# Patient Record
Sex: MALE | Race: OTHER | Employment: FULL TIME | URBAN - METROPOLITAN AREA
[De-identification: names, ages, dates, MRNs, and addresses within clinical notes are randomized per-mention and may not be internally consistent; named-entity substitution may affect disease eponyms.]

---

## 2022-01-01 ENCOUNTER — HOSPITAL ENCOUNTER (INPATIENT)
Age: 62
LOS: 14 days | DRG: 870 | End: 2022-09-14
Attending: EMERGENCY MEDICINE | Admitting: FAMILY MEDICINE
Payer: COMMERCIAL

## 2022-01-01 ENCOUNTER — APPOINTMENT (OUTPATIENT)
Dept: CT IMAGING | Age: 62
DRG: 870 | End: 2022-01-01
Attending: EMERGENCY MEDICINE
Payer: COMMERCIAL

## 2022-01-01 ENCOUNTER — APPOINTMENT (OUTPATIENT)
Dept: GENERAL RADIOLOGY | Age: 62
DRG: 870 | End: 2022-01-01
Attending: INTERNAL MEDICINE
Payer: COMMERCIAL

## 2022-01-01 ENCOUNTER — APPOINTMENT (OUTPATIENT)
Dept: GENERAL RADIOLOGY | Age: 62
DRG: 870 | End: 2022-01-01
Attending: EMERGENCY MEDICINE
Payer: COMMERCIAL

## 2022-01-01 ENCOUNTER — APPOINTMENT (OUTPATIENT)
Dept: VASCULAR SURGERY | Age: 62
DRG: 870 | End: 2022-01-01
Attending: INTERNAL MEDICINE
Payer: COMMERCIAL

## 2022-01-01 ENCOUNTER — APPOINTMENT (OUTPATIENT)
Dept: MRI IMAGING | Age: 62
DRG: 870 | End: 2022-01-01
Attending: INTERNAL MEDICINE
Payer: COMMERCIAL

## 2022-01-01 ENCOUNTER — APPOINTMENT (OUTPATIENT)
Dept: NON INVASIVE DIAGNOSTICS | Age: 62
DRG: 870 | End: 2022-01-01
Attending: INTERNAL MEDICINE
Payer: COMMERCIAL

## 2022-01-01 ENCOUNTER — HOSPICE ADMISSION (OUTPATIENT)
Dept: HOSPICE | Facility: HOSPICE | Age: 62
End: 2022-01-01

## 2022-01-01 ENCOUNTER — APPOINTMENT (OUTPATIENT)
Dept: VASCULAR SURGERY | Age: 62
DRG: 870 | End: 2022-01-01
Attending: EMERGENCY MEDICINE
Payer: COMMERCIAL

## 2022-01-01 ENCOUNTER — APPOINTMENT (OUTPATIENT)
Dept: NON INVASIVE DIAGNOSTICS | Age: 62
DRG: 870 | End: 2022-01-01
Attending: EMERGENCY MEDICINE
Payer: COMMERCIAL

## 2022-01-01 ENCOUNTER — APPOINTMENT (OUTPATIENT)
Dept: CT IMAGING | Age: 62
DRG: 870 | End: 2022-01-01
Attending: INTERNAL MEDICINE
Payer: COMMERCIAL

## 2022-01-01 ENCOUNTER — APPOINTMENT (OUTPATIENT)
Dept: ULTRASOUND IMAGING | Age: 62
DRG: 870 | End: 2022-01-01
Attending: HOSPITALIST
Payer: COMMERCIAL

## 2022-01-01 VITALS
SYSTOLIC BLOOD PRESSURE: 126 MMHG | BODY MASS INDEX: 29.04 KG/M2 | WEIGHT: 202.82 LBS | HEIGHT: 70 IN | HEART RATE: 113 BPM | OXYGEN SATURATION: 94 % | DIASTOLIC BLOOD PRESSURE: 75 MMHG | TEMPERATURE: 100.3 F | RESPIRATION RATE: 21 BRPM

## 2022-01-01 DIAGNOSIS — I46.9 CARDIAC ARREST (HCC): Primary | ICD-10-CM

## 2022-01-01 DIAGNOSIS — N17.9 ACUTE RENAL FAILURE, UNSPECIFIED ACUTE RENAL FAILURE TYPE (HCC): ICD-10-CM

## 2022-01-01 DIAGNOSIS — R57.9 SHOCK CIRCULATORY (HCC): ICD-10-CM

## 2022-01-01 DIAGNOSIS — R33.9 URINARY RETENTION: ICD-10-CM

## 2022-01-01 LAB
ABO + RH BLD: NORMAL
ALBUMIN SERPL-MCNC: 1.7 G/DL (ref 3.4–5)
ALBUMIN SERPL-MCNC: 1.9 G/DL (ref 3.4–5)
ALBUMIN SERPL-MCNC: 2 G/DL (ref 3.4–5)
ALBUMIN SERPL-MCNC: 2.2 G/DL (ref 3.4–5)
ALBUMIN SERPL-MCNC: 2.3 G/DL (ref 3.4–5)
ALBUMIN SERPL-MCNC: 2.6 G/DL (ref 3.4–5)
ALBUMIN/GLOB SERPL: 0.4 {RATIO} (ref 0.8–1.7)
ALBUMIN/GLOB SERPL: 0.4 {RATIO} (ref 0.8–1.7)
ALBUMIN/GLOB SERPL: 0.5 {RATIO} (ref 0.8–1.7)
ALBUMIN/GLOB SERPL: 0.5 {RATIO} (ref 0.8–1.7)
ALBUMIN/GLOB SERPL: 0.6 {RATIO} (ref 0.8–1.7)
ALP SERPL-CCNC: 103 U/L (ref 45–117)
ALP SERPL-CCNC: 111 U/L (ref 45–117)
ALP SERPL-CCNC: 112 U/L (ref 45–117)
ALP SERPL-CCNC: 188 U/L (ref 45–117)
ALP SERPL-CCNC: 98 U/L (ref 45–117)
ALT SERPL-CCNC: 100 U/L (ref 16–61)
ALT SERPL-CCNC: 142 U/L (ref 16–61)
ALT SERPL-CCNC: 238 U/L (ref 16–61)
ALT SERPL-CCNC: 313 U/L (ref 16–61)
ALT SERPL-CCNC: 55 U/L (ref 16–61)
AMMONIA PLAS-SCNC: 18 UMOL/L (ref 11–32)
AMORPH CRY URNS QL MICRO: ABNORMAL
AMPHET UR QL SCN: NEGATIVE
ANION GAP BLD CALC-SCNC: 19 MMOL/L (ref 10–20)
ANION GAP SERPL CALC-SCNC: 10 MMOL/L (ref 3–18)
ANION GAP SERPL CALC-SCNC: 14 MMOL/L (ref 3–18)
ANION GAP SERPL CALC-SCNC: 15 MMOL/L (ref 3–18)
ANION GAP SERPL CALC-SCNC: 18 MMOL/L (ref 3–18)
ANION GAP SERPL CALC-SCNC: 4 MMOL/L (ref 3–18)
ANION GAP SERPL CALC-SCNC: 4 MMOL/L (ref 3–18)
ANION GAP SERPL CALC-SCNC: 5 MMOL/L (ref 3–18)
ANION GAP SERPL CALC-SCNC: 6 MMOL/L (ref 3–18)
ANION GAP SERPL CALC-SCNC: 6 MMOL/L (ref 3–18)
ANION GAP SERPL CALC-SCNC: 7 MMOL/L (ref 3–18)
ANION GAP SERPL CALC-SCNC: 8 MMOL/L (ref 3–18)
APPEARANCE UR: ABNORMAL
APPEARANCE UR: ABNORMAL
APTT PPP: 106.6 SEC (ref 23–36.4)
APTT PPP: 131 SEC (ref 23–36.4)
APTT PPP: 25.5 SEC (ref 23–36.4)
APTT PPP: 26 SEC (ref 23–36.4)
APTT PPP: 26.2 SEC (ref 23–36.4)
APTT PPP: 27.9 SEC (ref 23–36.4)
APTT PPP: 28.9 SEC (ref 23–36.4)
APTT PPP: 29.5 SEC (ref 23–36.4)
APTT PPP: 35.6 SEC (ref 23–36.4)
APTT PPP: >180 SEC (ref 23–36.4)
ARTERIAL PATENCY WRIST A: ABNORMAL
ARTERIAL PATENCY WRIST A: POSITIVE
AST SERPL-CCNC: 103 U/L (ref 10–38)
AST SERPL-CCNC: 111 U/L (ref 10–38)
AST SERPL-CCNC: 128 U/L (ref 10–38)
AST SERPL-CCNC: 275 U/L (ref 10–38)
AST SERPL-CCNC: 451 U/L (ref 10–38)
ATRIAL RATE: 112 BPM
BACTERIA SPEC CULT: NORMAL
BACTERIA SPEC CULT: NORMAL
BACTERIA URNS QL MICRO: ABNORMAL /HPF
BACTERIA URNS QL MICRO: ABNORMAL /HPF
BARBITURATES UR QL SCN: NEGATIVE
BASE DEFICIT BLD-SCNC: 1 MMOL/L
BASE DEFICIT BLD-SCNC: 4.7 MMOL/L
BASE DEFICIT BLD-SCNC: 5.1 MMOL/L
BASE DEFICIT BLD-SCNC: 8.7 MMOL/L
BASE DEFICIT BLD-SCNC: 8.8 MMOL/L
BASE EXCESS BLD CALC-SCNC: 0.8 MMOL/L
BASE EXCESS BLD CALC-SCNC: 1.7 MMOL/L
BASOPHILS # BLD: 0 K/UL (ref 0–0.1)
BASOPHILS # BLD: 0.1 K/UL (ref 0–0.1)
BASOPHILS NFR BLD: 0 % (ref 0–2)
BASOPHILS NFR BLD: 1 % (ref 0–2)
BDY SITE: ABNORMAL
BENZODIAZ UR QL: NEGATIVE
BILIRUB DIRECT SERPL-MCNC: 0.2 MG/DL (ref 0–0.2)
BILIRUB SERPL-MCNC: 0.4 MG/DL (ref 0.2–1)
BILIRUB SERPL-MCNC: 0.4 MG/DL (ref 0.2–1)
BILIRUB SERPL-MCNC: 0.5 MG/DL (ref 0.2–1)
BILIRUB SERPL-MCNC: 0.6 MG/DL (ref 0.2–1)
BILIRUB SERPL-MCNC: 0.6 MG/DL (ref 0.2–1)
BILIRUB UR QL: NEGATIVE
BILIRUB UR QL: NEGATIVE
BLOOD GROUP ANTIBODIES SERPL: NORMAL
BNP SERPL-MCNC: 142 PG/ML (ref 0–900)
BNP SERPL-MCNC: 262 PG/ML (ref 0–900)
BODY TEMPERATURE: 98
BODY TEMPERATURE: 99
BUN SERPL-MCNC: 30 MG/DL (ref 7–18)
BUN SERPL-MCNC: 31 MG/DL (ref 7–18)
BUN SERPL-MCNC: 31 MG/DL (ref 7–18)
BUN SERPL-MCNC: 32 MG/DL (ref 7–18)
BUN SERPL-MCNC: 32 MG/DL (ref 7–18)
BUN SERPL-MCNC: 34 MG/DL (ref 7–18)
BUN SERPL-MCNC: 39 MG/DL (ref 7–18)
BUN SERPL-MCNC: 40 MG/DL (ref 7–18)
BUN SERPL-MCNC: 42 MG/DL (ref 7–18)
BUN SERPL-MCNC: 43 MG/DL (ref 7–18)
BUN SERPL-MCNC: 48 MG/DL (ref 7–18)
BUN SERPL-MCNC: 48 MG/DL (ref 7–18)
BUN SERPL-MCNC: 50 MG/DL (ref 7–18)
BUN SERPL-MCNC: 51 MG/DL (ref 7–18)
BUN SERPL-MCNC: 52 MG/DL (ref 7–18)
BUN/CREAT SERPL: 12 (ref 12–20)
BUN/CREAT SERPL: 13 (ref 12–20)
BUN/CREAT SERPL: 13 (ref 12–20)
BUN/CREAT SERPL: 14 (ref 12–20)
BUN/CREAT SERPL: 15 (ref 12–20)
BUN/CREAT SERPL: 16 (ref 12–20)
BUN/CREAT SERPL: 17 (ref 12–20)
BUN/CREAT SERPL: 18 (ref 12–20)
BUN/CREAT SERPL: 18 (ref 12–20)
BUN/CREAT SERPL: 21 (ref 12–20)
CA-I BLD-MCNC: 1.03 MMOL/L (ref 1.12–1.32)
CALCIUM SERPL-MCNC: 7.7 MG/DL (ref 8.5–10.1)
CALCIUM SERPL-MCNC: 7.7 MG/DL (ref 8.5–10.1)
CALCIUM SERPL-MCNC: 7.8 MG/DL (ref 8.5–10.1)
CALCIUM SERPL-MCNC: 8 MG/DL (ref 8.5–10.1)
CALCIUM SERPL-MCNC: 8.1 MG/DL (ref 8.5–10.1)
CALCIUM SERPL-MCNC: 8.1 MG/DL (ref 8.5–10.1)
CALCIUM SERPL-MCNC: 8.3 MG/DL (ref 8.5–10.1)
CALCIUM SERPL-MCNC: 8.5 MG/DL (ref 8.5–10.1)
CALCIUM SERPL-MCNC: 8.5 MG/DL (ref 8.5–10.1)
CALCIUM SERPL-MCNC: 8.7 MG/DL (ref 8.5–10.1)
CALCIUM SERPL-MCNC: 8.9 MG/DL (ref 8.5–10.1)
CALCIUM SERPL-MCNC: 9 MG/DL (ref 8.5–10.1)
CALCIUM SERPL-MCNC: 9 MG/DL (ref 8.5–10.1)
CALCULATED P AXIS, ECG09: 73 DEGREES
CALCULATED R AXIS, ECG10: -69 DEGREES
CALCULATED T AXIS, ECG11: 51 DEGREES
CANNABINOIDS UR QL SCN: NEGATIVE
CHLORIDE BLD-SCNC: 110 MMOL/L (ref 98–107)
CHLORIDE SERPL-SCNC: 103 MMOL/L (ref 100–111)
CHLORIDE SERPL-SCNC: 104 MMOL/L (ref 100–111)
CHLORIDE SERPL-SCNC: 106 MMOL/L (ref 100–111)
CHLORIDE SERPL-SCNC: 107 MMOL/L (ref 100–111)
CHLORIDE SERPL-SCNC: 107 MMOL/L (ref 100–111)
CHLORIDE SERPL-SCNC: 108 MMOL/L (ref 100–111)
CHLORIDE SERPL-SCNC: 108 MMOL/L (ref 100–111)
CHLORIDE SERPL-SCNC: 109 MMOL/L (ref 100–111)
CHLORIDE SERPL-SCNC: 113 MMOL/L (ref 100–111)
CHLORIDE SERPL-SCNC: 114 MMOL/L (ref 100–111)
CHLORIDE SERPL-SCNC: 115 MMOL/L (ref 100–111)
CHLORIDE SERPL-SCNC: 118 MMOL/L (ref 100–111)
CHLORIDE SERPL-SCNC: 119 MMOL/L (ref 100–111)
CHLORIDE SERPL-SCNC: 119 MMOL/L (ref 100–111)
CHLORIDE SERPL-SCNC: 120 MMOL/L (ref 100–111)
CO2 BLD-SCNC: 18 MMOL/L (ref 19–24)
CO2 SERPL-SCNC: 17 MMOL/L (ref 21–32)
CO2 SERPL-SCNC: 19 MMOL/L (ref 21–32)
CO2 SERPL-SCNC: 20 MMOL/L (ref 21–32)
CO2 SERPL-SCNC: 21 MMOL/L (ref 21–32)
CO2 SERPL-SCNC: 25 MMOL/L (ref 21–32)
CO2 SERPL-SCNC: 27 MMOL/L (ref 21–32)
CO2 SERPL-SCNC: 28 MMOL/L (ref 21–32)
CO2 SERPL-SCNC: 29 MMOL/L (ref 21–32)
CO2 SERPL-SCNC: 30 MMOL/L (ref 21–32)
CO2 SERPL-SCNC: 30 MMOL/L (ref 21–32)
CO2 SERPL-SCNC: 31 MMOL/L (ref 21–32)
COCAINE UR QL SCN: NEGATIVE
COLOR UR: YELLOW
COLOR UR: YELLOW
COVID-19 RAPID TEST, COVR: NOT DETECTED
CREAT BLD-MCNC: 2.8 MG/DL (ref 0.6–1.3)
CREAT SERPL-MCNC: 2.11 MG/DL (ref 0.6–1.3)
CREAT SERPL-MCNC: 2.13 MG/DL (ref 0.6–1.3)
CREAT SERPL-MCNC: 2.25 MG/DL (ref 0.6–1.3)
CREAT SERPL-MCNC: 2.26 MG/DL (ref 0.6–1.3)
CREAT SERPL-MCNC: 2.27 MG/DL (ref 0.6–1.3)
CREAT SERPL-MCNC: 2.36 MG/DL (ref 0.6–1.3)
CREAT SERPL-MCNC: 2.38 MG/DL (ref 0.6–1.3)
CREAT SERPL-MCNC: 2.53 MG/DL (ref 0.6–1.3)
CREAT SERPL-MCNC: 2.6 MG/DL (ref 0.6–1.3)
CREAT SERPL-MCNC: 2.68 MG/DL (ref 0.6–1.3)
CREAT SERPL-MCNC: 2.91 MG/DL (ref 0.6–1.3)
CREAT SERPL-MCNC: 3.01 MG/DL (ref 0.6–1.3)
CREAT SERPL-MCNC: 3.03 MG/DL (ref 0.6–1.3)
CREAT SERPL-MCNC: 3.13 MG/DL (ref 0.6–1.3)
CREAT SERPL-MCNC: 3.16 MG/DL (ref 0.6–1.3)
DIAGNOSIS, 93000: NORMAL
DIFFERENTIAL METHOD BLD: ABNORMAL
ECHO AO ASC DIAM: 3.7 CM
ECHO AO ASCENDING AORTA INDEX: 1.62 CM/M2
ECHO AO ROOT DIAM: 3.8 CM
ECHO AO ROOT INDEX: 1.67 CM/M2
ECHO EST RA PRESSURE: 3 MMHG
ECHO LA DIAMETER INDEX: 1.1 CM/M2
ECHO LA DIAMETER: 2.5 CM
ECHO LA TO AORTIC ROOT RATIO: 0.66
ECHO LV FRACTIONAL SHORTENING: 42 % (ref 28–44)
ECHO LV INTERNAL DIMENSION DIASTOLE INDEX: 1.67 CM/M2
ECHO LV INTERNAL DIMENSION DIASTOLIC: 3.8 CM (ref 4.2–5.9)
ECHO LV INTERNAL DIMENSION SYSTOLIC INDEX: 0.96 CM/M2
ECHO LV INTERNAL DIMENSION SYSTOLIC: 2.2 CM
ECHO LV IVSD: 0.9 CM (ref 0.6–1)
ECHO LV MASS 2D: 109 G (ref 88–224)
ECHO LV MASS INDEX 2D: 47.8 G/M2 (ref 49–115)
ECHO LV POSTERIOR WALL DIASTOLIC: 1 CM (ref 0.6–1)
ECHO LV RELATIVE WALL THICKNESS RATIO: 0.53
ECHO LVOT AREA: 4.2 CM2
ECHO LVOT DIAM: 2.3 CM
ECHO MV A VELOCITY: 0.41 M/S
ECHO MV E DECELERATION TIME (DT): 131 MS
ECHO MV E VELOCITY: 0.33 M/S
ECHO MV E/A RATIO: 0.8
ECHO RIGHT VENTRICULAR SYSTOLIC PRESSURE (RVSP): 25 MMHG
ECHO RV FREE WALL PEAK S': 13 CM/S
ECHO RV INTERNAL DIMENSION: 4.7 CM
ECHO RV TAPSE: 1.3 CM (ref 1.7–?)
ECHO RVOT MEAN GRADIENT: 0 MMHG
ECHO RVOT PEAK GRADIENT: 1 MMHG
ECHO RVOT PEAK VELOCITY: 0.4 M/S
ECHO RVOT VTI: 5.9 CM
ECHO TV REGURGITANT MAX VELOCITY: 2.32 M/S
ECHO TV REGURGITANT PEAK GRADIENT: 22 MMHG
EOSINOPHIL # BLD: 0 K/UL (ref 0–0.4)
EOSINOPHIL # BLD: 0.1 K/UL (ref 0–0.4)
EOSINOPHIL # BLD: 0.1 K/UL (ref 0–0.4)
EOSINOPHIL # BLD: 0.2 K/UL (ref 0–0.4)
EOSINOPHIL # BLD: 0.3 K/UL (ref 0–0.4)
EOSINOPHIL # BLD: 0.4 K/UL (ref 0–0.4)
EOSINOPHIL # BLD: 0.4 K/UL (ref 0–0.4)
EOSINOPHIL NFR BLD: 0 % (ref 0–5)
EOSINOPHIL NFR BLD: 1 % (ref 0–5)
EOSINOPHIL NFR BLD: 1 % (ref 0–5)
EOSINOPHIL NFR BLD: 2 % (ref 0–5)
EOSINOPHIL NFR BLD: 3 % (ref 0–5)
EOSINOPHIL NFR BLD: 3 % (ref 0–5)
EOSINOPHIL NFR BLD: 4 % (ref 0–5)
EOSINOPHIL NFR BLD: 5 % (ref 0–5)
EPITH CASTS URNS QL MICRO: ABNORMAL /LPF (ref 0–5)
EPITH CASTS URNS QL MICRO: NEGATIVE /LPF (ref 0–5)
ERYTHROCYTE [DISTWIDTH] IN BLOOD BY AUTOMATED COUNT: 15.4 % (ref 11.6–14.5)
ERYTHROCYTE [DISTWIDTH] IN BLOOD BY AUTOMATED COUNT: 15.4 % (ref 11.6–14.5)
ERYTHROCYTE [DISTWIDTH] IN BLOOD BY AUTOMATED COUNT: 15.6 % (ref 11.6–14.5)
ERYTHROCYTE [DISTWIDTH] IN BLOOD BY AUTOMATED COUNT: 15.9 % (ref 11.6–14.5)
ERYTHROCYTE [DISTWIDTH] IN BLOOD BY AUTOMATED COUNT: 16 % (ref 11.6–14.5)
ERYTHROCYTE [DISTWIDTH] IN BLOOD BY AUTOMATED COUNT: 16.1 % (ref 11.6–14.5)
ERYTHROCYTE [DISTWIDTH] IN BLOOD BY AUTOMATED COUNT: 16.2 % (ref 11.6–14.5)
ERYTHROCYTE [DISTWIDTH] IN BLOOD BY AUTOMATED COUNT: 16.4 % (ref 11.6–14.5)
ERYTHROCYTE [DISTWIDTH] IN BLOOD BY AUTOMATED COUNT: 16.5 % (ref 11.6–14.5)
ERYTHROCYTE [DISTWIDTH] IN BLOOD BY AUTOMATED COUNT: 16.7 % (ref 11.6–14.5)
ERYTHROCYTE [DISTWIDTH] IN BLOOD BY AUTOMATED COUNT: 16.8 % (ref 11.6–14.5)
ERYTHROCYTE [DISTWIDTH] IN BLOOD BY AUTOMATED COUNT: 16.8 % (ref 11.6–14.5)
ERYTHROCYTE [DISTWIDTH] IN BLOOD BY AUTOMATED COUNT: 17 % (ref 11.6–14.5)
EST. AVERAGE GLUCOSE BLD GHB EST-MCNC: 206 MG/DL
ETHANOL SERPL-MCNC: <3 MG/DL (ref 0–3)
FIO2 ON VENT: 100 %
GAS FLOW.O2 O2 DELIVERY SYS: ABNORMAL L/MIN
GAS FLOW.O2 SETTING OXYMISER: 14 BPM
GAS FLOW.O2 SETTING OXYMISER: 16 BPM
GLOBULIN SER CALC-MCNC: 3.7 G/DL (ref 2–4)
GLOBULIN SER CALC-MCNC: 3.7 G/DL (ref 2–4)
GLOBULIN SER CALC-MCNC: 4.1 G/DL (ref 2–4)
GLOBULIN SER CALC-MCNC: 4.1 G/DL (ref 2–4)
GLOBULIN SER CALC-MCNC: 4.4 G/DL (ref 2–4)
GLUCOSE BLD STRIP.AUTO-MCNC: 102 MG/DL (ref 70–110)
GLUCOSE BLD STRIP.AUTO-MCNC: 117 MG/DL (ref 70–110)
GLUCOSE BLD STRIP.AUTO-MCNC: 120 MG/DL (ref 70–110)
GLUCOSE BLD STRIP.AUTO-MCNC: 134 MG/DL (ref 70–110)
GLUCOSE BLD STRIP.AUTO-MCNC: 136 MG/DL (ref 70–110)
GLUCOSE BLD STRIP.AUTO-MCNC: 139 MG/DL (ref 70–110)
GLUCOSE BLD STRIP.AUTO-MCNC: 176 MG/DL (ref 70–110)
GLUCOSE BLD STRIP.AUTO-MCNC: 176 MG/DL (ref 70–110)
GLUCOSE BLD STRIP.AUTO-MCNC: 183 MG/DL (ref 70–110)
GLUCOSE BLD STRIP.AUTO-MCNC: 188 MG/DL (ref 70–110)
GLUCOSE BLD STRIP.AUTO-MCNC: 190 MG/DL (ref 70–110)
GLUCOSE BLD STRIP.AUTO-MCNC: 191 MG/DL (ref 70–110)
GLUCOSE BLD STRIP.AUTO-MCNC: 191 MG/DL (ref 70–110)
GLUCOSE BLD STRIP.AUTO-MCNC: 192 MG/DL (ref 70–110)
GLUCOSE BLD STRIP.AUTO-MCNC: 193 MG/DL (ref 70–110)
GLUCOSE BLD STRIP.AUTO-MCNC: 197 MG/DL (ref 70–110)
GLUCOSE BLD STRIP.AUTO-MCNC: 198 MG/DL (ref 70–110)
GLUCOSE BLD STRIP.AUTO-MCNC: 202 MG/DL (ref 70–110)
GLUCOSE BLD STRIP.AUTO-MCNC: 204 MG/DL (ref 70–110)
GLUCOSE BLD STRIP.AUTO-MCNC: 204 MG/DL (ref 70–110)
GLUCOSE BLD STRIP.AUTO-MCNC: 205 MG/DL (ref 70–110)
GLUCOSE BLD STRIP.AUTO-MCNC: 208 MG/DL (ref 70–110)
GLUCOSE BLD STRIP.AUTO-MCNC: 209 MG/DL (ref 70–110)
GLUCOSE BLD STRIP.AUTO-MCNC: 213 MG/DL (ref 70–110)
GLUCOSE BLD STRIP.AUTO-MCNC: 219 MG/DL (ref 70–110)
GLUCOSE BLD STRIP.AUTO-MCNC: 220 MG/DL (ref 70–110)
GLUCOSE BLD STRIP.AUTO-MCNC: 221 MG/DL (ref 70–110)
GLUCOSE BLD STRIP.AUTO-MCNC: 222 MG/DL (ref 70–110)
GLUCOSE BLD STRIP.AUTO-MCNC: 224 MG/DL (ref 70–110)
GLUCOSE BLD STRIP.AUTO-MCNC: 225 MG/DL (ref 70–110)
GLUCOSE BLD STRIP.AUTO-MCNC: 226 MG/DL (ref 70–110)
GLUCOSE BLD STRIP.AUTO-MCNC: 230 MG/DL (ref 70–110)
GLUCOSE BLD STRIP.AUTO-MCNC: 236 MG/DL (ref 70–110)
GLUCOSE BLD STRIP.AUTO-MCNC: 240 MG/DL (ref 70–110)
GLUCOSE BLD STRIP.AUTO-MCNC: 241 MG/DL (ref 70–110)
GLUCOSE BLD STRIP.AUTO-MCNC: 243 MG/DL (ref 70–110)
GLUCOSE BLD STRIP.AUTO-MCNC: 243 MG/DL (ref 70–110)
GLUCOSE BLD STRIP.AUTO-MCNC: 247 MG/DL (ref 70–110)
GLUCOSE BLD STRIP.AUTO-MCNC: 247 MG/DL (ref 70–110)
GLUCOSE BLD STRIP.AUTO-MCNC: 256 MG/DL (ref 70–110)
GLUCOSE BLD STRIP.AUTO-MCNC: 260 MG/DL (ref 70–110)
GLUCOSE BLD STRIP.AUTO-MCNC: 275 MG/DL (ref 70–110)
GLUCOSE BLD STRIP.AUTO-MCNC: 306 MG/DL (ref 70–110)
GLUCOSE BLD-MCNC: 281 MG/DL (ref 65–100)
GLUCOSE SERPL-MCNC: 140 MG/DL (ref 74–99)
GLUCOSE SERPL-MCNC: 156 MG/DL (ref 74–99)
GLUCOSE SERPL-MCNC: 184 MG/DL (ref 74–99)
GLUCOSE SERPL-MCNC: 184 MG/DL (ref 74–99)
GLUCOSE SERPL-MCNC: 188 MG/DL (ref 74–99)
GLUCOSE SERPL-MCNC: 190 MG/DL (ref 74–99)
GLUCOSE SERPL-MCNC: 193 MG/DL (ref 74–99)
GLUCOSE SERPL-MCNC: 211 MG/DL (ref 74–99)
GLUCOSE SERPL-MCNC: 212 MG/DL (ref 74–99)
GLUCOSE SERPL-MCNC: 223 MG/DL (ref 74–99)
GLUCOSE SERPL-MCNC: 226 MG/DL (ref 74–99)
GLUCOSE SERPL-MCNC: 232 MG/DL (ref 74–99)
GLUCOSE SERPL-MCNC: 238 MG/DL (ref 74–99)
GLUCOSE SERPL-MCNC: 325 MG/DL (ref 74–99)
GLUCOSE SERPL-MCNC: 340 MG/DL (ref 74–99)
GLUCOSE UR STRIP.AUTO-MCNC: >1000 MG/DL
GLUCOSE UR STRIP.AUTO-MCNC: >1000 MG/DL
GRAN CASTS URNS QL MICRO: ABNORMAL /LPF
HBA1C MFR BLD: 8.8 % (ref 4.2–5.6)
HCO3 BLD-SCNC: 14.1 MMOL/L (ref 22–26)
HCO3 BLD-SCNC: 17.7 MMOL/L (ref 22–26)
HCO3 BLD-SCNC: 19.4 MMOL/L (ref 22–26)
HCO3 BLD-SCNC: 19.9 MMOL/L (ref 22–26)
HCO3 BLD-SCNC: 23.1 MMOL/L (ref 22–26)
HCO3 BLD-SCNC: 25 MMOL/L (ref 22–26)
HCO3 BLD-SCNC: 25.8 MMOL/L (ref 22–26)
HCT VFR BLD AUTO: 24.4 % (ref 36–48)
HCT VFR BLD AUTO: 24.5 % (ref 36–48)
HCT VFR BLD AUTO: 25.5 % (ref 36–48)
HCT VFR BLD AUTO: 25.6 % (ref 36–48)
HCT VFR BLD AUTO: 26.2 % (ref 36–48)
HCT VFR BLD AUTO: 26.4 % (ref 36–48)
HCT VFR BLD AUTO: 28 % (ref 36–48)
HCT VFR BLD AUTO: 28.4 % (ref 36–48)
HCT VFR BLD AUTO: 28.5 % (ref 36–48)
HCT VFR BLD AUTO: 29.5 % (ref 36–48)
HCT VFR BLD AUTO: 32.9 % (ref 36–48)
HCT VFR BLD AUTO: 33.8 % (ref 36–48)
HCT VFR BLD AUTO: 42.3 % (ref 36–48)
HDSCOM,HDSCOM: NORMAL
HGB BLD-MCNC: 10.5 G/DL (ref 13–16)
HGB BLD-MCNC: 10.7 G/DL (ref 13–16)
HGB BLD-MCNC: 13.1 G/DL (ref 13–16)
HGB BLD-MCNC: 7.7 G/DL (ref 13–16)
HGB BLD-MCNC: 7.9 G/DL (ref 13–16)
HGB BLD-MCNC: 8 G/DL (ref 13–16)
HGB BLD-MCNC: 8.1 G/DL (ref 13–16)
HGB BLD-MCNC: 8.2 G/DL (ref 13–16)
HGB BLD-MCNC: 8.5 G/DL (ref 13–16)
HGB BLD-MCNC: 9 G/DL (ref 13–16)
HGB BLD-MCNC: 9.1 G/DL (ref 13–16)
HGB BLD-MCNC: 9.1 G/DL (ref 13–16)
HGB BLD-MCNC: 9.4 G/DL (ref 13–16)
HGB UR QL STRIP: ABNORMAL
HGB UR QL STRIP: ABNORMAL
IMM GRANULOCYTES # BLD AUTO: 0 K/UL
IMM GRANULOCYTES # BLD AUTO: 0 K/UL
IMM GRANULOCYTES # BLD AUTO: 0.1 K/UL (ref 0–0.04)
IMM GRANULOCYTES # BLD AUTO: 0.2 K/UL (ref 0–0.04)
IMM GRANULOCYTES NFR BLD AUTO: 0 %
IMM GRANULOCYTES NFR BLD AUTO: 0 %
IMM GRANULOCYTES NFR BLD AUTO: 1 % (ref 0–0.5)
IMM GRANULOCYTES NFR BLD AUTO: 2 % (ref 0–0.5)
IMM GRANULOCYTES NFR BLD AUTO: 3 % (ref 0–0.5)
INR PPP: 1 (ref 0.8–1.2)
INR PPP: 1.1 (ref 0.8–1.2)
INR PPP: 1.5 (ref 0.8–1.2)
INSPIRATION.DURATION SETTING TIME VENT: 0.9 SEC
KETONES UR QL STRIP.AUTO: 15 MG/DL
KETONES UR QL STRIP.AUTO: NEGATIVE MG/DL
LACTATE BLD-SCNC: 10.02 MMOL/L (ref 0.4–2)
LACTATE SERPL-SCNC: 1.8 MMOL/L (ref 0.4–2)
LEUKOCYTE ESTERASE UR QL STRIP.AUTO: ABNORMAL
LEUKOCYTE ESTERASE UR QL STRIP.AUTO: ABNORMAL
LYMPHOCYTES # BLD: 1 K/UL (ref 0.9–3.6)
LYMPHOCYTES # BLD: 1 K/UL (ref 0.9–3.6)
LYMPHOCYTES # BLD: 1.1 K/UL (ref 0.9–3.6)
LYMPHOCYTES # BLD: 1.2 K/UL (ref 0.9–3.6)
LYMPHOCYTES # BLD: 1.2 K/UL (ref 0.9–3.6)
LYMPHOCYTES # BLD: 1.3 K/UL (ref 0.9–3.6)
LYMPHOCYTES # BLD: 1.5 K/UL (ref 0.9–3.6)
LYMPHOCYTES # BLD: 1.6 K/UL (ref 0.9–3.6)
LYMPHOCYTES # BLD: 2 K/UL (ref 0.9–3.6)
LYMPHOCYTES NFR BLD: 12 % (ref 21–52)
LYMPHOCYTES NFR BLD: 12 % (ref 21–52)
LYMPHOCYTES NFR BLD: 14 % (ref 21–52)
LYMPHOCYTES NFR BLD: 15 % (ref 21–52)
LYMPHOCYTES NFR BLD: 16 % (ref 21–52)
LYMPHOCYTES NFR BLD: 17 % (ref 21–52)
LYMPHOCYTES NFR BLD: 18 % (ref 21–52)
LYMPHOCYTES NFR BLD: 19 % (ref 21–52)
LYMPHOCYTES NFR BLD: 20 % (ref 21–52)
LYMPHOCYTES NFR BLD: 21 % (ref 21–52)
LYMPHOCYTES NFR BLD: 6 % (ref 21–52)
LYMPHOCYTES NFR BLD: 8 % (ref 21–52)
MAGNESIUM SERPL-MCNC: 2 MG/DL (ref 1.6–2.6)
MAGNESIUM SERPL-MCNC: 2.2 MG/DL (ref 1.6–2.6)
MAGNESIUM SERPL-MCNC: 2.2 MG/DL (ref 1.6–2.6)
MAGNESIUM SERPL-MCNC: 2.3 MG/DL (ref 1.6–2.6)
MAGNESIUM SERPL-MCNC: 2.4 MG/DL (ref 1.6–2.6)
MAGNESIUM SERPL-MCNC: 2.4 MG/DL (ref 1.6–2.6)
MAGNESIUM SERPL-MCNC: 2.5 MG/DL (ref 1.6–2.6)
MAGNESIUM SERPL-MCNC: 2.6 MG/DL (ref 1.6–2.6)
MAGNESIUM SERPL-MCNC: 2.7 MG/DL (ref 1.6–2.6)
MCH RBC QN AUTO: 27.9 PG (ref 24–34)
MCH RBC QN AUTO: 28.2 PG (ref 24–34)
MCH RBC QN AUTO: 28.3 PG (ref 24–34)
MCH RBC QN AUTO: 28.4 PG (ref 24–34)
MCH RBC QN AUTO: 28.4 PG (ref 24–34)
MCH RBC QN AUTO: 28.5 PG (ref 24–34)
MCH RBC QN AUTO: 28.5 PG (ref 24–34)
MCH RBC QN AUTO: 28.6 PG (ref 24–34)
MCH RBC QN AUTO: 28.7 PG (ref 24–34)
MCH RBC QN AUTO: 28.8 PG (ref 24–34)
MCH RBC QN AUTO: 29 PG (ref 24–34)
MCH RBC QN AUTO: 29.2 PG (ref 24–34)
MCH RBC QN AUTO: 29.2 PG (ref 24–34)
MCHC RBC AUTO-ENTMCNC: 31 G/DL (ref 31–37)
MCHC RBC AUTO-ENTMCNC: 31.1 G/DL (ref 31–37)
MCHC RBC AUTO-ENTMCNC: 31.3 G/DL (ref 31–37)
MCHC RBC AUTO-ENTMCNC: 31.3 G/DL (ref 31–37)
MCHC RBC AUTO-ENTMCNC: 31.6 G/DL (ref 31–37)
MCHC RBC AUTO-ENTMCNC: 31.8 G/DL (ref 31–37)
MCHC RBC AUTO-ENTMCNC: 31.9 G/DL (ref 31–37)
MCHC RBC AUTO-ENTMCNC: 31.9 G/DL (ref 31–37)
MCHC RBC AUTO-ENTMCNC: 32 G/DL (ref 31–37)
MCHC RBC AUTO-ENTMCNC: 32.1 G/DL (ref 31–37)
MCHC RBC AUTO-ENTMCNC: 32.2 G/DL (ref 31–37)
MCHC RBC AUTO-ENTMCNC: 32.2 G/DL (ref 31–37)
MCHC RBC AUTO-ENTMCNC: 32.5 G/DL (ref 31–37)
MCV RBC AUTO: 87 FL (ref 78–100)
MCV RBC AUTO: 88.3 FL (ref 78–100)
MCV RBC AUTO: 88.6 FL (ref 78–100)
MCV RBC AUTO: 88.8 FL (ref 78–100)
MCV RBC AUTO: 88.9 FL (ref 78–100)
MCV RBC AUTO: 90.2 FL (ref 78–100)
MCV RBC AUTO: 90.2 FL (ref 78–100)
MCV RBC AUTO: 90.4 FL (ref 78–100)
MCV RBC AUTO: 90.7 FL (ref 78–100)
MCV RBC AUTO: 91 FL (ref 78–100)
MCV RBC AUTO: 91.3 FL (ref 78–100)
MCV RBC AUTO: 92.1 FL (ref 78–100)
MCV RBC AUTO: 93.4 FL (ref 78–100)
METAMYELOCYTES NFR BLD MANUAL: 1 %
METHADONE UR QL: NEGATIVE
MONOCYTES # BLD: 0.6 K/UL (ref 0.05–1.2)
MONOCYTES # BLD: 0.7 K/UL (ref 0.05–1.2)
MONOCYTES # BLD: 0.8 K/UL (ref 0.05–1.2)
MONOCYTES # BLD: 1 K/UL (ref 0.05–1.2)
MONOCYTES # BLD: 1.2 K/UL (ref 0.05–1.2)
MONOCYTES # BLD: 1.4 K/UL (ref 0.05–1.2)
MONOCYTES NFR BLD: 10 % (ref 3–10)
MONOCYTES NFR BLD: 11 % (ref 3–10)
MONOCYTES NFR BLD: 6 % (ref 3–10)
MONOCYTES NFR BLD: 6 % (ref 3–10)
MONOCYTES NFR BLD: 7 % (ref 3–10)
MONOCYTES NFR BLD: 8 % (ref 3–10)
MONOCYTES NFR BLD: 9 % (ref 3–10)
MONOCYTES NFR BLD: 9 % (ref 3–10)
NEUTS BAND NFR BLD MANUAL: 2 % (ref 0–5)
NEUTS BAND NFR BLD MANUAL: 8 % (ref 0–5)
NEUTS SEG # BLD: 12.3 K/UL (ref 1.8–8)
NEUTS SEG # BLD: 14.6 K/UL (ref 1.8–8)
NEUTS SEG # BLD: 4.6 K/UL (ref 1.8–8)
NEUTS SEG # BLD: 4.7 K/UL (ref 1.8–8)
NEUTS SEG # BLD: 5.6 K/UL (ref 1.8–8)
NEUTS SEG # BLD: 6.1 K/UL (ref 1.8–8)
NEUTS SEG # BLD: 6.3 K/UL (ref 1.8–8)
NEUTS SEG # BLD: 6.5 K/UL (ref 1.8–8)
NEUTS SEG # BLD: 6.8 K/UL (ref 1.8–8)
NEUTS SEG # BLD: 6.9 K/UL (ref 1.8–8)
NEUTS SEG # BLD: 7.3 K/UL (ref 1.8–8)
NEUTS SEG # BLD: 7.3 K/UL (ref 1.8–8)
NEUTS SEG NFR BLD: 64 % (ref 40–73)
NEUTS SEG NFR BLD: 65 % (ref 40–73)
NEUTS SEG NFR BLD: 68 % (ref 40–73)
NEUTS SEG NFR BLD: 69 % (ref 40–73)
NEUTS SEG NFR BLD: 70 % (ref 40–73)
NEUTS SEG NFR BLD: 71 % (ref 40–73)
NEUTS SEG NFR BLD: 72 % (ref 40–73)
NEUTS SEG NFR BLD: 72 % (ref 40–73)
NEUTS SEG NFR BLD: 74 % (ref 40–73)
NEUTS SEG NFR BLD: 76 % (ref 40–73)
NEUTS SEG NFR BLD: 79 % (ref 40–73)
NEUTS SEG NFR BLD: 81 % (ref 40–73)
NITRITE UR QL STRIP.AUTO: NEGATIVE
NITRITE UR QL STRIP.AUTO: NEGATIVE
NRBC # BLD: 0 K/UL (ref 0–0.01)
NRBC # BLD: 0.02 K/UL (ref 0–0.01)
NRBC # BLD: 0.02 K/UL (ref 0–0.01)
NRBC # BLD: 0.05 K/UL (ref 0–0.01)
NRBC # BLD: 0.06 K/UL (ref 0–0.01)
NRBC # BLD: 0.08 K/UL (ref 0–0.01)
NRBC # BLD: 0.1 K/UL (ref 0–0.01)
NRBC # BLD: 0.11 K/UL (ref 0–0.01)
NRBC # BLD: 0.13 K/UL (ref 0–0.01)
NRBC # BLD: 0.15 K/UL (ref 0–0.01)
NRBC # BLD: 0.16 K/UL (ref 0–0.01)
NRBC BLD-RTO: 0 PER 100 WBC
NRBC BLD-RTO: 0.2 PER 100 WBC
NRBC BLD-RTO: 0.2 PER 100 WBC
NRBC BLD-RTO: 0.3 PER 100 WBC
NRBC BLD-RTO: 0.6 PER 100 WBC
NRBC BLD-RTO: 0.9 PER 100 WBC
NRBC BLD-RTO: 1.1 PER 100 WBC
NRBC BLD-RTO: 1.2 PER 100 WBC
NRBC BLD-RTO: 1.5 PER 100 WBC
NRBC BLD-RTO: 2.1 PER 100 WBC
NRBC BLD-RTO: 2.2 PER 100 WBC
O2/TOTAL GAS SETTING VFR VENT: 35 %
O2/TOTAL GAS SETTING VFR VENT: 40 %
O2/TOTAL GAS SETTING VFR VENT: 45 %
OPIATES UR QL: NEGATIVE
P-R INTERVAL, ECG05: 158 MS
PAW @ MEAN EXP FLOW ON VENT: 11 CMH2O
PAW @ MEAN EXP FLOW ON VENT: 9.6 CMH2O
PCO2 BLD: 21.7 MMHG (ref 35–45)
PCO2 BLD: 31.2 MMHG (ref 35–45)
PCO2 BLD: 35.5 MMHG (ref 35–45)
PCO2 BLD: 35.7 MMHG (ref 35–45)
PCO2 BLD: 36.5 MMHG (ref 35–45)
PCO2 BLD: 37.5 MMHG (ref 35–45)
PCO2 BLD: 39.1 MMHG (ref 35–45)
PCP UR QL: NEGATIVE
PEEP RESPIRATORY: 5 CMH2O
PEEP RESPIRATORY: 5 CM[H2O]
PEEP RESPIRATORY: 6 CMH2O
PEEP RESPIRATORY: 6 CMH2O
PH BLD: 7.26 [PH] (ref 7.35–7.45)
PH BLD: 7.35 [PH] (ref 7.35–7.45)
PH BLD: 7.4 [PH] (ref 7.35–7.45)
PH BLD: 7.42 [PH] (ref 7.35–7.45)
PH BLD: 7.42 [PH] (ref 7.35–7.45)
PH BLD: 7.44 [PH] (ref 7.35–7.45)
PH BLD: 7.45 [PH] (ref 7.35–7.45)
PH UR STRIP: 5.5 [PH] (ref 5–8)
PH UR STRIP: 5.5 [PH] (ref 5–8)
PHOSPHATE SERPL-MCNC: 2.4 MG/DL (ref 2.5–4.9)
PHOSPHATE SERPL-MCNC: 2.6 MG/DL (ref 2.5–4.9)
PHOSPHATE SERPL-MCNC: 2.8 MG/DL (ref 2.5–4.9)
PHOSPHATE SERPL-MCNC: 2.9 MG/DL (ref 2.5–4.9)
PHOSPHATE SERPL-MCNC: 2.9 MG/DL (ref 2.5–4.9)
PHOSPHATE SERPL-MCNC: 3 MG/DL (ref 2.5–4.9)
PHOSPHATE SERPL-MCNC: 3.1 MG/DL (ref 2.5–4.9)
PHOSPHATE SERPL-MCNC: 3.1 MG/DL (ref 2.5–4.9)
PHOSPHATE SERPL-MCNC: 3.4 MG/DL (ref 2.5–4.9)
PHOSPHATE SERPL-MCNC: 3.4 MG/DL (ref 2.5–4.9)
PHOSPHATE SERPL-MCNC: 3.5 MG/DL (ref 2.5–4.9)
PHOSPHATE SERPL-MCNC: 3.6 MG/DL (ref 2.5–4.9)
PHOSPHATE SERPL-MCNC: 3.7 MG/DL (ref 2.5–4.9)
PHOSPHATE SERPL-MCNC: 4.7 MG/DL (ref 2.5–4.9)
PIP ISTAT,IPIP: 13
PIP ISTAT,IPIP: 19
PIP ISTAT,IPIP: 20
PLATELET # BLD AUTO: 146 K/UL (ref 135–420)
PLATELET # BLD AUTO: 162 K/UL (ref 135–420)
PLATELET # BLD AUTO: 166 K/UL (ref 135–420)
PLATELET # BLD AUTO: 167 K/UL (ref 135–420)
PLATELET # BLD AUTO: 170 K/UL (ref 135–420)
PLATELET # BLD AUTO: 170 K/UL (ref 135–420)
PLATELET # BLD AUTO: 174 K/UL (ref 135–420)
PLATELET # BLD AUTO: 195 K/UL (ref 135–420)
PLATELET # BLD AUTO: 230 K/UL (ref 135–420)
PLATELET # BLD AUTO: 284 K/UL (ref 135–420)
PLATELET # BLD AUTO: 305 K/UL (ref 135–420)
PLATELET # BLD AUTO: 381 K/UL (ref 135–420)
PLATELET # BLD AUTO: 391 K/UL (ref 135–420)
PMV BLD AUTO: 10 FL (ref 9.2–11.8)
PMV BLD AUTO: 10.3 FL (ref 9.2–11.8)
PMV BLD AUTO: 10.6 FL (ref 9.2–11.8)
PMV BLD AUTO: 10.7 FL (ref 9.2–11.8)
PMV BLD AUTO: 10.7 FL (ref 9.2–11.8)
PMV BLD AUTO: 10.8 FL (ref 9.2–11.8)
PMV BLD AUTO: 10.9 FL (ref 9.2–11.8)
PMV BLD AUTO: 10.9 FL (ref 9.2–11.8)
PMV BLD AUTO: 11 FL (ref 9.2–11.8)
PMV BLD AUTO: 11.2 FL (ref 9.2–11.8)
PMV BLD AUTO: 11.3 FL (ref 9.2–11.8)
PMV BLD AUTO: 11.4 FL (ref 9.2–11.8)
PMV BLD AUTO: 11.9 FL (ref 9.2–11.8)
PO2 BLD: 116 MMHG (ref 80–100)
PO2 BLD: 118 MMHG (ref 80–100)
PO2 BLD: 135 MMHG (ref 80–100)
PO2 BLD: 137 MMHG (ref 80–100)
PO2 BLD: 444 MMHG (ref 80–100)
PO2 BLD: 84 MMHG (ref 80–100)
PO2 BLD: 95 MMHG (ref 80–100)
POTASSIUM BLD-SCNC: 3 MMOL/L (ref 3.5–5.1)
POTASSIUM SERPL-SCNC: 3.1 MMOL/L (ref 3.5–5.5)
POTASSIUM SERPL-SCNC: 3.3 MMOL/L (ref 3.5–5.5)
POTASSIUM SERPL-SCNC: 3.4 MMOL/L (ref 3.5–5.5)
POTASSIUM SERPL-SCNC: 3.5 MMOL/L (ref 3.5–5.5)
POTASSIUM SERPL-SCNC: 3.5 MMOL/L (ref 3.5–5.5)
POTASSIUM SERPL-SCNC: 3.6 MMOL/L (ref 3.5–5.5)
POTASSIUM SERPL-SCNC: 3.7 MMOL/L (ref 3.5–5.5)
POTASSIUM SERPL-SCNC: 3.8 MMOL/L (ref 3.5–5.5)
POTASSIUM SERPL-SCNC: 3.9 MMOL/L (ref 3.5–5.5)
POTASSIUM SERPL-SCNC: 4 MMOL/L (ref 3.5–5.5)
POTASSIUM SERPL-SCNC: 4.1 MMOL/L (ref 3.5–5.5)
POTASSIUM SERPL-SCNC: 4.4 MMOL/L (ref 3.5–5.5)
POTASSIUM SERPL-SCNC: 4.6 MMOL/L (ref 3.5–5.5)
POTASSIUM SERPL-SCNC: 4.7 MMOL/L (ref 3.5–5.5)
PROCALCITONIN SERPL-MCNC: 0.11 NG/ML
PROT SERPL-MCNC: 5.7 G/DL (ref 6.4–8.2)
PROT SERPL-MCNC: 5.8 G/DL (ref 6.4–8.2)
PROT SERPL-MCNC: 6 G/DL (ref 6.4–8.2)
PROT SERPL-MCNC: 6.3 G/DL (ref 6.4–8.2)
PROT SERPL-MCNC: 6.3 G/DL (ref 6.4–8.2)
PROT UR STRIP-MCNC: 100 MG/DL
PROT UR STRIP-MCNC: 100 MG/DL
PROTHROMBIN TIME: 13.3 SEC (ref 11.5–15.2)
PROTHROMBIN TIME: 13.3 SEC (ref 11.5–15.2)
PROTHROMBIN TIME: 13.6 SEC (ref 11.5–15.2)
PROTHROMBIN TIME: 13.7 SEC (ref 11.5–15.2)
PROTHROMBIN TIME: 13.8 SEC (ref 11.5–15.2)
PROTHROMBIN TIME: 14.1 SEC (ref 11.5–15.2)
PROTHROMBIN TIME: 14.2 SEC (ref 11.5–15.2)
PROTHROMBIN TIME: 14.3 SEC (ref 11.5–15.2)
PROTHROMBIN TIME: 18.7 SEC (ref 11.5–15.2)
Q-T INTERVAL, ECG07: 360 MS
QRS DURATION, ECG06: 100 MS
QTC CALCULATION (BEZET), ECG08: 491 MS
RBC # BLD AUTO: 2.7 M/UL (ref 4.35–5.65)
RBC # BLD AUTO: 2.76 M/UL (ref 4.35–5.65)
RBC # BLD AUTO: 2.78 M/UL (ref 4.35–5.65)
RBC # BLD AUTO: 2.87 M/UL (ref 4.35–5.65)
RBC # BLD AUTO: 2.89 M/UL (ref 4.35–5.65)
RBC # BLD AUTO: 2.98 M/UL (ref 4.35–5.65)
RBC # BLD AUTO: 3.12 M/UL (ref 4.35–5.65)
RBC # BLD AUTO: 3.12 M/UL (ref 4.35–5.65)
RBC # BLD AUTO: 3.17 M/UL (ref 4.35–5.65)
RBC # BLD AUTO: 3.27 M/UL (ref 4.35–5.65)
RBC # BLD AUTO: 3.62 M/UL (ref 4.35–5.65)
RBC # BLD AUTO: 3.78 M/UL (ref 4.35–5.65)
RBC # BLD AUTO: 4.69 M/UL (ref 4.35–5.65)
RBC #/AREA URNS HPF: ABNORMAL /HPF (ref 0–5)
RBC #/AREA URNS HPF: ABNORMAL /HPF (ref 0–5)
RBC MORPH BLD: ABNORMAL
RBC MORPH BLD: ABNORMAL
SAO2 % BLD: 100 %
SAO2 % BLD: 96.8 % (ref 92–97)
SAO2 % BLD: 97.5 % (ref 92–97)
SAO2 % BLD: 98.7 % (ref 92–97)
SAO2 % BLD: 98.8 % (ref 92–97)
SAO2 % BLD: 99 % (ref 92–97)
SAO2 % BLD: 99.2 % (ref 92–97)
SERVICE CMNT-IMP: ABNORMAL
SERVICE CMNT-IMP: NORMAL
SERVICE CMNT-IMP: NORMAL
SODIUM BLD-SCNC: 146 MMOL/L (ref 136–145)
SODIUM SERPL-SCNC: 137 MMOL/L (ref 136–145)
SODIUM SERPL-SCNC: 139 MMOL/L (ref 136–145)
SODIUM SERPL-SCNC: 140 MMOL/L (ref 136–145)
SODIUM SERPL-SCNC: 140 MMOL/L (ref 136–145)
SODIUM SERPL-SCNC: 142 MMOL/L (ref 136–145)
SODIUM SERPL-SCNC: 144 MMOL/L (ref 136–145)
SODIUM SERPL-SCNC: 145 MMOL/L (ref 136–145)
SODIUM SERPL-SCNC: 146 MMOL/L (ref 136–145)
SODIUM SERPL-SCNC: 150 MMOL/L (ref 136–145)
SODIUM SERPL-SCNC: 151 MMOL/L (ref 136–145)
SODIUM SERPL-SCNC: 152 MMOL/L (ref 136–145)
SOURCE, COVRS: NORMAL
SP GR UR REFRACTOMETRY: 1.02 (ref 1–1.03)
SP GR UR REFRACTOMETRY: 1.02 (ref 1–1.03)
SPECIMEN EXP DATE BLD: NORMAL
SPECIMEN SITE: ABNORMAL
SPECIMEN TYPE: ABNORMAL
TOTAL RESP. RATE, ITRR: 16
TRIGL SERPL-MCNC: 201 MG/DL (ref ?–150)
TROPONIN-HIGH SENSITIVITY: 2077 NG/L (ref 0–78)
TROPONIN-HIGH SENSITIVITY: 61 NG/L (ref 0–78)
TROPONIN-HIGH SENSITIVITY: 98 NG/L (ref 0–78)
UROBILINOGEN UR QL STRIP.AUTO: 0.2 EU/DL (ref 0.2–1)
UROBILINOGEN UR QL STRIP.AUTO: 0.2 EU/DL (ref 0.2–1)
VANCOMYCIN SERPL-MCNC: 16.2 UG/ML (ref 5–40)
VANCOMYCIN SERPL-MCNC: <0.8 UG/ML (ref 5–40)
VENTILATION MODE VENT: ABNORMAL
VENTRICULAR RATE, ECG03: 112 BPM
VT SETTING VENT: 450 ML
VT SETTING VENT: 475 ML
VT SETTING VENT: 480 ML
WBC # BLD AUTO: 10.1 K/UL (ref 4.6–13.2)
WBC # BLD AUTO: 12.4 K/UL (ref 4.6–13.2)
WBC # BLD AUTO: 15.1 K/UL (ref 4.6–13.2)
WBC # BLD AUTO: 16.8 K/UL (ref 4.6–13.2)
WBC # BLD AUTO: 7.1 K/UL (ref 4.6–13.2)
WBC # BLD AUTO: 7.3 K/UL (ref 4.6–13.2)
WBC # BLD AUTO: 8.2 K/UL (ref 4.6–13.2)
WBC # BLD AUTO: 8.6 K/UL (ref 4.6–13.2)
WBC # BLD AUTO: 8.8 K/UL (ref 4.6–13.2)
WBC # BLD AUTO: 9 K/UL (ref 4.6–13.2)
WBC # BLD AUTO: 9.6 K/UL (ref 4.6–13.2)
WBC # BLD AUTO: 9.6 K/UL (ref 4.6–13.2)
WBC # BLD AUTO: 9.7 K/UL (ref 4.6–13.2)
WBC URNS QL MICRO: ABNORMAL /HPF (ref 0–5)
WBC URNS QL MICRO: ABNORMAL /HPF (ref 0–5)

## 2022-01-01 PROCEDURE — 94003 VENT MGMT INPAT SUBQ DAY: CPT

## 2022-01-01 PROCEDURE — 65610000006 HC RM INTENSIVE CARE

## 2022-01-01 PROCEDURE — 85610 PROTHROMBIN TIME: CPT

## 2022-01-01 PROCEDURE — 74011250636 HC RX REV CODE- 250/636: Performed by: INTERNAL MEDICINE

## 2022-01-01 PROCEDURE — 80048 BASIC METABOLIC PNL TOTAL CA: CPT

## 2022-01-01 PROCEDURE — 77010033678 HC OXYGEN DAILY

## 2022-01-01 PROCEDURE — 74011636637 HC RX REV CODE- 636/637: Performed by: INTERNAL MEDICINE

## 2022-01-01 PROCEDURE — 74011250637 HC RX REV CODE- 250/637: Performed by: INTERNAL MEDICINE

## 2022-01-01 PROCEDURE — C9113 INJ PANTOPRAZOLE SODIUM, VIA: HCPCS | Performed by: FAMILY MEDICINE

## 2022-01-01 PROCEDURE — 81001 URINALYSIS AUTO W/SCOPE: CPT

## 2022-01-01 PROCEDURE — 74011000250 HC RX REV CODE- 250: Performed by: INTERNAL MEDICINE

## 2022-01-01 PROCEDURE — 74011000258 HC RX REV CODE- 258: Performed by: EMERGENCY MEDICINE

## 2022-01-01 PROCEDURE — 74011250636 HC RX REV CODE- 250/636: Performed by: FAMILY MEDICINE

## 2022-01-01 PROCEDURE — 85730 THROMBOPLASTIN TIME PARTIAL: CPT

## 2022-01-01 PROCEDURE — 71045 X-RAY EXAM CHEST 1 VIEW: CPT

## 2022-01-01 PROCEDURE — 82803 BLOOD GASES ANY COMBINATION: CPT

## 2022-01-01 PROCEDURE — 93970 EXTREMITY STUDY: CPT

## 2022-01-01 PROCEDURE — 83880 ASSAY OF NATRIURETIC PEPTIDE: CPT

## 2022-01-01 PROCEDURE — 74011250636 HC RX REV CODE- 250/636: Performed by: EMERGENCY MEDICINE

## 2022-01-01 PROCEDURE — 84100 ASSAY OF PHOSPHORUS: CPT

## 2022-01-01 PROCEDURE — 36600 WITHDRAWAL OF ARTERIAL BLOOD: CPT

## 2022-01-01 PROCEDURE — 82962 GLUCOSE BLOOD TEST: CPT

## 2022-01-01 PROCEDURE — 74011000258 HC RX REV CODE- 258: Performed by: FAMILY MEDICINE

## 2022-01-01 PROCEDURE — 87635 SARS-COV-2 COVID-19 AMP PRB: CPT

## 2022-01-01 PROCEDURE — 74011000250 HC RX REV CODE- 250: Performed by: NURSE PRACTITIONER

## 2022-01-01 PROCEDURE — 83735 ASSAY OF MAGNESIUM: CPT

## 2022-01-01 PROCEDURE — 84484 ASSAY OF TROPONIN QUANT: CPT

## 2022-01-01 PROCEDURE — 84132 ASSAY OF SERUM POTASSIUM: CPT

## 2022-01-01 PROCEDURE — 93005 ELECTROCARDIOGRAM TRACING: CPT

## 2022-01-01 PROCEDURE — 74011250637 HC RX REV CODE- 250/637: Performed by: NURSE PRACTITIONER

## 2022-01-01 PROCEDURE — 36415 COLL VENOUS BLD VENIPUNCTURE: CPT

## 2022-01-01 PROCEDURE — 85025 COMPLETE CBC W/AUTO DIFF WBC: CPT

## 2022-01-01 PROCEDURE — 94640 AIRWAY INHALATION TREATMENT: CPT

## 2022-01-01 PROCEDURE — 70551 MRI BRAIN STEM W/O DYE: CPT

## 2022-01-01 PROCEDURE — 70450 CT HEAD/BRAIN W/O DYE: CPT

## 2022-01-01 PROCEDURE — 76770 US EXAM ABDO BACK WALL COMP: CPT

## 2022-01-01 PROCEDURE — 84478 ASSAY OF TRIGLYCERIDES: CPT

## 2022-01-01 PROCEDURE — 74011000250 HC RX REV CODE- 250: Performed by: FAMILY MEDICINE

## 2022-01-01 PROCEDURE — 74011250637 HC RX REV CODE- 250/637: Performed by: FAMILY MEDICINE

## 2022-01-01 PROCEDURE — 71250 CT THORAX DX C-: CPT

## 2022-01-01 PROCEDURE — 74011000250 HC RX REV CODE- 250: Performed by: EMERGENCY MEDICINE

## 2022-01-01 PROCEDURE — 65270000029 HC RM PRIVATE

## 2022-01-01 PROCEDURE — 99233 SBSQ HOSP IP/OBS HIGH 50: CPT | Performed by: NURSE PRACTITIONER

## 2022-01-01 PROCEDURE — 82947 ASSAY GLUCOSE BLOOD QUANT: CPT

## 2022-01-01 PROCEDURE — 99231 SBSQ HOSP IP/OBS SF/LOW 25: CPT | Performed by: NURSE PRACTITIONER

## 2022-01-01 PROCEDURE — 80076 HEPATIC FUNCTION PANEL: CPT

## 2022-01-01 PROCEDURE — 02HV33Z INSERTION OF INFUSION DEVICE INTO SUPERIOR VENA CAVA, PERCUTANEOUS APPROACH: ICD-10-PCS | Performed by: FAMILY MEDICINE

## 2022-01-01 PROCEDURE — 80053 COMPREHEN METABOLIC PANEL: CPT

## 2022-01-01 PROCEDURE — 74011636637 HC RX REV CODE- 636/637: Performed by: FAMILY MEDICINE

## 2022-01-01 PROCEDURE — 84145 PROCALCITONIN (PCT): CPT

## 2022-01-01 PROCEDURE — 96365 THER/PROPH/DIAG IV INF INIT: CPT

## 2022-01-01 PROCEDURE — 82140 ASSAY OF AMMONIA: CPT

## 2022-01-01 PROCEDURE — 86900 BLOOD TYPING SEROLOGIC ABO: CPT

## 2022-01-01 PROCEDURE — 85027 COMPLETE CBC AUTOMATED: CPT

## 2022-01-01 PROCEDURE — 77030005513 HC CATH URETH FOL11 MDII -B

## 2022-01-01 PROCEDURE — 83036 HEMOGLOBIN GLYCOSYLATED A1C: CPT

## 2022-01-01 PROCEDURE — 80202 ASSAY OF VANCOMYCIN: CPT

## 2022-01-01 PROCEDURE — 74011250636 HC RX REV CODE- 250/636: Performed by: NURSE PRACTITIONER

## 2022-01-01 PROCEDURE — 5A1955Z RESPIRATORY VENTILATION, GREATER THAN 96 CONSECUTIVE HOURS: ICD-10-PCS | Performed by: FAMILY MEDICINE

## 2022-01-01 PROCEDURE — 74011250636 HC RX REV CODE- 250/636

## 2022-01-01 PROCEDURE — 94002 VENT MGMT INPAT INIT DAY: CPT

## 2022-01-01 PROCEDURE — 83605 ASSAY OF LACTIC ACID: CPT

## 2022-01-01 PROCEDURE — 99285 EMERGENCY DEPT VISIT HI MDM: CPT

## 2022-01-01 PROCEDURE — 99222 1ST HOSP IP/OBS MODERATE 55: CPT | Performed by: NURSE PRACTITIONER

## 2022-01-01 PROCEDURE — 80307 DRUG TEST PRSMV CHEM ANLYZR: CPT

## 2022-01-01 PROCEDURE — 96366 THER/PROPH/DIAG IV INF ADDON: CPT

## 2022-01-01 PROCEDURE — 95816 EEG AWAKE AND DROWSY: CPT | Performed by: INTERNAL MEDICINE

## 2022-01-01 PROCEDURE — 31500 INSERT EMERGENCY AIRWAY: CPT

## 2022-01-01 PROCEDURE — 77030040831 HC BAG URINE DRNG MDII -A

## 2022-01-01 PROCEDURE — 82077 ASSAY SPEC XCP UR&BREATH IA: CPT

## 2022-01-01 PROCEDURE — 80069 RENAL FUNCTION PANEL: CPT

## 2022-01-01 PROCEDURE — 87040 BLOOD CULTURE FOR BACTERIA: CPT

## 2022-01-01 PROCEDURE — 93306 TTE W/DOPPLER COMPLETE: CPT

## 2022-01-01 PROCEDURE — 2709999900 HC NON-CHARGEABLE SUPPLY

## 2022-01-01 PROCEDURE — A4217 STERILE WATER/SALINE, 500 ML: HCPCS

## 2022-01-01 RX ORDER — MORPHINE SULFATE 2 MG/ML
2 INJECTION, SOLUTION INTRAMUSCULAR; INTRAVENOUS
Status: DISCONTINUED | OUTPATIENT
Start: 2022-01-01 | End: 2022-01-01 | Stop reason: HOSPADM

## 2022-01-01 RX ORDER — POTASSIUM CHLORIDE 7.45 MG/ML
10 INJECTION INTRAVENOUS ONCE
Status: COMPLETED | OUTPATIENT
Start: 2022-01-01 | End: 2022-01-01

## 2022-01-01 RX ORDER — METOPROLOL TARTRATE 5 MG/5ML
1.25 INJECTION INTRAVENOUS EVERY 6 HOURS
Status: DISCONTINUED | OUTPATIENT
Start: 2022-01-01 | End: 2022-01-01

## 2022-01-01 RX ORDER — CHLORHEXIDINE GLUCONATE 1.2 MG/ML
10 RINSE ORAL ONCE
Status: DISPENSED | OUTPATIENT
Start: 2022-01-01 | End: 2022-01-01

## 2022-01-01 RX ORDER — GLYCOPYRROLATE 0.2 MG/ML
0.2 INJECTION INTRAMUSCULAR; INTRAVENOUS
Status: DISCONTINUED | OUTPATIENT
Start: 2022-01-01 | End: 2022-01-01 | Stop reason: HOSPADM

## 2022-01-01 RX ORDER — PROPOFOL 10 MG/ML
0-50 INJECTION, EMULSION INTRAVENOUS
Status: DISCONTINUED | OUTPATIENT
Start: 2022-01-01 | End: 2022-01-01 | Stop reason: RX

## 2022-01-01 RX ORDER — ONDANSETRON 2 MG/ML
4 INJECTION INTRAMUSCULAR; INTRAVENOUS
Status: DISCONTINUED | OUTPATIENT
Start: 2022-01-01 | End: 2022-01-01 | Stop reason: HOSPADM

## 2022-01-01 RX ORDER — SODIUM CHLORIDE 0.9 % (FLUSH) 0.9 %
5-40 SYRINGE (ML) INJECTION EVERY 8 HOURS
Status: DISCONTINUED | OUTPATIENT
Start: 2022-01-01 | End: 2022-01-01

## 2022-01-01 RX ORDER — POTASSIUM CHLORIDE 750 MG/1
10 TABLET, EXTENDED RELEASE ORAL
Status: COMPLETED | OUTPATIENT
Start: 2022-01-01 | End: 2022-01-01

## 2022-01-01 RX ORDER — MIDAZOLAM HYDROCHLORIDE 1 MG/ML
2 INJECTION, SOLUTION INTRAMUSCULAR; INTRAVENOUS ONCE
Status: COMPLETED | OUTPATIENT
Start: 2022-01-01 | End: 2022-01-01

## 2022-01-01 RX ORDER — FACIAL-BODY WIPES
10 EACH TOPICAL DAILY PRN
Status: DISCONTINUED | OUTPATIENT
Start: 2022-01-01 | End: 2022-01-01 | Stop reason: HOSPADM

## 2022-01-01 RX ORDER — NOREPINEPHRINE BITARTRATE/D5W 8 MG/250ML
2-30 PLASTIC BAG, INJECTION (ML) INTRAVENOUS
Status: DISCONTINUED | OUTPATIENT
Start: 2022-01-01 | End: 2022-01-01

## 2022-01-01 RX ORDER — ARFORMOTEROL TARTRATE 15 UG/2ML
15 SOLUTION RESPIRATORY (INHALATION)
Status: DISCONTINUED | OUTPATIENT
Start: 2022-01-01 | End: 2022-01-01

## 2022-01-01 RX ORDER — POLYETHYLENE GLYCOL 3350 17 G/17G
17 POWDER, FOR SOLUTION ORAL DAILY PRN
Status: DISCONTINUED | OUTPATIENT
Start: 2022-01-01 | End: 2022-01-01

## 2022-01-01 RX ORDER — FUROSEMIDE 10 MG/ML
20 INJECTION INTRAMUSCULAR; INTRAVENOUS DAILY
Status: DISCONTINUED | OUTPATIENT
Start: 2022-01-01 | End: 2022-01-01

## 2022-01-01 RX ORDER — ACETAMINOPHEN 650 MG/1
650 SUPPOSITORY RECTAL
Status: DISCONTINUED | OUTPATIENT
Start: 2022-01-01 | End: 2022-01-01

## 2022-01-01 RX ORDER — HEPARIN SODIUM 10000 [USP'U]/100ML
18-36 INJECTION, SOLUTION INTRAVENOUS
Status: DISCONTINUED | OUTPATIENT
Start: 2022-01-01 | End: 2022-01-01

## 2022-01-01 RX ORDER — SODIUM CHLORIDE 9 MG/ML
25 INJECTION, SOLUTION INTRAVENOUS CONTINUOUS
Status: DISCONTINUED | OUTPATIENT
Start: 2022-01-01 | End: 2022-01-01

## 2022-01-01 RX ORDER — METOPROLOL TARTRATE 5 MG/5ML
2.5 INJECTION INTRAVENOUS
Status: DISCONTINUED | OUTPATIENT
Start: 2022-01-01 | End: 2022-01-01

## 2022-01-01 RX ORDER — HEPARIN SODIUM 1000 [USP'U]/ML
80 INJECTION, SOLUTION INTRAVENOUS; SUBCUTANEOUS ONCE
Status: ACTIVE | OUTPATIENT
Start: 2022-01-01 | End: 2022-01-01

## 2022-01-01 RX ORDER — INSULIN GLARGINE 100 [IU]/ML
10 INJECTION, SOLUTION SUBCUTANEOUS EVERY 24 HOURS
Status: DISCONTINUED | OUTPATIENT
Start: 2022-01-01 | End: 2022-01-01

## 2022-01-01 RX ORDER — POTASSIUM CHLORIDE 7.45 MG/ML
10 INJECTION INTRAVENOUS
Status: COMPLETED | OUTPATIENT
Start: 2022-01-01 | End: 2022-01-01

## 2022-01-01 RX ORDER — INSULIN GLARGINE 100 [IU]/ML
12 INJECTION, SOLUTION SUBCUTANEOUS EVERY 24 HOURS
Status: DISCONTINUED | OUTPATIENT
Start: 2022-01-01 | End: 2022-01-01

## 2022-01-01 RX ORDER — LIDOCAINE HYDROCHLORIDE 20 MG/ML
JELLY TOPICAL
Status: COMPLETED | OUTPATIENT
Start: 2022-01-01 | End: 2022-01-01

## 2022-01-01 RX ORDER — PROPOFOL 10 MG/ML
0-50 VIAL (ML) INTRAVENOUS
Status: DISCONTINUED | OUTPATIENT
Start: 2022-01-01 | End: 2022-01-01

## 2022-01-01 RX ORDER — ONDANSETRON 4 MG/1
4 TABLET, ORALLY DISINTEGRATING ORAL
Status: DISCONTINUED | OUTPATIENT
Start: 2022-01-01 | End: 2022-01-01

## 2022-01-01 RX ORDER — ACETAMINOPHEN 325 MG/1
650 TABLET ORAL
Status: DISCONTINUED | OUTPATIENT
Start: 2022-01-01 | End: 2022-01-01 | Stop reason: HOSPADM

## 2022-01-01 RX ORDER — SCOLOPAMINE TRANSDERMAL SYSTEM 1 MG/1
1 PATCH, EXTENDED RELEASE TRANSDERMAL
Status: DISCONTINUED | OUTPATIENT
Start: 2022-01-01 | End: 2022-01-01 | Stop reason: HOSPADM

## 2022-01-01 RX ORDER — INSULIN GLARGINE 100 [IU]/ML
15 INJECTION, SOLUTION SUBCUTANEOUS EVERY 24 HOURS
Status: DISCONTINUED | OUTPATIENT
Start: 2022-01-01 | End: 2022-01-01

## 2022-01-01 RX ORDER — SODIUM,POTASSIUM PHOSPHATES 280-250MG
2 POWDER IN PACKET (EA) ORAL
Status: COMPLETED | OUTPATIENT
Start: 2022-01-01 | End: 2022-01-01

## 2022-01-01 RX ORDER — PROPOFOL 10 MG/ML
0-20 VIAL (ML) INTRAVENOUS
Status: DISCONTINUED | OUTPATIENT
Start: 2022-01-01 | End: 2022-01-01

## 2022-01-01 RX ORDER — DEXTROSE MONOHYDRATE 50 MG/ML
50 INJECTION, SOLUTION INTRAVENOUS CONTINUOUS
Status: DISCONTINUED | OUTPATIENT
Start: 2022-01-01 | End: 2022-01-01

## 2022-01-01 RX ORDER — SODIUM CHLORIDE 0.9 % (FLUSH) 0.9 %
5-40 SYRINGE (ML) INJECTION AS NEEDED
Status: DISCONTINUED | OUTPATIENT
Start: 2022-01-01 | End: 2022-01-01 | Stop reason: HOSPADM

## 2022-01-01 RX ORDER — AMLODIPINE BESYLATE 5 MG/1
5 TABLET ORAL DAILY
Status: DISCONTINUED | OUTPATIENT
Start: 2022-01-01 | End: 2022-01-01

## 2022-01-01 RX ORDER — HEPARIN SODIUM 5000 [USP'U]/ML
5000 INJECTION, SOLUTION INTRAVENOUS; SUBCUTANEOUS EVERY 8 HOURS
Status: DISCONTINUED | OUTPATIENT
Start: 2022-01-01 | End: 2022-01-01

## 2022-01-01 RX ORDER — HEPARIN SODIUM 1000 [USP'U]/ML
5000 INJECTION, SOLUTION INTRAVENOUS; SUBCUTANEOUS EVERY 8 HOURS
Status: DISCONTINUED | OUTPATIENT
Start: 2022-01-01 | End: 2022-01-01 | Stop reason: ALTCHOICE

## 2022-01-01 RX ORDER — DEXTROSE AND POTASSIUM CHLORIDE 5; .15 G/100ML; G/100ML
SOLUTION INTRAVENOUS CONTINUOUS
Status: DISCONTINUED | OUTPATIENT
Start: 2022-01-01 | End: 2022-01-01

## 2022-01-01 RX ORDER — INSULIN LISPRO 100 [IU]/ML
INJECTION, SOLUTION INTRAVENOUS; SUBCUTANEOUS EVERY 6 HOURS
Status: DISCONTINUED | OUTPATIENT
Start: 2022-01-01 | End: 2022-01-01

## 2022-01-01 RX ORDER — VANCOMYCIN HYDROCHLORIDE
1250 ONCE
Status: COMPLETED | OUTPATIENT
Start: 2022-01-01 | End: 2022-01-01

## 2022-01-01 RX ORDER — ACETAMINOPHEN 650 MG/1
650 SUPPOSITORY RECTAL
Status: DISCONTINUED | OUTPATIENT
Start: 2022-01-01 | End: 2022-01-01 | Stop reason: HOSPADM

## 2022-01-01 RX ORDER — ACETAMINOPHEN 325 MG/1
650 TABLET ORAL
Status: DISCONTINUED | OUTPATIENT
Start: 2022-01-01 | End: 2022-01-01

## 2022-01-01 RX ORDER — POTASSIUM CHLORIDE 20 MEQ/1
40 TABLET, EXTENDED RELEASE ORAL ONCE
Status: COMPLETED | OUTPATIENT
Start: 2022-01-01 | End: 2022-01-01

## 2022-01-01 RX ORDER — VANCOMYCIN 2 GRAM/500 ML IN 0.9 % SODIUM CHLORIDE INTRAVENOUS
2000 ONCE
Status: COMPLETED | OUTPATIENT
Start: 2022-01-01 | End: 2022-01-01

## 2022-01-01 RX ORDER — INSULIN GLARGINE 100 [IU]/ML
2 INJECTION, SOLUTION SUBCUTANEOUS ONCE
Status: COMPLETED | OUTPATIENT
Start: 2022-01-01 | End: 2022-01-01

## 2022-01-01 RX ORDER — AMLODIPINE BESYLATE 5 MG/1
2.5 TABLET ORAL DAILY
Status: DISCONTINUED | OUTPATIENT
Start: 2022-01-01 | End: 2022-01-01

## 2022-01-01 RX ORDER — VANCOMYCIN 2 GRAM/500 ML IN 0.9 % SODIUM CHLORIDE INTRAVENOUS
2000 ONCE
Status: DISCONTINUED | OUTPATIENT
Start: 2022-01-01 | End: 2022-01-01

## 2022-01-01 RX ORDER — LORAZEPAM 2 MG/ML
1 CONCENTRATE ORAL
Status: DISCONTINUED | OUTPATIENT
Start: 2022-01-01 | End: 2022-01-01 | Stop reason: HOSPADM

## 2022-01-01 RX ADMIN — ARFORMOTEROL TARTRATE INHALATION 15 MCG: 15 SOLUTION RESPIRATORY (INHALATION) at 20:03

## 2022-01-01 RX ADMIN — LACTULOSE 15 ML: 20 SOLUTION ORAL at 10:44

## 2022-01-01 RX ADMIN — METOPROLOL TARTRATE 1.25 MG: 5 INJECTION INTRAVENOUS at 20:08

## 2022-01-01 RX ADMIN — SODIUM CHLORIDE, PRESERVATIVE FREE 10 ML: 5 INJECTION INTRAVENOUS at 16:52

## 2022-01-01 RX ADMIN — AMLODIPINE BESYLATE 5 MG: 5 TABLET ORAL at 08:05

## 2022-01-01 RX ADMIN — LEVETIRACETAM 1000 MG: 100 INJECTION, SOLUTION INTRAVENOUS at 09:12

## 2022-01-01 RX ADMIN — ACYCLOVIR SODIUM 750 MG: 50 INJECTION, SOLUTION INTRAVENOUS at 01:16

## 2022-01-01 RX ADMIN — METOPROLOL TARTRATE 1.25 MG: 5 INJECTION INTRAVENOUS at 14:35

## 2022-01-01 RX ADMIN — FUROSEMIDE 20 MG: 10 INJECTION, SOLUTION INTRAMUSCULAR; INTRAVENOUS at 08:11

## 2022-01-01 RX ADMIN — ACETAMINOPHEN 650 MG: 325 TABLET ORAL at 17:10

## 2022-01-01 RX ADMIN — HEPARIN SODIUM 5000 UNITS: 5000 INJECTION INTRAVENOUS; SUBCUTANEOUS at 21:07

## 2022-01-01 RX ADMIN — ARFORMOTEROL TARTRATE INHALATION 15 MCG: 15 SOLUTION RESPIRATORY (INHALATION) at 08:00

## 2022-01-01 RX ADMIN — HEPARIN SODIUM 5000 UNITS: 5000 INJECTION INTRAVENOUS; SUBCUTANEOUS at 13:50

## 2022-01-01 RX ADMIN — SODIUM CHLORIDE, PRESERVATIVE FREE 10 ML: 5 INJECTION INTRAVENOUS at 21:14

## 2022-01-01 RX ADMIN — SODIUM CHLORIDE, PRESERVATIVE FREE 10 ML: 5 INJECTION INTRAVENOUS at 05:45

## 2022-01-01 RX ADMIN — LEVETIRACETAM 1000 MG: 100 INJECTION, SOLUTION INTRAVENOUS at 20:24

## 2022-01-01 RX ADMIN — ACYCLOVIR SODIUM 750 MG: 50 INJECTION, SOLUTION INTRAVENOUS at 06:10

## 2022-01-01 RX ADMIN — ACYCLOVIR SODIUM 750 MG: 50 INJECTION, SOLUTION INTRAVENOUS at 14:16

## 2022-01-01 RX ADMIN — METOPROLOL TARTRATE 1.25 MG: 5 INJECTION INTRAVENOUS at 13:23

## 2022-01-01 RX ADMIN — SODIUM CHLORIDE, PRESERVATIVE FREE 10 ML: 5 INJECTION INTRAVENOUS at 13:13

## 2022-01-01 RX ADMIN — METOPROLOL TARTRATE 1.25 MG: 5 INJECTION INTRAVENOUS at 01:16

## 2022-01-01 RX ADMIN — METOPROLOL TARTRATE 1.25 MG: 5 INJECTION INTRAVENOUS at 20:23

## 2022-01-01 RX ADMIN — INSULIN GLARGINE 15 UNITS: 100 INJECTION, SOLUTION SUBCUTANEOUS at 08:11

## 2022-01-01 RX ADMIN — SODIUM CHLORIDE, PRESERVATIVE FREE 40 MG: 5 INJECTION INTRAVENOUS at 21:27

## 2022-01-01 RX ADMIN — METOPROLOL TARTRATE 1.25 MG: 5 INJECTION INTRAVENOUS at 13:53

## 2022-01-01 RX ADMIN — HEPARIN SODIUM 5000 UNITS: 5000 INJECTION INTRAVENOUS; SUBCUTANEOUS at 05:07

## 2022-01-01 RX ADMIN — AMLODIPINE BESYLATE 5 MG: 5 TABLET ORAL at 08:11

## 2022-01-01 RX ADMIN — SODIUM CHLORIDE, PRESERVATIVE FREE 10 ML: 5 INJECTION INTRAVENOUS at 22:29

## 2022-01-01 RX ADMIN — PIPERACILLIN AND TAZOBACTAM 3.38 G: 3; .375 INJECTION, POWDER, FOR SOLUTION INTRAVENOUS at 12:22

## 2022-01-01 RX ADMIN — PIPERACILLIN AND TAZOBACTAM 3.38 G: 3; .375 INJECTION, POWDER, FOR SOLUTION INTRAVENOUS at 20:57

## 2022-01-01 RX ADMIN — SODIUM CHLORIDE, PRESERVATIVE FREE 40 MG: 5 INJECTION INTRAVENOUS at 09:09

## 2022-01-01 RX ADMIN — METOPROLOL TARTRATE 1.25 MG: 5 INJECTION INTRAVENOUS at 13:13

## 2022-01-01 RX ADMIN — METOPROLOL TARTRATE 1.25 MG: 5 INJECTION INTRAVENOUS at 21:12

## 2022-01-01 RX ADMIN — POTASSIUM CHLORIDE 10 MEQ: 7.46 INJECTION, SOLUTION INTRAVENOUS at 05:58

## 2022-01-01 RX ADMIN — POTASSIUM BICARBONATE 40 MEQ: 782 TABLET, EFFERVESCENT ORAL at 05:37

## 2022-01-01 RX ADMIN — Medication 3 UNITS: at 05:50

## 2022-01-01 RX ADMIN — METOPROLOL TARTRATE 1.25 MG: 5 INJECTION INTRAVENOUS at 14:43

## 2022-01-01 RX ADMIN — FUROSEMIDE 20 MG: 10 INJECTION, SOLUTION INTRAMUSCULAR; INTRAVENOUS at 09:41

## 2022-01-01 RX ADMIN — Medication 6 UNITS: at 17:52

## 2022-01-01 RX ADMIN — HEPARIN SODIUM 18 UNITS/KG/HR: 10000 INJECTION, SOLUTION INTRAVENOUS at 20:39

## 2022-01-01 RX ADMIN — Medication 6 UNITS: at 17:29

## 2022-01-01 RX ADMIN — FUROSEMIDE 20 MG: 10 INJECTION, SOLUTION INTRAMUSCULAR; INTRAVENOUS at 10:45

## 2022-01-01 RX ADMIN — FUROSEMIDE 20 MG: 10 INJECTION, SOLUTION INTRAMUSCULAR; INTRAVENOUS at 08:10

## 2022-01-01 RX ADMIN — Medication 6 UNITS: at 11:44

## 2022-01-01 RX ADMIN — HEPARIN SODIUM 5000 UNITS: 5000 INJECTION INTRAVENOUS; SUBCUTANEOUS at 14:35

## 2022-01-01 RX ADMIN — MORPHINE SULFATE 2 MG: 2 INJECTION, SOLUTION INTRAMUSCULAR; INTRAVENOUS at 21:20

## 2022-01-01 RX ADMIN — POTASSIUM BICARBONATE 20 MEQ: 782 TABLET, EFFERVESCENT ORAL at 23:26

## 2022-01-01 RX ADMIN — METOPROLOL TARTRATE 2.5 MG: 5 INJECTION INTRAVENOUS at 14:34

## 2022-01-01 RX ADMIN — Medication 6 UNITS: at 13:06

## 2022-01-01 RX ADMIN — SODIUM CHLORIDE, PRESERVATIVE FREE 10 ML: 5 INJECTION INTRAVENOUS at 05:50

## 2022-01-01 RX ADMIN — Medication 6 UNITS: at 17:47

## 2022-01-01 RX ADMIN — ARFORMOTEROL TARTRATE INHALATION 15 MCG: 15 SOLUTION RESPIRATORY (INHALATION) at 07:26

## 2022-01-01 RX ADMIN — SODIUM CHLORIDE, PRESERVATIVE FREE 40 MG: 5 INJECTION INTRAVENOUS at 08:09

## 2022-01-01 RX ADMIN — AMLODIPINE BESYLATE 5 MG: 5 TABLET ORAL at 13:50

## 2022-01-01 RX ADMIN — HEPARIN SODIUM 5000 UNITS: 5000 INJECTION INTRAVENOUS; SUBCUTANEOUS at 21:19

## 2022-01-01 RX ADMIN — SODIUM CHLORIDE, PRESERVATIVE FREE 40 MG: 5 INJECTION INTRAVENOUS at 08:05

## 2022-01-01 RX ADMIN — NOREPINEPHRINE BITARTRATE 13 MCG/MIN: 8 INJECTION, SOLUTION INTRAVENOUS at 03:53

## 2022-01-01 RX ADMIN — SODIUM CHLORIDE, PRESERVATIVE FREE 40 MG: 5 INJECTION INTRAVENOUS at 20:06

## 2022-01-01 RX ADMIN — Medication 6 UNITS: at 00:41

## 2022-01-01 RX ADMIN — LEVETIRACETAM 1000 MG: 100 INJECTION, SOLUTION INTRAVENOUS at 08:25

## 2022-01-01 RX ADMIN — PROPOFOL 15 MCG/KG/MIN: 10 INJECTION, EMULSION INTRAVENOUS at 19:33

## 2022-01-01 RX ADMIN — SODIUM CHLORIDE, PRESERVATIVE FREE 10 ML: 5 INJECTION INTRAVENOUS at 21:20

## 2022-01-01 RX ADMIN — GLYCOPYRROLATE 0.2 MG: 0.2 INJECTION, SOLUTION INTRAMUSCULAR; INTRAVENOUS at 20:19

## 2022-01-01 RX ADMIN — PIPERACILLIN AND TAZOBACTAM 3.38 G: 3; .375 INJECTION, POWDER, FOR SOLUTION INTRAVENOUS at 04:56

## 2022-01-01 RX ADMIN — Medication 6 UNITS: at 14:41

## 2022-01-01 RX ADMIN — ACYCLOVIR SODIUM 750 MG: 50 INJECTION, SOLUTION INTRAVENOUS at 15:00

## 2022-01-01 RX ADMIN — PIPERACILLIN AND TAZOBACTAM 3.38 G: 3; .375 INJECTION, POWDER, FOR SOLUTION INTRAVENOUS at 12:15

## 2022-01-01 RX ADMIN — SODIUM CHLORIDE, PRESERVATIVE FREE 10 ML: 5 INJECTION INTRAVENOUS at 13:01

## 2022-01-01 RX ADMIN — SODIUM CHLORIDE, PRESERVATIVE FREE 10 ML: 5 INJECTION INTRAVENOUS at 13:57

## 2022-01-01 RX ADMIN — ACYCLOVIR SODIUM 750 MG: 50 INJECTION, SOLUTION INTRAVENOUS at 02:33

## 2022-01-01 RX ADMIN — ACETAMINOPHEN 650 MG: 325 TABLET ORAL at 20:27

## 2022-01-01 RX ADMIN — METOPROLOL TARTRATE 1.25 MG: 5 INJECTION INTRAVENOUS at 01:57

## 2022-01-01 RX ADMIN — Medication 9 UNITS: at 23:35

## 2022-01-01 RX ADMIN — NOREPINEPHRINE BITARTRATE 4 MCG/MIN: 8 INJECTION, SOLUTION INTRAVENOUS at 11:05

## 2022-01-01 RX ADMIN — PROPOFOL 15 MCG/KG/MIN: 10 INJECTION, EMULSION INTRAVENOUS at 19:43

## 2022-01-01 RX ADMIN — ARFORMOTEROL TARTRATE INHALATION 15 MCG: 15 SOLUTION RESPIRATORY (INHALATION) at 07:38

## 2022-01-01 RX ADMIN — POTASSIUM & SODIUM PHOSPHATES POWDER PACK 280-160-250 MG 2 PACKET: 280-160-250 PACK at 13:51

## 2022-01-01 RX ADMIN — SODIUM CHLORIDE, PRESERVATIVE FREE 10 ML: 5 INJECTION INTRAVENOUS at 13:54

## 2022-01-01 RX ADMIN — METOPROLOL TARTRATE 1.25 MG: 5 INJECTION INTRAVENOUS at 09:25

## 2022-01-01 RX ADMIN — PIPERACILLIN AND TAZOBACTAM 4.5 G: 4; .5 INJECTION, POWDER, FOR SOLUTION INTRAVENOUS at 10:49

## 2022-01-01 RX ADMIN — PIPERACILLIN AND TAZOBACTAM 3.38 G: 3; .375 INJECTION, POWDER, FOR SOLUTION INTRAVENOUS at 21:07

## 2022-01-01 RX ADMIN — PIPERACILLIN AND TAZOBACTAM 3.38 G: 3; .375 INJECTION, POWDER, FOR SOLUTION INTRAVENOUS at 12:21

## 2022-01-01 RX ADMIN — FUROSEMIDE 20 MG: 10 INJECTION, SOLUTION INTRAMUSCULAR; INTRAVENOUS at 08:05

## 2022-01-01 RX ADMIN — ACYCLOVIR SODIUM 750 MG: 50 INJECTION, SOLUTION INTRAVENOUS at 22:43

## 2022-01-01 RX ADMIN — SODIUM CHLORIDE, PRESERVATIVE FREE 40 MG: 5 INJECTION INTRAVENOUS at 20:38

## 2022-01-01 RX ADMIN — POTASSIUM CHLORIDE 10 MEQ: 10 TABLET, EXTENDED RELEASE ORAL at 05:48

## 2022-01-01 RX ADMIN — ACYCLOVIR SODIUM 750 MG: 50 INJECTION, SOLUTION INTRAVENOUS at 23:12

## 2022-01-01 RX ADMIN — AMLODIPINE BESYLATE 2.5 MG: 5 TABLET ORAL at 12:54

## 2022-01-01 RX ADMIN — SODIUM CHLORIDE, PRESERVATIVE FREE 40 MG: 5 INJECTION INTRAVENOUS at 20:22

## 2022-01-01 RX ADMIN — SODIUM CHLORIDE, PRESERVATIVE FREE 40 MG: 5 INJECTION INTRAVENOUS at 20:28

## 2022-01-01 RX ADMIN — HEPARIN SODIUM 5000 UNITS: 5000 INJECTION INTRAVENOUS; SUBCUTANEOUS at 05:45

## 2022-01-01 RX ADMIN — PIPERACILLIN AND TAZOBACTAM 3.38 G: 3; .375 INJECTION, POWDER, FOR SOLUTION INTRAVENOUS at 14:35

## 2022-01-01 RX ADMIN — Medication 4 UNITS: at 05:42

## 2022-01-01 RX ADMIN — POTASSIUM BICARBONATE 20 MEQ: 782 TABLET, EFFERVESCENT ORAL at 15:58

## 2022-01-01 RX ADMIN — POTASSIUM CHLORIDE 10 MEQ: 7.46 INJECTION, SOLUTION INTRAVENOUS at 02:33

## 2022-01-01 RX ADMIN — Medication 3 UNITS: at 05:36

## 2022-01-01 RX ADMIN — PROPOFOL 10 MCG/KG/MIN: 10 INJECTION, EMULSION INTRAVENOUS at 11:36

## 2022-01-01 RX ADMIN — LEVETIRACETAM 1000 MG: 100 INJECTION, SOLUTION INTRAVENOUS at 20:14

## 2022-01-01 RX ADMIN — Medication 3 UNITS: at 05:07

## 2022-01-01 RX ADMIN — AMLODIPINE BESYLATE 5 MG: 5 TABLET ORAL at 10:45

## 2022-01-01 RX ADMIN — LACTULOSE 15 ML: 20 SOLUTION ORAL at 09:23

## 2022-01-01 RX ADMIN — PROPOFOL 20 MCG/KG/MIN: 10 INJECTION, EMULSION INTRAVENOUS at 06:11

## 2022-01-01 RX ADMIN — SODIUM CHLORIDE, PRESERVATIVE FREE 10 ML: 5 INJECTION INTRAVENOUS at 21:01

## 2022-01-01 RX ADMIN — SODIUM CHLORIDE 1000 ML: 9 INJECTION, SOLUTION INTRAVENOUS at 10:30

## 2022-01-01 RX ADMIN — MIDAZOLAM HYDROCHLORIDE 2 MG: 1 INJECTION, SOLUTION INTRAMUSCULAR; INTRAVENOUS at 20:38

## 2022-01-01 RX ADMIN — ARFORMOTEROL TARTRATE INHALATION 15 MCG: 15 SOLUTION RESPIRATORY (INHALATION) at 07:40

## 2022-01-01 RX ADMIN — INSULIN GLARGINE 12 UNITS: 100 INJECTION, SOLUTION SUBCUTANEOUS at 08:15

## 2022-01-01 RX ADMIN — Medication 10 UNITS: at 10:00

## 2022-01-01 RX ADMIN — Medication 3 UNITS: at 17:48

## 2022-01-01 RX ADMIN — SODIUM CHLORIDE, PRESERVATIVE FREE 40 MG: 5 INJECTION INTRAVENOUS at 20:53

## 2022-01-01 RX ADMIN — HEPARIN SODIUM 10 UNITS/KG/HR: 10000 INJECTION, SOLUTION INTRAVENOUS at 09:41

## 2022-01-01 RX ADMIN — SODIUM CHLORIDE, PRESERVATIVE FREE 10 ML: 5 INJECTION INTRAVENOUS at 21:10

## 2022-01-01 RX ADMIN — Medication 6 UNITS: at 00:13

## 2022-01-01 RX ADMIN — PROPOFOL 15 MCG/KG/MIN: 10 INJECTION, EMULSION INTRAVENOUS at 05:35

## 2022-01-01 RX ADMIN — SODIUM CHLORIDE, PRESERVATIVE FREE 10 ML: 5 INJECTION INTRAVENOUS at 05:14

## 2022-01-01 RX ADMIN — METOPROLOL TARTRATE 1.25 MG: 5 INJECTION INTRAVENOUS at 02:11

## 2022-01-01 RX ADMIN — METOPROLOL TARTRATE 1.25 MG: 5 INJECTION INTRAVENOUS at 13:34

## 2022-01-01 RX ADMIN — Medication 3 UNITS: at 18:00

## 2022-01-01 RX ADMIN — NOREPINEPHRINE BITARTRATE 16 MCG/MIN: 8 INJECTION, SOLUTION INTRAVENOUS at 19:45

## 2022-01-01 RX ADMIN — PIPERACILLIN AND TAZOBACTAM 3.38 G: 3; .375 INJECTION, POWDER, FOR SOLUTION INTRAVENOUS at 05:50

## 2022-01-01 RX ADMIN — FUROSEMIDE 20 MG: 10 INJECTION, SOLUTION INTRAMUSCULAR; INTRAVENOUS at 09:25

## 2022-01-01 RX ADMIN — SODIUM CHLORIDE, PRESERVATIVE FREE 40 MG: 5 INJECTION INTRAVENOUS at 20:27

## 2022-01-01 RX ADMIN — METOPROLOL TARTRATE 1.25 MG: 5 INJECTION INTRAVENOUS at 10:46

## 2022-01-01 RX ADMIN — POTASSIUM BICARBONATE 20 MEQ: 782 TABLET, EFFERVESCENT ORAL at 05:58

## 2022-01-01 RX ADMIN — METOPROLOL TARTRATE 1.25 MG: 5 INJECTION INTRAVENOUS at 21:13

## 2022-01-01 RX ADMIN — PIPERACILLIN AND TAZOBACTAM 3.38 G: 3; .375 INJECTION, POWDER, FOR SOLUTION INTRAVENOUS at 13:13

## 2022-01-01 RX ADMIN — MORPHINE SULFATE 2 MG: 2 INJECTION, SOLUTION INTRAMUSCULAR; INTRAVENOUS at 23:00

## 2022-01-01 RX ADMIN — Medication 6 UNITS: at 23:33

## 2022-01-01 RX ADMIN — LEVETIRACETAM 1000 MG: 100 INJECTION, SOLUTION INTRAVENOUS at 09:23

## 2022-01-01 RX ADMIN — METOPROLOL TARTRATE 1.25 MG: 5 INJECTION INTRAVENOUS at 09:42

## 2022-01-01 RX ADMIN — Medication 6 UNITS: at 13:01

## 2022-01-01 RX ADMIN — ACYCLOVIR SODIUM 750 MG: 50 INJECTION, SOLUTION INTRAVENOUS at 16:50

## 2022-01-01 RX ADMIN — POTASSIUM BICARBONATE 20 MEQ: 782 TABLET, EFFERVESCENT ORAL at 06:26

## 2022-01-01 RX ADMIN — Medication 6 UNITS: at 05:44

## 2022-01-01 RX ADMIN — METOPROLOL TARTRATE 1.25 MG: 5 INJECTION INTRAVENOUS at 09:06

## 2022-01-01 RX ADMIN — SODIUM CHLORIDE, PRESERVATIVE FREE 40 MG: 5 INJECTION INTRAVENOUS at 08:14

## 2022-01-01 RX ADMIN — SODIUM CHLORIDE, PRESERVATIVE FREE 40 MG: 5 INJECTION INTRAVENOUS at 20:57

## 2022-01-01 RX ADMIN — PIPERACILLIN AND TAZOBACTAM 3.38 G: 3; .375 INJECTION, POWDER, FOR SOLUTION INTRAVENOUS at 04:27

## 2022-01-01 RX ADMIN — PIPERACILLIN AND TAZOBACTAM 3.38 G: 3; .375 INJECTION, POWDER, FOR SOLUTION INTRAVENOUS at 20:23

## 2022-01-01 RX ADMIN — METOPROLOL TARTRATE 1.25 MG: 5 INJECTION INTRAVENOUS at 02:04

## 2022-01-01 RX ADMIN — Medication 3 UNITS: at 05:11

## 2022-01-01 RX ADMIN — HEPARIN SODIUM 5000 UNITS: 5000 INJECTION INTRAVENOUS; SUBCUTANEOUS at 23:53

## 2022-01-01 RX ADMIN — SODIUM CHLORIDE, PRESERVATIVE FREE 10 ML: 5 INJECTION INTRAVENOUS at 05:39

## 2022-01-01 RX ADMIN — POTASSIUM BICARBONATE 20 MEQ: 782 TABLET, EFFERVESCENT ORAL at 20:38

## 2022-01-01 RX ADMIN — METOPROLOL TARTRATE 1.25 MG: 5 INJECTION INTRAVENOUS at 08:05

## 2022-01-01 RX ADMIN — ACYCLOVIR SODIUM 750 MG: 50 INJECTION, SOLUTION INTRAVENOUS at 13:07

## 2022-01-01 RX ADMIN — METOPROLOL TARTRATE 1.25 MG: 5 INJECTION INTRAVENOUS at 20:54

## 2022-01-01 RX ADMIN — Medication 2 UNITS: at 23:26

## 2022-01-01 RX ADMIN — ACETAMINOPHEN 650 MG: 325 TABLET ORAL at 02:22

## 2022-01-01 RX ADMIN — POTASSIUM BICARBONATE 10 MEQ: 782 TABLET, EFFERVESCENT ORAL at 18:58

## 2022-01-01 RX ADMIN — FUROSEMIDE 20 MG: 10 INJECTION, SOLUTION INTRAMUSCULAR; INTRAVENOUS at 09:06

## 2022-01-01 RX ADMIN — METOPROLOL TARTRATE 1.25 MG: 5 INJECTION INTRAVENOUS at 02:25

## 2022-01-01 RX ADMIN — PIPERACILLIN AND TAZOBACTAM 4.5 G: 4; .5 INJECTION, POWDER, LYOPHILIZED, FOR SOLUTION INTRAVENOUS at 04:57

## 2022-01-01 RX ADMIN — PROPOFOL 20 MCG/KG/MIN: 10 INJECTION, EMULSION INTRAVENOUS at 23:15

## 2022-01-01 RX ADMIN — ACYCLOVIR SODIUM 750 MG: 50 INJECTION, SOLUTION INTRAVENOUS at 14:07

## 2022-01-01 RX ADMIN — HEPARIN SODIUM 5000 UNITS: 5000 INJECTION INTRAVENOUS; SUBCUTANEOUS at 13:53

## 2022-01-01 RX ADMIN — VANCOMYCIN HYDROCHLORIDE 2000 MG: 500 INJECTION, POWDER, LYOPHILIZED, FOR SOLUTION INTRAVENOUS at 13:08

## 2022-01-01 RX ADMIN — DEXTROSE MONOHYDRATE 50 ML/HR: 50 INJECTION, SOLUTION INTRAVENOUS at 13:29

## 2022-01-01 RX ADMIN — LEVETIRACETAM 1000 MG: 100 INJECTION, SOLUTION INTRAVENOUS at 20:53

## 2022-01-01 RX ADMIN — LEVETIRACETAM 1000 MG: 100 INJECTION, SOLUTION INTRAVENOUS at 09:17

## 2022-01-01 RX ADMIN — SODIUM CHLORIDE, PRESERVATIVE FREE 10 ML: 5 INJECTION INTRAVENOUS at 13:07

## 2022-01-01 RX ADMIN — Medication 6 UNITS: at 05:51

## 2022-01-01 RX ADMIN — Medication 3 UNITS: at 17:47

## 2022-01-01 RX ADMIN — Medication 2 UNITS: at 10:00

## 2022-01-01 RX ADMIN — METOPROLOL TARTRATE 1.25 MG: 5 INJECTION INTRAVENOUS at 20:57

## 2022-01-01 RX ADMIN — Medication 6 UNITS: at 11:41

## 2022-01-01 RX ADMIN — PROPOFOL 10 MCG/KG/MIN: 10 INJECTION, EMULSION INTRAVENOUS at 21:03

## 2022-01-01 RX ADMIN — HEPARIN SODIUM 5000 UNITS: 5000 INJECTION INTRAVENOUS; SUBCUTANEOUS at 05:51

## 2022-01-01 RX ADMIN — ARFORMOTEROL TARTRATE INHALATION 15 MCG: 15 SOLUTION RESPIRATORY (INHALATION) at 20:00

## 2022-01-01 RX ADMIN — INSULIN GLARGINE 12 UNITS: 100 INJECTION, SOLUTION SUBCUTANEOUS at 08:00

## 2022-01-01 RX ADMIN — METOPROLOL TARTRATE 1.25 MG: 5 INJECTION INTRAVENOUS at 20:38

## 2022-01-01 RX ADMIN — LORAZEPAM 1 MG: 2 SOLUTION, CONCENTRATE ORAL at 11:15

## 2022-01-01 RX ADMIN — PIPERACILLIN AND TAZOBACTAM 3.38 G: 3; .375 INJECTION, POWDER, FOR SOLUTION INTRAVENOUS at 04:21

## 2022-01-01 RX ADMIN — PIPERACILLIN AND TAZOBACTAM 3.38 G: 3; .375 INJECTION, POWDER, FOR SOLUTION INTRAVENOUS at 14:43

## 2022-01-01 RX ADMIN — SODIUM CHLORIDE, PRESERVATIVE FREE 40 MG: 5 INJECTION INTRAVENOUS at 08:11

## 2022-01-01 RX ADMIN — LORAZEPAM 1 MG: 2 SOLUTION, CONCENTRATE ORAL at 18:12

## 2022-01-01 RX ADMIN — HEPARIN SODIUM 5000 UNITS: 5000 INJECTION INTRAVENOUS; SUBCUTANEOUS at 06:30

## 2022-01-01 RX ADMIN — SODIUM CHLORIDE, PRESERVATIVE FREE 10 ML: 5 INJECTION INTRAVENOUS at 21:07

## 2022-01-01 RX ADMIN — METOPROLOL TARTRATE 1.25 MG: 5 INJECTION INTRAVENOUS at 02:33

## 2022-01-01 RX ADMIN — ACYCLOVIR SODIUM 750 MG: 50 INJECTION, SOLUTION INTRAVENOUS at 01:25

## 2022-01-01 RX ADMIN — MORPHINE SULFATE 2 MG: 2 INJECTION, SOLUTION INTRAMUSCULAR; INTRAVENOUS at 16:10

## 2022-01-01 RX ADMIN — PROPOFOL 20 MCG/KG/MIN: 10 INJECTION, EMULSION INTRAVENOUS at 03:00

## 2022-01-01 RX ADMIN — ARFORMOTEROL TARTRATE INHALATION 15 MCG: 15 SOLUTION RESPIRATORY (INHALATION) at 20:14

## 2022-01-01 RX ADMIN — SODIUM CHLORIDE, PRESERVATIVE FREE 40 MG: 5 INJECTION INTRAVENOUS at 09:06

## 2022-01-01 RX ADMIN — INSULIN GLARGINE 15 UNITS: 100 INJECTION, SOLUTION SUBCUTANEOUS at 09:25

## 2022-01-01 RX ADMIN — SODIUM CHLORIDE, PRESERVATIVE FREE 40 MG: 5 INJECTION INTRAVENOUS at 21:13

## 2022-01-01 RX ADMIN — MORPHINE SULFATE 2 MG: 2 INJECTION, SOLUTION INTRAMUSCULAR; INTRAVENOUS at 16:35

## 2022-01-01 RX ADMIN — PROPOFOL 10 MCG/KG/MIN: 10 INJECTION, EMULSION INTRAVENOUS at 08:13

## 2022-01-01 RX ADMIN — METOPROLOL TARTRATE 1.25 MG: 5 INJECTION INTRAVENOUS at 08:09

## 2022-01-01 RX ADMIN — EPINEPHRINE 10 MCG/MIN: 1 INJECTION INTRAMUSCULAR; INTRAVENOUS; SUBCUTANEOUS at 10:06

## 2022-01-01 RX ADMIN — METOPROLOL TARTRATE 2.5 MG: 5 INJECTION INTRAVENOUS at 17:47

## 2022-01-01 RX ADMIN — MORPHINE SULFATE 2 MG: 2 INJECTION, SOLUTION INTRAMUSCULAR; INTRAVENOUS at 12:42

## 2022-01-01 RX ADMIN — Medication 3 UNITS: at 23:26

## 2022-01-01 RX ADMIN — PROPOFOL 20 MCG/KG/MIN: 10 INJECTION, EMULSION INTRAVENOUS at 16:28

## 2022-01-01 RX ADMIN — Medication 3 UNITS: at 23:53

## 2022-01-01 RX ADMIN — SODIUM CHLORIDE, PRESERVATIVE FREE 10 ML: 5 INJECTION INTRAVENOUS at 14:06

## 2022-01-01 RX ADMIN — MORPHINE SULFATE 2 MG: 2 INJECTION, SOLUTION INTRAMUSCULAR; INTRAVENOUS at 19:51

## 2022-01-01 RX ADMIN — PIPERACILLIN AND TAZOBACTAM 3.38 G: 3; .375 INJECTION, POWDER, FOR SOLUTION INTRAVENOUS at 20:29

## 2022-01-01 RX ADMIN — PIPERACILLIN AND TAZOBACTAM 4.5 G: 4; .5 INJECTION, POWDER, LYOPHILIZED, FOR SOLUTION INTRAVENOUS at 23:26

## 2022-01-01 RX ADMIN — PIPERACILLIN AND TAZOBACTAM 3.38 G: 3; .375 INJECTION, POWDER, FOR SOLUTION INTRAVENOUS at 05:13

## 2022-01-01 RX ADMIN — PROPOFOL 15 MCG/KG/MIN: 10 INJECTION, EMULSION INTRAVENOUS at 07:59

## 2022-01-01 RX ADMIN — SODIUM CHLORIDE, PRESERVATIVE FREE 40 MG: 5 INJECTION INTRAVENOUS at 10:48

## 2022-01-01 RX ADMIN — PIPERACILLIN AND TAZOBACTAM 3.38 G: 3; .375 INJECTION, POWDER, FOR SOLUTION INTRAVENOUS at 20:54

## 2022-01-01 RX ADMIN — INSULIN GLARGINE 15 UNITS: 100 INJECTION, SOLUTION SUBCUTANEOUS at 09:05

## 2022-01-01 RX ADMIN — Medication 3 UNITS: at 23:58

## 2022-01-01 RX ADMIN — AMLODIPINE BESYLATE 2.5 MG: 5 TABLET ORAL at 09:07

## 2022-01-01 RX ADMIN — SODIUM CHLORIDE, PRESERVATIVE FREE 10 ML: 5 INJECTION INTRAVENOUS at 21:29

## 2022-01-01 RX ADMIN — MORPHINE SULFATE 2 MG: 2 INJECTION, SOLUTION INTRAMUSCULAR; INTRAVENOUS at 10:27

## 2022-01-01 RX ADMIN — METOPROLOL TARTRATE 1.25 MG: 5 INJECTION INTRAVENOUS at 01:53

## 2022-01-01 RX ADMIN — LEVETIRACETAM 1000 MG: 100 INJECTION, SOLUTION INTRAVENOUS at 20:27

## 2022-01-01 RX ADMIN — LEVETIRACETAM 1000 MG: 100 INJECTION, SOLUTION INTRAVENOUS at 11:11

## 2022-01-01 RX ADMIN — PROPOFOL 10 MCG/KG/MIN: 10 INJECTION, EMULSION INTRAVENOUS at 20:49

## 2022-01-01 RX ADMIN — POTASSIUM BICARBONATE 40 MEQ: 782 TABLET, EFFERVESCENT ORAL at 13:08

## 2022-01-01 RX ADMIN — Medication 6 UNITS: at 11:36

## 2022-01-01 RX ADMIN — MORPHINE SULFATE 2 MG: 2 INJECTION, SOLUTION INTRAMUSCULAR; INTRAVENOUS at 10:44

## 2022-01-01 RX ADMIN — PIPERACILLIN AND TAZOBACTAM 3.38 G: 3; .375 INJECTION, POWDER, FOR SOLUTION INTRAVENOUS at 21:27

## 2022-01-01 RX ADMIN — Medication 9 UNITS: at 17:48

## 2022-01-01 RX ADMIN — LIDOCAINE HYDROCHLORIDE: 20 JELLY TOPICAL at 14:09

## 2022-01-01 RX ADMIN — Medication 10 UNITS: at 09:10

## 2022-01-01 RX ADMIN — HEPARIN SODIUM 5000 UNITS: 5000 INJECTION INTRAVENOUS; SUBCUTANEOUS at 22:29

## 2022-01-01 RX ADMIN — POTASSIUM BICARBONATE 20 MEQ: 782 TABLET, EFFERVESCENT ORAL at 05:48

## 2022-01-01 RX ADMIN — PIPERACILLIN AND TAZOBACTAM 3.38 G: 3; .375 INJECTION, POWDER, FOR SOLUTION INTRAVENOUS at 20:38

## 2022-01-01 RX ADMIN — PIPERACILLIN AND TAZOBACTAM 3.38 G: 3; .375 INJECTION, POWDER, FOR SOLUTION INTRAVENOUS at 05:42

## 2022-01-01 RX ADMIN — Medication 6 UNITS: at 05:48

## 2022-01-01 RX ADMIN — METOPROLOL TARTRATE 1.25 MG: 5 INJECTION INTRAVENOUS at 13:50

## 2022-01-01 RX ADMIN — SODIUM CHLORIDE, PRESERVATIVE FREE 40 MG: 5 INJECTION INTRAVENOUS at 08:00

## 2022-01-01 RX ADMIN — Medication 6 UNITS: at 18:15

## 2022-01-01 RX ADMIN — LEVETIRACETAM 1000 MG: 100 INJECTION, SOLUTION INTRAVENOUS at 21:15

## 2022-01-01 RX ADMIN — MORPHINE SULFATE 2 MG: 2 INJECTION, SOLUTION INTRAMUSCULAR; INTRAVENOUS at 14:24

## 2022-01-01 RX ADMIN — SODIUM CHLORIDE, PRESERVATIVE FREE 10 ML: 5 INJECTION INTRAVENOUS at 05:51

## 2022-01-01 RX ADMIN — LEVETIRACETAM 1000 MG: 100 INJECTION, SOLUTION INTRAVENOUS at 20:08

## 2022-01-01 RX ADMIN — HEPARIN SODIUM 5000 UNITS: 5000 INJECTION INTRAVENOUS; SUBCUTANEOUS at 21:01

## 2022-01-01 RX ADMIN — ACYCLOVIR SODIUM 750 MG: 50 INJECTION, SOLUTION INTRAVENOUS at 01:35

## 2022-01-01 RX ADMIN — MORPHINE SULFATE 2 MG: 2 INJECTION, SOLUTION INTRAMUSCULAR; INTRAVENOUS at 20:19

## 2022-01-01 RX ADMIN — SODIUM CHLORIDE, PRESERVATIVE FREE 40 MG: 5 INJECTION INTRAVENOUS at 09:05

## 2022-01-01 RX ADMIN — POTASSIUM BICARBONATE 20 MEQ: 782 TABLET, EFFERVESCENT ORAL at 12:22

## 2022-01-01 RX ADMIN — AMLODIPINE BESYLATE 5 MG: 5 TABLET ORAL at 09:05

## 2022-01-01 RX ADMIN — LEVETIRACETAM 1000 MG: 100 INJECTION, SOLUTION INTRAVENOUS at 09:06

## 2022-01-01 RX ADMIN — SODIUM CHLORIDE, PRESERVATIVE FREE 40 MG: 5 INJECTION INTRAVENOUS at 08:37

## 2022-01-01 RX ADMIN — LACTULOSE 15 ML: 20 SOLUTION ORAL at 13:51

## 2022-01-01 RX ADMIN — SODIUM CHLORIDE, PRESERVATIVE FREE 10 ML: 5 INJECTION INTRAVENOUS at 05:42

## 2022-01-01 RX ADMIN — LEVETIRACETAM 1000 MG: 100 INJECTION, SOLUTION INTRAVENOUS at 21:27

## 2022-01-01 RX ADMIN — Medication 4 UNITS: at 13:14

## 2022-01-01 RX ADMIN — SODIUM CHLORIDE, PRESERVATIVE FREE 40 MG: 5 INJECTION INTRAVENOUS at 21:11

## 2022-01-01 RX ADMIN — LEVETIRACETAM 1000 MG: 100 INJECTION, SOLUTION INTRAVENOUS at 21:22

## 2022-01-01 RX ADMIN — METOPROLOL TARTRATE 1.25 MG: 5 INJECTION INTRAVENOUS at 13:00

## 2022-01-01 RX ADMIN — SODIUM CHLORIDE, PRESERVATIVE FREE 10 ML: 5 INJECTION INTRAVENOUS at 21:59

## 2022-01-01 RX ADMIN — ACYCLOVIR SODIUM 750 MG: 50 INJECTION, SOLUTION INTRAVENOUS at 11:38

## 2022-01-01 RX ADMIN — SODIUM CHLORIDE 50 ML/HR: 900 INJECTION, SOLUTION INTRAVENOUS at 18:00

## 2022-01-01 RX ADMIN — HEPARIN SODIUM 5000 UNITS: 5000 INJECTION INTRAVENOUS; SUBCUTANEOUS at 13:01

## 2022-01-01 RX ADMIN — PIPERACILLIN AND TAZOBACTAM 3.38 G: 3; .375 INJECTION, POWDER, FOR SOLUTION INTRAVENOUS at 04:41

## 2022-01-01 RX ADMIN — SODIUM CHLORIDE 25 ML/HR: 900 INJECTION, SOLUTION INTRAVENOUS at 21:10

## 2022-01-01 RX ADMIN — PIPERACILLIN AND TAZOBACTAM 3.38 G: 3; .375 INJECTION, POWDER, FOR SOLUTION INTRAVENOUS at 12:41

## 2022-01-01 RX ADMIN — LEVETIRACETAM 1000 MG: 100 INJECTION, SOLUTION INTRAVENOUS at 09:41

## 2022-01-01 RX ADMIN — AMLODIPINE BESYLATE 5 MG: 5 TABLET ORAL at 09:24

## 2022-01-01 RX ADMIN — SODIUM CHLORIDE, PRESERVATIVE FREE 40 MG: 5 INJECTION INTRAVENOUS at 09:22

## 2022-01-01 RX ADMIN — POTASSIUM CHLORIDE 10 MEQ: 7.45 INJECTION INTRAVENOUS at 13:08

## 2022-01-01 RX ADMIN — SODIUM CHLORIDE, PRESERVATIVE FREE 40 MG: 5 INJECTION INTRAVENOUS at 09:41

## 2022-01-01 RX ADMIN — PIPERACILLIN AND TAZOBACTAM 3.38 G: 3; .375 INJECTION, POWDER, FOR SOLUTION INTRAVENOUS at 20:34

## 2022-01-01 RX ADMIN — LACTULOSE 15 ML: 20 SOLUTION ORAL at 08:09

## 2022-01-01 RX ADMIN — SODIUM CHLORIDE, PRESERVATIVE FREE 10 ML: 5 INJECTION INTRAVENOUS at 13:51

## 2022-01-01 RX ADMIN — Medication 9 UNITS: at 17:37

## 2022-01-01 RX ADMIN — INSULIN GLARGINE 15 UNITS: 100 INJECTION, SOLUTION SUBCUTANEOUS at 10:02

## 2022-01-01 RX ADMIN — LEVETIRACETAM 1000 MG: 100 INJECTION, SOLUTION INTRAVENOUS at 21:01

## 2022-01-01 RX ADMIN — Medication 3 UNITS: at 23:38

## 2022-01-01 RX ADMIN — INSULIN GLARGINE 12 UNITS: 100 INJECTION, SOLUTION SUBCUTANEOUS at 08:09

## 2022-01-01 RX ADMIN — Medication 3 UNITS: at 05:33

## 2022-01-01 RX ADMIN — HEPARIN SODIUM 5000 UNITS: 5000 INJECTION INTRAVENOUS; SUBCUTANEOUS at 05:02

## 2022-01-01 RX ADMIN — MORPHINE SULFATE 2 MG: 2 INJECTION, SOLUTION INTRAMUSCULAR; INTRAVENOUS at 00:46

## 2022-01-01 RX ADMIN — MORPHINE SULFATE 2 MG: 2 INJECTION, SOLUTION INTRAMUSCULAR; INTRAVENOUS at 16:55

## 2022-01-01 RX ADMIN — SODIUM CHLORIDE, PRESERVATIVE FREE 10 ML: 5 INJECTION INTRAVENOUS at 20:43

## 2022-01-01 RX ADMIN — Medication 6 UNITS: at 23:23

## 2022-01-01 RX ADMIN — Medication 6 UNITS: at 12:54

## 2022-01-01 RX ADMIN — INSULIN GLARGINE 15 UNITS: 100 INJECTION, SOLUTION SUBCUTANEOUS at 10:45

## 2022-01-01 RX ADMIN — Medication 4 UNITS: at 05:11

## 2022-01-01 RX ADMIN — LEVETIRACETAM 1000 MG: 100 INJECTION, SOLUTION INTRAVENOUS at 20:38

## 2022-01-01 RX ADMIN — MORPHINE SULFATE 2 MG: 2 INJECTION, SOLUTION INTRAMUSCULAR; INTRAVENOUS at 17:09

## 2022-01-01 RX ADMIN — SODIUM CHLORIDE, PRESERVATIVE FREE 10 ML: 5 INJECTION INTRAVENOUS at 05:02

## 2022-01-01 RX ADMIN — INSULIN GLARGINE 12 UNITS: 100 INJECTION, SOLUTION SUBCUTANEOUS at 09:07

## 2022-01-01 RX ADMIN — SODIUM CHLORIDE, PRESERVATIVE FREE 10 ML: 5 INJECTION INTRAVENOUS at 21:12

## 2022-01-01 RX ADMIN — FUROSEMIDE 20 MG: 10 INJECTION, SOLUTION INTRAMUSCULAR; INTRAVENOUS at 09:05

## 2022-01-01 RX ADMIN — Medication 8 UNITS: at 00:29

## 2022-01-01 RX ADMIN — FUROSEMIDE 20 MG: 10 INJECTION, SOLUTION INTRAMUSCULAR; INTRAVENOUS at 11:12

## 2022-01-01 RX ADMIN — SODIUM CHLORIDE, PRESERVATIVE FREE 10 ML: 5 INJECTION INTRAVENOUS at 23:54

## 2022-01-01 RX ADMIN — LEVETIRACETAM 1000 MG: 100 INJECTION, SOLUTION INTRAVENOUS at 08:20

## 2022-01-01 RX ADMIN — ARFORMOTEROL TARTRATE INHALATION 15 MCG: 15 SOLUTION RESPIRATORY (INHALATION) at 20:27

## 2022-01-01 RX ADMIN — LACTULOSE 15 ML: 20 SOLUTION ORAL at 09:41

## 2022-01-01 RX ADMIN — LACTULOSE 15 ML: 20 SOLUTION ORAL at 08:06

## 2022-01-01 RX ADMIN — PIPERACILLIN AND TAZOBACTAM 3.38 G: 3; .375 INJECTION, POWDER, FOR SOLUTION INTRAVENOUS at 20:27

## 2022-01-01 RX ADMIN — PIPERACILLIN AND TAZOBACTAM 4.5 G: 4; .5 INJECTION, POWDER, LYOPHILIZED, FOR SOLUTION INTRAVENOUS at 19:35

## 2022-01-01 RX ADMIN — HEPARIN SODIUM 5000 UNITS: 5000 INJECTION INTRAVENOUS; SUBCUTANEOUS at 13:54

## 2022-01-01 RX ADMIN — DEXTROSE MONOHYDRATE AND POTASSIUM CHLORIDE INJECTION, SOLUTION: 5; .149 INJECTION, SOLUTION INTRAVENOUS at 10:44

## 2022-01-01 RX ADMIN — METOPROLOL TARTRATE 1.25 MG: 5 INJECTION INTRAVENOUS at 01:25

## 2022-01-01 RX ADMIN — MORPHINE SULFATE 2 MG: 2 INJECTION, SOLUTION INTRAMUSCULAR; INTRAVENOUS at 12:51

## 2022-01-01 RX ADMIN — POTASSIUM CHLORIDE 40 MEQ: 1500 TABLET, EXTENDED RELEASE ORAL at 15:09

## 2022-01-01 RX ADMIN — Medication 6 UNITS: at 11:53

## 2022-01-01 RX ADMIN — SODIUM CHLORIDE, PRESERVATIVE FREE 10 ML: 5 INJECTION INTRAVENOUS at 05:13

## 2022-01-01 RX ADMIN — LEVETIRACETAM 1000 MG: 100 INJECTION, SOLUTION INTRAVENOUS at 10:43

## 2022-01-01 RX ADMIN — AMLODIPINE BESYLATE 5 MG: 5 TABLET ORAL at 09:41

## 2022-01-01 RX ADMIN — SODIUM CHLORIDE, PRESERVATIVE FREE 10 ML: 5 INJECTION INTRAVENOUS at 13:23

## 2022-01-01 RX ADMIN — Medication 3 UNITS: at 12:29

## 2022-01-01 RX ADMIN — METOPROLOL TARTRATE 1.25 MG: 5 INJECTION INTRAVENOUS at 08:00

## 2022-01-01 RX ADMIN — LEVETIRACETAM 1000 MG: 100 INJECTION, SOLUTION INTRAVENOUS at 09:49

## 2022-01-01 RX ADMIN — LEVETIRACETAM 1000 MG: 100 INJECTION, SOLUTION INTRAVENOUS at 20:58

## 2022-01-01 RX ADMIN — VANCOMYCIN HYDROCHLORIDE 1250 MG: 5 INJECTION, POWDER, LYOPHILIZED, FOR SOLUTION INTRAVENOUS at 15:30

## 2022-01-01 RX ADMIN — SODIUM CHLORIDE, PRESERVATIVE FREE 40 MG: 5 INJECTION INTRAVENOUS at 20:33

## 2022-01-01 RX ADMIN — LEVETIRACETAM 1000 MG: 100 INJECTION, SOLUTION INTRAVENOUS at 08:23

## 2022-01-01 RX ADMIN — SODIUM CHLORIDE, PRESERVATIVE FREE 10 ML: 5 INJECTION INTRAVENOUS at 05:08

## 2022-01-01 RX ADMIN — DEXTROSE MONOHYDRATE AND POTASSIUM CHLORIDE INJECTION, SOLUTION: 5; .149 INJECTION, SOLUTION INTRAVENOUS at 05:37

## 2022-01-01 RX ADMIN — PIPERACILLIN AND TAZOBACTAM 3.38 G: 3; .375 INJECTION, POWDER, FOR SOLUTION INTRAVENOUS at 04:36

## 2022-01-01 RX ADMIN — MORPHINE SULFATE 2 MG: 2 INJECTION, SOLUTION INTRAMUSCULAR; INTRAVENOUS at 11:51

## 2022-01-01 RX ADMIN — MORPHINE SULFATE 2 MG: 2 INJECTION, SOLUTION INTRAMUSCULAR; INTRAVENOUS at 10:59

## 2022-01-01 RX ADMIN — HEPARIN SODIUM 5000 UNITS: 5000 INJECTION INTRAVENOUS; SUBCUTANEOUS at 21:11

## 2022-01-01 RX ADMIN — POTASSIUM & SODIUM PHOSPHATES POWDER PACK 280-160-250 MG 2 PACKET: 280-160-250 PACK at 12:22

## 2022-01-01 RX ADMIN — SODIUM CHLORIDE, PRESERVATIVE FREE 10 ML: 5 INJECTION INTRAVENOUS at 05:19

## 2022-01-01 RX ADMIN — INSULIN GLARGINE 15 UNITS: 100 INJECTION, SOLUTION SUBCUTANEOUS at 08:05

## 2022-01-01 RX ADMIN — AMLODIPINE BESYLATE 5 MG: 5 TABLET ORAL at 08:10

## 2022-01-01 RX ADMIN — DEXTROSE MONOHYDRATE 50 ML/HR: 50 INJECTION, SOLUTION INTRAVENOUS at 15:15

## 2022-01-01 RX ADMIN — PIPERACILLIN AND TAZOBACTAM 3.38 G: 3; .375 INJECTION, POWDER, FOR SOLUTION INTRAVENOUS at 12:54

## 2022-01-01 RX ADMIN — LACTULOSE 15 ML: 20 SOLUTION ORAL at 09:05

## 2022-01-01 RX ADMIN — METOPROLOL TARTRATE 1.25 MG: 5 INJECTION INTRAVENOUS at 20:28

## 2022-01-01 RX ADMIN — METOPROLOL TARTRATE 1.25 MG: 5 INJECTION INTRAVENOUS at 02:28

## 2022-01-01 RX ADMIN — ACYCLOVIR SODIUM 750 MG: 50 INJECTION, SOLUTION INTRAVENOUS at 12:40

## 2022-01-01 RX ADMIN — PIPERACILLIN AND TAZOBACTAM 3.38 G: 3; .375 INJECTION, POWDER, FOR SOLUTION INTRAVENOUS at 05:36

## 2022-01-01 RX ADMIN — LEVETIRACETAM 1000 MG: 100 INJECTION, SOLUTION INTRAVENOUS at 08:21

## 2022-01-01 RX ADMIN — LEVETIRACETAM 1000 MG: 100 INJECTION, SOLUTION INTRAVENOUS at 20:48

## 2022-01-01 RX ADMIN — HEPARIN SODIUM 5000 UNITS: 5000 INJECTION INTRAVENOUS; SUBCUTANEOUS at 05:13

## 2022-01-01 RX ADMIN — HEPARIN SODIUM 5000 UNITS: 5000 INJECTION INTRAVENOUS; SUBCUTANEOUS at 05:36

## 2022-01-01 RX ADMIN — PIPERACILLIN AND TAZOBACTAM 3.38 G: 3; .375 INJECTION, POWDER, FOR SOLUTION INTRAVENOUS at 13:15

## 2022-01-01 RX ADMIN — METOPROLOL TARTRATE 1.25 MG: 5 INJECTION INTRAVENOUS at 20:31

## 2022-01-01 RX ADMIN — POTASSIUM CHLORIDE 10 MEQ: 7.46 INJECTION, SOLUTION INTRAVENOUS at 13:51

## 2022-01-01 RX ADMIN — HEPARIN SODIUM 5000 UNITS: 5000 INJECTION INTRAVENOUS; SUBCUTANEOUS at 13:23

## 2022-01-01 RX ADMIN — GLYCOPYRROLATE 0.2 MG: 0.2 INJECTION, SOLUTION INTRAMUSCULAR; INTRAVENOUS at 11:02

## 2022-01-01 RX ADMIN — MORPHINE SULFATE 2 MG: 2 INJECTION, SOLUTION INTRAMUSCULAR; INTRAVENOUS at 11:19

## 2022-01-01 RX ADMIN — HEPARIN SODIUM 5000 UNITS: 5000 INJECTION INTRAVENOUS; SUBCUTANEOUS at 21:13

## 2022-01-01 RX ADMIN — SODIUM CHLORIDE, PRESERVATIVE FREE 10 ML: 5 INJECTION INTRAVENOUS at 05:36

## 2022-01-01 RX ADMIN — MORPHINE SULFATE 2 MG: 2 INJECTION, SOLUTION INTRAMUSCULAR; INTRAVENOUS at 18:12

## 2022-01-01 RX ADMIN — SODIUM CHLORIDE, PRESERVATIVE FREE 10 ML: 5 INJECTION INTRAVENOUS at 05:12

## 2022-01-01 RX ADMIN — ARFORMOTEROL TARTRATE INHALATION 15 MCG: 15 SOLUTION RESPIRATORY (INHALATION) at 19:32

## 2022-01-01 RX ADMIN — POTASSIUM CHLORIDE 10 MEQ: 7.45 INJECTION INTRAVENOUS at 06:25

## 2022-01-01 RX ADMIN — LORAZEPAM 1 MG: 2 SOLUTION, CONCENTRATE ORAL at 10:35

## 2022-08-31 PROBLEM — J96.90 RESPIRATORY FAILURE (HCC): Status: ACTIVE | Noted: 2022-01-01

## 2022-08-31 PROBLEM — N17.9 AKI (ACUTE KIDNEY INJURY) (HCC): Status: ACTIVE | Noted: 2022-01-01

## 2022-08-31 PROBLEM — I46.9 CARDIAC ARREST (HCC): Status: ACTIVE | Noted: 2022-01-01

## 2022-08-31 PROBLEM — R57.9 SHOCK (HCC): Status: ACTIVE | Noted: 2022-01-01

## 2022-08-31 PROBLEM — N13.30 HYDRONEPHROSIS: Status: ACTIVE | Noted: 2022-01-01

## 2022-08-31 PROBLEM — A41.9 SEPSIS (HCC): Status: ACTIVE | Noted: 2022-01-01

## 2022-08-31 PROBLEM — B02.7 DISSEMINATED HERPES ZOSTER: Status: ACTIVE | Noted: 2022-01-01

## 2022-08-31 NOTE — CONSULTS
[facility]    Subjective:     Date of Consultation:  August 31, 2022    Referring Physician: Adryan Wolfe MD    Reason for Consultation:  Urinary retention, b/l hydroureteronephrosis. History of Present Illness:   Patient is a 65 y.o.  male who is being seen for reason above. He was admitted to the hospital for Cardiac arrest ([facility] [address] [I46.9]. Pt was noted on CT scan to have  Urinary retention, b/l hydroureteronephrosis. See CT report. Pt is unresponsive and intubated in ICU on pressors. Failed duran catheter placement in ER and ICU. Urology consulted for difficult duran catheter placement in setting of cardiac arrest and Urinary retention, b/l hydroureteronephrosis. CT scan 8/31/22  Severe left and moderate right hydroureteronephrosis with marked urinary bladder  distention. No obstructing calculus.     BMP:   Lab Results   Component Value Date/Time     08/31/2022 10:00 AM    K 3.1 (L) 08/31/2022 10:00 AM     08/31/2022 10:00 AM    CO2 19 (L) 08/31/2022 10:00 AM    AGAP 18 08/31/2022 10:00 AM     (H) 08/31/2022 10:00 AM    BUN 50 (H) 08/31/2022 10:00 AM    CREA 3.01 (H) 08/31/2022 10:00 AM    GFRAA 26 (L) 08/31/2022 10:00 AM    GFRNA 21 (L) 08/31/2022 10:00 AM     CMP:   Lab Results   Component Value Date/Time     08/31/2022 10:00 AM    K 3.1 (L) 08/31/2022 10:00 AM     08/31/2022 10:00 AM    CO2 19 (L) 08/31/2022 10:00 AM    AGAP 18 08/31/2022 10:00 AM     (H) 08/31/2022 10:00 AM    BUN 50 (H) 08/31/2022 10:00 AM    CREA 3.01 (H) 08/31/2022 10:00 AM    GFRAA 26 (L) 08/31/2022 10:00 AM    GFRNA 21 (L) 08/31/2022 10:00 AM    CA 7.7 (L) 08/31/2022 10:00 AM    MG 2.7 (H) 08/31/2022 10:00 AM    ALB 2.3 (L) 08/31/2022 10:00 AM    TP 6.0 (L) 08/31/2022 10:00 AM    GLOB 3.7 08/31/2022 10:00 AM    AGRAT 0.6 (L) 08/31/2022 10:00 AM     (H) 08/31/2022 10:00 AM     CBC:   Lab Results   Component Value Date/Time    WBC 16.8 (H) 08/31/2022 02:15 PM    HGB 13.1 08/31/2022 02:15 PM    HCT 42.3 08/31/2022 02:15 PM     08/31/2022 02:15 PM          Past Medical History:   Diagnosis Date    Gout     Shingles       No past surgical history on file. No family history on file.    Social History     Tobacco Use    Smoking status: Not on file    Smokeless tobacco: Not on file   Substance Use Topics    Alcohol use: Not on file     No Known Allergies   Prior to Admission medications    Not on File         REVIEW OF SYSTEMS  Unable to obtain      Objective:     Patient Vitals for the past 8 hrs:   BP Temp Pulse Resp SpO2 Height Weight   08/31/22 1524 96/65 -- -- -- -- 5' 10\" (1.778 m) 111.6 kg (246 lb)   08/31/22 1507 -- -- 82 25 100 % -- --   08/31/22 1402 96/65 -- 80 20 100 % -- --   08/31/22 1341 (!) 80/61 -- 80 20 -- -- --   08/31/22 1339 (!) 84/62 -- 83 20 -- -- --   08/31/22 1331 (!) 87/61 -- 82 20 100 % -- --   08/31/22 1330 (!) 89/56 -- 81 21 100 % -- --   08/31/22 1321 (!) 91/58 -- 81 20 100 % -- --   08/31/22 1316 (!) 88/52 -- 82 22 -- -- --   08/31/22 1311 (!) 89/52 -- 81 20 100 % -- --   08/31/22 1258 (!) 94/59 -- 84 20 100 % -- --   08/31/22 1253 (!) 89/46 -- 84 20 100 % -- --   08/31/22 1248 (!) 85/55 -- 84 19 -- -- --   08/31/22 1213 (!) 98/57 -- 84 21 100 % -- --   08/31/22 1208 93/61 -- 85 28 100 % -- --   08/31/22 1203 (!) 95/57 -- 84 19 100 % -- --   08/31/22 1158 (!) 100/56 -- 87 20 -- -- --   08/31/22 1153 96/64 -- 88 20 -- -- --   08/31/22 1148 97/61 -- 86 20 -- -- --   08/31/22 1143 99/64 -- 88 18 100 % -- --   08/31/22 1056 (!) 97/59 -- 93 22 100 % -- --   08/31/22 1046 97/65 -- 98 24 100 % -- --   08/31/22 1041 102/65 -- 99 26 100 % -- --   08/31/22 1031 112/65 -- 96 22 100 % -- --   08/31/22 1026 107/62 -- 96 23 -- -- --   08/31/22 1016 (!) 88/68 -- 100 29 100 % -- --   08/31/22 1015 (!) 83/64 -- 95 21 -- -- --   08/31/22 1005 -- -- (!) 105 -- 100 % -- --   08/31/22 1004 -- -- -- -- 100 % -- --   08/31/22 1003 -- -- -- -- 100 % -- --   08/31/22 1002 (!) 68/50 -- -- -- -- -- --   22 1001 (!) 82/55 -- -- -- 100 % -- --   22 1000 (!) 82/55 96.8 °F (36 °C) (!) 112 18 100 % 5' 10\" (1.778 m) 111.6 kg (246 lb 1.6 oz)   22 0959 -- -- -- -- (!) 89 % -- --   22 0958 -- -- -- -- (!) 82 % -- 111.9 kg (246 lb 12.8 oz)   22 0957 (!) 86/59 -- -- -- (!) 88 % -- --   22 0954 (!) 140/121 -- -- -- -- -- --     Temp (24hrs), Av.8 °F (36 °C), Min:96.8 °F (36 °C), Max:96.8 °F (36 °C)        Intake and Output:   No intake/output data recorded. Physical Exam:  Constitutional Abnormal Unresponsive, intubated and sedated   Neck Exam     Respiratory  Intubated        Genitourinary Normal +Suprapubic fullness.          Current Facility-Administered Medications:     EPINEPHrine (ADRENALIN) 8 mg in 0.9% sodium chloride 250 mL infusion, 2-30 mcg/min, IntraVENous, TITRATE, Anu Pacheco DO, Stopped at 22 1313    chlorhexidine (PERIDEX) 0.12 % mouthwash 10 mL, 10 mL, Oral, ONCE, Fausto Chow DO    NOREPINephrine (LEVOPHED) 8 mg in 5% dextrose 250mL (32 mcg/mL) infusion, 2-30 mcg/min, IntraVENous, TITRATE, Anu Pacheco DO, Last Rate: 30 mL/hr at 22 1316, 16 mcg/min at 22 1316    [COMPLETED] piperacillin-tazobactam (ZOSYN) 4.5 g in 0.9% sodium chloride (MBP/ADV) 100 mL MBP, 4.5 g, IntraVENous, ONCE, IV Completed at 22 1258 **FOLLOWED BY** piperacillin-tazobactam (ZOSYN) 4.5 g in 0.9% sodium chloride (MBP/ADV) 100 mL MBP, 4.5 g, IntraVENous, Q6H, Fausto Calvillo DO    vancomycin (VANCOCIN) 2000 mg in  ml infusion, 2,000 mg, IntraVENous, ONCE, Fausto Calvillo DO    Vancomycin Pharmacy to Dose, 1 Each, Other, Rx Dosing/Monitoring, Fausto Calvillo DO    propofol (DIPRIVAN) 10 mg/mL infusion, 0-20 mcg/kg/min, IntraVENous, TITRATE, Prudencio Johnson MD, Last Rate: 10 mL/hr at 22 1145, 15 mcg/kg/min at 22 1145    heparin (porcine) 1,000 unit/mL injection 8,930 Units, 80 Units/kg, IntraVENous, ONCE, Anu Pacheco, DO    heparin (porcine) 25,000 units in 0.45% saline 250 ml infusion, 18-36 Units/kg/hr, IntraVENous, TITRATE, Fausto Resendiz,     0.9% sodium chloride infusion, 50 mL/hr, IntraVENous, CONTINUOUS, Juan Jordan MD    sodium chloride (NS) flush 5-40 mL, 5-40 mL, IntraVENous, Q8H, Brigette Chakraborty MD    sodium chloride (NS) flush 5-40 mL, 5-40 mL, IntraVENous, PRN, Brigette Chakraborty MD    acetaminophen (TYLENOL) tablet 650 mg, 650 mg, Oral, Q6H PRN **OR** acetaminophen (TYLENOL) suppository 650 mg, 650 mg, Rectal, Q6H PRN, Brigette Chakraborty MD    polyethylene glycol (MIRALAX) packet 17 g, 17 g, Oral, DAILY PRN, Brigette Chakraborty MD    ondansetron (ZOFRAN ODT) tablet 4 mg, 4 mg, Oral, Q8H PRN **OR** ondansetron (ZOFRAN) injection 4 mg, 4 mg, IntraVENous, Q6H PRN, Brigette Chakraborty MD    pantoprazole (PROTONIX) 40 mg in 0.9% sodium chloride 10 mL injection, 40 mg, IntraVENous, Q12H, Sukhjinder Chakraborty MD    [START ON 9/1/2022] vancomycin (VANCOCIN) 1,000 mg in 0.9% sodium chloride 250 mL (VIAL-MATE), 1,000 mg, IntraVENous, Q24H, Fausto Chow DO    arformoteroL (BROVANA) neb solution 15 mcg, 15 mcg, Nebulization, BID RT, Juan Jordan MD       Assessment:     Active Problems:    Cardiac arrest ([address] (8/31/2022)      Respiratory failure ([address] (8/31/2022)      GENO (acute kidney injury) ([address] (8/31/2022)      Disseminated herpes zoster (8/31/2022)      Hydronephrosis (8/31/2022)      Shock ([address] (8/31/2022)      Sepsis ([facility] [address] (8/31/2022)    Urinary retention        Plan:   Initial attempt to place 18fr coude duran catheter did not go in. Duran placed at bedside over wire with 16fr [place] tip duran catheter. Clear urine drained out immediately. Cont duran catheter long term as long as patient is in the ICU. Only remove duran catheter when patient has recovered from cardiac rest, alert and is able to at least ambulate.  If duran catheter stays in for 4 weeks, it will need to be changed. In that case, it will need to be changed by a urologist over a wire. Hydronephrosis is likely from urination retention. Monitor for post obstructive diuresis. Will sign off. Call with questions.      Signed By: Kristen Ayala MD                         August 31, 2022

## 2022-08-31 NOTE — ED NOTES
Patient og unsuccessful after several attempts so had to pace ng. md aware. Ng to [facility] with some bloody secreations.

## 2022-08-31 NOTE — PROGRESS NOTES
Active shingles alreay on antiviral at home   Bilateral hydronephrosis ? Urosepsis cath difficult to place urology consulted   Rash present still considered septic contraindication for cooling protocol  Spoke to family full code   ANNE MARIE Narvaez Md

## 2022-08-31 NOTE — PROGRESS NOTES
I was called to evalaute after cardiac arrest  I am evaluating for possible cooling protocol. Echo pending  If no contraindications by cardiology we will start  ANNE MARIE Malave Md

## 2022-08-31 NOTE — PROGRESS NOTES
.[id number] [facility] Pharmacokinetic Monitoring Service - Vancomycin     Jessika Vela is a 65 y.o. male starting on vancomycin therapy for Sepsis of unknown etiology. Pharmacy consulted by Dr Cande Pathak for monitoring and adjustment. Target Concentration: Goal AUC/MAGALYS 400-600 mg*hr/L    Additional Antimicrobials: Zosyn    Pertinent Laboratory Values: Wt Readings from Last 1 Encounters:   08/31/22 111.6 kg (246 lb 1.6 oz)     Temp Readings from Last 1 Encounters:   08/31/22 96.8 °F (36 °C)     No components found for: PROCAL  Estimated Creatinine Clearance: 32.2 mL/min (A) (based on SCr of 3.01 mg/dL (H)). Recent Labs     08/31/22  1000   WBC 9.6       Pertinent Cultures:  Culture Date Source Results           .     Plan:  Dosing recommendations based on Bayesian software  Start vancomycin 2000 mg bolus the Vancomycin 1000 mg IV Q 24 hours  Anticipated AUCss of 516 mg/L.hr and trough concentration of 17.7 mcgml at steady state  Renal labs as indicated   Vancomycin concentration ordered after subsequent doses  Pharmacy will continue to monitor patient and adjust therapy as indicated    Thank you for the consult,  Erlinda Yarbrough [facility]  8/31/2022 2:52 PM

## 2022-08-31 NOTE — H&P
History & Physical    Patient: Zenobia Cervantes MRN: [id number]  CSN: [id number]    YOB: 1960  Age: 65 y.o. Sex: male      DOA: 8/31/2022  Primary Care Provider:  Myrna Weems MD      Assessment/Plan   Zenobia Cervantes is a 65 y.o. hypertension, dyslipidemia, type 2 diabetes mellitus, vitamin D deficiency, stage 3a CKD and gout male with history of who presents with cardiac arrest.     Cardiac arrest  Acute respiratory failure  Shock   Sepsis, likely due urinary source  Disseminated herpes zoster  Severe left and moderate right hydroureteronephrosis with marked urinary bladder  distention      CRITICAL CARE PLAN    Resp - Acute respiratory failure, See vent orders, VAP bundle. HOB>30 degrees. Bronchodilators. ABG, Follow sputum cx. Daily CXR. Intensivist consult -Dr. Momo Garcia PVL studies    ID - Shock, Sepsis, likely due urinary source, but also has disseminated zoster, Follow up blood and urine cx, broad spectrum antibiotics, trend temps, wbc, LA improving. CVS - Cardiac arrest, Shock, Monitor HD. Wean pressors, levo for MAP>65. Cooling protocol to be determined by intensivist     Heme/onc - Follow H&H, plts. INR. Renal - Severe left and moderate right hydroureteronephrosis with marked urinary bladder, multiple attempts to place duran catheter unsucessful, urology consulted, will need urology to place duran, nephrology consulted, full evaluation per nephrology and urology, Scr 3.01,Trend BUN, Cr, follow I/O, duran in place. Check and replace Mg, K, phos. Endocrine -  Follow FSG    Neuro -acute head CT negative     Pain/Sedation - Fentanyl, ativan/versed prn. Sedation bundle. GI - NPO for now. NG tube, tube feeding.     Prophylaxis - DVT: heparin, GI: protonix     Patient Active Problem List   Diagnosis Code    Cardiac arrest ([facility] [address] I46.9    Respiratory failure ([facility] [address] J96.90    GENO (acute kidney injury) ([facility] [address] N17.9    Disseminated herpes zoster B02.7    Hydronephrosis N13.30 Shock ([facility] R57.9    Sepsis ([facility] A41.9     Estimated length of stay : 2-3 days    CC: cardiac arrest        HPI:     Elder Matamoros is a 65 y.o. male who presents with cardiac arrest. Reportedly patient was at home, has been feeling unwell since yesterday, when this morning he was feeling worse. The call for EMS was for \"illness\". On EMS arrival patient was encephalopathic but they were able to measure vital signs and check a blood sugar. In their presence patient had a witnessed cardiac arrest.  Reportedly in pulseless electrical activity. He received about 6 doses of epinephrine, no shocks delivered. He was started on a Levophed drip and then changed to an epinephrine drip. They had between 4 and 5 episodes of cardiac arrest, patient arrives with return of spontaneous circulation. He is unresponsive and not able to contribute to the history and the history is limited as a result. Reportedly being treated for both gout and shingles at present. Past Medical History:   Diagnosis Date    Gout     Shingles        No past surgical history on file. No family history on file. Social History     Socioeconomic History    Marital status:        Prior to Admission medications    Not on File       No Known Allergies    Review of Systems:  [facility]     Physical Exam:     Physical Exam:  Visit Vitals  /74   Pulse 93   Temp 99.6 °F (37.6 °C)   Resp 16   Ht 5' 10\" (1.778 m)   Wt 111.6 kg (246 lb)   SpO2 100%   BMI 35.30 kg/m²      O2 Device: Endotracheal tube, Ventilator    Temp (24hrs), Av.2 °F (36.2 °C), Min:95.3 °F (35.2 °C), Max:99.6 °F (37.6 °C)    1901 -  07  In: -   Out: 1950 [[id number]]   701 - 1900  In: 1390.2 [I.V.:1390.2]  Out: -     General:  Vented and sedated    Head:  Normocephalic, without obvious abnormality, atraumatic. Eyes:  Conjunctivae/corneas clear, sclera anicteric, PERRL, EOMs intact. Nose: Nares normal. No drainage or sinus tenderness.    Throat: Lips, mucosa, and tongue normal.    Neck: Supple, symmetrical, trachea midline, no adenopathy. Lungs:   Clear to auscultation bilaterally. Heart:  Regular rate and rhythm, S1, S2 normal, no murmur, click, rub or gallop. Abdomen: Soft, non-tender. Bowel sounds normal. No masses,  No organomegaly. Extremities: Extremities normal, atraumatic, no cyanosis or edema. Capillary refill normal.   Pulses: 2+ and symmetric all extremities. Skin: Skin color as per ethnicity, turgor normal. No rashes or lesions   Neurologic: Vented and sedated       Labs Reviewed:    Lab results reviewed. For significant abnormal values and values requiring intervention, see assessment and plan.   Recent Results (from the past 24 hour(s))   EKG, 12 LEAD, INITIAL    Collection Time: 08/31/22  9:51 AM   Result Value Ref Range    Ventricular Rate 112 BPM    Atrial Rate 112 BPM    P-R Interval 158 ms    QRS Duration 100 ms    Q-T Interval 360 ms    QTC Calculation (Bezet) 491 ms    Calculated P Axis 73 degrees    Calculated R Axis -69 degrees    Calculated T Axis 51 degrees    Diagnosis       Sinus tachycardia  Left axis deviation  Low voltage QRS  Inferior infarct , age undetermined  Abnormal ECG  No previous ECGs available     MAGNESIUM    Collection Time: 08/31/22 10:00 AM   Result Value Ref Range    Magnesium 2.7 (H) 1.6 - 2.6 mg/dL   TROPONIN-HIGH SENSITIVITY    Collection Time: 08/31/22 10:00 AM   Result Value Ref Range    Troponin-High Sensitivity 98 (H) 0 - 78 ng/L   METABOLIC PANEL, COMPREHENSIVE    Collection Time: 08/31/22 10:00 AM   Result Value Ref Range    Sodium 144 136 - 145 mmol/L    Potassium 3.1 (L) 3.5 - 5.5 mmol/L    Chloride 107 100 - 111 mmol/L    CO2 19 (L) 21 - 32 mmol/L    Anion gap 18 3.0 - 18 mmol/L    Glucose 325 (H) 74 - 99 mg/dL    BUN 50 (H) 7.0 - 18 MG/DL    Creatinine 3.01 (H) 0.6 - 1.3 MG/DL    BUN/Creatinine ratio 17 12 - 20      GFR est AA 26 (L) >60 ml/min/1.73m2    GFR est non-AA 21 (L) >60 ml/min/1.73m2    Calcium 7.7 (L) 8.5 - 10.1 MG/DL    Bilirubin, total 0.4 0.2 - 1.0 MG/DL    ALT (SGPT) 313 (H) 16 - 61 U/L    AST (SGOT) 451 (H) 10 - 38 U/L    Alk. phosphatase 103 45 - 117 U/L    Protein, total 6.0 (L) 6.4 - 8.2 g/dL    Albumin 2.3 (L) 3.4 - 5.0 g/dL    Globulin 3.7 2.0 - 4.0 g/dL    A-G Ratio 0.6 (L) 0.8 - 1.7     CBC WITH AUTOMATED DIFF    Collection Time: 08/31/22 10:00 AM   Result Value Ref Range    WBC 9.6 4.6 - 13.2 K/uL    RBC 3.62 (L) 4.35 - 5.65 M/uL    HGB 10.5 (L) 13.0 - 16.0 g/dL    HCT 33.8 (L) 36.0 - 48.0 %    MCV 93.4 78.0 - 100.0 FL    MCH 29.0 24.0 - 34.0 PG    MCHC 31.1 31.0 - 37.0 g/dL    RDW 15.4 (H) 11.6 - 14.5 %    PLATELET 851 937 - 679 K/uL    MPV 10.7 9.2 - 11.8 FL    NRBC 0.0 0  WBC    ABSOLUTE NRBC 0.00 0.00 - 0.01 K/uL    NEUTROPHILS 69 40 - 73 %    BAND NEUTROPHILS 2 0 - 5 %    LYMPHOCYTES 21 21 - 52 %    MONOCYTES 6 3 - 10 %    EOSINOPHILS 1 0 - 5 %    BASOPHILS 0 0 - 2 %    METAMYELOCYTES 1 (H) 0 %    IMMATURE GRANULOCYTES 0 %    ABS. NEUTROPHILS 6.8 1.8 - 8.0 K/UL    ABS. LYMPHOCYTES 2.0 0.9 - 3.6 K/UL    ABS. MONOCYTES 0.6 0.05 - 1.2 K/UL    ABS. EOSINOPHILS 0.1 0.0 - 0.4 K/UL    ABS. BASOPHILS 0.0 0.0 - 0.1 K/UL    ABS. IMM.  GRANS. 0.0 K/UL    DF MANUAL      RBC COMMENTS NORMOCYTIC, NORMOCHROMIC     PROTHROMBIN TIME + INR    Collection Time: 08/31/22 10:00 AM   Result Value Ref Range    Prothrombin time 18.7 (H) 11.5 - 15.2 sec    INR 1.5 (H) 0.8 - 1.2     ETHYL ALCOHOL    Collection Time: 08/31/22 10:00 AM   Result Value Ref Range    ALCOHOL(ETHYL),SERUM <3 0 - 3 MG/DL   NT-PRO BNP    Collection Time: 08/31/22 10:00 AM   Result Value Ref Range    NT pro- 0 - 900 PG/ML   BLOOD GAS,CHEM8,LACTIC ACID POC    Collection Time: 08/31/22 10:08 AM   Result Value Ref Range    pH (POC) 7.26 (L) 7.35 - 7.45      pCO2 (POC) 39.1 35.0 - 45.0 MMHG    pO2 (POC) 444 (H) 80 - 100 MMHG    Calcium, ionized (POC) 1.03 (L) 1.12 - 1.32 mmol/L    Base deficit (POC) 8.7 mmol/L    HCO3 (POC) 17.7 (L) 22 - 26 MMOL/L    CO2, POC 18 (L) 19 - 24 MMOL/L    O2  %    Sample source ARTERIAL      SITE RIGHT RADIAL      WILLY'S TEST Positive      Device: ADULT VENT      FIO2 100 %    Tidal volume 480      PEEP/CPAP 5      Performed by Romy MCGUIRE     Sodium (POC) 146 (H) 136 - 145 mmol/L    Potassium (POC) 3.0 (L) 3.5 - 5.1 mmol/L    Glucose (POC) 281 (H) 65 - 100 mg/dL    Creatinine (POC) 2.80 (H) 0.6 - 1.3 mg/dL    Lactic Acid (POC) 10.02 (HH) 0.40 - 2.00 mmol/L    Chloride (POC) 110 (H) 98 - 107 mmol/L    Anion gap, POC 19 10 - 20      GFRAA, POC 28 (L) >60 ml/min/1.73m2    GFRNA, POC 23 (L) >60 ml/min/1.73m2   TYPE & SCREEN    Collection Time: 08/31/22 10:15 AM   Result Value Ref Range    Crossmatch Expiration 09/03/2022,2359     ABO/Rh(D) AB POSITIVE     Antibody screen NEG    COVID-19 RAPID TEST    Collection Time: 08/31/22 10:50 AM   Result Value Ref Range    Specimen source Nasopharyngeal      COVID-19 rapid test Not detected NOTD     TROPONIN-HIGH SENSITIVITY    Collection Time: 08/31/22  2:15 PM   Result Value Ref Range    Troponin-High Sensitivity 2,077 (HH) 0 - 78 ng/L   NT-PRO BNP    Collection Time: 08/31/22  2:15 PM   Result Value Ref Range    NT pro- 0 - 900 PG/ML   RENAL FUNCTION PANEL    Collection Time: 08/31/22  2:15 PM   Result Value Ref Range    Sodium 142 136 - 145 mmol/L    Potassium 4.1 3.5 - 5.5 mmol/L    Chloride 108 100 - 111 mmol/L    CO2 20 (L) 21 - 32 mmol/L    Anion gap 14 3.0 - 18 mmol/L    Glucose 340 (H) 74 - 99 mg/dL    BUN 52 (H) 7.0 - 18 MG/DL    Creatinine 2.91 (H) 0.6 - 1.3 MG/DL    BUN/Creatinine ratio 18 12 - 20      GFR est AA 27 (L) >60 ml/min/1.73m2    GFR est non-AA 22 (L) >60 ml/min/1.73m2    Calcium 8.3 (L) 8.5 - 10.1 MG/DL    Phosphorus 4.7 2.5 - 4.9 MG/DL    Albumin 2.6 (L) 3.4 - 5.0 g/dL   MAGNESIUM    Collection Time: 08/31/22  2:15 PM   Result Value Ref Range    Magnesium 2.6 1.6 - 2.6 mg/dL   CBC WITH AUTOMATED DIFF Collection Time: 08/31/22  2:15 PM   Result Value Ref Range    WBC 16.8 (H) 4.6 - 13.2 K/uL    RBC 4.69 4.35 - 5.65 M/uL    HGB 13.1 13.0 - 16.0 g/dL    HCT 42.3 36.0 - 48.0 %    MCV 90.2 78.0 - 100.0 FL    MCH 27.9 24.0 - 34.0 PG    MCHC 31.0 31.0 - 37.0 g/dL    RDW 15.6 (H) 11.6 - 14.5 %    PLATELET 022 519 - 262 K/uL    MPV 10.6 9.2 - 11.8 FL    NRBC 0.0 0  WBC    ABSOLUTE NRBC 0.00 0.00 - 0.01 K/uL    NEUTROPHILS 79 (H) 40 - 73 %    BAND NEUTROPHILS 8 (H) 0 - 5 %    LYMPHOCYTES 6 (L) 21 - 52 %    MONOCYTES 7 3 - 10 %    EOSINOPHILS 0 0 - 5 %    BASOPHILS 0 0 - 2 %    IMMATURE GRANULOCYTES 0 %    ABS. NEUTROPHILS 14.6 (H) 1.8 - 8.0 K/UL    ABS. LYMPHOCYTES 1.0 0.9 - 3.6 K/UL    ABS. MONOCYTES 1.2 0.05 - 1.2 K/UL    ABS. EOSINOPHILS 0.0 0.0 - 0.4 K/UL    ABS. BASOPHILS 0.0 0.0 - 0.1 K/UL    ABS. IMM.  GRANS. 0.0 K/UL    DF MANUAL      RBC COMMENTS OVALOCYTES  SLIGHT       PTT    Collection Time: 08/31/22  2:15 PM   Result Value Ref Range    aPTT 35.6 23.0 - 36.4 SEC   ECHO ADULT COMPLETE    Collection Time: 08/31/22  3:58 PM   Result Value Ref Range    Fractional Shortening 2D 42 28 - 44 %    LVIDd Index 1.67 cm/m2    LVIDs Index 0.96 cm/m2    LV RWT Ratio 0.53     LV Mass 2D 109.0 88 - 224 g    LV Mass 2D Index 47.8 (A) 49 - 115 g/m2    MV E/A 0.80     LVOT Area 4.2 cm2    LA Size Index 1.10 cm/m2    LA/AO Root Ratio 0.66     Ao Root Index 1.67 cm/m2    Ascending Aorta Index 1.62 cm/m2    Est. RA Pressure 3 mmHg    RVSP 25 mmHg    IVSd 0.9 0.6 - 1.0 cm    LVIDd 3.8 (A) 4.2 - 5.9 cm    LVIDs 2.2 cm    LVOT Diameter 2.3 cm    LVPWd 1.0 0.6 - 1.0 cm    RVIDd 4.7 cm    RV Free Wall Peak S' 13 cm/s    RVOT Peak Gradient 1 mmHg    RVOT Mean Gradient 0 mmHg    RVOT Peak Velocity 0.4 m/s    RVOT VTI 5.9 cm    LA Diameter 2.5 cm    MV A Velocity 0.41 m/s    MV E Wave Deceleration Time 131.0 ms    MV E Velocity 0.33 m/s    TAPSE 1.3 (A) 1.7 cm    TR Peak Gradient 22 mmHg    TR Max Velocity 2.32 m/s    Ascending Aorta 3.7 cm    Aortic Root 3.8 cm   DRUG SCREEN, URINE    Collection Time: 08/31/22  5:15 PM   Result Value Ref Range    BENZODIAZEPINES Negative NEG      BARBITURATES Negative NEG      THC (TH-CANNABINOL) Negative NEG      OPIATES Negative NEG      PCP(PHENCYCLIDINE) Negative NEG      COCAINE Negative NEG      AMPHETAMINES Negative NEG      METHADONE Negative NEG      HDSCOM (NOTE)    URINALYSIS W/MICROSCOPIC    Collection Time: 08/31/22  5:16 PM   Result Value Ref Range    Color YELLOW      Appearance TURBID      Specific gravity 1.016 1.005 - 1.030      pH (UA) 5.5 5.0 - 8.0      Protein 100 (A) NEG mg/dL    Glucose >1,000 (A) NEG mg/dL    Ketone Negative NEG mg/dL    Bilirubin Negative NEG      Blood LARGE (A) NEG      Urobilinogen 0.2 0.2 - 1.0 EU/dL    Nitrites Negative NEG      Leukocyte Esterase LARGE (A) NEG      WBC TOO NUMEROUS TO COUNT 0 - 5 /hpf    RBC 0 to 1 0 - 5 /hpf    Epithelial cells Negative 0 - 5 /lpf    Bacteria 2+ (A) NEG /hpf   BLOOD GAS, ARTERIAL POC    Collection Time: 08/31/22  5:33 PM   Result Value Ref Range    Device: ADULT VENT      FIO2 (POC) 45 %    pH (POC) 7.35 7.35 - 7.45      pCO2 (POC) 35.7 35.0 - 45.0 MMHG    pO2 (POC) 135 (H) 80 - 100 MMHG    HCO3 (POC) 19.9 (L) 22 - 26 MMOL/L    sO2 (POC) 99.0 (H) 92 - 97 %    Base deficit (POC) 5.1 mmol/L    Mode PRESSURE CONTROL      Set Rate 16 bpm    PEEP/CPAP (POC) 6 cmH2O    Mean Airway Pressure 11 cmH2O    PIP (POC) 20      Allens test (POC) Positive      Site RIGHT RADIAL      Specimen type (POC) ARTERIAL      Performed by Alessio Rivas        Procedures/imaging: see electronic medical records for all procedures/Xrays and details which were not copied into this note but were reviewed prior to creation of Plan    [id number], 60 minutes of critical care time spent in the direct evaluation and treatment of this high risk patient.  The reason for providing this level of medical care for this critically ill patient was due a critical illness that impaired one or more vital organ systems such that there was a high probability of imminent or life threatening deterioration in the patients condition. This care involved high complexity decision making to assess, manipulate, and support vital system functions, to treat this degreee vital organ system failure and to prevent further life threatening deterioration of the patients condition.       CC: Julian Orozco MD

## 2022-08-31 NOTE — PROGRESS NOTES
Pt seen and assessed. Ett is secured 7.0 24 @ gums. Vent settings are as documented RR increased to 20 fi02 decreased to 50% per ABG. ETC02 in line. BBS are diminished and coarse. RN and DR at bedside.

## 2022-08-31 NOTE — PROGRESS NOTES
Patient was in a procedure. Spoke with the family in the waiting room - wife, son (22 yr old), pt's brother, 2 of wife's sisters, their  and a couple of others. Brother was asking most of the questions and insisting on talking to the doctor. Explained that he was working with the patient and once he was able he would come speak to them. Family was allowed at the bedside after a bit. Doctor spoke with the wife. Other family wanting to talk to the doctor also. Explained that he can't have conversations with each family member. Brother was most insistent about getting information. Wife was very quiet. Family is aware that the patient will be moved to the ICU once tests are completed. [address] [facility], M.Div.  [facility], [facility]-  Board Certified   Board Certified Ethicist  Certified [address] [facility]  [phone] - Office

## 2022-08-31 NOTE — ED TRIAGE NOTES
Patient arrived via ems. The call was for illness. When the arrived patient was found sitting in wheelschair urinating on self. They got a pulse put in ems to bring to ed lost pulse 4 times and returned  to rosc they gave a total of epi 6x and 1 sodium bicarb and levo started for a brief time but did not tolerate. Patient arrived with epi gtt not dripping.

## 2022-08-31 NOTE — ED NOTES
TRANSFER - OUT REPORT:    Verbal report given to Oz Garrett 45 (name) on Negrito Danielle  being transferred to ICU (unit) for routine progression of care       Report consisted of patients Situation, Background, Assessment and   Recommendations(SBAR). Information from the following report(s) SBAR, ED Summary, Intake/Output, MAR and Recent Results was reviewed with the receiving nurse. Lines:   Triple Lumen Central line left IJ 08/31/22 Left Internal jugular (Active)   Central Line Being Utilized Yes 08/31/22 1135   Site Assessment Clean, dry, & intact 08/31/22 1135   Dressing Status Clean, dry, & intact 08/31/22 1135       Peripheral IV 08/31/22 Left Antecubital (Active)   Site Assessment Clean, dry, & intact 08/31/22 1003   Phlebitis Assessment 0 08/31/22 1003   Infiltration Assessment 0 08/31/22 1003   Dressing Status Clean, dry, & intact 08/31/22 1003       Peripheral IV 08/31/22 Right Antecubital (Active)        Opportunity for questions and clarification was provided.       Patient transported with:   Monitor   Ventilator

## 2022-08-31 NOTE — PROGRESS NOTES
TRANSFER - IN REPORT:    1415:Verbal report received from [facility] on Delio Medinaph  being received from Emergency Room for routine progression of care      Report consisted of patients Situation, Background, Assessment and   Recommendations(SBAR). Information from the following report(s) SBAR, ED Summary, and Cardiac Rhythm NSR  was reviewed with the receiving nurse. Opportunity for questions and clarification was provided. Assessment completed upon patients arrival to unit and care assumed. 1500: Pt arrived via stretcher with RN and respiratory. Pt on airborne contact for shingles. Heparin held per MD due to excessive penile bleeding post duran insertion attempts in ED and oral bleeding. Urology to come see patient. Dr. Ce Lucero aware. Pt hypothermic and requiring david villareal MD aware. Dr. Ce Lucero in to see patient. Duran cath placed, see MD note. Blood continues to ooze around catheter, but is not in urine. Urine sent for testing. OG tube inserted w/out difficulty. Xray to verify placement. EEG done in room, ECHO completed, PVL completed. Cardiology in to assess patient. Pt placed on contact/airborne precautions. Multiple areas of shingles, with significant amount bleeding and open to air. Okay for family to visit if they have chicken pox vaccine, have had chicken pox and shingles vaccine per Infectious Disease. Pharmacy to retime vanc, not given in ED at 1200, will be rescheduled for this evening. Will administer at new time. 1820: Bedside shift change report given to Tonia Nieves RN. Report included the following information SBAR, Kardex, Intake/Output, MAR, Recent Results, Med Rec Status and Cardiac Rhythm NSR and plan of care.

## 2022-08-31 NOTE — ED NOTES
Lyle catheter unable to advance bleeding noted from penis male premafit placed for urinary collection

## 2022-08-31 NOTE — Clinical Note
Status[de-identified] INPATIENT [101]   Type of Bed: Intensive Care [6]   Inpatient Hospitalization Certified Necessary for the Following Reasons: 4. Patient requires ICU level of care interventions (further clarification in H&P documentation)   Admitting Diagnosis: Cardiac arrest ([facility] [address] [427. 5. ICD-9-CM]   Admitting Physician: Anderson Albrecht [[id number]]   Attending Physician: Anderson Albrecht [[id number]]   Estimated Length of Stay: 5-7 Midnights   Discharge Plan[de-identified] Extended Care Facility (e.g. Adult Home, Nursing Home, etc.)

## 2022-08-31 NOTE — CONSULTS
[facility]  Pulmonary, Critical Care, and Sleep Medicine    Name: Zelda Dukes MRN: [id number]   : 1960 Hospital: [facility] [place]    Date: 2022  Room: [address] [place] [address] Note                                              Consult requesting physician: Dr. 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 Currently has urethral bleeding can do low dose heparin if better we will start full dose   Ventilator bundle & Sedation protocol followed. Daily sedation holiday, assessment for readiness for SBT and then re-titrate if required. Chlorhexidine mouth washes. Keep SPO2 >=92%. HOB 30 degree elevation all the time. Aggressive pulmonary toileting. Aspiration precautions. Incentive spirometry. CVS: cardiac arrest   I suspect related to sepsis recently disseminated zoster was on antiviral has DM/HTN  ? Pea arrest not clear  Echo pending  [id number] minutes codes 3 times CPR epi  Due to renal failure cannot exclude PE  PVL pending , bleeding now cannot give full dose heparin but will reevaluate daily   Now on levophed 16  ID: suspect sepsis   Hydronephrosis urology consulted urinalysis pending  10 days of zoster active disseminate back and left leg consulted ID    Sepsis bundle and protocol followed.  Follow serial lactic acid, frequent BMP check, fluid resuscitation. Follow cultures. Deescalate antibiotic when appropriate. 1. Mildly motion limited study without evidence of acute intracranial  abnormality. 2. Paranasal mucosal disease as described above with air-fluid level in the  right maxillary sinus as can be seen with sinusitis. IMPRESSION     Severe left and moderate right hydroureteronephrosis with marked urinary bladder  distention. No obstructing calculus. No acute intrathoracic abnormality. Hematology/Oncology: follow hb  Chronic anemia   Renal: CRI around 1.8 now Glen suspect due to sepsis and hydronephrosis   GI/: PPI  Hydronephrosis   Endocrine: Monitor BS. SSI. has DM  Neurology: intubated on propofol post cardiac arrest r/o anoxia   Ct of the head negative  EEG  Neurology consulted myoclonic jerks  Toxicology: pending   Pain/Sedation: propofol  Skin/Wound: has extensive back and left lower extremity rash typical for zoster   Active lesions   Electrolytes: Replace electrolytes per ICU electrolyte replacement protocol. IVF: NACL  Nutrition: npo  Prophylaxis: DVT Prophylaxis: SCD/sq heaprin. GI Prophylaxis: Protonix/ppi. Restraints:   Wrist soft restraints for patient interfering with medical therapy/management and patient safety. Lines/Tubes: PIV  ETT: in place . OGT/NGT: in place . Central line: left internal jugular placed on 8/31 in ED  (site examined, no erythema, induration, discharge or sign of infection. Dressing intact. Medically necessary, will remove it when not needed. Central line bundle followed). Other:  PT/OT eval and treat. OOB.    Advance Directive/Palliative Care: consulted    Will defer respective systems problem management to primary and other respective consultant and follow patient in ICU with primary and other medical team.  Further recommendations will be based on the patient's response to recommended treatment and results of the investigation ordered. Quality Care: PPI, DVT prophylaxis, HOB elevated, Infection control all reviewed and addressed. Care of plan d/w hospitalist team, RN, RT, MDR.  D/w , family-wife and brother  (answered all questions to satisfaction). High  complexity decision making was performed during the evaluation of this patient at high risk for decompensation with multiple organ involvement. Total critical care time spent rendering care exclusive of procedures/family discussion/coordination of care: 90 minutes. I evaluated for cooling protocol spoke to cardiology and hospitalist due to sepsis zoster and hydronephrosis high risk for cooling           Subjective/History of Present Illness:     Patient is a 65 y.o. male with PMHx significant for HTN/CRI/DM recently diagnosed with severe zoster. Ling was on antiviral tx very weak , sob,cough. EMS called in ambulance 3 times cardiac arrest. Witnessed CPR/Epi  Repored PEA. 6 doses of epi and no shocks. He came on epi drip but in Ed chnaged to levophed. Ling had seizure vs myocloninc jerks was started on propofol. 8/31/2022:  Evaluated in Ed and ICU bed 4  Cardiac arrest  Unresponsive  ? Urosepsis vs zoster? Suspect sepsis  No history of CAD  Worsening renal failure  Pvl pending  Now on levophed1 6  Full code spoke to family        I/O last 24 hrs: No intake or output data in the 24 hours ending 08/31/22 1504      The patient is critically ill and can not provide additional history due to Ventilated    History taken from  family and EMR    Review of Systems:    ROS not obtained due to patient factor. No Known Allergies   Past Medical History:   Diagnosis Date    Gout     Shingles       No past surgical history on file. Social History     Tobacco Use    Smoking status: Not on file    Smokeless tobacco: Not on file   Substance Use Topics    Alcohol use: Not on file      No family history on file.    Prior to Admission medications    Not on File     Current Facility-Administered Medications   Medication Dose Route Frequency    EPINEPHrine (ADRENALIN) 8 mg in 0.9% sodium chloride 250 mL infusion  2-30 mcg/min IntraVENous TITRATE    chlorhexidine (PERIDEX) 0.12 % mouthwash 10 mL  10 mL Oral ONCE    NOREPINephrine (LEVOPHED) 8 mg in 5% dextrose 250mL (32 mcg/mL) infusion  2-30 mcg/min IntraVENous TITRATE    piperacillin-tazobactam (ZOSYN) 4.5 g in 0.9% sodium chloride (MBP/ADV) 100 mL MBP  4.5 g IntraVENous Q6H    vancomycin (VANCOCIN) 2000 mg in  ml infusion  2,000 mg IntraVENous ONCE    Vancomycin Pharmacy to Dose  1 Each Other Rx Dosing/Monitoring    propofol (DIPRIVAN) 10 mg/mL infusion  0-20 mcg/kg/min IntraVENous TITRATE    heparin (porcine) 1,000 unit/mL injection 8,930 Units  80 Units/kg IntraVENous ONCE    heparin (porcine) 25,000 units in 0.45% saline 250 ml infusion  18-36 Units/kg/hr IntraVENous TITRATE    0.9% sodium chloride infusion  50 mL/hr IntraVENous CONTINUOUS    sodium chloride (NS) flush 5-40 mL  5-40 mL IntraVENous Q8H    pantoprazole (PROTONIX) 40 mg in 0.9% sodium chloride 10 mL injection  40 mg IntraVENous Q12H    [START ON 2022] vancomycin (VANCOCIN) 1,000 mg in 0.9% sodium chloride 250 mL (VIAL-MATE)  1,000 mg IntraVENous Q24H    arformoteroL (BROVANA) neb solution 15 mcg  15 mcg Nebulization BID RT         Objective:   Vital Signs:    Visit Vitals  BP 96/65   Pulse 80   Temp 96.8 °F (36 °C)   Resp 20   Ht 5' 10\" (1.778 m)   Wt 111.6 kg (246 lb 1.6 oz)   SpO2 100%   BMI 35.31 kg/m²       O2 Device: Ventilator       Temp (24hrs), Av.8 °F (36 °C), Min:96.8 °F (36 °C), Max:96.8 °F (36 °C)       Intake/Output:   Last shift:      No intake/output data recorded. Last 3 shifts: No intake/output data recorded.     No intake or output data in the 24 hours ending 22 1504    Last 3 Recorded Weights in this Encounter    22 0958 22 1000   Weight: 111.9 kg (246 lb 12.8 oz) 111.6 kg (246 lb 1.6 oz)         Ventilator Settings:  Mode Rate Tidal Volume Pressure FiO2 PEEP   Assist control, Volume control   480 ml    50 % (decreased per ABG) 5 cm H20     Peak airway pressure:      Plateau pressure:     Tidal volume:    Minute ventilation:       Recent Labs     08/31/22  1008   PHI 7.26*   PCO2I 39.1   PO2I 444*   HCO3I 17.7*   FIO2 100       Physical Exam:     Impresson general : intubated sedated unresponsive  Head:   Normocephalic,atraumatic. ENT:   EOM , no scleral icterus, no pallor, no cyanosis. Nose:   No sinus tenderness  Throat:  Oropharynx ,mucosa, and tongue normal. No oral thrush. Neck:   Supple, symmetric. Lymph nodes. Trachea ismidline   Lung: Moderate air entry bilateral equal. No rales. No rhonchi. No wheezing. No stridors. No prolongded expiration. No accessory muscle use. Heart:   Regular  rate & rhythm. S1 S2 present. No murmur. No JVD. Abdomen:  Soft. NT. ND. +BS. No masses. liver  and spleen  Extremities:  No pedal edema. No cyanosis. No clubbing. Pulses: 2+ and symmetric in DP. Capillary refill: normal  Lymphatic:  neck and supraclavicular    Musculoskeletal: No joint swelling. No tenderness.    Skin:   Back and left lower extremity vesicular rash typical for zoster      Data:       Recent Results (from the past 24 hour(s))   EKG, 12 LEAD, INITIAL    Collection Time: 08/31/22  9:51 AM   Result Value Ref Range    Ventricular Rate 112 BPM    Atrial Rate 112 BPM    P-R Interval 158 ms    QRS Duration 100 ms    Q-T Interval 360 ms    QTC Calculation (Bezet) 491 ms    Calculated P Axis 73 degrees    Calculated R Axis -69 degrees    Calculated T Axis 51 degrees    Diagnosis       Sinus tachycardia  Left axis deviation  Low voltage QRS  Inferior infarct , age undetermined  Abnormal ECG  No previous ECGs available     MAGNESIUM    Collection Time: 08/31/22 10:00 AM   Result Value Ref Range    Magnesium 2.7 (H) 1.6 - 2.6 mg/dL   TROPONIN-HIGH SENSITIVITY    Collection Time: 08/31/22 10:00 AM   Result Value Ref Range    Troponin-High Sensitivity 98 (H) 0 - 78 ng/L   METABOLIC PANEL, COMPREHENSIVE    Collection Time: 08/31/22 10:00 AM   Result Value Ref Range    Sodium 144 136 - 145 mmol/L    Potassium 3.1 (L) 3.5 - 5.5 mmol/L    Chloride 107 100 - 111 mmol/L    CO2 19 (L) 21 - 32 mmol/L    Anion gap 18 3.0 - 18 mmol/L    Glucose 325 (H) 74 - 99 mg/dL    BUN 50 (H) 7.0 - 18 MG/DL    Creatinine 3.01 (H) 0.6 - 1.3 MG/DL    BUN/Creatinine ratio 17 12 - 20      GFR est AA 26 (L) >60 ml/min/1.73m2    GFR est non-AA 21 (L) >60 ml/min/1.73m2    Calcium 7.7 (L) 8.5 - 10.1 MG/DL    Bilirubin, total 0.4 0.2 - 1.0 MG/DL    ALT (SGPT) 313 (H) 16 - 61 U/L    AST (SGOT) 451 (H) 10 - 38 U/L    Alk. phosphatase 103 45 - 117 U/L    Protein, total 6.0 (L) 6.4 - 8.2 g/dL    Albumin 2.3 (L) 3.4 - 5.0 g/dL    Globulin 3.7 2.0 - 4.0 g/dL    A-G Ratio 0.6 (L) 0.8 - 1.7     CBC WITH AUTOMATED DIFF    Collection Time: 08/31/22 10:00 AM   Result Value Ref Range    WBC 9.6 4.6 - 13.2 K/uL    RBC 3.62 (L) 4.35 - 5.65 M/uL    HGB 10.5 (L) 13.0 - 16.0 g/dL    HCT 33.8 (L) 36.0 - 48.0 %    MCV 93.4 78.0 - 100.0 FL    MCH 29.0 24.0 - 34.0 PG    MCHC 31.1 31.0 - 37.0 g/dL    RDW 15.4 (H) 11.6 - 14.5 %    PLATELET 943 889 - 897 K/uL    MPV 10.7 9.2 - 11.8 FL    NRBC 0.0 0  WBC    ABSOLUTE NRBC 0.00 0.00 - 0.01 K/uL    NEUTROPHILS 69 40 - 73 %    BAND NEUTROPHILS 2 0 - 5 %    LYMPHOCYTES 21 21 - 52 %    MONOCYTES 6 3 - 10 %    EOSINOPHILS 1 0 - 5 %    BASOPHILS 0 0 - 2 %    METAMYELOCYTES 1 (H) 0 %    IMMATURE GRANULOCYTES 0 %    ABS. NEUTROPHILS 6.8 1.8 - 8.0 K/UL    ABS. LYMPHOCYTES 2.0 0.9 - 3.6 K/UL    ABS. MONOCYTES 0.6 0.05 - 1.2 K/UL    ABS. EOSINOPHILS 0.1 0.0 - 0.4 K/UL    ABS. BASOPHILS 0.0 0.0 - 0.1 K/UL    ABS. IMM.  GRANS. 0.0 K/UL    DF MANUAL      RBC COMMENTS NORMOCYTIC, NORMOCHROMIC     PROTHROMBIN TIME + INR    Collection Time: 08/31/22 10:00 AM   Result Value Ref Range    Prothrombin time 18.7 (H) 11.5 - 15.2 sec    INR 1.5 (H) 0.8 - 1. 2     ETHYL ALCOHOL    Collection Time: 08/31/22 10:00 AM   Result Value Ref Range    ALCOHOL(ETHYL),SERUM <3 0 - 3 MG/DL   NT-PRO BNP    Collection Time: 08/31/22 10:00 AM   Result Value Ref Range    NT pro- 0 - 900 PG/ML   BLOOD GAS,CHEM8,LACTIC ACID POC    Collection Time: 08/31/22 10:08 AM   Result Value Ref Range    pH (POC) 7.26 (L) 7.35 - 7.45      pCO2 (POC) 39.1 35.0 - 45.0 MMHG    pO2 (POC) 444 (H) 80 - 100 MMHG    Calcium, ionized (POC) 1.03 (L) 1.12 - 1.32 mmol/L    Base deficit (POC) 8.7 mmol/L    HCO3 (POC) 17.7 (L) 22 - 26 MMOL/L    CO2, POC 18 (L) 19 - 24 MMOL/L    O2  %    Sample source ARTERIAL      SITE RIGHT RADIAL      WILLY'S TEST Positive      Device: ADULT VENT      FIO2 100 %    Tidal volume 480      PEEP/CPAP 5      Performed by [place] Daily RESP     Sodium (POC) 146 (H) 136 - 145 mmol/L    Potassium (POC) 3.0 (L) 3.5 - 5.1 mmol/L    Glucose (POC) 281 (H) 65 - 100 mg/dL    Creatinine (POC) 2.80 (H) 0.6 - 1.3 mg/dL    Lactic Acid (POC) 10.02 (HH) 0.40 - 2.00 mmol/L    Chloride (POC) 110 (H) 98 - 107 mmol/L    Anion gap, POC 19 10 - 20      GFRAA, POC 28 (L) >60 ml/min/1.73m2    GFRNA, POC 23 (L) >60 ml/min/1.73m2   TYPE & SCREEN    Collection Time: 08/31/22 10:15 AM   Result Value Ref Range    Crossmatch Expiration 09/03/2022,2359     ABO/Rh(D) AB POSITIVE     Antibody screen NEG    COVID-19 RAPID TEST    Collection Time: 08/31/22 10:50 AM   Result Value Ref Range    Specimen source Nasopharyngeal      COVID-19 rapid test Not detected NOTD           Chemistry Recent Labs     08/31/22  1000   *      K 3.1*      CO2 19*   BUN 50*   CREA 3.01*   CA 7.7*   MG 2.7*   AGAP 18   BUCR 17      TP 6.0*   ALB 2.3*   GLOB 3.7   AGRAT 0.6*        Lactic Acid No results found for: LAC  No results for input(s): LAC in the last 72 hours.      Liver Enzymes Protein, total   Date Value Ref Range Status   08/31/2022 6.0 (L) 6.4 - 8.2 g/dL Final     Albumin   Date Value Ref Range Status   08/31/2022 2.3 (L) 3.4 - 5.0 g/dL Final     Globulin   Date Value Ref Range Status   08/31/2022 3.7 2.0 - 4.0 g/dL Final     A-G Ratio   Date Value Ref Range Status   08/31/2022 0.6 (L) 0.8 - 1.7   Final     Alk. phosphatase   Date Value Ref Range Status   08/31/2022 103 45 - 117 U/L Final     Recent Labs     08/31/22  1000   TP 6.0*   ALB 2.3*   GLOB 3.7   AGRAT 0.6*           CBC w/Diff Recent Labs     08/31/22  1000   WBC 9.6   RBC 3.62*   HGB 10.5*   HCT 33.8*      GRANS 69   LYMPH 21   EOS 1        Cardiac Enzymes No results found for: CPK, CK, CKMMB, CKMB, RCK3, CKMBT, CKNDX, CKND1, ROB, TROPT, TROIQ, BAY, TROPT, TNIPOC, BNP, BNPP     BNP No results found for: BNP, BNPP, XBNPT     Coagulation Recent Labs     08/31/22  1000   PTP 18.7*   INR 1.5*         Thyroid  No results found for: T4, T3U, TSH, TSHEXT    No results found for: T4     Urinalysis No results found for: COLOR, APPRN, SPGRU, REFSG, EMIGDIO, PROTU, GLUCU, KETU, BILU, UROU, CLAUDIO, LEUKU, GLUKE, EPSU, BACTU, WBCU, RBCU, CASTS, UCRY     Micro  No results for input(s): SDES, CULT in the last 72 hours. No results for input(s): CULT in the last 72 hours. Culture data during this hospitalization. All Micro Results       Procedure Component Value Units Date/Time    COVID-19 RAPID TEST [[id number]] Collected: 08/31/22 1050    Order Status: Completed Specimen: Nasopharyngeal Updated: 08/31/22 1131     Specimen source Nasopharyngeal        COVID-19 rapid test Not detected        Comment: Rapid Abbott ID Now       Rapid NAAT:  The specimen is NEGATIVE for SARS-CoV-2, the novel coronavirus associated with COVID-19. Negative results should be treated as presumptive and, if inconsistent with clinical signs and symptoms or necessary for patient management, should be tested with an alternative molecular assay.   Negative results do not preclude SARS-CoV-2 infection and should not be used as the sole basis for patient management decisions. This test has been authorized by the FDA under an Emergency Use Authorization (EUA) for use by authorized laboratories. Fact sheet for Healthcare Providers: ConventionUpdate.co.nz  Fact sheet for Patients: ConventionUpdate.co.nz       Methodology: Isothermal Nucleic Acid Amplification         CULTURE, BLOOD [[id number]] Collected: 08/31/22 1015    Order Status: Sent Specimen: Blood Updated: 08/31/22 1021    CULTURE, BLOOD [[id number]] Collected: 08/31/22 1000    Order Status: Sent Specimen: Blood Updated: 08/31/22 1021                 PFT       Ultrasound       LE Doppler       ECHO       Images report reviewed by me:  CT (Most Recent) (CT chest reviewed by me)        CXR reviewed by me:  XR (Most Recent). CXR  reviewed by me and compared with previous CXR Results from Hospital Encounter encounter on 08/31/22    XR CHEST PORT    Narrative  XR CHEST PORT    CLINICAL INDICATION/HISTORY: Tube placement  -Additional: None    COMPARISON: None    TECHNIQUE: Portable frontal view of the chest    _______________    FINDINGS:    SUPPORT DEVICES: Enteric tube tip projecting over the thoracic inlet. Endotracheal tube tip in the midthoracic trachea. Left IJV central venous  catheter tip at the caval atrial junction. HEART AND MEDIASTINUM: Cardiomediastinal silhouette within normal limits. LUNGS AND PLEURAL SPACES: No dense consolidation, large effusion or  pneumothorax.    _______________    Impression  Enteric tube tip projecting over the thoracic inlet. Endotracheal tube tip in  the midthoracic trachea. No acute cardiopulmonary abnormality.            Johana Chandler MD  8/31/2022

## 2022-08-31 NOTE — ED NOTES
Patient has been attempted duran with 14 duran and 16 coude without success  re attempted after inserting uro jet. With duran coude without success only some blood return noted.  Checked wit bladder scanner had 856 ml n bladder Dr. Leisa Bundy aware Patient: Victor M Mao    Procedure(s):  Esophagogastroduodenoscopy with Biopsy by Biopsy    Diagnosis:Gastroesophageal reflux disease with esophagitis [K21.0]  Esophageal dysphagia [R13.10]  Diagnosis Additional Information: No value filed.    Anesthesia Type:  MAC    Note:  Anesthesia Post Evaluation    Patient location during evaluation: Phase 2  Patient participation: Able to fully participate in evaluation  Level of consciousness: awake  Pain management: adequate  Airway patency: patent  Cardiovascular status: acceptable and hemodynamically stable  Respiratory status: acceptable, room air and nonlabored ventilation  Hydration status: stable  PONV: none     Anesthetic complications: None    Comments: Patient was happy with the anesthesia care received and no anesthesia related complications were noted.  I will follow up with the patient again if it is needed.        Last vitals:  There were no vitals filed for this visit.      Electronically Signed By: DAHLIA Starks CRNA  October 4, 2019  3:05 PM

## 2022-08-31 NOTE — CONSULTS
[facility] Consult Note      Patient: Maura Case MRN: [id number]  SSN: [id number]    YOB: 1960  Age: 65 y.o. Sex: male    Date of Consultation: 8/31/2022  Referring Physician: Dr. Eric Escalona  Reason for Consultation: PEA cardiac arrest    HPI:  I was asked by Dr. Eric Escalona to see this patient for PEA cardiac arrest. Maura Case is a 81-year-old gentleman admitted in the hospital with PE a cardiac arrest.  Patient is intubated most of the information is gathered from medical record as well as from patient's wife. Patient is recently diagnosed with acute gout and shingle and was not feeling well EMS was called today and patient developed PA arrest underwent CPR with epinephrine have reversal of spontaneous circulation with multiple round of CPR. Patient is intubated. History of DVT in 2017 after surgery. No known history of CAD, CHF, sleep apnea. Past Medical History:   Diagnosis Date    Gout     Shingles      No past surgical history on file.   Current Facility-Administered Medications   Medication Dose Route Frequency    EPINEPHrine (ADRENALIN) 8 mg in 0.9% sodium chloride 250 mL infusion  2-30 mcg/min IntraVENous TITRATE    chlorhexidine (PERIDEX) 0.12 % mouthwash 10 mL  10 mL Oral ONCE    NOREPINephrine (LEVOPHED) 8 mg in 5% dextrose 250mL (32 mcg/mL) infusion  2-30 mcg/min IntraVENous TITRATE    piperacillin-tazobactam (ZOSYN) 4.5 g in 0.9% sodium chloride (MBP/ADV) 100 mL MBP  4.5 g IntraVENous Q6H    vancomycin (VANCOCIN) 2000 mg in  ml infusion  2,000 mg IntraVENous ONCE    Vancomycin Pharmacy to Dose  1 Each Other Rx Dosing/Monitoring    propofol (DIPRIVAN) 10 mg/mL infusion  0-20 mcg/kg/min IntraVENous TITRATE    heparin (porcine) 1,000 unit/mL injection 8,930 Units  80 Units/kg IntraVENous ONCE    heparin (porcine) 25,000 units in 0.45% saline 250 ml infusion  18-36 Units/kg/hr IntraVENous TITRATE    0.9% sodium chloride infusion  50 mL/hr IntraVENous CONTINUOUS    sodium chloride (NS) flush 5-40 mL  5-40 mL IntraVENous Q8H    sodium chloride (NS) flush 5-40 mL  5-40 mL IntraVENous PRN    acetaminophen (TYLENOL) tablet 650 mg  650 mg Oral Q6H PRN    Or    acetaminophen (TYLENOL) suppository 650 mg  650 mg Rectal Q6H PRN    polyethylene glycol (MIRALAX) packet 17 g  17 g Oral DAILY PRN    ondansetron (ZOFRAN ODT) tablet 4 mg  4 mg Oral Q8H PRN    Or    ondansetron (ZOFRAN) injection 4 mg  4 mg IntraVENous Q6H PRN    pantoprazole (PROTONIX) 40 mg in 0.9% sodium chloride 10 mL injection  40 mg IntraVENous Q12H    [START ON 9/1/2022] vancomycin (VANCOCIN) 1,000 mg in 0.9% sodium chloride 250 mL (VIAL-MATE)  1,000 mg IntraVENous Q24H    arformoteroL (BROVANA) neb solution 15 mcg  15 mcg Nebulization BID RT       Allergies and Intolerances:   No Known Allergies    Family History:   No family history on file. Social History:   He  has no history on file for tobacco use. He  has no history on file for alcohol use. Review of Systems:     Review of Systems      Limited due to intubation.       Physical:   Patient Vitals for the past 6 hrs:   Temp Pulse Resp BP SpO2   08/31/22 1730 -- 93 23 116/73 100 %   08/31/22 1700 -- 91 24 105/74 100 %   08/31/22 1630 -- 90 24 -- 100 %   08/31/22 1600 (!) 95.3 °F (35.2 °C) 86 24 103/68 100 %   08/31/22 1530 -- 85 24 -- 100 %   08/31/22 1524 -- -- -- 96/65 --   08/31/22 1507 -- 82 25 -- 100 %   08/31/22 1500 -- 84 24 -- 100 %   08/31/22 1402 -- 80 20 96/65 100 %   08/31/22 1400 -- 80 20 -- 100 %   08/31/22 1341 -- 80 20 (!) 80/61 --   08/31/22 1339 -- 83 20 (!) 84/62 --   08/31/22 1331 -- 82 20 (!) 87/61 100 %   08/31/22 1330 -- 81 21 (!) 89/56 100 %   08/31/22 1321 -- 81 20 (!) 91/58 100 %   08/31/22 1316 -- 82 22 (!) 88/52 --   08/31/22 1311 -- 81 20 (!) 89/52 100 %   08/31/22 1300 -- 84 20 (!) 92/58 100 %   08/31/22 1258 -- 84 20 (!) 94/59 100 %   08/31/22 1253 -- 84 20 (!) 89/46 100 %   08/31/22 1248 -- 84 19 (!) 85/55 --         Exam:   General Appearance:   Limited due to intubation, comfortable  Shingle rash  HEENT: TILA. HEAD: Atraumatic  NECK: No JVD, no thyroidomeglay. LUNGS:  bilateral air entry positive   HEART: S1+S2     ABD: Non-tender, BS Audible    EXT:  bilateral trace edema,   VASCULAR EXAM: Pulses are intact. PSYCHIATRIC EXAM:  limited due to intubation  MUSCULOSKELETAL: Grossly no joint deformity. NEUROLOGICAL:  limited due to intubation    Review of Data:   LABS:   Lab Results   Component Value Date/Time    WBC 16.8 (H) 08/31/2022 02:15 PM    HGB 13.1 08/31/2022 02:15 PM    HCT 42.3 08/31/2022 02:15 PM    PLATELET 467 36/16/7905 02:15 PM     Lab Results   Component Value Date/Time    Sodium 142 08/31/2022 02:15 PM    Potassium 4.1 08/31/2022 02:15 PM    Chloride 108 08/31/2022 02:15 PM    CO2 20 (L) 08/31/2022 02:15 PM    Glucose 340 (H) 08/31/2022 02:15 PM    BUN 52 (H) 08/31/2022 02:15 PM    Creatinine 2.91 (H) 08/31/2022 02:15 PM     No results found for: CHOL, CHOLX, CHLST, CHOLV, HDL, HDLP, LDL, LDLC, DLDLP, TGLX, TRIGL, TRIGP  No components found for: GPT  No results found for: HBA1C, LVG7LHEW, FDY7GCYH    RADIOLOGY    CXR Results  (Last 48 hours)                 08/31/22 1610  XR CHEST PORT Final result    Impression:          1. Support devices in stable/appropriate position as visualized. 2. No superimposed acute radiographic cardiopulmonary abnormality. Narrative:  EXAM: XR CHEST PORT       CLINICAL INDICATION/HISTORY: central line   -Additional: None       COMPARISON: 4 hours prior       TECHNIQUE: Portable frontal view of the chest       _______________       FINDINGS:       SUPPORT DEVICES: Endotracheal tube, gastric tube, and left IJ approach central   venous catheter noted. Tip of the central venous catheter is at the level of the   superior cavoatrial junction. HEART AND MEDIASTINUM: Normal cardiac size and mediastinal contours. LUNGS AND PLEURAL SPACES: No focal pneumonic opacity.  No evidence of   pneumothorax or pleural effusion. BONY THORAX AND SOFT TISSUES: Unremarkable.       _______________           08/31/22 1133  XR CHEST PORT Final result    Impression:      Enteric tube tip projecting over the thoracic inlet. Endotracheal tube tip in   the midthoracic trachea. No acute cardiopulmonary abnormality. Narrative:  XR CHEST PORT       CLINICAL INDICATION/HISTORY: Tube placement   -Additional: None       COMPARISON: None       TECHNIQUE: Portable frontal view of the chest       _______________       FINDINGS:       SUPPORT DEVICES: Enteric tube tip projecting over the thoracic inlet. Endotracheal tube tip in the midthoracic trachea. Left IJV central venous   catheter tip at the caval atrial junction. HEART AND MEDIASTINUM: Cardiomediastinal silhouette within normal limits. LUNGS AND PLEURAL SPACES: No dense consolidation, large effusion or   pneumothorax.       _______________                     Cardiology Procedures:   Results for orders placed or performed during the hospital encounter of 08/31/22   EKG, 12 LEAD, INITIAL   Result Value Ref Range    Ventricular Rate 112 BPM    Atrial Rate 112 BPM    P-R Interval 158 ms    QRS Duration 100 ms    Q-T Interval 360 ms    QTC Calculation (Bezet) 491 ms    Calculated P Axis 73 degrees    Calculated R Axis -69 degrees    Calculated T Axis 51 degrees    Diagnosis       Sinus tachycardia  Left axis deviation  Low voltage QRS  Inferior infarct , age undetermined  Abnormal ECG  No previous ECGs available        Echo Results  (Last 48 hours)      None         Cardiolite (Tc-99m Sestamibi) stress test        Impression / Plan:    Patient Active Problem List   Diagnosis Code    Cardiac arrest ([facility] [address] I46.9    Respiratory failure ([facility]) J96.90    GENO (acute kidney injury) ([address] N17.9    Disseminated herpes zoster B02.7    Hydronephrosis N13.30    Shock ([facility] [address] R57.9    Sepsis ([facility]) A41.9     Assessment and plan.         Cardiac arrest, PEA arrest/non shockable cardiac arrest  Respiratory failure requiring intubation  Acute kidney injury  Bilateral hydro ureteral nephrosis  Sepsis   Shock  Disseminated herpes zoster    Plan. Echocardiogram done and reviewed      Left Ventricle: Normal left ventricular systolic function with a visually estimated EF of 60 - 65%. Not well visualized. Left ventricle size is normal. Normal wall thickness. Normal wall motion. Right Ventricle: Not well visualized. Right ventricle is moderately dilated. Moderately reduced systolic function. Visually moderately dilated RV and moderately reduced RV systolic function. Aortic Valve: Tricuspid valve. Tricuspid Valve: The estimated RVSP is 25 mmHg. Right Atrium: Not well visualized. Right atrium is moderately dilated. No old echocardiogram available to compare. Continue with supportive treatment. Continue with ventilatory support   Patient is having some urethral bleeding and being evaluated by urologist Dr. Leonor Wiseman for catheter placement. Consider starting heparin if there is no active bleeding after urinary catheterization  DVT and a pulmonary embolism workup pending  Management plan was discussed with patient's wife  Discussed with Dr. Elliott Daley                45 minutes of critical care time spent in the direct evaluation and treatment of this high risk patient. The reason for providing this level of medical care for this critically ill patient was due a critical illness that impaired one or more vital organ systems such that there was a high probability of imminent or life threatening deterioration in the patients condition. This care involved high complexity decision making to assess, manipulate, and support vital system functions, to treat this degreee vital organ system failure and to prevent further life threatening deterioration of the patients condition.       Signed By: Christy Chew MD     August 31, 2022

## 2022-08-31 NOTE — ED PROVIDER NOTES
EMERGENCY DEPARTMENT HISTORY AND PHYSICAL EXAM      Date: 8/31/2022  Patient Name: Maura Case      History of Presenting Illness     Chief Complaint   Patient presents with    Cardiac arrest       History Provided By: Patient's Wife, Patient's Son, and EMS    Location/Duration/Severity/Modifying factors   Patient is a 80-year-old male who presents with cardiac arrest.  Reportedly patient was at home, has been feeling unwell since yesterday, when this morning he was feeling worse. The call for EMS was for \"illness\". On EMS arrival patient was encephalopathic but they were able to measure vital signs and check a blood sugar. In their presence patient had a witnessed cardiac arrest.  Reportedly in pulseless electrical activity. He received about 6 doses of epinephrine, no shocks delivered. He was started on a Levophed drip and then changed to an epinephrine drip. They had between 4 and 5 episodes of cardiac arrest, patient arrives with return of spontaneous circulation. He is unresponsive and not able to contribute to the history and the history is limited as a result. Reportedly being treated for both gout and shingles at present. Patient's wife later provided history that has been ill for about 3 days. He has been being treated for gout as well as shingles. And they think it is the left leg that was bothering him. He was put on gabapentin but no opioids that they are aware of. He was not complaining of any chest pain, shortness of breath or any other new symptoms for him otherwise. Today he was at home complaining of feeling unwell patient got up to go to the bathroom and wife checked on him and found him slumped over the sink and she was able to maneuver him to a family member's wheelchair he was less responsive but talking to the family. On EMS arrival patient initially had a pulse and then lost a pulse in their presence. The history is provided by the EMS personnel.      There are no other complaints, changes, or physical findings at this time.     PCP: Everett Guadarrama MD    Current Facility-Administered Medications   Medication Dose Route Frequency Provider Last Rate Last Admin    insulin lispro (HUMALOG) injection   SubCUTAneous Q6H Jane Francisco MD   4 Units at 09/01/22 3606    ELECTROLYTE REPLACEMENT PROTOCOL - Potassium Renal Dosing  1 Each Other PRN Jane Francisco MD        insulin glargine (LANTUS) injection 10 Units  10 Units SubCUTAneous Q24H Dean Nolan MD   10 Units at 09/01/22 1000    acyclovir (ZOVIRAX) 750 mg in 0.9% sodium chloride 250 mL IVPB  10 mg/kg (Ideal) IntraVENous Q8H Bruna Zhang  mL/hr at 09/01/22 1240 750 mg at 09/01/22 1240    piperacillin-tazobactam (ZOSYN) 3.375 g in 0.9% sodium chloride (MBP/ADV) 100 mL MBP  3.375 g IntraVENous Q8H Eyad CANADA DO 25 mL/hr at 09/01/22 1315 3.375 g at 09/01/22 1315    vancomycin (VANCOCIN) 2000 mg in  ml infusion  2,000 mg IntraVENous Dimitri Browne  mL/hr at 09/01/22 1308 2,000 mg at 09/01/22 1308    [START ON 9/2/2022] Vancomycin Random Concentration 9/2/2022 at 1300  1 Each Other ONCE Eyad CANADA DO        EPINEPHrine (ADRENALIN) 8 mg in 0.9% sodium chloride 250 mL infusion  2-30 mcg/min IntraVENous TITRATE Tawny Dillard DO   Stopped at 08/31/22 1313    NOREPINephrine (LEVOPHED) 8 mg in 5% dextrose 250mL (32 mcg/mL) infusion  2-30 mcg/min IntraVENous TITRATE Tawny Dillard DO   Held at 09/01/22 0900    Vancomycin Pharmacy to Dose  1 Each Other Rx Dosing/Monitoring Eyad CANADA DO        heparin (porcine) 25,000 units in 0.45% saline 250 ml infusion  18-36 Units/kg/hr IntraVENous TITRATE Tawny Dillard DO   Held at 09/01/22 8400    0.9% sodium chloride infusion  50 mL/hr IntraVENous CONTINUOUS Dean Nolan MD 50 mL/hr at 08/31/22 1800 50 mL/hr at 08/31/22 1800    sodium chloride (NS) flush 5-40 mL  5-40 mL IntraVENous Q8H Brigette Chakraborty MD   10 mL at 09/01/22 1323    sodium chloride (NS) flush 5-40 mL  5-40 mL IntraVENous PRN Nicanor Chakraborty MD        acetaminophen (TYLENOL) tablet 650 mg  650 mg Oral Q6H PRN Nicanor Chakraborty MD        Or    acetaminophen (TYLENOL) suppository 650 mg  650 mg Rectal Q6H PRN Brigette Chakraborty MD        polyethylene glycol (MIRALAX) packet 17 g  17 g Oral DAILY PRN Brigette Chakraborty MD        ondansetron (ZOFRAN ODT) tablet 4 mg  4 mg Oral Q8H PRN Brigette Chakraborty MD        Or    ondansetron (ZOFRAN) injection 4 mg  4 mg IntraVENous Q6H PRN Brigette Chakraborty MD        pantoprazole (PROTONIX) 40 mg in 0.9% sodium chloride 10 mL injection  40 mg IntraVENous Q12H Brigette Chakraborty MD   40 mg at 09/01/22 0837    arformoteroL (BROVANA) neb solution 15 mcg  15 mcg Nebulization BID RT Kendell Sierra MD   15 mcg at 09/01/22 0800    levETIRAcetam (KEPPRA) 1,000 mg in 0.9% sodium chloride 100 mL IVPB  1,000 mg IntraVENous Q12H Reina Ponce  mL/hr at 09/01/22 1111 1,000 mg at 09/01/22 1111    propofol (DIPRIVAN) 10 mg/mL infusion  0-50 mcg/kg/min IntraVENous TITRATE Reina Ponce MD   Stopped at 09/01/22 0850       Past History     Past Medical History:  Past Medical History:   Diagnosis Date    Gout     Shingles        Past Surgical History:  No past surgical history on file. Family History:  No family history on file. Social History: Allergies:  No Known Allergies      Review of Systems     Review of Systems   Unable to perform ROS: Intubated     Physical Exam     CONSTITUTIONAL: Well-developed, well-nourished individual ill appearing dusky coloration, non responsive. Patient is is intubated. HEAD: Normocephalic, atraumatic. EYES: Pupils are fixed, 4mm. Eyelids, conjunctiva, iris, and sclera are normal.  EARS: External ears are normal.  NOSE: The nose is normal in appearance.   MOUTH/DENTAL: The lips are dusky in coloration, gums, and teeth appear normal. There are no exudates or erosions on the buccal mucosa. The uvula is mid-line. The tonsils are not inflamed or erythematous. The posterior pharynx is free from erythema and exudates. NECK: The trachea is mid-line. The neck is supple and non-tender to palpation. There is no cervical lymphadenopathy. There is no JVD. CHEST: No evidence of trauma or deformity. Non-tender to palpation. No crepitus or paradoxical movements. Chest excursion is non-existent. CARDIOVASCULAR: The heart is in complete stand-still. LUNGS: Coarse breath sounds with BVM ventilation. No spontaneous respirations. GI/ABDOMEN: The abdomen is mildly distended in appearance. PELVIS: No evidence of trauma or deformity. Negative pelvic rock. no evidence of instability. MUSCULOSKELETAL: There are no deformities noted in all four extremities. Patient with intact central and peripheral pulses. INTEGUMENTARY: The skin is dusky and cool. There is a rash consistent with shingles to the left femoral region and the left leg. There is trace to 1+ edema both lower extremities  NEUROLOGICAL: Unresponsive. : normal genitalia, NO rashes/lesions  RECTAL: deferred  PSYCHOLOGICAL: Unresponsive.          Lab and Diagnostic Study Results     Labs -  Recent Results (from the past 24 hour(s))   ECHO ADULT COMPLETE    Collection Time: 08/31/22  3:58 PM   Result Value Ref Range    Fractional Shortening 2D 42 28 - 44 %    LVIDd Index 1.67 cm/m2    LVIDs Index 0.96 cm/m2    LV RWT Ratio 0.53     LV Mass 2D 109.0 88 - 224 g    LV Mass 2D Index 47.8 (A) 49 - 115 g/m2    MV E/A 0.80     LVOT Area 4.2 cm2    LA Size Index 1.10 cm/m2    LA/AO Root Ratio 0.66     Ao Root Index 1.67 cm/m2    Ascending Aorta Index 1.62 cm/m2    Est. RA Pressure 3 mmHg    RVSP 25 mmHg    IVSd 0.9 0.6 - 1.0 cm    LVIDd 3.8 (A) 4.2 - 5.9 cm    LVIDs 2.2 cm    LVOT Diameter 2.3 cm    LVPWd 1.0 0.6 - 1.0 cm    RVIDd 4.7 cm    RV Free Wall Peak S' 13 cm/s    RVOT Peak Gradient 1 mmHg RVOT Mean Gradient 0 mmHg    RVOT Peak Velocity 0.4 m/s    RVOT VTI 5.9 cm    LA Diameter 2.5 cm    MV A Velocity 0.41 m/s    MV E Wave Deceleration Time 131.0 ms    MV E Velocity 0.33 m/s    TAPSE 1.3 (A) 1.7 cm    TR Peak Gradient 22 mmHg    TR Max Velocity 2.32 m/s    Ascending Aorta 3.7 cm    Aortic Root 3.8 cm   DRUG SCREEN, URINE    Collection Time: 08/31/22  5:15 PM   Result Value Ref Range    BENZODIAZEPINES Negative NEG      BARBITURATES Negative NEG      THC (TH-CANNABINOL) Negative NEG      OPIATES Negative NEG      PCP(PHENCYCLIDINE) Negative NEG      COCAINE Negative NEG      AMPHETAMINES Negative NEG      METHADONE Negative NEG      HDSCOM (NOTE)    URINALYSIS W/MICROSCOPIC    Collection Time: 08/31/22  5:16 PM   Result Value Ref Range    Color YELLOW      Appearance TURBID      Specific gravity 1.016 1.005 - 1.030      pH (UA) 5.5 5.0 - 8.0      Protein 100 (A) NEG mg/dL    Glucose >1,000 (A) NEG mg/dL    Ketone Negative NEG mg/dL    Bilirubin Negative NEG      Blood LARGE (A) NEG      Urobilinogen 0.2 0.2 - 1.0 EU/dL    Nitrites Negative NEG      Leukocyte Esterase LARGE (A) NEG      WBC TOO NUMEROUS TO COUNT 0 - 5 /hpf    RBC 0 to 1 0 - 5 /hpf    Epithelial cells Negative 0 - 5 /lpf    Bacteria 2+ (A) NEG /hpf   BLOOD GAS, ARTERIAL POC    Collection Time: 08/31/22  5:33 PM   Result Value Ref Range    Device: ADULT VENT      FIO2 (POC) 45 %    pH (POC) 7.35 7.35 - 7.45      pCO2 (POC) 35.7 35.0 - 45.0 MMHG    pO2 (POC) 135 (H) 80 - 100 MMHG    HCO3 (POC) 19.9 (L) 22 - 26 MMOL/L    sO2 (POC) 99.0 (H) 92 - 97 %    Base deficit (POC) 5.1 mmol/L    Mode PRESSURE CONTROL      Set Rate 16 bpm    PEEP/CPAP (POC) 6 cmH2O    Mean Airway Pressure 11 cmH2O    PIP (POC) 20      Allens test (POC) Positive      Site RIGHT RADIAL      Specimen type (POC) ARTERIAL      Performed by Evie Dinh    GLUCOSE, POC    Collection Time: 08/31/22 11:54 PM   Result Value Ref Range    Glucose (POC) 306 (H) 70 - 110 mg/dL METABOLIC PANEL, COMPREHENSIVE    Collection Time: 09/01/22  3:00 AM   Result Value Ref Range    Sodium 145 136 - 145 mmol/L    Potassium 3.4 (L) 3.5 - 5.5 mmol/L    Chloride 113 (H) 100 - 111 mmol/L    CO2 25 21 - 32 mmol/L    Anion gap 7 3.0 - 18 mmol/L    Glucose 232 (H) 74 - 99 mg/dL    BUN 51 (H) 7.0 - 18 MG/DL    Creatinine 3.03 (H) 0.6 - 1.3 MG/DL    BUN/Creatinine ratio 17 12 - 20      GFR est AA 26 (L) >60 ml/min/1.73m2    GFR est non-AA 21 (L) >60 ml/min/1.73m2    Calcium 7.7 (L) 8.5 - 10.1 MG/DL    Bilirubin, total 0.4 0.2 - 1.0 MG/DL    ALT (SGPT) 238 (H) 16 - 61 U/L    AST (SGOT) 275 (H) 10 - 38 U/L    Alk.  phosphatase 98 45 - 117 U/L    Protein, total 6.3 (L) 6.4 - 8.2 g/dL    Albumin 2.2 (L) 3.4 - 5.0 g/dL    Globulin 4.1 (H) 2.0 - 4.0 g/dL    A-G Ratio 0.5 (L) 0.8 - 1.7     MAGNESIUM    Collection Time: 09/01/22  3:00 AM   Result Value Ref Range    Magnesium 2.4 1.6 - 2.6 mg/dL   PHOSPHORUS    Collection Time: 09/01/22  3:00 AM   Result Value Ref Range    Phosphorus 2.6 2.5 - 4.9 MG/DL   PTT    Collection Time: 09/01/22  3:00 AM   Result Value Ref Range    aPTT >180.0 (HH) 23.0 - 36.4 SEC   CBC W/O DIFF    Collection Time: 09/01/22  3:00 AM   Result Value Ref Range    WBC 12.4 4.6 - 13.2 K/uL    RBC 3.78 (L) 4.35 - 5.65 M/uL    HGB 10.7 (L) 13.0 - 16.0 g/dL    HCT 32.9 (L) 36.0 - 48.0 %    MCV 87.0 78.0 - 100.0 FL    MCH 28.3 24.0 - 34.0 PG    MCHC 32.5 31.0 - 37.0 g/dL    RDW 15.4 (H) 11.6 - 14.5 %    PLATELET 291 216 - 844 K/uL    MPV 11.2 9.2 - 11.8 FL    NRBC 0.0 0  WBC    ABSOLUTE NRBC 0.00 0.00 - 0.01 K/uL   GLUCOSE, POC    Collection Time: 09/01/22  5:06 AM   Result Value Ref Range    Glucose (POC) 247 (H) 70 - 110 mg/dL   BLOOD GAS, ARTERIAL POC    Collection Time: 09/01/22  5:59 AM   Result Value Ref Range    Device: ADULT VENT      FIO2 (POC) 40 %    pH (POC) 7.42 7.35 - 7.45      pCO2 (POC) 35.5 35.0 - 45.0 MMHG    pO2 (POC) 137 (H) 80 - 100 MMHG    HCO3 (POC) 23.1 22 - 26 MMOL/L sO2 (POC) 99.2 (H) 92 - 97 %    Base deficit (POC) 1.0 mmol/L    Mode PRESSURE CONTROL      Tidal volume 475 ml    Set Rate 16 bpm    PEEP/CPAP (POC) 6 cmH2O    Mean Airway Pressure 9.6 cmH2O    PIP (POC) 19      Allens test (POC) NOT APPLICABLE      Inspiratory Time 0.9 sec    Total resp. rate 16      Site RIGHT RADIAL      Specimen type (POC) ARTERIAL      Performed by Scotty Lynch    PTT    Collection Time: 09/01/22 10:21 AM   Result Value Ref Range    aPTT >180.0 (HH) 23.0 - 36.4 SEC   POTASSIUM    Collection Time: 09/01/22 10:21 AM   Result Value Ref Range    Potassium 3.4 (L) 3.5 - 5.5 mmol/L   HEMOGLOBIN A1C WITH EAG    Collection Time: 09/01/22 10:21 AM   Result Value Ref Range    Hemoglobin A1c 8.8 (H) 4.2 - 5.6 %    Est. average glucose 206 mg/dL   VANCOMYCIN, RANDOM    Collection Time: 09/01/22 10:21 AM   Result Value Ref Range    Vancomycin, random <0.8 (L) 5.0 - 40.0 UG/ML   GLUCOSE, POC    Collection Time: 09/01/22 12:39 PM   Result Value Ref Range    Glucose (POC) 134 (H) 70 - 110 mg/dL         Radiologic Studies -   DUPLEX LOWER EXT VENOUS BILAT   Final Result      XR CHEST PORT   Final Result         1. Support devices in stable/appropriate position as visualized. 2. No superimposed acute radiographic cardiopulmonary abnormality. CT HEAD WO CONT   Final Result         1. Mildly motion limited study without evidence of acute intracranial   abnormality. 2. Paranasal mucosal disease as described above with air-fluid level in the   right maxillary sinus as can be seen with sinusitis. CT CHEST ABD PELV WO CONT   Final Result      Severe left and moderate right hydroureteronephrosis with marked urinary bladder   distention. No obstructing calculus. No acute intrathoracic abnormality. XR CHEST PORT   Final Result      Enteric tube tip projecting over the thoracic inlet. Endotracheal tube tip in   the midthoracic trachea. No acute cardiopulmonary abnormality.       CT HEAD WO CONT    (Results Pending)   US RETROPERITONEUM COMP    (Results Pending)         Medical Decision Making and ED Course   - I am the first and primary provider for this patient AND AM THE PRIMARY PROVIDER OF RECORD. - I reviewed the vital signs, available nursing notes, past medical history, past surgical history, family history and social history. - Initial assessment performed. The patients presenting problems have been discussed, and the staff are in agreement with the care plan formulated and outlined with them. I have encouraged them to ask questions as they arise throughout their visit. Vital Signs-Reviewed the patient's vital signs. Patient Vitals for the past 12 hrs:   Temp Pulse Resp BP SpO2   09/01/22 1230 98.2 °F (36.8 °C) -- -- -- --   09/01/22 1130 -- 89 17 -- 94 %   09/01/22 1113 -- 89 16 -- 95 %   09/01/22 1100 -- 88 16 120/75 93 %   09/01/22 1030 -- 88 16 115/71 92 %   09/01/22 1000 -- 97 20 134/83 98 %   09/01/22 0930 -- 93 16 109/71 100 %   09/01/22 0900 -- (!) 104 17 122/77 99 %   09/01/22 0830 -- 83 17 139/77 99 %   09/01/22 0822 98.4 °F (36.9 °C) 80 18 -- 100 %   09/01/22 0800 98.4 °F (36.9 °C) 82 15 131/81 100 %   09/01/22 0730 -- 82 15 132/81 100 %   09/01/22 0700 -- 81 18 120/73 100 %   09/01/22 0558 -- 79 16 -- 100 %   09/01/22 0530 -- 81 16 114/72 100 %   09/01/22 0515 -- 80 13 119/73 100 %   09/01/22 0500 -- 82 16 124/74 100 %   09/01/22 0445 -- 79 15 122/72 100 %   09/01/22 0430 -- 79 18 116/73 100 %   09/01/22 0415 -- 79 16 105/70 100 %   09/01/22 0400 -- 80 16 108/67 100 %   09/01/22 0345 -- 82 16 94/64 100 %   09/01/22 0330 -- 80 17 113/70 100 %   09/01/22 0315 -- 82 16 120/72 100 %   09/01/22 0300 98.6 °F (37 °C) 82 16 116/72 100 %       EKG interpretation: EKG interpretation: Sinus tachycardia rate of 112 bpm, with a left axis deviation. There is quite a bit of artifact. No ST elevation or ST depression. QTC prolonged at 491. No T wave inversions.   Overall sinus tachycardia with nonspecific changes. Records Reviewed: Nursing Notes and Old Medical Records        Provider Notes (Medical Decision Making):     MDM  Number of Diagnoses or Management Options  Acute renal failure, unspecified acute renal failure type [facility])  Cardiac arrest ([facility] [address]  Shock circulatory ([facility]  Urinary retention  Diagnosis management comments: Patient presented after  cardiac arrest, PEA arrest, s/p 5-6 epinephine doses in the field. Total downtime between 25-35 minutes. Pt was intubated by EMS in the field with 7.0 ETT. On examination he has diminished by symmetric breath sounds with satisfactory placement of the tube, after it was withdrawn 2 cm to about 23cm at the teeth. He arrived on Epinephrine gtt, this was continued, but gradually transitioned to a Norepinephrine gtt, started on propofol for involuntary movements (?myoclonic jerking). He has otherwise not been making any spontaneous movements, but appears to have a gag/cough reflex. Family was updated with the patient's condition and grave status. Received 2L IVF between ED and EMS. Did not continue with more fluid given his distended bladder and inability to place a duran catheter in ED. His hemodynamics gradually improved during ED stay. Ddx: for cardiac arrest -- Hypoxic arrest, PE, ACS, Hyperkalemia, Septic shock/MODs, Acidosis, hypoglycemia, opioid overdose. Patient discussed with several consultants (see Consults section). Patient will be admitted to Hospitalist service to the Intensive care unit. Patient's family upated with the plan. ED Course:       ED Course as of 09/01/22 1445   Wed Aug 31, 2022   1226 Lactic Acid (POC)(!!): 10.02 [JOHNNA]      ED Course User Index  [JOHNNA] Ciara Puente, DO     ------------------------------------------------------------------------------------------------------------        Consultations:       Consultations: -  Hospitalist Consultant: Dr. Mimi Romero:  We have asked for emergent assistance with regard to this patient. We have discussed the patients HPI, ROS, PE and results this far. They will come and evaluate the patient for admission. , - Cardiology Consult: Dr. Yrn Fox. We have asked for emergent assistance with regard to this patient. We have discussed the patients HPI, ROS, PE and results this far. They will come and evaluate the patient for admission with or without emergent diagnostics and intervention. , and -  Dr Venecia Post -- Urology, Dr Sol Barfield -- [address] [place] [address]    Urology: D/w Dr Venecia Post; Requests trying 18 Fr catheter with urojet (unsuccessful), he will see pt in ICU  Cardiology: D/w Dr Yrn Fox (Cardiology) -- Recommends heparin gtt until PE can be excluded, will order stat echo; defer to intensivist to discuss cooling if no contraindication    Procedures and Critical Care       Performed by: Saad Diaz DO    Critical Care    Date/Time: 9/1/2022 2:43 PM  Performed by: Saima Carlos DO  Authorized by: Saima Carlos DO     Critical care provider statement:     Critical care time (minutes):  100    Critical care was necessary to treat or prevent imminent or life-threatening deterioration of the following conditions:  Circulatory failure, cardiac failure, renal failure, respiratory failure and shock    Critical care was time spent personally by me on the following activities:  Ordering and performing treatments and interventions, development of treatment plan with patient or surrogate, ordering and review of laboratory studies, discussions with consultants, ordering and review of radiographic studies, pulse oximetry, evaluation of patient's response to treatment, re-evaluation of patient's condition, review of old charts and examination of patient    I assumed direction of critical care for this patient from another provider in my specialty: no      Care discussed with: admitting provider    Central Line    Date/Time: 9/1/2022 2:43 PM  Performed by: Saima Carlos DO  Authorized by: Jude Nichols DO     Consent:     Consent obtained:  Emergent situation  Universal protocol:     Relevant documents present and verified: yes      Test results available: yes      Site/side marked: yes      Immediately prior to procedure, a time out was called: yes      Patient identity confirmed:  Arm band  Pre-procedure details:     Indication(s): central venous access      Hand hygiene: Hand hygiene performed prior to insertion      Sterile barrier technique: All elements of maximal sterile technique followed      Skin preparation:  Chlorhexidine    Skin preparation agent: Skin preparation agent completely dried prior to procedure    Sedation:     Sedation type:  None  Procedure details:     Location:  L internal jugular    Site selection rationale:  Rash to Femoral region, large accessible vessel on US imaging    Patient position:  Trendelenburg    Procedural supplies:  Triple lumen    Catheter size:  7 Fr    Landmarks identified: yes      Ultrasound guidance: yes      Ultrasound guidance timing: real time      Sterile ultrasound techniques: Sterile gel and sterile probe covers were used      Number of attempts:  1    Successful placement: yes    Post-procedure details:     Post-procedure:  Dressing applied and line sutured    Assessment:  Blood return through all ports and no pneumothorax on x-ray    Procedure completion:  Tolerated             CRITICAL CARE NOTE :  10:16 AM  CRITICAL CARE TIME: I have spent 100 minutes of critical care time involved in lab review, consultations with specialist, family decision-making, and documentation. During this entire length of time I was immediately available to the patient. Critical Care: The reason for providing this level of medical care for this critically ill patient was due a critical illness that impaired one or more vital organ systems such that there was a high probability of imminent or life threatening deterioration in the patients condition.  This care involved high complexity decision making to assess, manipulate, and support vital system functions, to treat this vital organ system failure and to prevent further life threatening deterioration of the patients condition. Time is exclusive of procedural and teaching time. DO Lynne Dunn DO        Disposition         Diagnosis:   1. Cardiac arrest ([address]    2. Acute renal failure, unspecified acute renal failure type ([address]    3. Urinary retention    4. Shock circulatory ([address]          Disposition: ADMITTED    Follow-up Information       Follow up With Specialties Details Why Pilar Calles MD Family Medicine Follow up  [address] [place] [address] [phone]              There are no discharge medications for this patient. Diagnosis     Clinical Impression:   1. Cardiac arrest ([address]    2. Acute renal failure, unspecified acute renal failure type ([address]    3. Urinary retention    4. Shock circulatory [facility])        Attestations:    Lynne Sanchez DO    Please note that this dictation was completed with [employer], the computer voice recognition software. Quite often unanticipated grammatical, syntax, homophones, and other interpretive errors are inadvertently transcribed by the computer software. Please disregard these errors. Please excuse any errors that have escaped final proofreading. Thank you.

## 2022-09-01 PROBLEM — N39.0 UTI (URINARY TRACT INFECTION): Status: ACTIVE | Noted: 2022-01-01

## 2022-09-01 NOTE — PROGRESS NOTES
Upon AM assessment pt noted to be unresponsive; no movement to painful stimuli, assessment documented per relevant flow sheet    Advised by intensivist Darren Green to hold propofol pending her assessment of myoclonus jerks in pt, once propofol was held pt began jerking uncontrollably, levophed placed on hold see note in MAR, brown and bloody tinged drainage orally and via OGT, OGT placed to low continuous suction, 100 output immediately after placing OGT to suction, heparin placed on hold at this time, intensivist made aware, Head CT ordered pt too unstable to go at this time    Reassessment completed pt remains off of heparin, blood tinged secretions still present, additional 100 output orally and via OGT, family at the bedside, provided updates on pt condition, all questions answered to their liking, allowed time for clarification, no additional requests at this time    Received call from lab with critical PTT result >180 despite heparin remaining on hold, intensivist made aware, will continue to hold heparin gtt    Pt experiencing myoclonal jerks, elevated HR, and desaturation into 80s, propofol restarted at this time    Reassessment completed, cardiologist at bedside, made aware of the heparin gtt being on hold d/t signs of bleeding from OGT and orally, noted signs of bleeding at Right IJ site    VSS on propofol gtt and mechanical ventilator, fever noted; med given per mar, ice packs applied to pt, layers minimized, restraints have been successfully d/c throughout entirety of shift, intermittent myoclonus jerks, all movement has been non purposeful, family remains at bedside, no additional needs to address    Bedside and Verbal shift change report given to Michaela Zuleta RN (oncoming nurse) by YING Suarez (offgoing nurse).  Report included the following information SBAR, Kardex, Intake/Output, MAR, Recent Results, and Cardiac Rhythm SR-ST .

## 2022-09-01 NOTE — PROGRESS NOTES
.[id number] Pharmacokinetic Monitoring Service - Vancomycin     Kirstie Westfall is a 65 y.o. male starting on vancomycin therapy for Sepsis of unknown etiology. Pharmacy consulted by Dr Johnnie Nayak for monitoring and adjustment. Target Concentration: Goal AUC/MAGALYS 400-600 mg*hr/L    Additional Antimicrobials: Zosyn    Pertinent Laboratory Values: Wt Readings from Last 1 Encounters:   08/31/22 111.6 kg (246 lb)     Temp Readings from Last 1 Encounters:   09/01/22 98.4 °F (36.9 °C)     No components found for: PROCAL  Estimated Creatinine Clearance: 32 mL/min (A) (based on SCr of 3.03 mg/dL (H)). Recent Labs     09/01/22  0300 08/31/22  1415   WBC 12.4 16.8*       Pertinent Cultures:  Culture Date Source Results           .     Plan:  Dosing recommendations based on Bayesian software  Patient did not receive bolus dose on 8/31/22, will start therapy 9/1/22 as indicated   Start vancomycin 2000 mg bolus   Anticipated AUCss of 516 mg/L.hr and trough concentration of 17.7 mcgml at steady state  Renal labs as indicated   Vancomycin concentration ordered 9/2/2022 at 1300  Pharmacy will continue to monitor patient and adjust therapy as indicated    Thank you for the consult,  Romana Zavala, [facility]  9/1/2022

## 2022-09-01 NOTE — PROGRESS NOTES
Heparin Monitoring  DVT / PE / AFIB    Patient Name: Florinda Mota   YOB: 1960   Medical Record Number: [id number]   Date of Admission: 8/31/2022    Progress Note: 9/1/2022 4:41 AM     Assessment This Admission:   Principal Problem:    Cardiac arrest ([address] (8/31/2022)    Active Problems:    Respiratory failure ([address] (8/31/2022)      GENO (acute kidney injury) ([address] (8/31/2022)      Disseminated herpes zoster (8/31/2022)      Hydronephrosis (8/31/2022)      Shock ([address] (8/31/2022)      Sepsis ([facility] [address] [facility] (8/31/2022)        Labs:   Latest aPTT:  Lab Results   Component Value Date/Time    aPTT >180.0 () 09/01/2022 03:00 AM      72-hr Hx Hematology:  Recent Labs     08/31/22  1415 08/31/22  1000   HGB 13.1 10.5*   HCT 42.3 33.8*    146       Pharmacist hereby verifies that the following action has been completed by nursing  according to the DVT/PE/AFib Heparin Protocol:     PTT greater than 141 seconds  ==>  RN *HELD* infusion for 1 hour.   Resumed Heparin Drip and decreased rate by 3 units/kg/hr.    --------------------------------------  Notes:   5254 infusion stopped   est new rate 15 un/kg/hr      Selma Hernandez, [facility] Pharmacist  [phone]

## 2022-09-01 NOTE — CONSULTS
P[facility] Specialists  Pulmonary, Critical Care, and Sleep Medicine    Name: Latonia Michelle MRN: [id number]   : 1960 Hospital: [facility]    Date: 2022  Room: [address] [place] [address] Note                                              Consult requesting physician: Dr. 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 Currently has urethral bleeding can do low dose heparin if better we will start full dose   Ventilator bundle & Sedation protocol followed. Daily sedation holiday, assessment for readiness for SBT and then re-titrate if required. Chlorhexidine mouth washes. Keep SPO2 >=92%. HOB 30 degree elevation all the time. Aggressive pulmonary toileting. Aspiration precautions. Incentive spirometry. CVS: cardiac arrest   On levophed BP improving   Echo stable   Has DVT heparin drip as tolerated had some OG bleeding now on hold   I suspect related to sepsis recently disseminated zoster was on antiviral has DM/HTN  ? Pea arrest not clear  Echo pending  [id number] minutes codes 3 times CPR epi  Due to renal failure cannot exclude PE  PVL pending , bleeding now cannot give full dose heparin but will reevaluate daily   Now on levophed 16  ID: suspect sepsis   UTI culure pending but positive U/a and zoster   Hydronephrosis urology consulted urinalysis pending  10 days of zoster active disseminate back and left leg consulted ID    Sepsis bundle and protocol followed. Follow serial lactic acid, frequent BMP check, fluid resuscitation. Follow cultures. Deescalate antibiotic when appropriate. 1. Mildly motion limited study without evidence of acute intracranial  abnormality. 2. Paranasal mucosal disease as described above with air-fluid level in the  right maxillary sinus as can be seen with sinusitis. IMPRESSION     Severe left and moderate right hydroureteronephrosis with marked urinary bladder  distention. No obstructing calculus. No acute intrathoracic abnormality. Hematology/Oncology: follow hb  Chronic anemia   Renal: CRI around 1.8 now Glen suspect due to sepsis and hydronephrosis   GI/: PPI  Hydronephrosis   Endocrine: Monitor BS. SSI. has DM  Neurology: intubated on propofol post cardiac arrest r/o anoxia   Ct of the head negative  EEG  Neurology consulted myoclonic jerks  Off propofol today did not follow commands  RR above the Vent   Reapt Ct pending not stable for MRI has to be off airborne isolation    Toxicology: pending   Pain/Sedation: propofol  Skin/Wound: has extensive back and left lower extremity rash typical for zoster   Active lesions   Electrolytes: Replace electrolytes per ICU electrolyte replacement protocol. IVF: NACL  Nutrition: npo  Prophylaxis: DVT Prophylaxis: SCD/sq heaprin. GI Prophylaxis: Protonix/ppi. Restraints:   Wrist soft restraints for patient interfering with medical therapy/management and patient safety. Lines/Tubes: PIV  ETT: in place . OGT/NGT: in place . Central line: left internal jugular placed on 8/31 in ED  (site examined, no erythema, induration, discharge or sign of infection. Dressing intact. Medically necessary, will remove it when not needed. Central line bundle followed). Other:  PT/OT eval and treat. OOB.   Advance Directive/Palliative Care: consulted    Will defer respective systems problem management to primary and other respective consultant and follow patient in ICU with primary and other medical team.  Further recommendations will be based on the patient's response to recommended treatment and results of the investigation ordered. Quality Care: PPI, DVT prophylaxis, HOB elevated, Infection control all reviewed and addressed. Care of plan d/w hospitalist team, RN, RT, MDR.  D/w , family-wife and brother  (answered all questions to satisfaction). High  complexity decision making was performed during the evaluation of this patient at high risk for decompensation with multiple organ involvement. Total critical care time spent rendering care exclusive of procedures/family discussion/coordination of care: 55minutes. I evaluated for cooling protocol spoke to cardiology and hospitalist due to sepsis zoster and hydronephrosis high risk for cooling  Familu updated daily wife and sons          Subjective/History of Present Illness:     Patient is a 65 y.o. male with PMHx significant for HTN/CRI/DM recently diagnosed with severe zoster. Ling was on antiviral tx very weak , sob,cough. EMS called in ambulance 3 times cardiac arrest. Witnessed CPR/Epi  Repored PEA. 6 doses of epi and no shocks. He came on epi drip but in Ed chnaged to levophed. Ling had seizure vs myocloninc jerks was started on propofol. 9/1/2022: Intubated off sedation RR above the vent and gaga but does not follow commands   Myoclonic jerks  Concern about anoxia  Contineu care     Full code spoke to family        I/O last 24 hrs:    Intake/Output Summary (Last 24 hours) at 9/1/2022 1600  Last data filed at 9/1/2022 1230  Gross per 24 hour   Intake 1851.01 ml   Output 4200 ml   Net -2348.99 ml         The patient is critically ill and can not provide additional history due to Ventilated    History taken from  family and EMR    Review of Systems:    ROS not obtained due to patient factor. No Known Allergies   Past Medical History:   Diagnosis Date    Gout     Shingles       No past surgical history on file. Social History     Tobacco Use    Smoking status: Not on file    Smokeless tobacco: Not on file   Substance Use Topics    Alcohol use: Not on file      No family history on file.    Prior to Admission medications    Not on File     Current Facility-Administered Medications   Medication Dose Route Frequency    insulin lispro (HUMALOG) injection   SubCUTAneous Q6H    insulin glargine (LANTUS) injection 10 Units  10 Units SubCUTAneous Q24H    acyclovir (ZOVIRAX) 750 mg in 0.9% sodium chloride 250 mL IVPB  10 mg/kg (Ideal) IntraVENous Q8H    piperacillin-tazobactam (ZOSYN) 3.375 g in 0.9% sodium chloride (MBP/ADV) 100 mL MBP  3.375 g IntraVENous Q8H    [START ON 9/2/2022] Vancomycin Random Concentration 9/2/2022 at 1300  1 Each Other ONCE    NOREPINephrine (LEVOPHED) 8 mg in 5% dextrose 250mL (32 mcg/mL) infusion  2-30 mcg/min IntraVENous TITRATE    Vancomycin Pharmacy to Dose  1 Each Other Rx Dosing/Monitoring    heparin (porcine) 25,000 units in 0.45% saline 250 ml infusion  18-36 Units/kg/hr IntraVENous TITRATE    0.9% sodium chloride infusion  50 mL/hr IntraVENous CONTINUOUS    sodium chloride (NS) flush 5-40 mL  5-40 mL IntraVENous Q8H    pantoprazole (PROTONIX) 40 mg in 0.9% sodium chloride 10 mL injection  40 mg IntraVENous Q12H    arformoteroL (BROVANA) neb solution 15 mcg  15 mcg Nebulization BID RT    levETIRAcetam (KEPPRA) 1,000 mg in 0.9% sodium chloride 100 mL IVPB  1,000 mg IntraVENous Q12H    propofol (DIPRIVAN) 10 mg/mL infusion  0-50 mcg/kg/min IntraVENous TITRATE         Objective:   Vital Signs:    Visit Vitals  /75   Pulse (!) 110   Temp 98.2 °F (36.8 °C)   Resp 19   Ht 5' 10\" (1.778 m)   Wt 111.6 kg (246 lb)   SpO2 93%   BMI 35.30 kg/m²       O2 Device: Endotracheal tube, Ventilator       Temp (24hrs), Av.7 °F (37.1 °C), Min:98.2 °F (36.8 °C), Max:99.6 °F (37.6 °C)       Intake/Output:   Last shift:      701 - 1900  In: 9096 [I.V.:1851]  Out: 806 [Urine:875]    Last 3 shifts: 1901 -  07  In: 1390.2 [I.V.:1390.2]  Out: 7094 [JGHOU:0256]      Intake/Output Summary (Last 24 hours) at 2022 1600  Last data filed at 2022 1230  Gross per 24 hour   Intake 1851.01 ml   Output 4200 ml   Net -2348.99 ml       Last 3 Recorded Weights in this Encounter    22 0958 22 1000 22 1524   Weight: 111.9 kg (246 lb 12.8 oz) 111.6 kg (246 lb 1.6 oz) 111.6 kg (246 lb)         Ventilator Settings:  Mode Rate Tidal Volume Pressure FiO2 PEEP   Pressure control   450 ml    35 % 5 cm H20     Peak airway pressure: 18 cm H2O    Plateau pressure:     Tidal volume:    Minute ventilation: 11.2 l/min     Recent Labs     22  0559 22  1733 22  1008   PHI 7.42 7.35 7.26*   PCO2I 35.5 35.7 39.1   PO2I 137* 135* 444*   HCO3I 23.1 19.9* 17.7*   FIO2  --   --  100   FIO2I 40 45  --          Physical Exam:     Impresson general : intubated off sedation   Pupils reactive positive corneal and gaga RR above the vent but   Does not follow commands   Head:   Normocephalic,atraumatic. ENT:   EOM , no scleral icterus, no pallor, no cyanosis. Nose:   No sinus tenderness  Throat:  Oropharynx ,mucosa, and tongue normal. No oral thrush. Neck:   Supple, symmetric. Lymph nodes. Trachea ismidline   Lung: Moderate air entry bilateral equal. No rales. No rhonchi. No wheezing. No stridors. No prolongded expiration. No accessory muscle use. Heart:   Regular  rate & rhythm. S1 S2 present. No murmur. No JVD. Abdomen:  Soft. NT. ND. +BS. No masses. liver  and spleen  Extremities:  No pedal edema. No cyanosis. No clubbing. Pulses: 2+ and symmetric in DP. Capillary refill: normal  Lymphatic:  neck and supraclavicular    Musculoskeletal: No joint swelling. No tenderness.    Skin:   Back and left lower extremity vesicular rash typical for zoster      Data:       Recent Results (from the past 24 hour(s))   DRUG SCREEN, URINE    Collection Time: 08/31/22  5:15 PM   Result Value Ref Range    BENZODIAZEPINES Negative NEG      BARBITURATES Negative NEG      THC (TH-CANNABINOL) Negative NEG      OPIATES Negative NEG      PCP(PHENCYCLIDINE) Negative NEG      COCAINE Negative NEG      AMPHETAMINES Negative NEG      METHADONE Negative NEG      HDSCOM (NOTE)    URINALYSIS W/MICROSCOPIC    Collection Time: 08/31/22  5:16 PM   Result Value Ref Range    Color YELLOW      Appearance TURBID      Specific gravity 1.016 1.005 - 1.030      pH (UA) 5.5 5.0 - 8.0      Protein 100 (A) NEG mg/dL    Glucose >1,000 (A) NEG mg/dL    Ketone Negative NEG mg/dL    Bilirubin Negative NEG      Blood LARGE (A) NEG      Urobilinogen 0.2 0.2 - 1.0 EU/dL    Nitrites Negative NEG      Leukocyte Esterase LARGE (A) NEG      WBC TOO NUMEROUS TO COUNT 0 - 5 /hpf    RBC 0 to 1 0 - 5 /hpf    Epithelial cells Negative 0 - 5 /lpf    Bacteria 2+ (A) NEG /hpf   BLOOD GAS, ARTERIAL POC    Collection Time: 08/31/22  5:33 PM   Result Value Ref Range    Device: ADULT VENT      FIO2 (POC) 45 %    pH (POC) 7.35 7.35 - 7.45      pCO2 (POC) 35.7 35.0 - 45.0 MMHG    pO2 (POC) 135 (H) 80 - 100 MMHG    HCO3 (POC) 19.9 (L) 22 - 26 MMOL/L    sO2 (POC) 99.0 (H) 92 - 97 %    Base deficit (POC) 5.1 mmol/L    Mode PRESSURE CONTROL      Set Rate 16 bpm    PEEP/CPAP (POC) 6 cmH2O    Mean Airway Pressure 11 cmH2O    PIP (POC) 20      Allens test (POC) Positive      Site RIGHT RADIAL      Specimen type (POC) ARTERIAL      Performed by Kailee Ness    GLUCOSE, POC    Collection Time: 08/31/22 11:54 PM   Result Value Ref Range    Glucose (POC) 306 (H) 70 - 148 mg/dL   METABOLIC PANEL, COMPREHENSIVE    Collection Time: 09/01/22  3:00 AM   Result Value Ref Range    Sodium 145 136 - 145 mmol/L    Potassium 3.4 (L) 3.5 - 5.5 mmol/L    Chloride 113 (H) 100 - 111 mmol/L CO2 25 21 - 32 mmol/L    Anion gap 7 3.0 - 18 mmol/L    Glucose 232 (H) 74 - 99 mg/dL    BUN 51 (H) 7.0 - 18 MG/DL    Creatinine 3.03 (H) 0.6 - 1.3 MG/DL    BUN/Creatinine ratio 17 12 - 20      GFR est AA 26 (L) >60 ml/min/1.73m2    GFR est non-AA 21 (L) >60 ml/min/1.73m2    Calcium 7.7 (L) 8.5 - 10.1 MG/DL    Bilirubin, total 0.4 0.2 - 1.0 MG/DL    ALT (SGPT) 238 (H) 16 - 61 U/L    AST (SGOT) 275 (H) 10 - 38 U/L    Alk.  phosphatase 98 45 - 117 U/L    Protein, total 6.3 (L) 6.4 - 8.2 g/dL    Albumin 2.2 (L) 3.4 - 5.0 g/dL    Globulin 4.1 (H) 2.0 - 4.0 g/dL    A-G Ratio 0.5 (L) 0.8 - 1.7     MAGNESIUM    Collection Time: 09/01/22  3:00 AM   Result Value Ref Range    Magnesium 2.4 1.6 - 2.6 mg/dL   PHOSPHORUS    Collection Time: 09/01/22  3:00 AM   Result Value Ref Range    Phosphorus 2.6 2.5 - 4.9 MG/DL   PTT    Collection Time: 09/01/22  3:00 AM   Result Value Ref Range    aPTT >180.0 (HH) 23.0 - 36.4 SEC   CBC W/O DIFF    Collection Time: 09/01/22  3:00 AM   Result Value Ref Range    WBC 12.4 4.6 - 13.2 K/uL    RBC 3.78 (L) 4.35 - 5.65 M/uL    HGB 10.7 (L) 13.0 - 16.0 g/dL    HCT 32.9 (L) 36.0 - 48.0 %    MCV 87.0 78.0 - 100.0 FL    MCH 28.3 24.0 - 34.0 PG    MCHC 32.5 31.0 - 37.0 g/dL    RDW 15.4 (H) 11.6 - 14.5 %    PLATELET 938 911 - 714 K/uL    MPV 11.2 9.2 - 11.8 FL    NRBC 0.0 0  WBC    ABSOLUTE NRBC 0.00 0.00 - 0.01 K/uL   GLUCOSE, POC    Collection Time: 09/01/22  5:06 AM   Result Value Ref Range    Glucose (POC) 247 (H) 70 - 110 mg/dL   BLOOD GAS, ARTERIAL POC    Collection Time: 09/01/22  5:59 AM   Result Value Ref Range    Device: ADULT VENT      FIO2 (POC) 40 %    pH (POC) 7.42 7.35 - 7.45      pCO2 (POC) 35.5 35.0 - 45.0 MMHG    pO2 (POC) 137 (H) 80 - 100 MMHG    HCO3 (POC) 23.1 22 - 26 MMOL/L    sO2 (POC) 99.2 (H) 92 - 97 %    Base deficit (POC) 1.0 mmol/L    Mode PRESSURE CONTROL      Tidal volume 475 ml    Set Rate 16 bpm    PEEP/CPAP (POC) 6 cmH2O    Mean Airway Pressure 9.6 cmH2O    PIP (POC) 19      Allens test (POC) NOT APPLICABLE      Inspiratory Time 0.9 sec    Total resp.  rate 16      Site RIGHT RADIAL      Specimen type (POC) ARTERIAL      Performed by Sanjana Robles    PTT    Collection Time: 09/01/22 10:21 AM   Result Value Ref Range    aPTT >180.0 (HH) 23.0 - 36.4 SEC   POTASSIUM    Collection Time: 09/01/22 10:21 AM   Result Value Ref Range    Potassium 3.4 (L) 3.5 - 5.5 mmol/L   HEMOGLOBIN A1C WITH EAG    Collection Time: 09/01/22 10:21 AM   Result Value Ref Range    Hemoglobin A1c 8.8 (H) 4.2 - 5.6 %    Est. average glucose 206 mg/dL   VANCOMYCIN, RANDOM    Collection Time: 09/01/22 10:21 AM   Result Value Ref Range    Vancomycin, random <0.8 (L) 5.0 - 40.0 UG/ML   GLUCOSE, POC    Collection Time: 09/01/22 12:39 PM   Result Value Ref Range    Glucose (POC) 134 (H) 70 - 110 mg/dL   LACTIC ACID    Collection Time: 09/01/22  1:30 PM   Result Value Ref Range    Lactic acid 1.8 0.4 - 2.0 MMOL/L         Chemistry Recent Labs     09/01/22  1021 09/01/22  0300 08/31/22  1415 08/31/22  1000   GLU  --  232* 340* 325*   NA  --  145 142 144   K 3.4* 3.4* 4.1 3.1*   CL  --  113* 108 107   CO2  --  25 20* 19*   BUN  --  51* 52* 50*   CREA  --  3.03* 2.91* 3.01*   CA  --  7.7* 8.3* 7.7*   MG  --  2.4 2.6 2.7*   PHOS  --  2.6 4.7  --    AGAP  --  7 14 18   BUCR  --  17 18 17   AP  --  98  --  103   TP  --  6.3*  --  6.0*   ALB  --  2.2* 2.6* 2.3*   GLOB  --  4.1*  --  3.7   AGRAT  --  0.5*  --  0.6*          Lactic Acid Lactic acid   Date Value Ref Range Status   09/01/2022 1.8 0.4 - 2.0 MMOL/L Final     Recent Labs     09/01/22  1330   LAC 1.8        Liver Enzymes Protein, total   Date Value Ref Range Status   09/01/2022 6.3 (L) 6.4 - 8.2 g/dL Final     Albumin   Date Value Ref Range Status   09/01/2022 2.2 (L) 3.4 - 5.0 g/dL Final     Globulin   Date Value Ref Range Status   09/01/2022 4.1 (H) 2.0 - 4.0 g/dL Final     A-G Ratio   Date Value Ref Range Status   09/01/2022 0.5 (L) 0.8 - 1.7   Final Alk. phosphatase   Date Value Ref Range Status   09/01/2022 98 45 - 117 U/L Final     Recent Labs     09/01/22  0300 08/31/22  1415 08/31/22  1000   TP 6.3*  --  6.0*   ALB 2.2* 2.6* 2.3*   GLOB 4.1*  --  3.7   AGRAT 0.5*  --  0.6*   AP 98  --  103          CBC w/Diff Recent Labs     09/01/22  0300 08/31/22  1415 08/31/22  1000   WBC 12.4 16.8* 9.6   RBC 3.78* 4.69 3.62*   HGB 10.7* 13.1 10.5*   HCT 32.9* 42.3 33.8*    162 146   GRANS  --  79* 69   LYMPH  --  6* 21   EOS  --  0 1          Cardiac Enzymes No results found for: CPK, CK, CKMMB, CKMB, RCK3, CKMBT, CKNDX, CKND1, ROB, TROPT, TROIQ, BAY, TROPT, TNIPOC, BNP, BNPP     BNP No results found for: BNP, BNPP, XBNPT     Coagulation Recent Labs     09/01/22  1021 09/01/22  0300 08/31/22  1415 08/31/22  1000   PTP  --   --   --  18.7*   INR  --   --   --  1.5*   APTT >180.0* >180.0* 35.6  --            Thyroid  No results found for: T4, T3U, TSH, TSHEXT, TSHEXT    No results found for: T4     Urinalysis Lab Results   Component Value Date/Time    Color YELLOW 08/31/2022 05:16 PM    Appearance TURBID 08/31/2022 05:16 PM    Specific gravity 1.016 08/31/2022 05:16 PM    pH (UA) 5.5 08/31/2022 05:16 PM    Protein 100 (A) 08/31/2022 05:16 PM    Glucose >1,000 (A) 08/31/2022 05:16 PM    Ketone Negative 08/31/2022 05:16 PM    Bilirubin Negative 08/31/2022 05:16 PM    Urobilinogen 0.2 08/31/2022 05:16 PM    Nitrites Negative 08/31/2022 05:16 PM    Leukocyte Esterase LARGE (A) 08/31/2022 05:16 PM    Epithelial cells Negative 08/31/2022 05:16 PM    Bacteria 2+ (A) 08/31/2022 05:16 PM    WBC TOO NUMEROUS TO COUNT 08/31/2022 05:16 PM    RBC 0 to 1 08/31/2022 05:16 PM        Micro  Recent Labs     08/31/22  1015 08/31/22  1000   CULT NO GROWTH AFTER 22 HOURS NO GROWTH AFTER 22 HOURS     Recent Labs     08/31/22  1015 08/31/22  1000   CULT NO GROWTH AFTER 22 HOURS NO GROWTH AFTER 22 HOURS          Culture data during this hospitalization.    All Micro Results Procedure Component Value Units Date/Time    CULTURE, BLOOD [[id number]] Collected: 08/31/22 1015    Order Status: Completed Specimen: Blood Updated: 09/01/22 0829     Special Requests: NO SPECIAL REQUESTS        Culture result: NO GROWTH AFTER 22 HOURS       CULTURE, BLOOD [[id number]] Collected: 08/31/22 1000    Order Status: Completed Specimen: Blood Updated: 09/01/22 0829     Special Requests: NO SPECIAL REQUESTS        Culture result: NO GROWTH AFTER 22 HOURS       COVID-19 RAPID TEST [[id number]] Collected: 08/31/22 1050    Order Status: Completed Specimen: Nasopharyngeal Updated: 08/31/22 1131     Specimen source Nasopharyngeal        COVID-19 rapid test Not detected        Comment: Rapid Abbott ID Now       Rapid NAAT:  The specimen is NEGATIVE for SARS-CoV-2, the novel coronavirus associated with COVID-19. Negative results should be treated as presumptive and, if inconsistent with clinical signs and symptoms or necessary for patient management, should be tested with an alternative molecular assay. Negative results do not preclude SARS-CoV-2 infection and should not be used as the sole basis for patient management decisions. This test has been authorized by the FDA under an Emergency Use Authorization (EUA) for use by authorized laboratories. Fact sheet for Healthcare Providers: ConventionUpdate.co.nz  Fact sheet for Patients: ConventionUpdate.co.nz       Methodology: Isothermal Nucleic Acid Amplification                      PFT       Ultrasound       LE Doppler       ECHO       Images report reviewed by me:  CT (Most Recent) (CT chest reviewed by me)        CXR reviewed by me:  XR (Most Recent).  CXR  reviewed by me and compared with previous CXR Results from Hospital Encounter encounter on 08/31/22    XR CHEST PORT    Narrative  EXAM: XR CHEST PORT    CLINICAL INDICATION/HISTORY: central line  -Additional: None    COMPARISON: 4 hours prior    TECHNIQUE: Portable frontal view of the chest    _______________    FINDINGS:    SUPPORT DEVICES: Endotracheal tube, gastric tube, and left IJ approach central  venous catheter noted. Tip of the central venous catheter is at the level of the  superior cavoatrial junction. HEART AND MEDIASTINUM: Normal cardiac size and mediastinal contours. LUNGS AND PLEURAL SPACES: No focal pneumonic opacity. No evidence of  pneumothorax or pleural effusion. BONY THORAX AND SOFT TISSUES: Unremarkable.    _______________    Impression  1. Support devices in stable/appropriate position as visualized. 2. No superimposed acute radiographic cardiopulmonary abnormality.            Patel Bales MD  9/1/2022

## 2022-09-01 NOTE — ACP (ADVANCE CARE PLANNING)
Advance Care Planning     General Advance Care Planning (ACP) Conversation      Date of Conversation: 9/1/2022  Conducted with: Healthcare Decision Maker: Next of Kin by law (only applies in absence of a Healthcare Power of  or Legal Guardian)    Healthcare Decision Maker:     Primary Decision Maker: Emili Austin - [phone]    Today we documented Decision Maker(s) consistent with Legal Next of Kin hierarchy. Content/Action Overview:   Has NO ACP documents/care preferences. Unable to complete AMD at this time  Reviewed DNR/DNI and patient elects Full Code (Attempt Resuscitation)    Length of Voluntary ACP Conversation in minutes:  <16 minutes (Non-Billable)     9/1/2022 1055 Seen today in room ICU 4 along with Ayse Gray NP. Lying in bed with eyes closed most of the time. Robust appearing. Occasionally opened them. No sedation on. Intubated/ventilated. SBT ongoing. Cinda Mayers, in the room. Came to the ED on 8/31/2022 from home per EMS for cardiac arrest. He had been feeling poorly at home for 2 days. EMS was called and he arrested when EMS arrived. He had multiple ROSC episodes. He is being treated for shingles. Required pressor support in the ED. Intubated per EMS    Admitted to ICU for cardiac arrest and shock. Intubated/ventilated. Will be managed by the intensivist with ID and neurology consultations. While in ED unable to place duran and imaging revealed bilateral hydroureteronephrosis. Urology was able to place an indwelling catheter. EEG done for myoclonic movements revealing: \"This EEG is abnormal due to severely depressed EEG background, which may be due to severe anoxic brain injury. The quality of EEG is limited due to myogenic artifact. Clinical correlation is recommended. \"    PMH (from record review) significant for HTN, dyslipidemia, T2DM, CKD stage 3, gout    The palliative care team has been consulted for goals of care discussion    Met in the ICU waiting room with wife, two sisters-in-law, and daughter. Introduced the role of palliative medicine for the hospitalized patient. Brief medical update given. They shared that they understand further neurological testing is ongoing and are awaiting the results but know that he may have suffered brain injury during the down time of his cardiac arrest. They did share that his shingles was extremely painful. Lives in a single family home with his wife. He is employed by the [employer] as a . Prior to admission, he was independent in ADLs and very active including being a  in his [employer]. Mrs Flo Veliz understands that she is legal next of kin and feels very supported by her children and sisters. Understands the current FULL CODE status. Encouraged to initiate discussions about resuscitation if he should experience another cardiac arrest. They agreed to do so. Disposition plan: to be determined based on response to treatment and family decisions    Palliative care will continue to follow Brooklynn Wilson  and his family during his hospitalization and support them as they make healthcare decisions and define goals of care.       Kayli Jimenez RN, MSN  Palliative Medicine  P: [phone]

## 2022-09-01 NOTE — DIABETES MGMT
Diabetes/ Glycemic Control Plan of Care  Recommendations:    Lantus 15 units every 24 hours   Continue Corrective Humalog coverage    Assessment: 65 y.o. male with known h/o T2DM - home medication regimen not able to be obtained r/t patient status. Patient admitted through ER by EMT for cardiac arrest.  Patient currently intubated, unresponsive. Blood glucose 232-340 mg/dL since admission with a total of 12 units corrective insulin coverage. Recommend initiating Lantus at a starting dose of 15 units, and continue monitoring + corrective Q6 hours. DX: No diagnosis found. Fasting/ Morning blood glucose:   Lab Results   Component Value Date/Time    Glucose 232 (H) 09/01/2022 03:00 AM    Glucose (POC) 247 (H) 09/01/2022 05:06 AM     IV Fluids containing dextrose:  NOREPINephrine (LEVOPHED) 8 mg in 5% dextrose 250mL (32 mcg/mL) infusion     Steroids:   none    Blood glucose values:        Within target range (70-180mg/dL):  NO  Current insulin orders:    Corrective Humalog Q6 hours - normal sensitivity scale  Total Daily Dose previous 24 hours = 12 units    Nutrition/Diet:   Active Orders   Diet    DIET NPO      Plan/Goals:   Blood glucose will be within target of 70 - 180 mg/dl within 72 hours    Education:  [] Refer to Diabetes Education Record                       [x] Education not indicated at this time       [address], [address]  Glycemic Control Team   Phone:  [phone]  Tues - Thurs 8:30 - 4:30

## 2022-09-01 NOTE — CONSULTS
[facility]  (A Division of [address])    Tiana Angel MD  Office #: - Option # [phone]  Fax #: [phone]     Date of Admission: 8/31/2022Date of Note: 9/1/2022     Reason for Referral: Evaluation and antibiotic management of dissiminated shingles/ shock post nan     Thank you for involving me in the care of this patient. Please do not hesitate to contact me on the above number if question or concern. Current Antimicrobials:    Prior Antimicrobials:  Zosyn/ Vanco 8/31 to date    Antibiotic allergy: None     Assessment:   Critically ill patient /guraded prognosis with :    Probable Septic Shock - on Levophed. Likely urine source--skin( has shingles)? --Severe L and mod R hydronephrosis, without obstructing stone. UA with TNTC wbc  S/P Cardiac arrest with elevated troponins-PE not ruled out  -- age indetermiante DVT on LLE  Suspected anoxic encephalopathy- with myoclonus jerks-- on Propfol- Neuro following  L lumbar/sacral dermatome Shingles-- was on viral medication at home per reports  Acute on chronick GENO (Cr 1.6 5/2022)    Plan:     Agree with Zosyn/ Vancomycin  FU blood culture until final-- NO UCX available  Resume viral treatment with Acyclovir IV-- dosed for crCl-- monitor electrolytes closely  Air borne isolation for Shingles, can DC once lesions dry up  Supportive care per ICU    Lines / Catheters: Left IJ central line, L PIV, Duran      HPI:     Jessika Vela is 65 y.o. M patient with PMH of shingles, on medication. History is limited and obtained from chart review and med staff. Presented to ED yesterday and was in cardiac arrest with CPR that took 30 minutes. He was intubated/ placed on pressor and admitted to ICU. He had shingles at home and was on treatment. He had urine retention  and had about 2L of urine with duran placement per RN. Hydronephrosis on CT scan, no CPD on CXR, LLE DVT of unknown age on FORBES.  He was placed on emperic abx    He is currently on pressor- Levophed, unresponsive, with myoclonus jerk that requires propfol    Past Medical History:   Diagnosis Date    Gout     Shingles      No past surgical history on file. No family history on file.   Medications reviewed as below:   Current Facility-Administered Medications   Medication Dose Route Frequency Provider Last Rate Last Admin    insulin lispro (HUMALOG) injection   SubCUTAneous Q6H Hollie Moy MD   4 Units at 09/01/22 0511    ELECTROLYTE REPLACEMENT PROTOCOL - Potassium Renal Dosing  1 Each Other PRN Hollie Moy MD        insulin glargine (LANTUS) injection 10 Units  10 Units SubCUTAneous Q24H Gil Mireles MD   10 Units at 09/01/22 1000    acyclovir (ZOVIRAX) 750 mg in 0.9% sodium chloride 250 mL IVPB  10 mg/kg (Ideal) IntraVENous Q8H Bruna Zhang  mL/hr at 09/01/22 1240 750 mg at 09/01/22 1240    piperacillin-tazobactam (ZOSYN) 3.375 g in 0.9% sodium chloride (MBP/ADV) 100 mL MBP  3.375 g IntraVENous Q8H Nelly CANADA DO 25 mL/hr at 09/01/22 1315 3.375 g at 09/01/22 1315    [START ON 9/2/2022] Vancomycin Random Concentration 9/2/2022 at 1300  1 Each Other ONCE Nelly CANADA DO        NOREPINephrine (LEVOPHED) 8 mg in 5% dextrose 250mL (32 mcg/mL) infusion  2-30 mcg/min IntraVENous TITRATE Edmar Ann DO   Held at 09/01/22 0900    Vancomycin Pharmacy to Dose  1 Each Other Rx Dosing/Monitoring Nelly CANADA DO        heparin (porcine) 25,000 units in 0.45% saline 250 ml infusion  18-36 Units/kg/hr IntraVENous TITRATE Edmar Ann DO   Held at 09/01/22 6980    0.9% sodium chloride infusion  50 mL/hr IntraVENous CONTINUOUS Gil Mireles MD 50 mL/hr at 08/31/22 1800 50 mL/hr at 08/31/22 1800    sodium chloride (NS) flush 5-40 mL  5-40 mL IntraVENous Q8H Brigette Chakraborty MD   10 mL at 09/01/22 1323    sodium chloride (NS) flush 5-40 mL  5-40 mL IntraVENous PRN Elizabeth Chakraborty MD        acetaminophen (TYLENOL) tablet 650 mg  650 mg Oral Q6H PRN Stephanie Chakraborty MD        Or    acetaminophen (TYLENOL) suppository 650 mg  650 mg Rectal Q6H PRN Brigette Chakraborty MD        polyethylene glycol (MIRALAX) packet 17 g  17 g Oral DAILY PRN Brigette Chakraborty MD        ondansetron (ZOFRAN ODT) tablet 4 mg  4 mg Oral Q8H PRN Brigette Chakraborty MD        Or    ondansetron (ZOFRAN) injection 4 mg  4 mg IntraVENous Q6H PRN Brigette Chakraborty MD        pantoprazole (PROTONIX) 40 mg in 0.9% sodium chloride 10 mL injection  40 mg IntraVENous Q12H Brigette Chakraborty MD   40 mg at 09/01/22 0837    arformoteroL (BROVANA) neb solution 15 mcg  15 mcg Nebulization BID RT Rosas Zaldivar MD   15 mcg at 09/01/22 0800    levETIRAcetam (KEPPRA) 1,000 mg in 0.9% sodium chloride 100 mL IVPB  1,000 mg IntraVENous Q12H Ino LONGORIA  mL/hr at 09/01/22 1111 1,000 mg at 09/01/22 1111    propofol (DIPRIVAN) 10 mg/mL infusion  0-50 mcg/kg/min IntraVENous TITRATE Cristobal Purcell MD 13.4 mL/hr at 09/01/22 1628 20 mcg/kg/min at 09/01/22 1628     No Known Allergies  Social History     Socioeconomic History    Marital status:      Spouse name: Not on file    Number of children: Not on file    Years of education: Not on file    Highest education level: Not on file   Occupational History    Not on file   Tobacco Use    Smoking status: Not on file    Smokeless tobacco: Not on file   Substance and Sexual Activity    Alcohol use: Not on file    Drug use: Not on file    Sexual activity: Not on file   Other Topics Concern    Not on file   Social History Narrative    Not on file     Social Determinants of Health     Financial Resource Strain: Not on file   Food Insecurity: Not on file   Transportation Needs: Not on file   Physical Activity: Not on file   Stress: Not on file   Social Connections: Not on file   Intimate Partner Violence: Not on file   Housing Stability: Not on file        Review of Systems: Unable to provide      Objective:      Visit Vitals  /74   Pulse (!) 108   Temp 98.2 °F (36.8 °C)   Resp 16   Ht 5' 10\" (1.778 m)   Wt 111.6 kg (246 lb)   SpO2 95%   BMI 35.30 kg/m²     Temp (24hrs), Av.6 °F (37 °C), Min:98.2 °F (36.8 °C), Max:99.6 °F (37.6 °C)         GEN: WD unresponsive, intubated  L IJ TLC/ ET/OT in place  . HEENT: Unicteric. Edematous sclera. --no neck swelling  CHEST: Non laboured breathing. CTA  CVS:RRR, no mur/gallop  ABD: Obese/soft. Non tender. Non distended. Hypoactive BS  VERNELL: Lyle in place  EXT: No apparent swelling or redness on UE/LE joints. Skin: Dry and intact. No obvious cellulitis-- has L thigh/buttock Shingles rash-- freshly ulcerated today.  No active vesicles noted on today exam.  CNS: unresponsive, jerky movements      Labs: Results:   Chemistry Recent Labs     22  1021 22  0300 22  1415 22  1000   GLU  --  232* 340* 325*   NA  --  145 142 144   K 3.4* 3.4* 4.1 3.1*   CL  --  113* 108 107   CO2  --  25 20* 19*   BUN  --  51* 52* 50*   CREA  --  3.03* 2.91* 3.01*   CA  --  7.7* 8.3* 7.7*   AGAP  --  7 14 18   BUCR  --  17 18 17   AP  --  98  --  103   TP  --  6.3*  --  6.0*   ALB  --  2.2* 2.6* 2.3*   GLOB  --  4.1*  --  3.7   AGRAT  --  0.5*  --  0.6*      CBC w/Diff Recent Labs     22  0300 22  1415 22  1000   WBC 12.4 16.8* 9.6   RBC 3.78* 4.69 3.62*   HGB 10.7* 13.1 10.5*   HCT 32.9* 42.3 33.8*    162 146   GRANS  --  79* 69   LYMPH  --  6* 21   EOS  --  0 1            No results found for: SDES Lab Results   Component Value Date/Time    Culture result: NO GROWTH AFTER 22 HOURS 2022 10:15 AM    Culture result: NO GROWTH AFTER 22 HOURS 2022 10:00 AM        Results       Procedure Component Value Units Date/Time    COVID-19 RAPID TEST [[id number]] Collected: 22 1050    Order Status: Completed Specimen: Nasopharyngeal Updated: 22 1131     Specimen source Nasopharyngeal COVID-19 rapid test Not detected        Comment: Rapid Abbott ID Now       Rapid NAAT:  The specimen is NEGATIVE for SARS-CoV-2, the novel coronavirus associated with COVID-19. Negative results should be treated as presumptive and, if inconsistent with clinical signs and symptoms or necessary for patient management, should be tested with an alternative molecular assay. Negative results do not preclude SARS-CoV-2 infection and should not be used as the sole basis for patient management decisions. This test has been authorized by the FDA under an Emergency Use Authorization (EUA) for use by authorized laboratories. Fact sheet for Healthcare Providers: ConventionASI System Integrationdate.co.nz  Fact sheet for Patients: ConventionUpdate.co.nz       Methodology: Isothermal Nucleic Acid Amplification         CULTURE, BLOOD [[id number]] Collected: 08/31/22 1015    Order Status: Completed Specimen: Blood Updated: 09/01/22 0829     Special Requests: NO SPECIAL REQUESTS        Culture result: NO GROWTH AFTER 22 HOURS       CULTURE, BLOOD [[id number]] Collected: 08/31/22 1000    Order Status: Completed Specimen: Blood Updated: 09/01/22 0829     Special Requests: NO SPECIAL REQUESTS        Culture result: NO GROWTH AFTER 22 HOURS                 Imaging:      All imaging reviewed from Admission to present as per radiology interpretation in [facility]

## 2022-09-01 NOTE — PROGRESS NOTES
1945- Report and care received, assessment completed per flow sheet. Intubated, unresponsive, eyes open spontaneously, does not focus or make eye contact. Does have generalized jerking/twitching movements increased with stimulation, arms elevate off of bed. No gag, no corneal reflex. 2029- Discussed seizure like activity and heparin drip with . Orders received to start heparin drip. 2330- Reassessment without change. 0300- Reassessment without change.

## 2022-09-01 NOTE — PROCEDURES
ELECTROENCEPHALOGRAPHY     Patient: Topher Covarrubias MRN: [id number]  CSN: [id number]    YOB: 1960  Age: 65 y.o. Sex: male    DOA: 8/31/2022 LOS:  LOS: 1 day        Date of Service: 08/31/2022    DICTATING: Henri Cueto MD     REFERRING PHYSICIAN: Dr. Stella Jacobson: 22-56    HISTORY OF PRESENT ILLNESS: This is a 67-year-old gentleman who presented with cardiac arrest. He had myoclonic movements. Currently, he is comatose. ELECTROENCEPHALOGRAM INTERPRETATION: This is a referential and bipolar EEG recorded with a 10-20 system. EEG background Shows diffuse myogenic artifact in the frontal leads, obscuring the EEG reading. However, there is no remarkable culture dominant rhythm of wakefulness or sleep pattern. The EEG background is diffusely depressed. Photic stimulation does not produce an abnormal activity. There are no epileptiform discharges or electrographic seizures in the study. IMPRESSION: This EEG is abnormal due to severely depressed EEG background, which may be due to severe anoxic brain injury. The quality of EEG is limited due to myogenic artifact. Clinical correlation is recommended.       Signed:  Henri Cueto MD  9/1/2022  9:23 AM

## 2022-09-01 NOTE — PROGRESS NOTES
Hospitalist Progress Note    Patient: Zelda Dukes MRN: [id number]  CSN: [id number]    YOB: 1960  Age: 65 y.o. Sex: male    DOA: 8/31/2022 LOS:  LOS: 1 day          Chief Complaint:    Cardiac arrest      Assessment/Plan     Zelda Dukes is a 65 y.o. hypertension, dyslipidemia, type 2 diabetes mellitus, vitamin D deficiency, stage 3a CKD and gout male with history of who presents with cardiac arrest.      Cardiac arrest, PEA  Acute respiratory failure  Shock   RLE popliteal DVT  Sepsis, likely due to urinary source  UTI  Disseminated herpes zoster  Severe left and moderate right hydroureteronephrosis with marked urinary bladder  Distention  GEON on CKD 3  hyperglycemiaa    Renal US pending  Heparin gtt  IV antibiotics  IV PPI    Lantus, SSI    Echo with nl EF    Pressors X 2    Cardiology on case    Vent mgmt/sedation per intensivist    Lyle placed by urology for retention, bleeding, hydro      Neuro -acute head CT negative      Pain/Sedation - Fentanyl, ativan/versed prn. Sedation bundle. GI - NPO     SUP     Prophylaxis - DVT: heparin, GI: protonix     Disposition :  Patient Active Problem List   Diagnosis Code    Cardiac arrest ([facility] [address] I46.9    Respiratory failure ([facility] [address] J96.90    GENO (acute kidney injury) ([facility] [address] N17.9    Disseminated herpes zoster B02.7    Hydronephrosis N13.30    Shock ([facility] [address] R57.9    Sepsis ([facility] [address] A41.9       Subjective:  Getting suctioned and tended to by nursing this am  No new events reported  DVT in leg US  On pressor support      Review of systems:    UTO due to severe illness      Vital signs/Intake and Output:  Visit Vitals  /72   Pulse 80   Temp 98.6 °F (37 °C)   Resp 18   Ht 5' 10\" (1.778 m)   Wt 111.6 kg (246 lb)   SpO2 100%   BMI 35.30 kg/m²     Current Shift:  No intake/output data recorded.   Last three shifts:  08/30 1901 - 09/01 0700  In: 1390.2 [I.V.:1390.2]  Out: 2660 [Urine:3325]    Exam:    General: OW AAM NAD sedate intubated  Head/Neck: NCAT, supple, No masses, No lymphadenopathy  CVS:Regular rate and rhythm, no M/R/G, S1/S2 heard, no thrill  Lungs:Clear to auscultation bilaterally, no wheezes, rhonchi, or rales  Abdomen: Soft, Nontender, No distention  Extremities: No C/C/E, pulses palpable 2+  Neuro:grossly normal     Labs: Results:       Chemistry Recent Labs     09/01/22 0300 08/31/22 1415 08/31/22  1000   * 340* 325*    142 144   K 3.4* 4.1 3.1*   * 108 107   CO2 25 20* 19*   BUN 51* 52* 50*   CREA 3.03* 2.91* 3.01*   CA 7.7* 8.3* 7.7*   AGAP 7 14 18   BUCR 17 18 17   AP 98  --  103   TP 6.3*  --  6.0*   ALB 2.2* 2.6* 2.3*   GLOB 4.1*  --  3.7   AGRAT 0.5*  --  0.6*      CBC w/Diff Recent Labs     09/01/22 0300 08/31/22 1415 08/31/22  1000   WBC 12.4 16.8* 9.6   RBC 3.78* 4.69 3.62*   HGB 10.7* 13.1 10.5*   HCT 32.9* 42.3 33.8*    162 146   GRANS  --  79* 69   LYMPH  --  6* 21   EOS  --  0 1      Cardiac Enzymes No results for input(s): CPK, CKND1, ROB in the last 72 hours. No lab exists for component: CKRMB, TROIP   Coagulation Recent Labs     09/01/22 0300 08/31/22 1415 08/31/22  1000   PTP  --   --  18.7*   INR  --   --  1.5*   APTT >180.0* 35.6  --        Lipid Panel No results found for: CHOL, CHOLPOCT, CHOLX, CHLST, CHOLV, 301263, HDL, HDLP, LDL, LDLC, DLDLP, 264186, VLDLC, VLDL, TGLX, TRIGL, TRIGP, TGLPOCT, CHHD, CHHDX   BNP No results for input(s): BNPP in the last 72 hours.    Liver Enzymes Recent Labs     09/01/22  0300   TP 6.3*   ALB 2.2*   AP 98      Thyroid Studies No results found for: T4, T3U, TSH, TSHEXT     Procedures/imaging: see electronic medical records for all procedures/Xrays and details which were not copied into this note but were reviewed prior to creation of Pebbles Ayers MD

## 2022-09-01 NOTE — PROGRESS NOTES
Reason for Admission:  Cardiac Arrest                     RUR Score:    Low; 10%                 Plan for utilizing home health:     SNF vs HH/Hospice     PCP: First and Last name:  Jaspal Peña MD     Name of Practice:    Are you a current patient: Yes/No:    Approximate date of last visit:    Can you participate in a virtual visit with your PCP:                     Current Advanced Directive/Advance Care Plan: Full Code      Healthcare Decision Maker:   Click here to complete [address] including selection of the Healthcare Decision Maker Relationship (ie \"Primary\")                             Transition of Care Plan:     SNF vs HH/Hospice                 Chart reviewed. Per H&P Margie Ohara is a 65 y.o. male who presents with cardiac arrest. Reportedly patient was at home, has been feeling unwell since yesterday, when this morning he was feeling worse. The call for EMS was for \"illness\". On EMS arrival patient was encephalopathic but they were able to measure vital signs and check a blood sugar. In their presence patient had a witnessed cardiac arrest.  Reportedly in pulseless electrical activity. He received about 6 doses of epinephrine, no shocks delivered. He was started on a Levophed drip and then changed to an epinephrine drip. They had between 4 and 5 episodes of cardiac arrest, patient arrives with return of spontaneous circulation. He is unresponsive and not able to contribute to the history and the history is limited as a result. Reportedly being treated for both gout and shingles at present. \"    Pt is currently intubated and has a Palliative Care consult pending. CM to await outcome of Palliative Care consult and identification of goals of care to further assist with care transition.   CM to continue to follow and assist.    Care Management Interventions  Transition of Care Consult (CM Consult): Discharge Planning  Health Maintenance Reviewed: Yes  Support Systems: Spouse/Significant Other, Child(lesley), Other Family Member(s)  The Plan for Transition of Care is Related to the Following Treatment Goals : SNF vs HH/Hospice  Discharge Location  Patient Expects to be Discharged to[de-identified] Skilled nursing facility (vs HH/Hospice)

## 2022-09-01 NOTE — PROGRESS NOTES
Cardiology Progress Note        Patient: Tony Desai        Sex: male          DOA: 8/31/2022  YOB: 1960      Age:  65 y.o.        LOS:  LOS: 1 day   Assessment/Plan     Principal Problem:    Cardiac arrest ([address] (8/31/2022)    Active Problems:    Respiratory failure ([address] (8/31/2022)      GENO (acute kidney injury) ([address] (8/31/2022)      Disseminated herpes zoster (8/31/2022)      Hydronephrosis (8/31/2022)      Shock ([address] (8/31/2022)      Sepsis ([address] (8/31/2022)      UTI (urinary tract infection) (9/1/2022)        Plan:    Cardiac arrest PEA arrest  Mild troponin elevation after CPR and PEA cardiac arrest, normal EF and wall motion, no evidence of ACS  DVT  Continue anticoagulation if no active bleeding  Continue with supportive treatment  I have long and lengthy discussion with family about management plan. All the questions were answered. 35 minutes of critical care time spent in the direct evaluation and treatment of this high risk patient. The reason for providing this level of medical care for this critically ill patient was due a critical illness that impaired one or more vital organ systems such that there was a high probability of imminent or life threatening deterioration in the patients condition. This care involved high complexity decision making to assess, manipulate, and support vital system functions, to treat this degreee vital organ system failure and to prevent further life threatening deterioration of the patients condition. Subjective:    cc:  Cardiac arrest        REVIEW OF SYSTEMS:     Limited due to intubation      Objective:      Visit Vitals  /74   Pulse (!) 108   Temp 98.2 °F (36.8 °C)   Resp 16   Ht 5' 10\" (1.778 m)   Wt 111.6 kg (246 lb)   SpO2 95%   BMI 35.30 kg/m²     Body mass index is 35.3 kg/m². Physical Exam:  General Appearance: Intubated  NECK: No JVD, no thyroidomeglay.    LUNGS: Clear bilaterally. HEART: S1+S2 audible,    ABD: Non-tender, BS Audible    EXT: No edema, and no cysnosis. VASCULAR EXAM: Pulses are intact.     PSYCHIATRIC EXAM: Intubated    Medication:  Current Facility-Administered Medications   Medication Dose Route Frequency    insulin lispro (HUMALOG) injection   SubCUTAneous Q6H    ELECTROLYTE REPLACEMENT PROTOCOL - Potassium Renal Dosing  1 Each Other PRN    insulin glargine (LANTUS) injection 10 Units  10 Units SubCUTAneous Q24H    acyclovir (ZOVIRAX) 750 mg in 0.9% sodium chloride 250 mL IVPB  10 mg/kg (Ideal) IntraVENous Q8H    piperacillin-tazobactam (ZOSYN) 3.375 g in 0.9% sodium chloride (MBP/ADV) 100 mL MBP  3.375 g IntraVENous Q8H    [START ON 9/2/2022] Vancomycin Random Concentration 9/2/2022 at 1300  1 Each Other ONCE    NOREPINephrine (LEVOPHED) 8 mg in 5% dextrose 250mL (32 mcg/mL) infusion  2-30 mcg/min IntraVENous TITRATE    Vancomycin Pharmacy to Dose  1 Each Other Rx Dosing/Monitoring    heparin (porcine) 25,000 units in 0.45% saline 250 ml infusion  18-36 Units/kg/hr IntraVENous TITRATE    0.9% sodium chloride infusion  50 mL/hr IntraVENous CONTINUOUS    sodium chloride (NS) flush 5-40 mL  5-40 mL IntraVENous Q8H    sodium chloride (NS) flush 5-40 mL  5-40 mL IntraVENous PRN    acetaminophen (TYLENOL) tablet 650 mg  650 mg Oral Q6H PRN    Or    acetaminophen (TYLENOL) suppository 650 mg  650 mg Rectal Q6H PRN    polyethylene glycol (MIRALAX) packet 17 g  17 g Oral DAILY PRN    ondansetron (ZOFRAN ODT) tablet 4 mg  4 mg Oral Q8H PRN    Or    ondansetron (ZOFRAN) injection 4 mg  4 mg IntraVENous Q6H PRN    pantoprazole (PROTONIX) 40 mg in 0.9% sodium chloride 10 mL injection  40 mg IntraVENous Q12H    arformoteroL (BROVANA) neb solution 15 mcg  15 mcg Nebulization BID RT    levETIRAcetam (KEPPRA) 1,000 mg in 0.9% sodium chloride 100 mL IVPB  1,000 mg IntraVENous Q12H    propofol (DIPRIVAN) 10 mg/mL infusion  0-50 mcg/kg/min IntraVENous TITRATE Lab/Data Reviewed:  Procedures/imaging: see electronic medical records for all procedures/Xrays   and details which were not copied into this note but were reviewed prior to creation of Plan       All lab results for the last 24 hours reviewed. Recent Labs     09/01/22  0300 08/31/22  1415 08/31/22  1000   WBC 12.4 16.8* 9.6   HGB 10.7* 13.1 10.5*   HCT 32.9* 42.3 33.8*    162 146     Recent Labs     09/01/22  1021 09/01/22  0300 08/31/22  1415 08/31/22  1000   NA  --  145 142 144   K 3.4* 3.4* 4.1 3.1*   CL  --  113* 108 107   CO2  --  25 20* 19*   GLU  --  232* 340* 325*   BUN  --  51* 52* 50*   CREA  --  3.03* 2.91* 3.01*   CA  --  7.7* 8.3* 7.7*       RADIOLOGY:      CXR Results  (Last 48 hours)                 08/31/22 1610  XR CHEST PORT Final result    Impression:          1. Support devices in stable/appropriate position as visualized. 2. No superimposed acute radiographic cardiopulmonary abnormality. Narrative:  EXAM: XR CHEST PORT       CLINICAL INDICATION/HISTORY: central line   -Additional: None       COMPARISON: 4 hours prior       TECHNIQUE: Portable frontal view of the chest       _______________       FINDINGS:       SUPPORT DEVICES: Endotracheal tube, gastric tube, and left IJ approach central   venous catheter noted. Tip of the central venous catheter is at the level of the   superior cavoatrial junction. HEART AND MEDIASTINUM: Normal cardiac size and mediastinal contours. LUNGS AND PLEURAL SPACES: No focal pneumonic opacity. No evidence of   pneumothorax or pleural effusion. BONY THORAX AND SOFT TISSUES: Unremarkable.       _______________           08/31/22 1133  XR CHEST PORT Final result    Impression:      Enteric tube tip projecting over the thoracic inlet. Endotracheal tube tip in   the midthoracic trachea. No acute cardiopulmonary abnormality.        Narrative:  XR CHEST PORT       CLINICAL INDICATION/HISTORY: Tube placement -Additional: None       COMPARISON: None       TECHNIQUE: Portable frontal view of the chest       _______________       FINDINGS:       SUPPORT DEVICES: Enteric tube tip projecting over the thoracic inlet. Endotracheal tube tip in the midthoracic trachea. Left IJV central venous   catheter tip at the caval atrial junction. HEART AND MEDIASTINUM: Cardiomediastinal silhouette within normal limits.        LUNGS AND PLEURAL SPACES: No dense consolidation, large effusion or   pneumothorax.       _______________                     Cardiology Procedures:   Results for orders placed or performed during the hospital encounter of 08/31/22   EKG, 12 LEAD, INITIAL   Result Value Ref Range    Ventricular Rate 112 BPM    Atrial Rate 112 BPM    P-R Interval 158 ms    QRS Duration 100 ms    Q-T Interval 360 ms    QTC Calculation (Bezet) 491 ms    Calculated P Axis 73 degrees    Calculated R Axis -69 degrees    Calculated T Axis 51 degrees    Diagnosis       Sinus tachycardia  Left axis deviation  Low voltage QRS  Inferior infarct , age undetermined  Abnormal ECG  No previous ECGs available  Confirmed by Poppy Godinez MD. ([id number]) on 8/31/2022 11:30:53 PM        Echo Results  (Last 48 hours)      None         Cardiolite (Tc-99m Sestamibi) stress test    Signed By: Tammy Odom MD     September 1, 2022

## 2022-09-01 NOTE — PROGRESS NOTES
Pharmacy Dosing Services: Zosyn    Indication: Sepsis of Unknown Etiology  Previous Regimen Zosyn 4.5 g IV Q 6 hours    Serum Creatinine Lab Results   Component Value Date/Time    Creatinine 3.03 (H) 09/01/2022 03:00 AM    Creatinine (POC) 2.80 (H) 08/31/2022 10:08 AM      Creatinine Clearance Estimated Creatinine Clearance: 32 mL/min (A) (based on SCr of 3.03 mg/dL (H)).    BUN Lab Results   Component Value Date/Time    BUN 51 (H) 09/01/2022 03:00 AM       Dose adjusted based on indication and extended infusion to Zosyn 3.375 g IV Q 8 hours   Plan:  Continue to monitor

## 2022-09-01 NOTE — CONSULTS
NEUROLOGY CONSULTATION NOTE    Patient: Luis Alberto Farias MRN: [id number]  [facility]: [id number]    YOB: 1960  Age: 65 y.o. Sex: male    DOA: 8/31/2022 LOS:  LOS: 1 day        Requesting Physician: Dr. Amy Hawk  Reason for Consultation: Anoxic brain injury               HISTORY OF PRESENT ILLNESS:   Luis Alberto Farias is a 65 y.o. male with history of hypertension, diabetes, hyperlipidemia and gout, who presented with cardiac arrest.  Apparently, the patient was feeling sick for a couple of days prior to becoming encephalopathic. When he was seen by EMS, he was encephalopathic and then he went into cardiac arrest.  He received CPR for 30 minutes. Currently, he is intubated. He also has widespread Norma zoster lesions. He was seen to have urinary retention with bilateral hydronephrosis. He has skin lesions at several dermatomes including lumbosacral region. PAST MEDICAL HISTORY:  Past Medical History:   Diagnosis Date    Gout     Shingles      PAST SURGICAL HISTORY:  No past surgical history on file. FAMILY HISTORY:  No family history on file.   SOCIAL HISTORY:  Social History     Socioeconomic History    Marital status:      MEDICATIONS:  Current Facility-Administered Medications   Medication Dose Route Frequency    insulin lispro (HUMALOG) injection   SubCUTAneous Q6H    ELECTROLYTE REPLACEMENT PROTOCOL - Potassium Renal Dosing  1 Each Other PRN    insulin glargine (LANTUS) injection 10 Units  10 Units SubCUTAneous Q24H    EPINEPHrine (ADRENALIN) 8 mg in 0.9% sodium chloride 250 mL infusion  2-30 mcg/min IntraVENous TITRATE    NOREPINephrine (LEVOPHED) 8 mg in 5% dextrose 250mL (32 mcg/mL) infusion  2-30 mcg/min IntraVENous TITRATE    piperacillin-tazobactam (ZOSYN) 4.5 g in 0.9% sodium chloride (MBP/ADV) 100 mL MBP  4.5 g IntraVENous Q6H    Vancomycin Pharmacy to Dose  1 Each Other Rx Dosing/Monitoring    heparin (porcine) 25,000 units in 0.45% saline 250 ml infusion  18-36 Units/kg/hr IntraVENous TITRATE    0.9% sodium chloride infusion  50 mL/hr IntraVENous CONTINUOUS    sodium chloride (NS) flush 5-40 mL  5-40 mL IntraVENous Q8H    sodium chloride (NS) flush 5-40 mL  5-40 mL IntraVENous PRN    acetaminophen (TYLENOL) tablet 650 mg  650 mg Oral Q6H PRN    Or    acetaminophen (TYLENOL) suppository 650 mg  650 mg Rectal Q6H PRN    polyethylene glycol (MIRALAX) packet 17 g  17 g Oral DAILY PRN    ondansetron (ZOFRAN ODT) tablet 4 mg  4 mg Oral Q8H PRN    Or    ondansetron (ZOFRAN) injection 4 mg  4 mg IntraVENous Q6H PRN    pantoprazole (PROTONIX) 40 mg in 0.9% sodium chloride 10 mL injection  40 mg IntraVENous Q12H    vancomycin (VANCOCIN) 1,000 mg in 0.9% sodium chloride 250 mL (VIAL-MATE)  1,000 mg IntraVENous Q24H    arformoteroL (BROVANA) neb solution 15 mcg  15 mcg Nebulization BID RT    levETIRAcetam (KEPPRA) 1,000 mg in 0.9% sodium chloride 100 mL IVPB  1,000 mg IntraVENous Q12H    propofol (DIPRIVAN) 10 mg/mL infusion  0-50 mcg/kg/min IntraVENous TITRATE     Prior to Admission medications    Not on File       ALLERGIES:  No Known Allergies    Review of Systems  I am unable to review the systems. The patient is comatose. PHYSICAL EXAMINATION:   Visit Vitals  /72   Pulse 80   Temp 98.6 °F (37 °C)   Resp 18   Ht 5' 10\" (1.778 m)   Wt 111.6 kg (246 lb)   SpO2 100%   BMI 35.30 kg/m²      O2 Device: Endotracheal tube, Ventilator  GENERAL: Comatose, intubated. HEENT: Moist mucous membranes, sclerae anicteric, scalp is atraumatic. ET tube is in. CVS: Regular rate and rhythm, no murmurs or gallops. No carotid bruits. PULMONARY: Clear to auscultation bilaterally. No rales or rhonchi. No wheezing. EXTREMITIES: Normal range of motion at all sites. No deformities. ABDOMEN: Soft, nontender. SKIN: There are no zoster lesions at several dermatomes. NEUROLOGIC: The patient is comatose. He is not responding to his name. He blinks his eyes to light.   Pupils are equal and reactive to light.  Corneal reflexes are intact. Gag reflex and cough reflex are intact. He does not withdraw from noxious stimuli. Deep tendon reflexes are depressed. Labs: Results:       Chemistry Recent Labs     09/01/22 0300 08/31/22 1415 08/31/22  1000   * 340* 325*    142 144   K 3.4* 4.1 3.1*   * 108 107   CO2 25 20* 19*   BUN 51* 52* 50*   CREA 3.03* 2.91* 3.01*   CA 7.7* 8.3* 7.7*   AGAP 7 14 18   BUCR 17 18 17   AP 98  --  103   TP 6.3*  --  6.0*   ALB 2.2* 2.6* 2.3*   GLOB 4.1*  --  3.7   AGRAT 0.5*  --  0.6*      CBC w/Diff Recent Labs     09/01/22 0300 08/31/22 1415 08/31/22  1000   WBC 12.4 16.8* 9.6   RBC 3.78* 4.69 3.62*   HGB 10.7* 13.1 10.5*   HCT 32.9* 42.3 33.8*    162 146   GRANS  --  79* 69   LYMPH  --  6* 21   EOS  --  0 1      Cardiac Enzymes No results for input(s): CPK, CKND1, ROB in the last 72 hours. No lab exists for component: CKRMB, TROIP   Coagulation Recent Labs     09/01/22 0300 08/31/22 1415 08/31/22  1000   PTP  --   --  18.7*   INR  --   --  1.5*   APTT >180.0* 35.6  --        Lipid Panel No results found for: CHOL, CHOLPOCT, CHOLX, CHLST, CHOLV, 922060, HDL, HDLP, LDL, LDLC, DLDLP, 550788, VLDLC, VLDL, TGLX, TRIGL, TRIGP, TGLPOCT, CHHD, CHHDX   BNP No results for input(s): BNPP in the last 72 hours. Liver Enzymes Recent Labs     09/01/22  0300   TP 6.3*   ALB 2.2*   AP 98      Thyroid Studies No results found for: T4, T3U, TSH, TSHEXT       Radiology:  CT HEAD WO CONT    Result Date: 8/31/2022  EXAM: CT head INDICATION: Cardiac arrest COMPARISON: None. TECHNIQUE: Axial CT imaging of the head was performed without intravenous contrast. Standard multiplanar coronal and sagittal reformatted images were obtained and are included in interpretation. One or more dose reduction techniques were used on this CT: automated exposure control, adjustment of the mAs and/or kVp according to patient size, and iterative reconstruction techniques.   The specific techniques used on this CT exam have been documented in the patient's electronic medical record. Digital Imaging and Communications in Medicine (DICOM) format image data are available to nonaffiliated external healthcare facilities or entities on a secure, media free, reciprocally searchable basis with patient authorization for at least a 12-month period after this study. _______________ FINDINGS: BRAIN AND POSTERIOR FOSSA: Exam quality is mildly degraded secondary to motion artifact. Ventricular size and configuration appears within normal limits. Basilar cisterns are patent. Within the limitations of motion, no acute intra-axial hemorrhage. No evidence of mass effect or midline shift. Gray-white matter differentiation appears within normal limits as visualized. EXTRA-AXIAL SPACES AND MENINGES: There are no abnormal extra-axial fluid collections. CALVARIUM: Intact. SINUSES: Minor nonspecific mucosal thickening ethmoid air cells, sphenoid sinus with air-fluid level present in the right maxillary sinus. OTHER: None. _______________     1. Mildly motion limited study without evidence of acute intracranial abnormality. 2. Paranasal mucosal disease as described above with air-fluid level in the right maxillary sinus as can be seen with sinusitis. CT CHEST ABD PELV WO CONT    Result Date: 8/31/2022  EXAM: CT chest, abdomen, and pelvis INDICATION: Pain COMPARISON: No prior study. TECHNIQUE: Axial CT imaging of the chest, abdomen, and pelvis was performed without IV contrast administration. Multiplanar reformats were generated. One or more dose reduction techniques were used on this CT: automated exposure control, adjustment of the mAs and/or kVp according to patient size, and iterative reconstruction techniques. The specific techniques used on this CT exam have been documented in the patient's electronic medical record.   Digital Imaging and Communications in Medicine (DICOM) format image data are available to nonaffiliated external healthcare facilities or entities on a secure, media free, reciprocally searchable basis with patient authorization for at least a 12-month period after this study. _______________ FINDINGS: CHEST: MEDIASTINUM: Left IJV central venous catheter tip at the cavoatrial junction. Normal caliber aorta with vascular calcifications. No pericardial effusion. LYMPH NODES: No pathologically enlarged mediastinal or hilar lymph nodes. PLEURA: No pleural effusion or pneumothorax. LUNGS/AIRWAY: Endotracheal tube tip in the midthoracic trachea. No consolidation or suspicious pulmonary nodule is seen. OTHER: None. ABDOMEN/PELVIS: LIVER, BILIARY: Liver is unremarkable. No abnormal biliary dilation. Gallbladder is unremarkable. PANCREAS: Unremarkable. SPLEEN: Unremarkable. ADRENALS: Unremarkable. KIDNEYS: Severe left and moderate right hydroureteronephrosis with marked urinary bladder distention. No obstructing calculus. Left lower pole 5.8 cm simple cysts, no follow-up necessary. LYMPH NODES: No pathologically enlarged lymph nodes. GASTROINTESTINAL TRACT: No abnormal bowel dilation or wall thickening. PELVIC ORGANS: Unremarkable. VASCULATURE: Unremarkable. OSSEOUS: No area of erosion or aggressive-appearing bone destruction. OTHER: None. _______________     Severe left and moderate right hydroureteronephrosis with marked urinary bladder distention. No obstructing calculus. No acute intrathoracic abnormality. XR CHEST PORT    Result Date: 8/31/2022  EXAM: XR CHEST PORT CLINICAL INDICATION/HISTORY: central line -Additional: None COMPARISON: 4 hours prior TECHNIQUE: Portable frontal view of the chest _______________ FINDINGS: SUPPORT DEVICES: Endotracheal tube, gastric tube, and left IJ approach central venous catheter noted. Tip of the central venous catheter is at the level of the superior cavoatrial junction. HEART AND MEDIASTINUM: Normal cardiac size and mediastinal contours.  LUNGS AND PLEURAL SPACES: No focal pneumonic opacity. No evidence of pneumothorax or pleural effusion. BONY THORAX AND SOFT TISSUES: Unremarkable. _______________     1. Support devices in stable/appropriate position as visualized. 2. No superimposed acute radiographic cardiopulmonary abnormality. XR CHEST PORT    Result Date: 8/31/2022  XR CHEST PORT CLINICAL INDICATION/HISTORY: Tube placement -Additional: None COMPARISON: None TECHNIQUE: Portable frontal view of the chest _______________ FINDINGS: SUPPORT DEVICES: Enteric tube tip projecting over the thoracic inlet. Endotracheal tube tip in the midthoracic trachea. Left IJV central venous catheter tip at the caval atrial junction. HEART AND MEDIASTINUM: Cardiomediastinal silhouette within normal limits. LUNGS AND PLEURAL SPACES: No dense consolidation, large effusion or pneumothorax. _______________     Enteric tube tip projecting over the thoracic inlet. Endotracheal tube tip in the midthoracic trachea. No acute cardiopulmonary abnormality. ECHO ADULT COMPLETE    Result Date: 8/31/2022  Formatting of this result is different from the original.   Left Ventricle: Normal left ventricular systolic function with a visually estimated EF of 60 - 65%. Not well visualized. Left ventricle size is normal. Normal wall thickness. Normal wall motion. Right Ventricle: Not well visualized. Right ventricle is moderately dilated. Moderately reduced systolic function. Visually moderately dilated RV and moderately reduced RV systolic function. Aortic Valve: Tricuspid valve. Tricuspid Valve: The estimated RVSP is 25 mmHg. Right Atrium: Not well visualized. Right atrium is moderately dilated. No old echocardiogram available to compare. DUPLEX LOWER EXT VENOUS BILAT    Result Date: 9/1/2022  · Age indeterminate thrombus present in the right popliteal vein. · No evidence of deep vein thrombosis in the left lower extremity.       ASSESSMENT/IMPRESSION:  Altered mental status, possibly hypoxic brain injury. EEG was remarkably depressed, suggestive of severe anoxic brain injury. The patient needs to undergo brain MRI but he is not stable hemodynamically. It may be reasonable to repeat the head CT until he is stable. He has widespread Norma zoster infection. I am assuming that he may have had neurogenic bladder and secondary hydronephrosis, resulting from the involvement of sacral roots. RECOMMENDATIONS:  1. Repeat head CT. 2.  Brain MRI without contrast when he is hemodynamically stable. 3.  Prognosis is guarded. I explained this to his wife and sister. 4.  Treatment of underlying toxic/metabolic etiologies and infection. 5.  Serial neuro examinations. We will follow the patient.  Please do not hesitate to return with any questions.    ------------------------------------  Ramos Gracia MD  9/1/2022  9:44 AM

## 2022-09-01 NOTE — CONSULTS
Palliative Medicine Consult    Patient Name: Zelda Dukes  YOB: 1960    Date of Initial Consult: 9/1/2022  Reason for Consult: goals of care discussions  Requesting Provider: Dr. Mykel Oliveira  Primary Care Physician: Sandy Gabriel MD      SUMMARY:   Zelda Dukes is a 65 y.o. with a past history of hypertension, dyslipidemia, type 2 diabetes mellitus, vitamin D deficiency, stage 3a CKD and gout , who was admitted on 8/31/2022 from home with a diagnosis of cardiac arrest, acute respiratory failure, sepsis, shock, disseminated herpes zoster, and Severe left and moderate right hydroureteronephrosis with marked urinary bladder distention. Current medical issues leading to Palliative Medicine involvement include: goals of care discussions. PALLIATIVE DIAGNOSES:   Goals of care discussions  Cardiac arrest  Acute respiratory failure  Sepsis, shock  Disseminated herpes zoster  Severe left and moderate right hydroureteronephrosis with marked urinary bladder distention       PLAN:   Goals of care discussions: Palliative medicine team including Alisha Veliz RN and I met with patient at patient's bedside. Patient is orally intubated, no response to noxious stimuli. Yarely Hodges at bedside. Family meeting in ICU waiting room today with patient's wife Cathleen Mason, patient's daughter, and 2 sister-in laws. Family has a good understanding of current health situation and they are waiting for results from ongoing neurological testing to determine the existence of and the severity of anoxic brain injury. Prior to admission, patient was independent in ADLs, worked as a  at [employer], and is a  in [employer]. Patient has no known AMD, and his wife Melissa Trevino is legal NOK. Family is hopeful for recovery but recognize consideration of quality of life will be discussed further based on patient's response to medical treatment.  Discussed the benefits and burdens of CPR in the event of cardiopulmonary arrest.  Encouraged ongoing conversations as patient is very high risk for unsuccessful attempts and/or post-arrest complications. At this time continue full code with full interventions. We will continue to follow with you. Cardiac arrest: Multiple, witnessed by EMS, total downtime ~30 minutes. Cardiology following. EEG done for myoclonic movements revealing: \"This EEG is abnormal due to severely depressed EEG background, which may be due to severe anoxic brain injury. \"   Acute respiratory failure: secondary to number 2. Orally intubated on mechanical vent, managed by PCCM. SBT ongoing. Sepsis, shock: likely due to urinary source. On IVAB per primary team. Lyle placed by urology for retention, bleeding, and hydronephrosis. Disseminated herpes zoster: Family reports patient was experiencing significant pain. Severe left and moderate right hydroureteronephrosis with marked urinary bladder distention: Lyle placed by urology for retention, bleeding, and hydronephrosis.   Initial consult note routed to primary continuity provider  Communicated plan of care with: Palliative IDT       GOALS OF CARE / TREATMENT PREFERENCES:   [====Goals of Care====]  GOALS OF CARE: Full code with full interventions  Patient/Health Care Proxy Stated Goals: Prolong life      TREATMENT PREFERENCES:   Code Status: Full Code    Advance Care Planning:  Advance Care Planning 9/1/2022   Confirm Advance Directive None       Medical Interventions: Full interventions            The palliative care team has discussed with patient / health care proxy about goals of care / treatment preferences for patient.  [====Goals of Care====]         HISTORY:     History obtained from: patient's chart, family    CHIEF COMPLAINT: cardiac arrest    HPI/SUBJECTIVE:    The patient is:   [] Verbal and participatory  [x] Non-participatory due to:   Orally intubated on mechanical ventilator, no response to noxious stimuli Clinical Pain Assessment (nonverbal scale for severity on nonverbal patients):   Clinical Pain Assessment  Severity: 0    Adult Nonverbal Pain Scale  Face: No particular expression or smile  Activity (Movement): Lying quietly, normal position  Guarding: Lying quietly, no positioning of hands over areas of body  Physiology (Vital Signs): Stable vital signs  Respiratory: Baseline RR/SpO2 compliant with ventilator  Total Score: 0       FUNCTIONAL ASSESSMENT:     Palliative Performance Scale (PPS):  PPS: 30       PSYCHOSOCIAL/SPIRITUAL SCREENING:     Advance Care Planning:  Advance Care Planning 9/1/2022   Confirm Advance Directive None        Any spiritual / Baptist concerns: unable to assess  [] Yes /  [] No    Caregiver Burnout:  [] Yes /  [x] No /  [] No Caregiver Present      Anticipatory grief assessment: unable to assess  [] Normal  / [] Maladaptive             REVIEW OF SYSTEMS:     Positive and pertinent negative findings in ROS are noted above in HPI. The following systems were [] reviewed / [x] unable to be reviewed as noted in HPI  Other findings are noted below. Systems: constitutional, ears/nose/mouth/throat, respiratory, gastrointestinal, genitourinary, musculoskeletal, integumentary, neurologic, psychiatric, endocrine. Positive findings noted below. Modified ESAS Completed by: provider           Pain: 0           Dyspnea: 0                    PHYSICAL EXAM:     From RN flowsheet:  Wt Readings from Last 3 Encounters:   08/31/22 111.6 kg (246 lb)     Blood pressure 120/75, pulse 89, temperature 98.2 °F (36.8 °C), resp. rate 17, height 5' 10\" (1.778 m), weight 111.6 kg (246 lb), SpO2 94 %.     Pain Scale 1: FLACC                      Constitutional: orally intubated, critically ill appearing  Eyes: closed  ENMT: orally intubated   Cardiovascular: regular rhythm, distal pulses intact  Respiratory: orally intubated on mechanical ventilation  Gastrointestinal: soft non-tender   Musculoskeletal: no deformity, no tenderness to palpation  Skin: warm, dry  Neurologic: off sedation, no response to noxious stimuli        HISTORY:     Principal Problem:    Cardiac arrest ([facility] (8/31/2022)    Active Problems:    Respiratory failure ([facility] (8/31/2022)      GENO (acute kidney injury) ([facility] (8/31/2022)      Disseminated herpes zoster (8/31/2022)      Hydronephrosis (8/31/2022)      Shock ([facility] (8/31/2022)      Sepsis ([facility] (8/31/2022)    Past Medical History:   Diagnosis Date    Gout     Shingles       No past surgical history on file. No family history on file. History reviewed, no pertinent family history.   Social History     Tobacco Use    Smoking status: Not on file    Smokeless tobacco: Not on file   Substance Use Topics    Alcohol use: Not on file     No Known Allergies   Current Facility-Administered Medications   Medication Dose Route Frequency    insulin lispro (HUMALOG) injection   SubCUTAneous Q6H    ELECTROLYTE REPLACEMENT PROTOCOL - Potassium Renal Dosing  1 Each Other PRN    insulin glargine (LANTUS) injection 10 Units  10 Units SubCUTAneous Q24H    acyclovir (ZOVIRAX) 750 mg in 0.9% sodium chloride 250 mL IVPB  10 mg/kg (Ideal) IntraVENous Q8H    piperacillin-tazobactam (ZOSYN) 3.375 g in 0.9% sodium chloride (MBP/ADV) 100 mL MBP  3.375 g IntraVENous Q8H    potassium chloride 10 mEq in 100 ml IVPB  10 mEq IntraVENous ONCE    vancomycin (VANCOCIN) 2000 mg in  ml infusion  2,000 mg IntraVENous ONCE    [START ON 9/2/2022] Vancomycin Random Concentration 9/2/2022 at 1300  1 Each Other ONCE    EPINEPHrine (ADRENALIN) 8 mg in 0.9% sodium chloride 250 mL infusion  2-30 mcg/min IntraVENous TITRATE    NOREPINephrine (LEVOPHED) 8 mg in 5% dextrose 250mL (32 mcg/mL) infusion  2-30 mcg/min IntraVENous TITRATE    Vancomycin Pharmacy to Dose  1 Each Other Rx Dosing/Monitoring    heparin (porcine) 25,000 units in 0.45% saline 250 ml infusion  18-36 Units/kg/hr IntraVENous TITRATE    0.9% sodium chloride infusion 50 mL/hr IntraVENous CONTINUOUS    sodium chloride (NS) flush 5-40 mL  5-40 mL IntraVENous Q8H    sodium chloride (NS) flush 5-40 mL  5-40 mL IntraVENous PRN    acetaminophen (TYLENOL) tablet 650 mg  650 mg Oral Q6H PRN    Or    acetaminophen (TYLENOL) suppository 650 mg  650 mg Rectal Q6H PRN    polyethylene glycol (MIRALAX) packet 17 g  17 g Oral DAILY PRN    ondansetron (ZOFRAN ODT) tablet 4 mg  4 mg Oral Q8H PRN    Or    ondansetron (ZOFRAN) injection 4 mg  4 mg IntraVENous Q6H PRN    pantoprazole (PROTONIX) 40 mg in 0.9% sodium chloride 10 mL injection  40 mg IntraVENous Q12H    arformoteroL (BROVANA) neb solution 15 mcg  15 mcg Nebulization BID RT    levETIRAcetam (KEPPRA) 1,000 mg in 0.9% sodium chloride 100 mL IVPB  1,000 mg IntraVENous Q12H    propofol (DIPRIVAN) 10 mg/mL infusion  0-50 mcg/kg/min IntraVENous TITRATE          LAB AND IMAGING FINDINGS:     Lab Results   Component Value Date/Time    WBC 12.4 09/01/2022 03:00 AM    HGB 10.7 (L) 09/01/2022 03:00 AM    PLATELET 000 75/51/5664 03:00 AM     Lab Results   Component Value Date/Time    Sodium 145 09/01/2022 03:00 AM    Potassium 3.4 (L) 09/01/2022 10:21 AM    Chloride 113 (H) 09/01/2022 03:00 AM    CO2 25 09/01/2022 03:00 AM    BUN 51 (H) 09/01/2022 03:00 AM    Creatinine 3.03 (H) 09/01/2022 03:00 AM    Calcium 7.7 (L) 09/01/2022 03:00 AM    Magnesium 2.4 09/01/2022 03:00 AM    Phosphorus 2.6 09/01/2022 03:00 AM      Lab Results   Component Value Date/Time    Alk.  phosphatase 98 09/01/2022 03:00 AM    Protein, total 6.3 (L) 09/01/2022 03:00 AM    Albumin 2.2 (L) 09/01/2022 03:00 AM    Globulin 4.1 (H) 09/01/2022 03:00 AM     Lab Results   Component Value Date/Time    INR 1.5 (H) 08/31/2022 10:00 AM    Prothrombin time 18.7 (H) 08/31/2022 10:00 AM    aPTT >180.0 (HH) 09/01/2022 10:21 AM      No results found for: IRON, FE, TIBC, IBCT, PSAT, FERR   No results found for: PH, PCO2, PO2  No components found for: GLPOC   No results found for: CPK, CKMB Total time: 50 minutes  Counseling / coordination time, spent as noted above:   > 50% counseling / coordination?: yes, patient, family, and medical team    Prolonged service was provided for  []30 min   []75 min in face to face time in the presence of the patient, spent as noted above.   Time Start:   Time End:

## 2022-09-01 NOTE — PROGRESS NOTES
Met with various family members throughout the day as they kept brito in the family room and took turns at the bedside. Spoke at length to patient's step-daughter and her sister who had just arrived from out of the area. Set up a Zoom for 3 pm today to - [email or web address]    1500 - Took Zoom into the room. Wife and  at the bedside. About a dozen on the screen. Lots of love, tears and prayers shared. Patient's son is 23 and has not been in the room since the patient went on isolation. Son is a student at [employer] and is understandably overwhelmed. Patient has a strong support system who remain hopeful. [address], M.Div.  [facility]  Board Certified   Board Certified Ethicist  Certified [facility]  [phone] - Office

## 2022-09-02 NOTE — PROGRESS NOTES
1930- Report and care received, assessment completed per flow sheet. Eyes open spontaneously, does not make eye contact or track. +Gag, + Corneal reflex. No movement of extremities noted with the exception of jerking movements when stimulated. 0000- Reassessment without change. 0330- Reassessment without change except no jerking/seizure like activity noted with care/stimulation. Incontinence care completed, linen changed.

## 2022-09-02 NOTE — DIABETES MGMT
Diabetes/ Glycemic Control Plan of Care  Recommendations:   Recommend increasing Lantus to 12-15 units q 24 hrs. Assessment: BG ranging 120-243 mg/dl over the last 24 hours on current regimen. TDD insulin previous 24 hours: 16 units. HgbA1c resulted at 8.8%. Patient discussed with interdisciplinary team to increase basal insulin. Provider would like conservative increase in insulin. Patient remains NPO at this time. Recent Glucose Results:   Lab Results   Component Value Date/Time     (H) 09/02/2022 05:40 AM    GLUCPOC 243 (H) 09/02/2022 05:24 AM    GLUCPOC 191 (H) 09/01/2022 11:21 PM    GLUCPOC 120 (H) 09/01/2022 05:18 PM         BG within target range (non-ICU: <180; -180):  [] Yes    [x] No   Current insulin orders: 10 units Lantus, corrective Humalog  Total Daily Dose previous 24 hours = 16 units      Plan/Goals:   Blood glucose will be within target of 70 - 180 mg/dl within 72 hours  Reinforce dietary and medication compliance at home.        Education:  [] Refer to Diabetes Education Record                       [x] Education not indicated at this time     Loyda Chambers RD  Glycemic Control Team  [phone]    Monday-Friday   9 am - 3 pm

## 2022-09-02 NOTE — PROGRESS NOTES
/  /                        Cardiology Progress Note        Patient: Lauren Denis        Sex: male          DOA: 8/31/2022  YOB: 1960      Age:  65 y.o.        LOS:  LOS: 2 days   Assessment/Plan     Principal Problem:    Cardiac arrest ([address] (8/31/2022)    Active Problems:    Respiratory failure ([address] (8/31/2022)      GENO (acute kidney injury) ([address] (8/31/2022)      Disseminated herpes zoster (8/31/2022)      Hydronephrosis (8/31/2022)      Shock ([address] (8/31/2022)      Sepsis ([address] (8/31/2022)      UTI (urinary tract infection) (9/1/2022)      Plan:    9/2/2022  Patient remained intubated  Off sedation  Of Levophed  Cardiac telemetry stable with occasional PVCs  Continue with supportive treatment  Discussed with wife and family members  Discussed with Dr. Manoj Jordan    Cardiac arrest PEA arrest  Mild troponin elevation after CPR and PEA cardiac arrest, normal EF and wall motion, no evidence of ACS  DVT  Continue anticoagulation if no active bleeding  Continue with supportive treatment  I have long and lengthy discussion with family about management plan. All the questions were answered. 35 minutes of critical care time spent in the direct evaluation and treatment of this high risk patient. The reason for providing this level of medical care for this critically ill patient was due a critical illness that impaired one or more vital organ systems such that there was a high probability of imminent or life threatening deterioration in the patients condition. This care involved high complexity decision making to assess, manipulate, and support vital system functions, to treat this degreee vital organ system failure and to prevent further life threatening deterioration of the patients condition.     Subjective:    cc:  Cardiac arrest        REVIEW OF SYSTEMS:     Limited due to intubation      Objective:      Visit Vitals  /62   Pulse (!) 127   Temp 100.2 °F (37.9 °C)   Resp 27   Ht 5' 10\" (1.778 m)   Wt 111.6 kg (246 lb)   SpO2 97%   BMI 35.30 kg/m²     Body mass index is 35.3 kg/m². Physical Exam:  General Appearance: Intubated  NECK: No JVD, no thyroidomeglay. LUNGS: Clear bilaterally. HEART: S1+S2 audible,    ABD: Non-tender, BS Audible    EXT: No edema, and no cysnosis. VASCULAR EXAM: Pulses are intact.     PSYCHIATRIC EXAM: Intubated    Medication:  Current Facility-Administered Medications   Medication Dose Route Frequency    [START ON 9/3/2022] insulin glargine (LANTUS) injection 12 Units  12 Units SubCUTAneous Q24H    vancomycin (VANCOCIN) 1250 mg in  ml infusion  1,250 mg IntraVENous ONCE    acyclovir (ZOVIRAX) 750 mg in 0.9% sodium chloride 250 mL IVPB  10 mg/kg (Ideal) IntraVENous Q12H    insulin lispro (HUMALOG) injection   SubCUTAneous Q6H    ELECTROLYTE REPLACEMENT PROTOCOL - Potassium Renal Dosing  1 Each Other PRN    piperacillin-tazobactam (ZOSYN) 3.375 g in 0.9% sodium chloride (MBP/ADV) 100 mL MBP  3.375 g IntraVENous Q8H    NOREPINephrine (LEVOPHED) 8 mg in 5% dextrose 250mL (32 mcg/mL) infusion  2-30 mcg/min IntraVENous TITRATE    Vancomycin Pharmacy to Dose  1 Each Other Rx Dosing/Monitoring    heparin (porcine) 25,000 units in 0.45% saline 250 ml infusion  18-36 Units/kg/hr IntraVENous TITRATE    0.9% sodium chloride infusion  50 mL/hr IntraVENous CONTINUOUS    sodium chloride (NS) flush 5-40 mL  5-40 mL IntraVENous Q8H    sodium chloride (NS) flush 5-40 mL  5-40 mL IntraVENous PRN    acetaminophen (TYLENOL) tablet 650 mg  650 mg Oral Q6H PRN    Or    acetaminophen (TYLENOL) suppository 650 mg  650 mg Rectal Q6H PRN    polyethylene glycol (MIRALAX) packet 17 g  17 g Oral DAILY PRN    ondansetron (ZOFRAN ODT) tablet 4 mg  4 mg Oral Q8H PRN    Or    ondansetron (ZOFRAN) injection 4 mg  4 mg IntraVENous Q6H PRN    pantoprazole (PROTONIX) 40 mg in 0.9% sodium chloride 10 mL injection  40 mg IntraVENous Q12H    arformoteroL (BROVANA) neb solution 15 mcg  15 mcg Nebulization BID RT    levETIRAcetam (KEPPRA) 1,000 mg in 0.9% sodium chloride 100 mL IVPB  1,000 mg IntraVENous Q12H    propofol (DIPRIVAN) 10 mg/mL infusion  0-50 mcg/kg/min IntraVENous TITRATE               Lab/Data Reviewed:  Procedures/imaging: see electronic medical records for all procedures/Xrays   and details which were not copied into this note but were reviewed prior to creation of Plan       All lab results for the last 24 hours reviewed. Recent Labs     09/01/22  0300 08/31/22  1415 08/31/22  1000   WBC 12.4 16.8* 9.6   HGB 10.7* 13.1 10.5*   HCT 32.9* 42.3 33.8*    162 146       Recent Labs     09/02/22  0540 09/01/22  1730 09/01/22  1021 09/01/22  0300 08/31/22  1415   *  --   --  145 142   K 3.9 4.0 3.4* 3.4* 4.1   *  --   --  113* 108   CO2 17*  --   --  25 20*   *  --   --  232* 340*   BUN 42*  --   --  51* 52*   CREA 3.16*  --   --  3.03* 2.91*   CA 8.3*  --   --  7.7* 8.3*         RADIOLOGY:      CXR Results  (Last 48 hours)                 08/31/22 1610  XR CHEST PORT Final result    Impression:          1. Support devices in stable/appropriate position as visualized. 2. No superimposed acute radiographic cardiopulmonary abnormality. Narrative:  EXAM: XR CHEST PORT       CLINICAL INDICATION/HISTORY: central line   -Additional: None       COMPARISON: 4 hours prior       TECHNIQUE: Portable frontal view of the chest       _______________       FINDINGS:       SUPPORT DEVICES: Endotracheal tube, gastric tube, and left IJ approach central   venous catheter noted. Tip of the central venous catheter is at the level of the   superior cavoatrial junction. HEART AND MEDIASTINUM: Normal cardiac size and mediastinal contours. LUNGS AND PLEURAL SPACES: No focal pneumonic opacity. No evidence of   pneumothorax or pleural effusion.        BONY THORAX AND SOFT TISSUES: Unremarkable.       _______________                     Cardiology Procedures:   Results for orders placed or performed during the hospital encounter of 08/31/22   EKG, 12 LEAD, INITIAL   Result Value Ref Range    Ventricular Rate 112 BPM    Atrial Rate 112 BPM    P-R Interval 158 ms    QRS Duration 100 ms    Q-T Interval 360 ms    QTC Calculation (Bezet) 491 ms    Calculated P Axis 73 degrees    Calculated R Axis -69 degrees    Calculated T Axis 51 degrees    Diagnosis       Sinus tachycardia  Left axis deviation  Low voltage QRS  Inferior infarct , age undetermined  Abnormal ECG  No previous ECGs available  Confirmed by Reuben Crowley MD. ([id number]) on 8/31/2022 11:30:53 PM        Echo Results  (Last 48 hours)      None         Cardiolite (Tc-99m Sestamibi) stress test    Signed By: Christy Chew MD     September 2, 2022 English

## 2022-09-02 NOTE — PROGRESS NOTES
0800-Received pt resting in bed with eyes closed, vss on ventilator and sedation, will continue to monitor  1200-Pt remains stable off of pressor, no sign of distress, family at bedside and updated on pt status  1600-Pt tolerated CT well, vss with sedation on ventilator, Heparin drip restarted per cardiology, will monitor for bleeding  1922-Bedside and Verbal shift change report given to [address] [place] [address] I- (oncoming nurse) by Margarita Voung RN (offgoing nurse).  Report included the following information SBAR, Kardex, Intake/Output, MAR, Recent Results, and Cardiac Rhythm ST .

## 2022-09-02 NOTE — PROGRESS NOTES
NEUROLOGY PROGRESS NOTE        Patient: Dottie Cloud        Sex: male          DOA: 8/31/2022  YOB: 1960      Age:  65 y.o.         LOS: 2 days     Identification:  [id number] male presents with coma after cardiac arrest.            SUBJECTIVE:   Myoclonic jerking has resolved. Patient remains to be in coma. EEG showed  abnormal due to severely depressed EEG background, which may be due to severe anoxic brain injury  REVIEW OF SYSTEMS: Unable to obtain. OBJECTIVE:    Visit Vitals  /60   Pulse (!) 117   Temp 99.4 °F (37.4 °C)   Resp 19   Ht 5' 10\" (1.778 m)   Wt 111.6 kg (246 lb)   SpO2 98%   BMI 35.30 kg/m²     Physical Exam:  GEN: Alert, NAD  EYES: conjunctiva normal, lids with out lesions  HEENT: MMM. HEART: RRR +S1 +S2  LUNGS: CTA B/L no rales or rhonchi. ABDOMEN: Soft, non-tender. EXTREMITIES: No edema cyanosis  SKIN: no rashes or skin breakdown, no nodules  NEURO: Coma, not respond to verbal or physical stimuli. Cranials: Neck is supple. Face is symmetric. PERRLA, positive corneal reflex, breathing over the vent. Motor: No spontaneous movement all 4 extremities. Sensory: No response to stimuli. Coordination: Unable to obtain.   Current Facility-Administered Medications   Medication Dose Route Frequency    [START ON 9/3/2022] insulin glargine (LANTUS) injection 12 Units  12 Units SubCUTAneous Q24H    insulin glargine (LANTUS) injection 2 Units  2 Units SubCUTAneous ONCE    insulin lispro (HUMALOG) injection   SubCUTAneous Q6H    ELECTROLYTE REPLACEMENT PROTOCOL - Potassium Renal Dosing  1 Each Other PRN    acyclovir (ZOVIRAX) 750 mg in 0.9% sodium chloride 250 mL IVPB  10 mg/kg (Ideal) IntraVENous Q8H    piperacillin-tazobactam (ZOSYN) 3.375 g in 0.9% sodium chloride (MBP/ADV) 100 mL MBP  3.375 g IntraVENous Q8H    Vancomycin Random Concentration 9/2/2022 at 1300  1 Each Other ONCE    NOREPINephrine (LEVOPHED) 8 mg in 5% dextrose 250mL (32 mcg/mL) infusion  2-30 mcg/min IntraVENous TITRATE    Vancomycin Pharmacy to Dose  1 Each Other Rx Dosing/Monitoring    heparin (porcine) 25,000 units in 0.45% saline 250 ml infusion  18-36 Units/kg/hr IntraVENous TITRATE    0.9% sodium chloride infusion  50 mL/hr IntraVENous CONTINUOUS    sodium chloride (NS) flush 5-40 mL  5-40 mL IntraVENous Q8H    sodium chloride (NS) flush 5-40 mL  5-40 mL IntraVENous PRN    acetaminophen (TYLENOL) tablet 650 mg  650 mg Oral Q6H PRN    Or    acetaminophen (TYLENOL) suppository 650 mg  650 mg Rectal Q6H PRN    polyethylene glycol (MIRALAX) packet 17 g  17 g Oral DAILY PRN    ondansetron (ZOFRAN ODT) tablet 4 mg  4 mg Oral Q8H PRN    Or    ondansetron (ZOFRAN) injection 4 mg  4 mg IntraVENous Q6H PRN    pantoprazole (PROTONIX) 40 mg in 0.9% sodium chloride 10 mL injection  40 mg IntraVENous Q12H    arformoteroL (BROVANA) neb solution 15 mcg  15 mcg Nebulization BID RT    levETIRAcetam (KEPPRA) 1,000 mg in 0.9% sodium chloride 100 mL IVPB  1,000 mg IntraVENous Q12H    propofol (DIPRIVAN) 10 mg/mL infusion  0-50 mcg/kg/min IntraVENous TITRATE       Laboratory  Recent Results (from the past 24 hour(s))   PTT    Collection Time: 09/01/22 10:21 AM   Result Value Ref Range    aPTT >180.0 (HH) 23.0 - 36.4 SEC   POTASSIUM    Collection Time: 09/01/22 10:21 AM   Result Value Ref Range    Potassium 3.4 (L) 3.5 - 5.5 mmol/L   HEMOGLOBIN A1C WITH EAG    Collection Time: 09/01/22 10:21 AM   Result Value Ref Range    Hemoglobin A1c 8.8 (H) 4.2 - 5.6 %    Est. average glucose 206 mg/dL   VANCOMYCIN, RANDOM    Collection Time: 09/01/22 10:21 AM   Result Value Ref Range    Vancomycin, random <0.8 (L) 5.0 - 40.0 UG/ML   GLUCOSE, POC    Collection Time: 09/01/22 12:39 PM   Result Value Ref Range    Glucose (POC) 134 (H) 70 - 110 mg/dL   LACTIC ACID    Collection Time: 09/01/22  1:30 PM   Result Value Ref Range    Lactic acid 1.8 0.4 - 2.0 MMOL/L   GLUCOSE, POC    Collection Time: 09/01/22  5:18 PM   Result Value Ref Range Glucose (POC) 120 (H) 70 - 110 mg/dL   POTASSIUM    Collection Time: 09/01/22  5:30 PM   Result Value Ref Range    Potassium 4.0 3.5 - 5.5 mmol/L   GLUCOSE, POC    Collection Time: 09/01/22 11:21 PM   Result Value Ref Range    Glucose (POC) 191 (H) 70 - 110 mg/dL   GLUCOSE, POC    Collection Time: 09/02/22  5:24 AM   Result Value Ref Range    Glucose (POC) 243 (H) 70 - 925 mg/dL   METABOLIC PANEL, COMPREHENSIVE    Collection Time: 09/02/22  5:40 AM   Result Value Ref Range    Sodium 146 (H) 136 - 145 mmol/L    Potassium 3.9 3.5 - 5.5 mmol/L    Chloride 114 (H) 100 - 111 mmol/L    CO2 17 (L) 21 - 32 mmol/L    Anion gap 15 3.0 - 18 mmol/L    Glucose 223 (H) 74 - 99 mg/dL    BUN 42 (H) 7.0 - 18 MG/DL    Creatinine 3.16 (H) 0.6 - 1.3 MG/DL    BUN/Creatinine ratio 13 12 - 20      GFR est AA 24 (L) >60 ml/min/1.73m2    GFR est non-AA 20 (L) >60 ml/min/1.73m2    Calcium 8.3 (L) 8.5 - 10.1 MG/DL    Bilirubin, total 0.6 0.2 - 1.0 MG/DL    ALT (SGPT) 142 (H) 16 - 61 U/L    AST (SGOT) 128 (H) 10 - 38 U/L    Alk.  phosphatase 111 45 - 117 U/L    Protein, total 5.7 (L) 6.4 - 8.2 g/dL    Albumin 2.0 (L) 3.4 - 5.0 g/dL    Globulin 3.7 2.0 - 4.0 g/dL    A-G Ratio 0.5 (L) 0.8 - 1.7     MAGNESIUM    Collection Time: 09/02/22  5:40 AM   Result Value Ref Range    Magnesium 2.5 1.6 - 2.6 mg/dL   PHOSPHORUS    Collection Time: 09/02/22  5:40 AM   Result Value Ref Range    Phosphorus 3.4 2.5 - 4.9 MG/DL   PTT    Collection Time: 09/02/22  5:40 AM   Result Value Ref Range    aPTT 28.9 23.0 - 36.4 SEC   BLOOD GAS, ARTERIAL POC    Collection Time: 09/02/22  5:45 AM   Result Value Ref Range    Device: ADULT VENT      FIO2 (POC) 35 %    pH (POC) 7.42 7.35 - 7.45      pCO2 (POC) 21.7 (L) 35.0 - 45.0 MMHG    pO2 (POC) 84 80 - 100 MMHG    HCO3 (POC) 14.1 (L) 22 - 26 MMOL/L    sO2 (POC) 96.8 92 - 97 %    Base deficit (POC) 8.8 mmol/L    Mode PRESSURE CONTROL      Set Rate 16 bpm    PEEP/CPAP (POC) 5 cmH2O    PIP (POC) 13      Allens test (POC) Positive      Site RIGHT RADIAL      Specimen type (POC) ARTERIAL      Performed by Espinoza Alcantara        Radiology:  CT HEAD WO CONT    Result Date: 8/31/2022  EXAM: CT head INDICATION: Cardiac arrest COMPARISON: None. TECHNIQUE: Axial CT imaging of the head was performed without intravenous contrast. Standard multiplanar coronal and sagittal reformatted images were obtained and are included in interpretation. One or more dose reduction techniques were used on this CT: automated exposure control, adjustment of the mAs and/or kVp according to patient size, and iterative reconstruction techniques. The specific techniques used on this CT exam have been documented in the patient's electronic medical record. Digital Imaging and Communications in Medicine (DICOM) format image data are available to nonaffiliated external healthcare facilities or entities on a secure, media free, reciprocally searchable basis with patient authorization for at least a 12-month period after this study. _______________ FINDINGS: BRAIN AND POSTERIOR FOSSA: Exam quality is mildly degraded secondary to motion artifact. Ventricular size and configuration appears within normal limits. Basilar cisterns are patent. Within the limitations of motion, no acute intra-axial hemorrhage. No evidence of mass effect or midline shift. Gray-white matter differentiation appears within normal limits as visualized. EXTRA-AXIAL SPACES AND MENINGES: There are no abnormal extra-axial fluid collections. CALVARIUM: Intact. SINUSES: Minor nonspecific mucosal thickening ethmoid air cells, sphenoid sinus with air-fluid level present in the right maxillary sinus. OTHER: None. _______________     1. Mildly motion limited study without evidence of acute intracranial abnormality. 2. Paranasal mucosal disease as described above with air-fluid level in the right maxillary sinus as can be seen with sinusitis.     CT CHEST ABD PELV WO CONT    Result Date: 8/31/2022  EXAM: CT chest, abdomen, and pelvis INDICATION: Pain COMPARISON: No prior study. TECHNIQUE: Axial CT imaging of the chest, abdomen, and pelvis was performed without IV contrast administration. Multiplanar reformats were generated. One or more dose reduction techniques were used on this CT: automated exposure control, adjustment of the mAs and/or kVp according to patient size, and iterative reconstruction techniques. The specific techniques used on this CT exam have been documented in the patient's electronic medical record. Digital Imaging and Communications in Medicine (DICOM) format image data are available to nonaffiliated external healthcare facilities or entities on a secure, media free, reciprocally searchable basis with patient authorization for at least a 12-month period after this study. _______________ FINDINGS: CHEST: MEDIASTINUM: Left IJV central venous catheter tip at the cavoatrial junction. Normal caliber aorta with vascular calcifications. No pericardial effusion. LYMPH NODES: No pathologically enlarged mediastinal or hilar lymph nodes. PLEURA: No pleural effusion or pneumothorax. LUNGS/AIRWAY: Endotracheal tube tip in the midthoracic trachea. No consolidation or suspicious pulmonary nodule is seen. OTHER: None. ABDOMEN/PELVIS: LIVER, BILIARY: Liver is unremarkable. No abnormal biliary dilation. Gallbladder is unremarkable. PANCREAS: Unremarkable. SPLEEN: Unremarkable. ADRENALS: Unremarkable. KIDNEYS: Severe left and moderate right hydroureteronephrosis with marked urinary bladder distention. No obstructing calculus. Left lower pole 5.8 cm simple cysts, no follow-up necessary. LYMPH NODES: No pathologically enlarged lymph nodes. GASTROINTESTINAL TRACT: No abnormal bowel dilation or wall thickening. PELVIC ORGANS: Unremarkable. VASCULATURE: Unremarkable. OSSEOUS: No area of erosion or aggressive-appearing bone destruction.  OTHER: None. _______________     Severe left and moderate right hydroureteronephrosis with marked urinary bladder distention. No obstructing calculus. No acute intrathoracic abnormality. US RETROPERITONEUM COMP    Result Date: 9/2/2022  US RETROPERITONEUM COMP INDICATION: Acute kidney injury. TECHNIQUE: Renal ultrasound. COMPARISON: 8/31/2022 CT FINDINGS: Right kidney measures 10.7 cm. Left kidney measures 6.7 cm. Bilateral increased cortical echogenicity. Severe left hydronephrosis with cortical atrophy. No shadowing calculus or perinephric abnormality. No solid renal mass identified. Indwelling Lyle catheter within decompressed urinary bladder. Bilateral increased cortical echogenicity. Severe left hydronephrosis with cortical atrophy. XR CHEST PORT    Result Date: 8/31/2022  EXAM: XR CHEST PORT CLINICAL INDICATION/HISTORY: central line -Additional: None COMPARISON: 4 hours prior TECHNIQUE: Portable frontal view of the chest _______________ FINDINGS: SUPPORT DEVICES: Endotracheal tube, gastric tube, and left IJ approach central venous catheter noted. Tip of the central venous catheter is at the level of the superior cavoatrial junction. HEART AND MEDIASTINUM: Normal cardiac size and mediastinal contours. LUNGS AND PLEURAL SPACES: No focal pneumonic opacity. No evidence of pneumothorax or pleural effusion. BONY THORAX AND SOFT TISSUES: Unremarkable. _______________     1. Support devices in stable/appropriate position as visualized. 2. No superimposed acute radiographic cardiopulmonary abnormality. XR CHEST PORT    Result Date: 8/31/2022  XR CHEST PORT CLINICAL INDICATION/HISTORY: Tube placement -Additional: None COMPARISON: None TECHNIQUE: Portable frontal view of the chest _______________ FINDINGS: SUPPORT DEVICES: Enteric tube tip projecting over the thoracic inlet. Endotracheal tube tip in the midthoracic trachea. Left IJV central venous catheter tip at the caval atrial junction. HEART AND MEDIASTINUM: Cardiomediastinal silhouette within normal limits.  LUNGS AND PLEURAL SPACES: No dense consolidation, large effusion or pneumothorax. _______________     Enteric tube tip projecting over the thoracic inlet. Endotracheal tube tip in the midthoracic trachea. No acute cardiopulmonary abnormality. ECHO ADULT COMPLETE    Result Date: 8/31/2022  Formatting of this result is different from the original.   Left Ventricle: Normal left ventricular systolic function with a visually estimated EF of 60 - 65%. Not well visualized. Left ventricle size is normal. Normal wall thickness. Normal wall motion. Right Ventricle: Not well visualized. Right ventricle is moderately dilated. Moderately reduced systolic function. Visually moderately dilated RV and moderately reduced RV systolic function. Aortic Valve: Tricuspid valve. Tricuspid Valve: The estimated RVSP is 25 mmHg. Right Atrium: Not well visualized. Right atrium is moderately dilated. No old echocardiogram available to compare. DUPLEX LOWER EXT VENOUS BILAT    Result Date: 9/1/2022  · Age indeterminate thrombus present in the right popliteal vein. · No evidence of deep vein thrombosis in the left lower extremity. ASSESSMENT/IMPRESSION:   69-year-old male with past medical history of shingles at lumbar/sacral dermatome, probable septic shock presents with, after cardiac arrest.  Coma: Anoxic brain injury after cardiac arrest.  Prognosis is guarded. PLAN/RECOMMENDATIONS:  Brain MRI or head CT when patient is stable. Continue Keppra 1000 mg twice daily for myoclonic jerk. Prognosis is discussed with family. I will follow the patient. Please do not hesitate to return with any questions. Signed:   Mary Rice MD  9/2/2022  9:37 AM

## 2022-09-02 NOTE — PROGRESS NOTES
[facility]  (A Division of [address] [place] [address] Road)    Rosa Jaramillo MD  Office #: - Option # [phone]  Fax #: [phone]     Date of Admission: 8/31/2022Date of Note: 9/2/2022     Reason for Referral: Evaluation and antibiotic management of dissiminated shingles/ shock post carrest     I am on call over the weekend-- [phone]. Please call via  or Perfect Serve for on call MD if questions or concerns. Thanks. Current Antimicrobials:    Prior Antimicrobials:  Zosyn/ Vanco 8/31 to date    Antibiotic allergy: None     Assessment:   Critically ill patient /guraded prognosis with :    Probable Septic Shock - on Levophed. Likely urine source--skin( has shingles)? --Severe L and mod R hydronephrosis, without obstructing stone. UA with TNTC wbc  --BCX  8/31: NGSF, no UCX available for review prior to antibiotics  S/P Cardiac arrest with elevated troponins-PE not ruled out  -- age indetermiante DVT on LLE  Suspected anoxic encephalopathy- with myoclonus jerks-- on Propfol- Neuro following  L lumbar/sacral dermatome Shingles-- was on viral medication at home per reports  Acute on chronick GENO (Cr 1.6 5/2022)  History of gout treatment with colchicine recently per wife--new diagnosis    Plan:     Cont Zosyn/ Vancomycin-- if blood culture remain negative - can DC vancomycin tomorrow  FU blood culture: NGSF  Resume viral treatment with Acyclovir IV-- dosed for crCl-- monitor electrolytes closely- until 9/4/22( was on oral PTA)  Air borne isolation/ contact isolation can be discontinued-- lesions now dry and crusted--no active lesion  Supportive care per ICU    Subjective:      Pt seen and DW ICU RN. His wife interviewed. He remains intubated, but is off off pressors and propofol. Head CT imaging done today.   He remains unresponsive  Cardiac/ Neuro team following as well-- concern for anoxic brain injury  Notes/Labs reviwed    HPI:     Topher Covarrubias is 65 y.o. Librado Ion patient with PMH of shingles, on medication. History is limited and obtained from chart review and med staff. Presented to ED yesterday and was in cardiac arrest with CPR that took 30 minutes. He was intubated/ placed on pressor and admitted to ICU. He had shingles at home and was on treatment. He had urine retention  and had about 2L of urine with duran placement per RN. Hydronephrosis on CT scan, no CPD on CXR, LLE DVT of unknown age on FORBES. He was placed on emperic abx    He is currently on pressor- Levophed, unresponsive, with myoclonus jerk that requires propfol    Past Medical History:   Diagnosis Date    Gout     Shingles      No past surgical history on file. No family history on file.   Medications reviewed as below:   Current Facility-Administered Medications   Medication Dose Route Frequency Provider Last Rate Last Admin    [START ON 9/3/2022] insulin glargine (LANTUS) injection 12 Units  12 Units SubCUTAneous Q24H Dean Nolan MD        vancomycin (VANCOCIN) 1250 mg in  ml infusion  1,250 mg IntraVENous ONCE Fausto Chow DO        acyclovir (ZOVIRAX) 750 mg in 0.9% sodium chloride 250 mL IVPB  10 mg/kg (Ideal) IntraVENous Q12H Bruna Zhang MD        insulin lispro (HUMALOG) injection   SubCUTAneous Q6H Sho LONGORIA MD   4 Units at 09/02/22 1314    ELECTROLYTE REPLACEMENT PROTOCOL - Potassium Renal Dosing  1 Each Other PRN Jane Francisco MD        piperacillin-tazobactam (ZOSYN) 3.375 g in 0.9% sodium chloride (MBP/ADV) 100 mL MBP  3.375 g IntraVENous Q8H Eyad CANADA DO 25 mL/hr at 09/02/22 1221 3.375 g at 09/02/22 1221    NOREPINephrine (LEVOPHED) 8 mg in 5% dextrose 250mL (32 mcg/mL) infusion  2-30 mcg/min IntraVENous TITRATE Tawny Dillard DO   Held at 09/01/22 0900    Vancomycin Pharmacy to Dose  1 Each Other Rx Dosing/Monitoring Eyad CANADA DO        heparin (porcine) 25,000 units in 0.45% saline 250 ml infusion  18-36 Units/kg/hr IntraVENous TITRATE Kim Rojo DO   Held at 09/01/22 0338    0.9% sodium chloride infusion  50 mL/hr IntraVENous CONTINUOUS John Walsh MD 50 mL/hr at 08/31/22 1800 50 mL/hr at 08/31/22 1800    sodium chloride (NS) flush 5-40 mL  5-40 mL IntraVENous Q8H Brigette Chakraborty MD   10 mL at 09/02/22 1406    sodium chloride (NS) flush 5-40 mL  5-40 mL IntraVENous PRN Camila Chakraborty MD        acetaminophen (TYLENOL) tablet 650 mg  650 mg Oral Q6H PRN Camila Chakraborty MD   650 mg at 09/01/22 1710    Or    acetaminophen (TYLENOL) suppository 650 mg  650 mg Rectal Q6H PRN Brigette Chakraborty MD        polyethylene glycol (MIRALAX) packet 17 g  17 g Oral DAILY PRN Brigette Chakraborty MD        ondansetron (ZOFRAN ODT) tablet 4 mg  4 mg Oral Q8H PRN Brigette Chakraborty MD        Or    ondansetron (ZOFRAN) injection 4 mg  4 mg IntraVENous Q6H PRN Brigette Chakraborty MD        pantoprazole (PROTONIX) 40 mg in 0.9% sodium chloride 10 mL injection  40 mg IntraVENous Q12H Brigette Chakraborty MD   40 mg at 09/02/22 0909    arformoteroL (BROVANA) neb solution 15 mcg  15 mcg Nebulization BID RT John Walsh MD   15 mcg at 09/02/22 0800    levETIRAcetam (KEPPRA) 1,000 mg in 0.9% sodium chloride 100 mL IVPB  1,000 mg IntraVENous Q12H Reji Govea  mL/hr at 09/02/22 0949 1,000 mg at 09/02/22 0949    propofol (DIPRIVAN) 10 mg/mL infusion  0-50 mcg/kg/min IntraVENous TITRATE Reji Govea MD   Stopped at 09/02/22 0920     No Known Allergies  Social History     Socioeconomic History    Marital status:      Spouse name: Not on file    Number of children: Not on file    Years of education: Not on file    Highest education level: Not on file   Occupational History    Not on file   Tobacco Use    Smoking status: Not on file    Smokeless tobacco: Not on file   Substance and Sexual Activity    Alcohol use: Not on file    Drug use: Not on file    Sexual activity: Not on file Other Topics Concern    Not on file   Social History Narrative    Not on file     Social Determinants of Health     Financial Resource Strain: Not on file   Food Insecurity: Not on file   Transportation Needs: Not on file   Physical Activity: Not on file   Stress: Not on file   Social Connections: Not on file   Intimate Partner Violence: Not on file   Housing Stability: Not on file        Review of Systems: Unable to provide      Objective:      Visit Vitals  /62   Pulse (!) 127   Temp 100.2 °F (37.9 °C)   Resp 27   Ht 5' 10\" (1.778 m)   Wt 111.6 kg (246 lb)   SpO2 97%   BMI 35.30 kg/m²     Temp (24hrs), Av.4 °F (37.4 °C), Min:98.6 °F (37 °C), Max:100.3 °F (37.9 °C)         GEN: WD unresponsive, intubated    Lines / Catheters: Left IJ central line, L PIV, Lyle  . HEENT: Unicteric. Edematous sclera. --no neck swelling  CHEST: Non laboured breathing. CTA  CVS:RRR, no mur/gallop  ABD: Obese/soft. Non tender. Non distended. Hypoactive BS  VERNELL: Lyle in place  EXT: No apparent swelling or redness on UE/LE joints. Skin: Dry and intact.  No obvious cellulitis-- has L thigh/buttock Shingles rash-- multiple dermatome, no active vesicular lesion on exam.  CNS: unresponsive, no jerky movement      Labs: Results:   Chemistry Recent Labs     22  0540 22  1730 22  1021 22  0300 22  1415 22  1000   *  --   --  232* 340* 325*   *  --   --  145 142 144   K 3.9 4.0 3.4* 3.4* 4.1 3.1*   *  --   --  113* 108 107   CO2 17*  --   --  25 20* 19*   BUN 42*  --   --  51* 52* 50*   CREA 3.16*  --   --  3.03* 2.91* 3.01*   CA 8.3*  --   --  7.7* 8.3* 7.7*   AGAP 15  --   --  7 14 18   BUCR 13  --   --  17 18 17     --   --  98  --  103   TP 5.7*  --   --  6.3*  --  6.0*   ALB 2.0*  --   --  2.2* 2.6* 2.3*   GLOB 3.7  --   --  4.1*  --  3.7   AGRAT 0.5*  --   --  0.5*  --  0.6*      CBC w/Diff Recent Labs     22  0300 22  1415 22  1000   WBC 12.4 16.8* 9.6   RBC 3.78* 4.69 3.62*   HGB 10.7* 13.1 10.5*   HCT 32.9* 42.3 33.8*    162 146   GRANS  --  79* 69   LYMPH  --  6* 21   EOS  --  0 1            No results found for: SDES Lab Results   Component Value Date/Time    Culture result: NO GROWTH 2 DAYS 08/31/2022 10:15 AM    Culture result: NO GROWTH 2 DAYS 08/31/2022 10:00 AM        Results       Procedure Component Value Units Date/Time    COVID-19 RAPID TEST [[id number]] Collected: 08/31/22 1050    Order Status: Completed Specimen: Nasopharyngeal Updated: 08/31/22 1131     Specimen source Nasopharyngeal        COVID-19 rapid test Not detected        Comment: Rapid Abbott ID Now       Rapid NAAT:  The specimen is NEGATIVE for SARS-CoV-2, the novel coronavirus associated with COVID-19. Negative results should be treated as presumptive and, if inconsistent with clinical signs and symptoms or necessary for patient management, should be tested with an alternative molecular assay. Negative results do not preclude SARS-CoV-2 infection and should not be used as the sole basis for patient management decisions. This test has been authorized by the FDA under an Emergency Use Authorization (EUA) for use by authorized laboratories. Fact sheet for Healthcare Providers: ConventionUpdate.co.nz  Fact sheet for Patients: ConventionUpdate.co.nz       Methodology: Isothermal Nucleic Acid Amplification         CULTURE, BLOOD [[id number]] Collected: 08/31/22 1015    Order Status: Completed Specimen: Blood Updated: 09/02/22 0755     Special Requests: NO SPECIAL REQUESTS        Culture result: NO GROWTH 2 DAYS       CULTURE, BLOOD [[id number]] Collected: 08/31/22 1000    Order Status: Completed Specimen: Blood Updated: 09/02/22 0755     Special Requests: NO SPECIAL REQUESTS        Culture result: NO GROWTH 2 DAYS                 Imaging:      All imaging reviewed from Admission to present as per radiology interpretation in [facility]

## 2022-09-02 NOTE — PROGRESS NOTES
Hospitalist Progress Note    Patient: Cassi Johnson MRN: [id number]  CSN: [id number]    YOB: 1960  Age: 65 y.o. Sex: male    DOA: 8/31/2022 LOS:  LOS: 2 days          Chief Complaint:    arrest      Assessment/Plan        Cassi Johnson is a 65 y.o. hypertension, dyslipidemia, type 2 diabetes mellitus, vitamin D deficiency, stage 3a CKD and gout male with history of who presents with cardiac arrest.      Cardiac arrest, PEA  Acute respiratory failure  Anoxia  Myoclonic jerks  Shock   RLE popliteal DVT  Sepsis, likely due to urinary source  UTI  Disseminated herpes zoster  Severe left and moderate right hydroureteronephrosis   GENO on CKD 3  hyperglycemia     Renal US shows hydro  Continue duran  Heparin gtt  IV antibiotics  IV acyclovir  IV PPI     Lantus, SSI     Echo with nl EF    Off pressors    Seen by neurology, guarded neurologic prognosis     Cardiology on case     Vent mgmt/sedation per intensivist     head CT negative       GI - NPO      SUP     Prophylaxis - DVT: heparin, GI: protonix     Long talk with wife and RICHIE, Total time: 26 min  In room, this am, d/w family, primarily wife  Guarded prognosis  Counseling / coordination time:   > 50% counseling / coordination    Full code       Disposition :  Patient Active Problem List   Diagnosis Code    Cardiac arrest ([facility] [address] I46.9    Respiratory failure ([address] J96.90    GENO (acute kidney injury) ([facility] [address] N17.9    Disseminated herpes zoster B02.7    Hydronephrosis N13.30    Shock ([address] R57.9    Sepsis ([address] A41.9    UTI (urinary tract infection) N39.0       Subjective:    Sedate on vent  NAD  No new issues reported  Pressors off    Review of systems:    UTO due to illness      Vital signs/Intake and Output:  Visit Vitals  /62   Pulse (!) 115   Temp 99.4 °F (37.4 °C)   Resp 21   Ht 5' 10\" (1.778 m)   Wt 111.6 kg (246 lb)   SpO2 98%   BMI 35.30 kg/m²     Current Shift:  No intake/output data recorded.   Last three shifts:  08/31 1901 - 09/02 0700  In: 4891.3 [I.V.:4891.3]  Out: 5000 [Urine:5000]    Exam:    General: OW AAM sedate on vent, NAD  CVS:Regular rate and rhythm, no M/R/G, S1/S2 heard, no thrill  Lungs:Clear to auscultation bilaterally, no wheezes, rhonchi, or rales  Abdomen: Soft, Nontender, No distention, Normal Bowel sounds  Extremities: No C/C/E, pulses palpable 2+  Skin:excoriated scabbed lesions on trunk, limbs  Neuro:grossly normal , no jerking seen during time in room                Labs: Results:       Chemistry Recent Labs     09/02/22  0540 09/01/22  1730 09/01/22  1021 09/01/22  0300 08/31/22  1415 08/31/22  1000   *  --   --  232* 340* 325*   *  --   --  145 142 144   K 3.9 4.0 3.4* 3.4* 4.1 3.1*   *  --   --  113* 108 107   CO2 17*  --   --  25 20* 19*   BUN 42*  --   --  51* 52* 50*   CREA 3.16*  --   --  3.03* 2.91* 3.01*   CA 8.3*  --   --  7.7* 8.3* 7.7*   AGAP 15  --   --  7 14 18   BUCR 13  --   --  17 18 17     --   --  98  --  103   TP 5.7*  --   --  6.3*  --  6.0*   ALB 2.0*  --   --  2.2* 2.6* 2.3*   GLOB 3.7  --   --  4.1*  --  3.7   AGRAT 0.5*  --   --  0.5*  --  0.6*      CBC w/Diff Recent Labs     09/01/22  0300 08/31/22  1415 08/31/22  1000   WBC 12.4 16.8* 9.6   RBC 3.78* 4.69 3.62*   HGB 10.7* 13.1 10.5*   HCT 32.9* 42.3 33.8*    162 146   GRANS  --  79* 69   LYMPH  --  6* 21   EOS  --  0 1      Cardiac Enzymes No results for input(s): CPK, CKND1, ROB in the last 72 hours. No lab exists for component: CKRMB, TROIP   Coagulation Recent Labs     09/02/22  1156 09/02/22  0540 08/31/22  1415 08/31/22  1000   PTP  --   --   --  18.7*   INR  --   --   --  1.5*   APTT 29.5 28.9   < >  --     < > = values in this interval not displayed. Lipid Panel No results found for: CHOL, CHOLPOCT, CHOLX, CHLST, CHOLV, 802343, HDL, HDLP, LDL, LDLC, DLDLP, 742353, VLDLC, VLDL, TGLX, TRIGL, TRIGP, TGLPOCT, CHHD, CHHDX   BNP No results for input(s): BNPP in the last 72 hours.    Liver Enzymes Recent Labs     09/02/22  0540   TP 5.7*   ALB 2.0*         Thyroid Studies No results found for: T4, T3U, TSH, TSHEXT     Procedures/imaging: see electronic medical records for all procedures/Xrays and details which were not copied into this note but were reviewed prior to creation of Clementine Guerra MD

## 2022-09-02 NOTE — PROGRESS NOTES
Vancomycin - Pharmacy to Dose ( Sepsis of Unknown Etiology )    Patient received Vancomycin 2000 mg IV at 13:08 9/1/2022  Vancomycin Random Level 16.2 mg/l at 12:00 9/3/2022  Will Re-dose with Vancomycin 1250 mg IV today. Goal Trough Level ~ 15 mg/l. Pharmacy will continue to follow and adjust.    Sofia Casarez [facility]

## 2022-09-02 NOTE — PROGRESS NOTES
Pharmacy Dosing Services: Acyclovir    Acyclovir 750 mg IV q8h was automatically dose-adjusted to Acyclovir 750 mg IV q12h per [facility] P&T Committee Protocol, with respect to renal function. Indication: Herpes Zoster     Pt Weight:   Wt Readings from Last 1 Encounters:   08/31/22 111.6 kg (246 lb)     Serum Creatinine Lab Results   Component Value Date/Time    Creatinine 3.16 (H) 09/02/2022 05:40 AM    Creatinine (POC) 2.80 (H) 08/31/2022 10:08 AM       Creatinine Clearance Estimated Creatinine Clearance: 30.7 mL/min (A) (based on SCr of 3.16 mg/dL (H)). BUN Lab Results   Component Value Date/Time    BUN 42 (H) 09/02/2022 05:40 AM         Pharmacy to continue to monitor patient daily. Will make dosage adjustments based upon changing renal function.   Signed Trinidad Alarcon RPH, BCPS

## 2022-09-02 NOTE — PROGRESS NOTES
P[facility] Specialists  Pulmonary, Critical Care, and Sleep Medicine    Name: Brooklynn Wilson MRN: [id number]   : 1960 Hospital: [facility] [place]    Date: 2022  Room: [facility]/[address] [place] [address] Note                                              Consult requesting physician: Dr. 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    Hydronephrosis N13.30    Shock ([facility] [address] R57.9    Sepsis ([facility] [address] A41.9    UTI (urinary tract infection) N39.0           Code status: full code       RECOMMENDATIONS:   Respiratory: acute respiratory failure post cardiac arrest  Full ventilatory support  pressure control 16/13/peep 5 Fio2 35  7.42//84/96  Ventilator adjusted  CXR today pending   Propofol for vent synchrony and myocloninc jerks   Off sedation does not follow commands   Add bronchodilators  Due to myoclonic jerks on propofol  We cannot exclude PE due to GENO  PVL pending. Currently has urethral bleeding can do low dose heparin if better we will start full dose   Ventilator bundle & Sedation protocol followed. Daily sedation holiday, assessment for readiness for SBT and then re-titrate if required. Chlorhexidine mouth washes. Keep SPO2 >=92%. HOB 30 degree elevation all the time. Aggressive pulmonary toileting. Aspiration precautions. Incentive spirometry. CVS: cardiac arrest initally on pressors  Pea arrest most likley due to sepsis  Now off pressors BP stable  On levophed BP improving   Echo stable   Has DVT heparin drip as tolerated had some OG bleeding now on hold   I suspect related to sepsis recently disseminated zoster was on antiviral has DM/HTN  ? Pea arrest   Echo   Result status: Final result       Left Ventricle: Normal left ventricular systolic function with a visually estimated EF of 60 - 65%. Not well visualized. Left ventricle size is normal. Normal wall thickness. Normal wall motion. Right Ventricle: Not well visualized. Right ventricle is moderately dilated. Moderately reduced systolic function. Visually moderately dilated RV and moderately reduced RV systolic function. Aortic Valve: Tricuspid valve. Tricuspid Valve: The estimated RVSP is 25 mmHg. Right Atrium: Not well visualized. Right atrium is moderately dilated. No old echocardiogram available to compare. [id number] minutes codes 3 times CPR epi  Due to renal failure cannot exclude PE  PVL pending , bleeding now cannot give full dose heparin but will reevaluate daily   Now on levophed 16  ID: suspect sepsis   UTI culure pending but positive U/a and zoster   Hydronephrosis urology consulted urinalysis pending  10 days of zoster active disseminate back and left leg consulted ID    Sepsis bundle and protocol followed. Follow serial lactic acid, frequent BMP check, fluid resuscitation. Follow cultures. Deescalate antibiotic when appropriate. 1. Mildly motion limited study without evidence of acute intracranial  abnormality. 2. Paranasal mucosal disease as described above with air-fluid level in the  right maxillary sinus as can be seen with sinusitis. IMPRESSION     Severe left and moderate right hydroureteronephrosis with marked urinary bladder  distention. No obstructing calculus. No acute intrathoracic abnormality. Hematology/Oncology: follow hb  Chronic anemia   Renal: CRI around 1.8 now Glen suspect due to sepsis and hydronephrosis   GI/: PPI  Hydronephrosis   Endocrine: Monitor BS. SSI. has DM  Neurology: intubated on propofol post cardiac arrest r/o anoxia   Ct of the head negative  EEG abnormal strongly suggestive of anoxic brain injury  Ct of the head 9/2 normal  MRI of the brain pending once stable   Neurology consulted myoclonic jerks  Off propofol today did not follow commands  RR above the Vent   Reapt Ct pending not stable for MRI has to be off airborne isolation    Toxicology: pending   Pain/Sedation: propofol  Skin/Wound: has extensive back and left lower extremity rash typical for zoster   Active lesions   Electrolytes: Replace electrolytes per ICU electrolyte replacement protocol. IVF: NACL  Nutrition: npo  Prophylaxis: DVT Prophylaxis: SCD/sq heaprin. GI Prophylaxis: Protonix/ppi. Restraints:   Wrist soft restraints for patient interfering with medical therapy/management and patient safety. Lines/Tubes: PIV  ETT: in place . OGT/NGT: in place . Central line: left internal jugular placed on 8/31 in ED  (site examined, no erythema, induration, discharge or sign of infection. Dressing intact. Medically necessary, will remove it when not needed. Central line bundle followed). Other:  PT/OT eval and treat. OOB. Advance Directive/Palliative Care: consulted    Will defer respective systems problem management to primary and other respective consultant and follow patient in ICU with primary and other medical team.  Further recommendations will be based on the patient's response to recommended treatment and results of the investigation ordered. Quality Care: PPI, DVT prophylaxis, HOB elevated, Infection control all reviewed and addressed. Care of plan d/w hospitalist team, RN, RT, MDR.  D/w , family-wife and brother  (answered all questions to satisfaction). High  complexity decision making was performed during the evaluation of this patient at high risk for decompensation with multiple organ involvement. Total critical care time spent rendering care exclusive of procedures/family discussion/coordination of care: 55minutes.   I evaluated for cooling protocol spoke to cardiology and hospitalist due to sepsis zoster and hydronephrosis high risk for cooling  Familu updated daily wife and sons          Subjective/History of Present Illness:     Patient is a 65 y.o. male with PMHx significant for HTN/CRI/DM recently diagnosed with severe zoster. Ling was on antiviral tx very weak , sob,cough. EMS called in ambulance 3 times cardiac arrest. Witnessed CPR/Epi  Repored PEA. 6 doses of epi and no shocks. He came on epi drip but in Ed chnaged to levophed. Ling had seizure vs myocloninc jerks was started on propofol. 9/2/2022:  Unresponsive off propofol does not follow commands  Ct normal eeg suggestive anoxia  Order MRI  Shock better  Spoke to family full code   Full code spoke to family        I/O last 24 hrs: Intake/Output Summary (Last 24 hours) at 9/2/2022 1507  Last data filed at 9/2/2022 1200  Gross per 24 hour   Intake 1890.28 ml   Output 1450 ml   Net 440.28 ml           The patient is critically ill and can not provide additional history due to Ventilated    History taken from  family and EMR    Review of Systems:    ROS not obtained due to patient factor. No Known Allergies   Past Medical History:   Diagnosis Date    Gout     Shingles       No past surgical history on file. Social History     Tobacco Use    Smoking status: Not on file    Smokeless tobacco: Not on file   Substance Use Topics    Alcohol use: Not on file      No family history on file.    Prior to Admission medications    Not on File     Current Facility-Administered Medications   Medication Dose Route Frequency    [START ON 9/3/2022] insulin glargine (LANTUS) injection 12 Units  12 Units SubCUTAneous Q24H    vancomycin (VANCOCIN) 1250 mg in  ml infusion  1,250 mg IntraVENous ONCE    acyclovir (ZOVIRAX) 750 mg in 0.9% sodium chloride 250 mL IVPB  10 mg/kg (Ideal) IntraVENous Q12H    insulin lispro (HUMALOG) injection   SubCUTAneous Q6H    piperacillin-tazobactam (ZOSYN) 3.375 g in 0.9% sodium chloride (MBP/ADV) 100 mL MBP  3.375 g IntraVENous Q8H    NOREPINephrine (LEVOPHED) 8 mg in 5% dextrose 250mL (32 mcg/mL) infusion  2-30 mcg/min IntraVENous TITRATE    Vancomycin Pharmacy to Dose  1 Each Other Rx Dosing/Monitoring    heparin (porcine) 25,000 units in 0.45% saline 250 ml infusion  18-36 Units/kg/hr IntraVENous TITRATE    0.9% sodium chloride infusion  25 mL/hr IntraVENous CONTINUOUS    sodium chloride (NS) flush 5-40 mL  5-40 mL IntraVENous Q8H    pantoprazole (PROTONIX) 40 mg in 0.9% sodium chloride 10 mL injection  40 mg IntraVENous Q12H    arformoteroL (BROVANA) neb solution 15 mcg  15 mcg Nebulization BID RT    levETIRAcetam (KEPPRA) 1,000 mg in 0.9% sodium chloride 100 mL IVPB  1,000 mg IntraVENous Q12H    propofol (DIPRIVAN) 10 mg/mL infusion  0-50 mcg/kg/min IntraVENous TITRATE         Objective:   Vital Signs:    Visit Vitals  /62   Pulse (!) 127   Temp 100.2 °F (37.9 °C)   Resp 27   Ht 5' 10\" (1.778 m)   Wt 111.6 kg (246 lb)   SpO2 97%   BMI 35.30 kg/m²       O2 Device: Endotracheal tube       Temp (24hrs), Av.4 °F (37.4 °C), Min:98.6 °F (37 °C), Max:100.3 °F (37.9 °C)       Intake/Output:   Last shift:       07 -  190  In: -   Out: 650 [Urine:650]    Last 3 shifts: 1901 -  0700  In: 4891.3 [I.V.:4891.3]  Out: 5000 [Urine:5000]      Intake/Output Summary (Last 24 hours) at 2022 1507  Last data filed at 2022 1200  Gross per 24 hour   Intake 1890.28 ml   Output 1450 ml   Net 440.28 ml         Last 3 Recorded Weights in this Encounter    22 0958 22 1000 22 1524   Weight: 111.9 kg (246 lb 12.8 oz) 111.6 kg (246 lb 1.6 oz) 111.6 kg (246 lb)         Ventilator Settings:  Mode Rate Tidal Volume Pressure FiO2 PEEP   Assist control   450 ml    35 % 5 cm H20     Peak airway pressure: 21 cm H2O    Plateau pressure:     Tidal volume:    Minute ventilation: 18.7 l/min     Recent Labs     22  0545 22  0559 22  1733 22  1008   PHI 7.42 7.42 7.35 7.26*   PCO2I 21.7* 35.5 35.7 39.1   PO2I 84 137* 135* 444*   HCO3I 14.1* 23.1 19.9* 17.7*   FIO2  --   --   --  100   FIO2I 35 40 45  --          Physical Exam:     Impresson general : intubated off sedation , RR above the evnt no resposne to verbal or painful stimuli  Pupils reactive positive corneal and gaga RR above the vent but   Does not follow commands   Head:   Normocephalic,atraumatic. ENT:   EOM , no scleral icterus, no pallor, no cyanosis. Nose:   No sinus tenderness  Throat:  Oropharynx ,mucosa, and tongue normal. No oral thrush. Neck:   Supple, symmetric. Lymph nodes. Trachea ismidline   Lung: Moderate air entry bilateral equal. No rales. No rhonchi. No wheezing. No stridors. No prolongded expiration. No accessory muscle use. Heart:   Regular  rate & rhythm. S1 S2 present. No murmur. No JVD. Abdomen:  Soft. NT. ND. +BS. No masses. liver  and spleen  Extremities:  No pedal edema. No cyanosis. No clubbing. Pulses: 2+ and symmetric in DP. Capillary refill: normal  Lymphatic:  neck and supraclavicular    Musculoskeletal: No joint swelling. No tenderness.    Skin:   Back and left lower extremity vesicular rash typical for zoster      Data:       Recent Results (from the past 24 hour(s))   GLUCOSE, POC    Collection Time: 09/01/22  5:18 PM   Result Value Ref Range    Glucose (POC) 120 (H) 70 - 110 mg/dL   POTASSIUM    Collection Time: 09/01/22  5:30 PM   Result Value Ref Range    Potassium 4.0 3.5 - 5.5 mmol/L   GLUCOSE, POC    Collection Time: 09/01/22 11:21 PM   Result Value Ref Range    Glucose (POC) 191 (H) 70 - 110 mg/dL   GLUCOSE, POC    Collection Time: 09/02/22  5:24 AM   Result Value Ref Range    Glucose (POC) 243 (H) 70 - 681 mg/dL   METABOLIC PANEL, COMPREHENSIVE    Collection Time: 09/02/22  5:40 AM   Result Value Ref Range    Sodium 146 (H) 136 - 145 mmol/L    Potassium 3.9 3.5 - 5.5 mmol/L    Chloride 114 (H) 100 - 111 mmol/L    CO2 17 (L) 21 - 32 mmol/L    Anion gap 15 3.0 - 18 mmol/L    Glucose 223 (H) 74 - 99 mg/dL    BUN 42 (H) 7.0 - 18 MG/DL Creatinine 3.16 (H) 0.6 - 1.3 MG/DL    BUN/Creatinine ratio 13 12 - 20      GFR est AA 24 (L) >60 ml/min/1.73m2    GFR est non-AA 20 (L) >60 ml/min/1.73m2    Calcium 8.3 (L) 8.5 - 10.1 MG/DL    Bilirubin, total 0.6 0.2 - 1.0 MG/DL    ALT (SGPT) 142 (H) 16 - 61 U/L    AST (SGOT) 128 (H) 10 - 38 U/L    Alk.  phosphatase 111 45 - 117 U/L    Protein, total 5.7 (L) 6.4 - 8.2 g/dL    Albumin 2.0 (L) 3.4 - 5.0 g/dL    Globulin 3.7 2.0 - 4.0 g/dL    A-G Ratio 0.5 (L) 0.8 - 1.7     MAGNESIUM    Collection Time: 09/02/22  5:40 AM   Result Value Ref Range    Magnesium 2.5 1.6 - 2.6 mg/dL   PHOSPHORUS    Collection Time: 09/02/22  5:40 AM   Result Value Ref Range    Phosphorus 3.4 2.5 - 4.9 MG/DL   PTT    Collection Time: 09/02/22  5:40 AM   Result Value Ref Range    aPTT 28.9 23.0 - 36.4 SEC   BLOOD GAS, ARTERIAL POC    Collection Time: 09/02/22  5:45 AM   Result Value Ref Range    Device: ADULT VENT      FIO2 (POC) 35 %    pH (POC) 7.42 7.35 - 7.45      pCO2 (POC) 21.7 (L) 35.0 - 45.0 MMHG    pO2 (POC) 84 80 - 100 MMHG    HCO3 (POC) 14.1 (L) 22 - 26 MMOL/L    sO2 (POC) 96.8 92 - 97 %    Base deficit (POC) 8.8 mmol/L    Mode PRESSURE CONTROL      Set Rate 16 bpm    PEEP/CPAP (POC) 5 cmH2O    PIP (POC) 13      Allens test (POC) Positive      Site RIGHT RADIAL      Specimen type (POC) ARTERIAL      Performed by Roger Lopez    PTT    Collection Time: 09/02/22 11:56 AM   Result Value Ref Range    aPTT 29.5 23.0 - 36.4 SEC   VANCOMYCIN, RANDOM    Collection Time: 09/02/22 12:00 PM   Result Value Ref Range    Vancomycin, random 16.2 5.0 - 40.0 UG/ML   GLUCOSE, POC    Collection Time: 09/02/22  1:07 PM   Result Value Ref Range    Glucose (POC) 226 (H) 70 - 110 mg/dL         Chemistry Recent Labs     09/02/22  0540 09/01/22  1730 09/01/22  1021 09/01/22  0300 08/31/22  1415 08/31/22  1000   *  --   --  232* 340* 325*   *  --   --  145 142 144   K 3.9 4.0 3.4* 3.4* 4.1 3.1*   *  --   --  113* 108 107   CO2 17*  -- --  25 20* 19*   BUN 42*  --   --  51* 52* 50*   CREA 3.16*  --   --  3.03* 2.91* 3.01*   CA 8.3*  --   --  7.7* 8.3* 7.7*   MG 2.5  --   --  2.4 2.6 2.7*   PHOS 3.4  --   --  2.6 4.7  --    AGAP 15  --   --  7 14 18   BUCR 13  --   --  17 18 17     --   --  98  --  103   TP 5.7*  --   --  6.3*  --  6.0*   ALB 2.0*  --   --  2.2* 2.6* 2.3*   GLOB 3.7  --   --  4.1*  --  3.7   AGRAT 0.5*  --   --  0.5*  --  0.6*          Lactic Acid Lactic acid   Date Value Ref Range Status   09/01/2022 1.8 0.4 - 2.0 MMOL/L Final     Recent Labs     09/01/22  1330   LAC 1.8          Liver Enzymes Protein, total   Date Value Ref Range Status   09/02/2022 5.7 (L) 6.4 - 8.2 g/dL Final     Albumin   Date Value Ref Range Status   09/02/2022 2.0 (L) 3.4 - 5.0 g/dL Final     Globulin   Date Value Ref Range Status   09/02/2022 3.7 2.0 - 4.0 g/dL Final     A-G Ratio   Date Value Ref Range Status   09/02/2022 0.5 (L) 0.8 - 1.7   Final     Alk. phosphatase   Date Value Ref Range Status   09/02/2022 111 45 - 117 U/L Final     Recent Labs     09/02/22  0540 09/01/22  0300 08/31/22  1415 08/31/22  1000   TP 5.7* 6.3*  --  6.0*   ALB 2.0* 2.2* 2.6* 2.3*   GLOB 3.7 4.1*  --  3.7   AGRAT 0.5* 0.5*  --  0.6*    98  --  103          CBC w/Diff Recent Labs     09/01/22  0300 08/31/22  1415 08/31/22  1000   WBC 12.4 16.8* 9.6   RBC 3.78* 4.69 3.62*   HGB 10.7* 13.1 10.5*   HCT 32.9* 42.3 33.8*    162 146   GRANS  --  79* 69   LYMPH  --  6* 21   EOS  --  0 1          Cardiac Enzymes No results found for: CPK, CK, CKMMB, CKMB, RCK3, CKMBT, CKNDX, CKND1, ROB, TROPT, TROIQ, BAY, TROPT, TNIPOC, BNP, BNPP     BNP No results found for: BNP, BNPP, XBNPT     Coagulation Recent Labs     09/02/22  1156 09/02/22  0540 09/01/22  1021 08/31/22  1415 08/31/22  1000   PTP  --   --   --   --  18.7*   INR  --   --   --   --  1.5*   APTT 29.5 28.9 >180.0*   < >  --     < > = values in this interval not displayed.            Thyroid  No results found for: T4, T3U, TSH, TSHEXT, TSHEXT    No results found for: T4     Urinalysis Lab Results   Component Value Date/Time    Color YELLOW 08/31/2022 05:16 PM    Appearance TURBID 08/31/2022 05:16 PM    Specific gravity 1.016 08/31/2022 05:16 PM    pH (UA) 5.5 08/31/2022 05:16 PM    Protein 100 (A) 08/31/2022 05:16 PM    Glucose >1,000 (A) 08/31/2022 05:16 PM    Ketone Negative 08/31/2022 05:16 PM    Bilirubin Negative 08/31/2022 05:16 PM    Urobilinogen 0.2 08/31/2022 05:16 PM    Nitrites Negative 08/31/2022 05:16 PM    Leukocyte Esterase LARGE (A) 08/31/2022 05:16 PM    Epithelial cells Negative 08/31/2022 05:16 PM    Bacteria 2+ (A) 08/31/2022 05:16 PM    WBC TOO NUMEROUS TO COUNT 08/31/2022 05:16 PM    RBC 0 to 1 08/31/2022 05:16 PM        Micro  Recent Labs     08/31/22  1015 08/31/22  1000   CULT NO GROWTH 2 DAYS NO GROWTH 2 DAYS       Recent Labs     08/31/22  1015 08/31/22  1000   CULT NO GROWTH 2 DAYS NO GROWTH 2 DAYS            Culture data during this hospitalization. All Micro Results       Procedure Component Value Units Date/Time    CULTURE, BLOOD [[id number]] Collected: 08/31/22 1015    Order Status: Completed Specimen: Blood Updated: 09/02/22 0755     Special Requests: NO SPECIAL REQUESTS        Culture result: NO GROWTH 2 DAYS       CULTURE, BLOOD [[id number]] Collected: 08/31/22 1000    Order Status: Completed Specimen: Blood Updated: 09/02/22 0755     Special Requests: NO SPECIAL REQUESTS        Culture result: NO GROWTH 2 DAYS       COVID-19 RAPID TEST [[id number]] Collected: 08/31/22 1050    Order Status: Completed Specimen: Nasopharyngeal Updated: 08/31/22 1131     Specimen source Nasopharyngeal        COVID-19 rapid test Not detected        Comment: Rapid Abbott ID Now       Rapid NAAT:  The specimen is NEGATIVE for SARS-CoV-2, the novel coronavirus associated with COVID-19.        Negative results should be treated as presumptive and, if inconsistent with clinical signs and symptoms or necessary for patient management, should be tested with an alternative molecular assay. Negative results do not preclude SARS-CoV-2 infection and should not be used as the sole basis for patient management decisions. This test has been authorized by the FDA under an Emergency Use Authorization (EUA) for use by authorized laboratories. Fact sheet for Healthcare Providers: [employer]  Fact sheet for Patients: [employer]       Methodology: Isothermal Nucleic Acid Amplification                      PFT       Ultrasound       LE Doppler       ECHO       Images report reviewed by me:  CT (Most Recent) (CT chest reviewed by me)        CXR reviewed by me:  XR (Most Recent). CXR  reviewed by me and compared with previous CXR Results from Hospital Encounter encounter on 08/31/22    XR CHEST PORT    Narrative  EXAM: XR CHEST PORT    CLINICAL INDICATION/HISTORY: central line  -Additional: None    COMPARISON: 4 hours prior    TECHNIQUE: Portable frontal view of the chest    _______________    FINDINGS:    SUPPORT DEVICES: Endotracheal tube, gastric tube, and left IJ approach central  venous catheter noted. Tip of the central venous catheter is at the level of the  superior cavoatrial junction. HEART AND MEDIASTINUM: Normal cardiac size and mediastinal contours. LUNGS AND PLEURAL SPACES: No focal pneumonic opacity. No evidence of  pneumothorax or pleural effusion. BONY THORAX AND SOFT TISSUES: Unremarkable.    _______________    Impression  1. Support devices in stable/appropriate position as visualized. 2. No superimposed acute radiographic cardiopulmonary abnormality.            Ngozi Heredia MD  9/2/2022

## 2022-09-02 NOTE — PROGRESS NOTES
/  /                        Cardiology Progress Note        Patient: Tony Desai        Sex: male          DOA: 8/31/2022  YOB: 1960      Age:  65 y.o.        LOS:  LOS: 2 days   Assessment/Plan     Principal Problem:    Cardiac arrest ([address] (8/31/2022)    Active Problems:    Respiratory failure ([address] (8/31/2022)      GENO (acute kidney injury) ([address] (8/31/2022)      Disseminated herpes zoster (8/31/2022)      Hydronephrosis (8/31/2022)      Shock ([address] (8/31/2022)      Sepsis ([address] (8/31/2022)      UTI (urinary tract infection) (9/1/2022)      Plan:    9/2/2022  Patient remained intubated  Off sedation  Of Levophed  Cardiac telemetry stable with occasional PVCs  Continue with supportive treatment  Discussed with wife and family members      Cardiac arrest PEA arrest  Mild troponin elevation after CPR and PEA cardiac arrest, normal EF and wall motion, no evidence of ACS  DVT  Continue anticoagulation if no active bleeding  Continue with supportive treatment  I have long and lengthy discussion with family about management plan. All the questions were answered. 35 minutes of critical care time spent in the direct evaluation and treatment of this high risk patient. The reason for providing this level of medical care for this critically ill patient was due a critical illness that impaired one or more vital organ systems such that there was a high probability of imminent or life threatening deterioration in the patients condition. This care involved high complexity decision making to assess, manipulate, and support vital system functions, to treat this degreee vital organ system failure and to prevent further life threatening deterioration of the patients condition.     Subjective:    cc:  Cardiac arrest        REVIEW OF SYSTEMS:     Limited due to intubation      Objective:      Visit Vitals  /60   Pulse (!) 117   Temp 99.4 °F (37.4 °C)   Resp 19   Ht 5' 10\" (1.778 m)   Wt 111.6 kg (246 lb)   SpO2 98%   BMI 35.30 kg/m²     Body mass index is 35.3 kg/m². Physical Exam:  General Appearance: Intubated  NECK: No JVD, no thyroidomeglay. LUNGS: Clear bilaterally. HEART: S1+S2 audible,    ABD: Non-tender, BS Audible    EXT: No edema, and no cysnosis. VASCULAR EXAM: Pulses are intact.     PSYCHIATRIC EXAM: Intubated    Medication:  Current Facility-Administered Medications   Medication Dose Route Frequency    [START ON 9/3/2022] insulin glargine (LANTUS) injection 12 Units  12 Units SubCUTAneous Q24H    insulin lispro (HUMALOG) injection   SubCUTAneous Q6H    ELECTROLYTE REPLACEMENT PROTOCOL - Potassium Renal Dosing  1 Each Other PRN    acyclovir (ZOVIRAX) 750 mg in 0.9% sodium chloride 250 mL IVPB  10 mg/kg (Ideal) IntraVENous Q8H    piperacillin-tazobactam (ZOSYN) 3.375 g in 0.9% sodium chloride (MBP/ADV) 100 mL MBP  3.375 g IntraVENous Q8H    Vancomycin Random Concentration 9/2/2022 at 1300  1 Each Other ONCE    NOREPINephrine (LEVOPHED) 8 mg in 5% dextrose 250mL (32 mcg/mL) infusion  2-30 mcg/min IntraVENous TITRATE    Vancomycin Pharmacy to Dose  1 Each Other Rx Dosing/Monitoring    heparin (porcine) 25,000 units in 0.45% saline 250 ml infusion  18-36 Units/kg/hr IntraVENous TITRATE    0.9% sodium chloride infusion  50 mL/hr IntraVENous CONTINUOUS    sodium chloride (NS) flush 5-40 mL  5-40 mL IntraVENous Q8H    sodium chloride (NS) flush 5-40 mL  5-40 mL IntraVENous PRN    acetaminophen (TYLENOL) tablet 650 mg  650 mg Oral Q6H PRN    Or    acetaminophen (TYLENOL) suppository 650 mg  650 mg Rectal Q6H PRN    polyethylene glycol (MIRALAX) packet 17 g  17 g Oral DAILY PRN    ondansetron (ZOFRAN ODT) tablet 4 mg  4 mg Oral Q8H PRN    Or    ondansetron (ZOFRAN) injection 4 mg  4 mg IntraVENous Q6H PRN    pantoprazole (PROTONIX) 40 mg in 0.9% sodium chloride 10 mL injection  40 mg IntraVENous Q12H    arformoteroL (BROVANA) neb solution 15 mcg  15 mcg Nebulization BID RT    levETIRAcetam (KEPPRA) 1,000 mg in 0.9% sodium chloride 100 mL IVPB  1,000 mg IntraVENous Q12H    propofol (DIPRIVAN) 10 mg/mL infusion  0-50 mcg/kg/min IntraVENous TITRATE               Lab/Data Reviewed:  Procedures/imaging: see electronic medical records for all procedures/Xrays   and details which were not copied into this note but were reviewed prior to creation of Plan       All lab results for the last 24 hours reviewed. Recent Labs     09/01/22  0300 08/31/22  1415 08/31/22  1000   WBC 12.4 16.8* 9.6   HGB 10.7* 13.1 10.5*   HCT 32.9* 42.3 33.8*    162 146       Recent Labs     09/02/22  0540 09/01/22  1730 09/01/22  1021 09/01/22  0300 08/31/22  1415   *  --   --  145 142   K 3.9 4.0 3.4* 3.4* 4.1   *  --   --  113* 108   CO2 17*  --   --  25 20*   *  --   --  232* 340*   BUN 42*  --   --  51* 52*   CREA 3.16*  --   --  3.03* 2.91*   CA 8.3*  --   --  7.7* 8.3*         RADIOLOGY:      CXR Results  (Last 48 hours)                 08/31/22 1610  XR CHEST PORT Final result    Impression:          1. Support devices in stable/appropriate position as visualized. 2. No superimposed acute radiographic cardiopulmonary abnormality. Narrative:  EXAM: XR CHEST PORT       CLINICAL INDICATION/HISTORY: central line   -Additional: None       COMPARISON: 4 hours prior       TECHNIQUE: Portable frontal view of the chest       _______________       FINDINGS:       SUPPORT DEVICES: Endotracheal tube, gastric tube, and left IJ approach central   venous catheter noted. Tip of the central venous catheter is at the level of the   superior cavoatrial junction. HEART AND MEDIASTINUM: Normal cardiac size and mediastinal contours. LUNGS AND PLEURAL SPACES: No focal pneumonic opacity. No evidence of   pneumothorax or pleural effusion.        BONY THORAX AND SOFT TISSUES: Unremarkable.       _______________           08/31/22 1133  XR CHEST PORT Final result    Impression:      Enteric tube tip projecting over the thoracic inlet. Endotracheal tube tip in   the midthoracic trachea. No acute cardiopulmonary abnormality. Narrative:  XR CHEST PORT       CLINICAL INDICATION/HISTORY: Tube placement   -Additional: None       COMPARISON: None       TECHNIQUE: Portable frontal view of the chest       _______________       FINDINGS:       SUPPORT DEVICES: Enteric tube tip projecting over the thoracic inlet. Endotracheal tube tip in the midthoracic trachea. Left IJV central venous   catheter tip at the caval atrial junction. HEART AND MEDIASTINUM: Cardiomediastinal silhouette within normal limits.        LUNGS AND PLEURAL SPACES: No dense consolidation, large effusion or   pneumothorax.       _______________                     Cardiology Procedures:   Results for orders placed or performed during the hospital encounter of 08/31/22   EKG, 12 LEAD, INITIAL   Result Value Ref Range    Ventricular Rate 112 BPM    Atrial Rate 112 BPM    P-R Interval 158 ms    QRS Duration 100 ms    Q-T Interval 360 ms    QTC Calculation (Bezet) 491 ms    Calculated P Axis 73 degrees    Calculated R Axis -69 degrees    Calculated T Axis 51 degrees    Diagnosis       Sinus tachycardia  Left axis deviation  Low voltage QRS  Inferior infarct , age undetermined  Abnormal ECG  No previous ECGs available  Confirmed by Gale Greenberg MD. ([id number]) on 8/31/2022 11:30:53 PM        Echo Results  (Last 48 hours)      None         Cardiolite (Tc-99m Sestamibi) stress test    Signed By: Sonya Bruner MD     September 2, 2022

## 2022-09-02 NOTE — PROGRESS NOTES
Physician Progress Note      PATIENT:               Abdi Little  CSN #:                  [id number]  :                       1960  ADMIT DATE:       2022 9:51 AM  DISCH DATE:  RESPONDING  PROVIDER #:        Arina Armstrong MD          QUERY TEXT:    Pt admitted with sepsis, cardiac arrest, acute respiratory failure and RLE popliteal DVT and has shock documented. If possible, please document in progress notes and discharge summary further specificity regarding the type of shock: The medical record reflects the following    Risk Factors:  hypertension, dyslipidemia, type 2 diabetes mellitus, vitamin D deficiency, stage 3a CKD and gout male with history of who presents with cardiac arrest.    Clinical Indicators:  > Per ID consult - probable septic shock  > WBC 16.8  >  LISA- 68/50 -140/121  > HR   > Temp 95.3 Important -99.6  > Intubated  > ED Course as of 22 1445  Wed Aug 31, 2022  1226 Lactic Acid (POC)(!!): 10.02  > Per ED provider- circulatory shock  > O2 sats- 82 -100  > Per H&PThe call for EMS was for \"illness\". In their presence patient had a witnessed cardiac arrest.  Reportedly in pulseless electrical activity    Treatment: Receiving Mechanical ventilation, Neurology, Cardiology, Pulmonology, Urology, Epinephrine, Zosyn, Vancomycin, IV NS    Wu Jeffery RN, BSN, Namita Kim / Leslye Whitlock, [facility] [email or web address]  Options provided:  -- Cardiogenic Shock  -- [[Septic Shock  -- Other - I will add my own diagnosis  -- Disagree - Not applicable / Not valid  -- Disagree - Clinically unable to determine / Unknown  -- Refer to Clinical Documentation Reviewer    PROVIDER RESPONSE TEXT:    This patient is in cardiogenic shock. Query created by: Joey Brown on 2022 3:32 PM      QUERY TEXT:    Pt admitted with sepsis, cardiac arrest, shock, RLE popliteal DVT  and has acute respiratory failure documented.  If possible, please document in progress notes and discharge summary further specificity regarding the type and acuity of respiratory failure: The medical record reflects the following:    Risk Factors:  hypertension, dyslipidemia, type 2 diabetes mellitus, vitamin D deficiency, stage 3a CKD and gout male with history of who presents with cardiac arrest.    Clinical Indicators:  > Per H&P-  Acute respiratory failure requiring intubation  > ABGs  8/31/22 10:08  O2 SAT: 100  Sample source: ARTERIAL  SITE: RIGHT RADIAL  WILLY'S TEST: Positive  FIO2: 100  Tidal volume: 480  PEEP/CPAP: 5  pH (POC): 7.26 (L)  pCO2 (POC): 39.1  pO2 (POC): 444 (H)  HCO3 (POC): 17.7 (L)  > RR 18-29    Treatment: Receiving HOB>30 degrees. Bronchodilators. ABG, Follow sputum cx. Daily CXR. Intensivist consult -Dr. Colonel Dimas studies      Margarita Crawford, RN, BSN, Abena Engel / Araseli Polk, [address] [facility] [email or web address]  Options provided:  -- Acute respiratory failure with hypoxia  -- Acute respiratory failure with hypercapnia  -- Other - I will add my own diagnosis  -- Disagree - Not applicable / Not valid  -- Disagree - Clinically unable to determine / Unknown  -- Refer to Clinical Documentation Reviewer    PROVIDER RESPONSE TEXT:    This patient is in acute respiratory failure with hypoxia.     Query created by: Casa Tovar on 9/1/2022 3:40 PM      Electronically signed by:  Carolin Case MD 9/2/2022 10:09 AM

## 2022-09-03 NOTE — PROGRESS NOTES
[facility]  (A Division of [address] [place] [address])    Salty Allen MD  Office #: - Option # [phone]  Fax #: [phone]     Date of Admission: 8/31/2022   Date of Note: 9/3/2022     Reason for Referral: Evaluation and antibiotic management of dissiminated shingles/ shock post carrest   I am on call over the weekend-- [phone]. Please call via  or Perfect Serve for on call MD if questions or concerns. Thanks. Current Antimicrobials:    Prior Antimicrobials:  Zosyn/ Vanco 8/31 to date  Acyclovir IV    Antibiotic allergy: None     Assessment:   Critically ill patient /guraded prognosis with :    Probable Septic Shock - on Levophed. Likely urine source--skin( has shingles)? --Severe L and mod R hydronephrosis, without obstructing stone. UA with TNTC wbc  --BCX  8/31: NGSF, no UCX available for review prior to antibiotics  --Fluctuating WBC-- up to 15.1, not on steroid  S/P Cardiac arrest with elevated troponins-PE not ruled out  -- age indetermiante DVT on LLE  Suspected anoxic encephalopathy- with myoclonus jerks-- on Propfol- Neuro following  --MRI brain pending  L lumbar/sacral dermatome Shingles-- was on viral medication at home per reports  Transaminitis-- from shock liver? Improving  Acute on chronick GENO (Cr 1.6 5/2022)- Cr >3  History of gout treatment with colchicine recently per wife--new diagnosis    Plan:       FU BCX: remain negative, DC vancomycin  Monitor cbc/cmp daily  Cont Zosyn- adjusted to CrCl  Cont acyclovir IV-- dosed for crCl-- monitor electrolytes closely- until 9/4/22( was on oral PTA)--MRI brain pending  Air borne isolation/ contact isolation discontinued-- lesions now dry and crusted--no active lesion, no superinfection with cellulitis noted  Supportive care per ICU  DW ICU RN, Neurology MD    Subjective:      Pt seen and DW ICU RN. He is intubated/ unresponsive. Not on pressor.   Placed back on Propofol due to tachycardia  Cardiac/ Neuro team following as well-- concern for anoxic brain injury-- MRI brain pending    Notes/Labs reviewed    HPI 9/1/22: Zelda Dukes is 65 y.o. M patient with PMH of shingles, on medication. History is limited and obtained from chart review and med staff. Presented to ED yesterday and was in cardiac arrest with CPR that took 30 minutes. He was intubated/ placed on pressor and admitted to ICU. He had shingles at home and was on treatment. He had urine retention  and had about 2L of urine with duran placement per RN. Hydronephrosis on CT scan, no CPD on CXR, LLE DVT of unknown age on FORBES. He was placed on emperic abx    He is currently on pressor- Levophed, unresponsive, with myoclonus jerk that requires propfol    Past Medical History:   Diagnosis Date    Gout     Shingles      No past surgical history on file. No family history on file.   Medications reviewed as below:   Current Facility-Administered Medications   Medication Dose Route Frequency Provider Last Rate Last Admin    insulin glargine (LANTUS) injection 12 Units  12 Units SubCUTAneous Q24H Christine Aparicio MD   12 Units at 09/03/22 0815    acyclovir (ZOVIRAX) 750 mg in 0.9% sodium chloride 250 mL IVPB  10 mg/kg (Ideal) IntraVENous Q12H Bruna Zhang  mL/hr at 09/03/22 0135 750 mg at 09/03/22 0135    insulin lispro (HUMALOG) injection   SubCUTAneous Q6H Christine Aparicio MD   6 Units at 09/03/22 0548    ELECTROLYTE REPLACEMENT PROTOCOL - Potassium Renal Dosing  1 Each Other PRN Marta Eduardo MD        piperacillin-tazobactam (ZOSYN) 3.375 g in 0.9% sodium chloride (MBP/ADV) 100 mL MBP  3.375 g IntraVENous Q8H Loretta CANADA DO 25 mL/hr at 09/03/22 0421 3.375 g at 09/03/22 0421    NOREPINephrine (LEVOPHED) 8 mg in 5% dextrose 250mL (32 mcg/mL) infusion  2-30 mcg/min IntraVENous TITRATE Efe Giron DO   Held at 09/01/22 0900    heparin (porcine) 25,000 units in 0.45% saline 250 ml infusion  18-36 Units/kg/hr IntraVENous TITRATE Dorthea Siciliatab, DO 11.2 mL/hr at 09/03/22 0941 10 Units/kg/hr at 09/03/22 0941    0.9% sodium chloride infusion  25 mL/hr IntraVENous CONTINUOUS Iraj Solano MD 25 mL/hr at 09/02/22 2110 25 mL/hr at 09/02/22 2110    sodium chloride (NS) flush 5-40 mL  5-40 mL IntraVENous Q8H Meredith Chakraborty MD   10 mL at 09/03/22 0519    sodium chloride (NS) flush 5-40 mL  5-40 mL IntraVENous PRN Meredith Chakraborty MD        acetaminophen (TYLENOL) tablet 650 mg  650 mg Oral Q6H PRN Meredith Chakraborty MD   650 mg at 09/02/22 2027    Or    acetaminophen (TYLENOL) suppository 650 mg  650 mg Rectal Q6H PRN Brigette Chakraborty MD        polyethylene glycol (MIRALAX) packet 17 g  17 g Oral DAILY PRN Brigette Chakraborty MD        ondansetron (ZOFRAN ODT) tablet 4 mg  4 mg Oral Q8H PRN Brigette Chakraborty MD        Or    ondansetron (ZOFRAN) injection 4 mg  4 mg IntraVENous Q6H PRN Brigette Chakraborty MD        pantoprazole (PROTONIX) 40 mg in 0.9% sodium chloride 10 mL injection  40 mg IntraVENous Q12H Brigette Chakraborty MD   40 mg at 09/03/22 0814    arformoteroL (BROVANA) neb solution 15 mcg  15 mcg Nebulization BID RT Iraj Solano MD   15 mcg at 09/03/22 0738    levETIRAcetam (KEPPRA) 1,000 mg in 0.9% sodium chloride 100 mL IVPB  1,000 mg IntraVENous Q12H Kate LONGORIA  mL/hr at 09/03/22 0820 1,000 mg at 09/03/22 0820    propofol (DIPRIVAN) 10 mg/mL infusion  0-50 mcg/kg/min IntraVENous TITRATE Martha Cartagena MD 13.4 mL/hr at 09/03/22 1017 20 mcg/kg/min at 09/03/22 1017     No Known Allergies  Social History     Socioeconomic History    Marital status:      Spouse name: Not on file    Number of children: Not on file    Years of education: Not on file    Highest education level: Not on file   Occupational History    Not on file   Tobacco Use    Smoking status: Not on file    Smokeless tobacco: Not on file   Substance and Sexual Activity    Alcohol use: Not on file    Drug use: Not on file    Sexual activity: Not on file   Other Topics Concern    Not on file   Social History Narrative    Not on file     Social Determinants of Health     Financial Resource Strain: Not on file   Food Insecurity: Not on file   Transportation Needs: Not on file   Physical Activity: Not on file   Stress: Not on file   Social Connections: Not on file   Intimate Partner Violence: Not on file   Housing Stability: Not on file        Review of Systems: Unable to provide      Objective:      Visit Vitals  BP (!) 150/85   Pulse (!) 112   Temp 100.2 °F (37.9 °C)   Resp 11   Ht 5' 10\" (1.778 m)   Wt 111.6 kg (246 lb)   SpO2 99%   BMI 35.30 kg/m²     Temp (24hrs), Av.7 °F (37.6 °C), Min:98.8 °F (37.1 °C), Max:100.6 °F (38.1 °C)         GEN: WD unresponsive, intubated    Lines / Catheters: Left IJ central line, L PIV, Lyle  . HEENT: Unicteric. Edematous sclera. --no neck swelling  CHEST: Non laboured breathing. CTA  CVS: Tachycardic- HS 1 and 2 heard, no mur/gallop  ABD: Obese/soft. Non tender. Non distended. Hypoactive BS  VERNELL: Lyle in place  EXT: No apparent swelling or redness on UE/LE joints. Skin: Dry and intact.  No obvious cellulitis-- has L thigh/buttock Shingles rash-- multiple dermatome, no active vesicular lesion on exam.  CNS: unresponsive, no jerky movement      Labs: Results:   Chemistry Recent Labs     22  0420 22  0540 22  1730 22  1021 22  0300   * 223*  --   --  232*   * 146*  --   --  145   K 3.6 3.9 4.0   < > 3.4*   * 114*  --   --  113*   CO2 21 17*  --   --  25   BUN 39* 42*  --   --  51*   CREA 3.13* 3.16*  --   --  3.03*   CA 8.5 8.3*  --   --  7.7*   AGAP 10 15  --   --  7   BUCR 12 13  --   --  17    111  --   --  98   TP 6.3* 5.7*  --   --  6.3*   ALB 1.9* 2.0*  --   --  2.2*   GLOB 4.4* 3.7  --   --  4.1*   AGRAT 0.4* 0.5*  --   --  0.5*    < > = values in this interval not displayed. CBC w/Diff Recent Labs     09/03/22  0420 09/01/22  0300 08/31/22  1415   WBC 15.1* 12.4 16.8*   RBC 3.17* 3.78* 4.69   HGB 9.0* 10.7* 13.1   HCT 28.0* 32.9* 42.3    174 162   GRANS 81*  --  79*   LYMPH 8*  --  6*   EOS 1  --  0            No results found for: SDES Lab Results   Component Value Date/Time    Culture result: NO GROWTH 3 DAYS 08/31/2022 10:15 AM    Culture result: NO GROWTH 3 DAYS 08/31/2022 10:00 AM        Results       Procedure Component Value Units Date/Time    COVID-19 RAPID TEST [[id number]] Collected: 08/31/22 1050    Order Status: Completed Specimen: Nasopharyngeal Updated: 08/31/22 1131     Specimen source Nasopharyngeal        COVID-19 rapid test Not detected        Comment: Rapid Abbott ID Now       Rapid NAAT:  The specimen is NEGATIVE for SARS-CoV-2, the novel coronavirus associated with COVID-19. Negative results should be treated as presumptive and, if inconsistent with clinical signs and symptoms or necessary for patient management, should be tested with an alternative molecular assay. Negative results do not preclude SARS-CoV-2 infection and should not be used as the sole basis for patient management decisions. This test has been authorized by the FDA under an Emergency Use Authorization (EUA) for use by authorized laboratories.    Fact sheet for Healthcare Providers: ConventionUpdate.co.nz  Fact sheet for Patients: ConventionUpdate.co.nz       Methodology: Isothermal Nucleic Acid Amplification         CULTURE, BLOOD [[id number]] Collected: 08/31/22 1015    Order Status: Completed Specimen: Blood Updated: 09/03/22 0744     Special Requests: NO SPECIAL REQUESTS        Culture result: NO GROWTH 3 DAYS       CULTURE, BLOOD [[id number]] Collected: 08/31/22 1000    Order Status: Completed Specimen: Blood Updated: 09/03/22 0744     Special Requests: NO SPECIAL REQUESTS        Culture result: NO GROWTH 3 DAYS Imaging:      All imaging reviewed from Admission to present as per radiology interpretation in [facility]

## 2022-09-03 NOTE — PROGRESS NOTES
1915- Bedside and Verbal shift change report given to Carmina Andrea RN (oncoming nurse) by Mey Medina RN (offgoing nurse). Report included the following information SBAR, Kardex, ED Summary, Intake/Output, MAR, Recent Results, Med Rec Status, and Cardiac Rhythm Sinus Tach/ NSR .     2000- Shift assessment completed. Pt remains on ventilator on 35% FiO2 with O2 sat 98%, responds to noxious stimuli only, and is in sinus tach on the monitor. Shingles wound on left leg is dry with no open blisters; contact precautions remain active at this time. CHG bath and oral care completed. 2342- aPTT greater than 180 sec, heparin held at this time. 0000- Reassessment completed; no change to pt condition. 5396- Heparin restarted, see MAR.     0400- Reassessment completed, no change to pt condition. 0516- aPTT 131. .. heparin titrated appropriately see MAR.     0700- Bedside and Verbal shift change report given to Mey Medina RN (oncoming nurse) by Carmina Andrea RN (offgoing nurse). Report included the following information SBAR, Kardex, ED Summary, Intake/Output, MAR, Recent Results, Med Rec Status, and Cardiac Rhythm Sinus Tach .

## 2022-09-03 NOTE — PROGRESS NOTES
MRI brain shows 1. High-grade acute/subacute anoxic brain injury without herniation. 2.  Heterogeneous expansile sellar lesion, nonspecific, pituitary  hematoma/hemorrhage favored over other etiologies. Pituitary hemorrhage is often caused by pituitary adenoma. Sudden pituitary hemorrhage and subsequent ACTH insufficiency contribute to hypotension. With high grade anoxic brain injury, prognosis is poor. Treatment goal need to be discussed with family.

## 2022-09-03 NOTE — PROGRESS NOTES
Hospitalist Progress Note    Patient: Fior Vizcaino MRN: [id number]  CSN: [id number]    YOB: 1960  Age: 65 y.o. Sex: male    DOA: 8/31/2022 LOS:  LOS: 3 days          Chief Complaint:    arrest      Assessment/Plan        Fior Vizcaino is a 65 y.o. hypertension, dyslipidemia, type 2 diabetes mellitus, vitamin D deficiency, stage 3a CKD and gout male with history of who presents with cardiac arrest.      Cardiac arrest, PEA  Acute respiratory failure  Anoxia  Myoclonic jerks  Shock   RLE popliteal DVT  Sepsis, likely due to urinary source  UTI  Disseminated herpes zoster  Severe left and moderate right hydroureteronephrosis   GENO on CKD 3  Hyperglycemia    CR slightly better    Hypernatremia-dextrose IV to help lower     Renal US shows hydro  Continue duran  Heparin gtt  IV antibiotics  IV acyclovir  IV PPI    Off sedation     Lantus, SSI     Echo with nl EF     Seen by neurology, guarded neurologic prognosis     Cardiology on case     Vent mgmt/sedation per intensivist     head CT negative       GI - NPO      Full code     Disposition :  Patient Active Problem List   Diagnosis Code    Cardiac arrest ([facility] [address] I46.9    Respiratory failure ([facility] [address] J96.90    GENO (acute kidney injury) ([facility] [address] N17.9    Disseminated herpes zoster B02.7    Hydronephrosis N13.30    Shock ([facility] [address] R57.9    Sepsis ([facility] [address] A41.9    UTI (urinary tract infection) N39.0       Subjective:  No new events  Sedation off      Review of systems:    UTO due to sedation of patient      Vital signs/Intake and Output:  Visit Vitals  BP (!) 146/81   Pulse (!) 125   Temp 99.8 °F (37.7 °C)   Resp 17   Ht 5' 10\" (1.778 m)   Wt 111.6 kg (246 lb)   SpO2 97%   BMI 35.30 kg/m²     Current Shift:  No intake/output data recorded.   Last three shifts:  09/01 1901 - 09/03 0700  In: 3464.1 [I.V.:3404.1]  Out: 2250 [Urine:2250]    Exam:    General: remains sedate, intubatd NAD  Head/Neck: NCAT, supple, No masses, No lymphadenopathy  CVS:Regular rate and rhythm, no M/R/G, S1/S2 heard, no thrill  Lungs:Clear to auscultation bilaterally, no wheezes, rhonchi, or rales  Abdomen: Soft, Nontender, No distention  Extremities: No C/C/E, pulses palpable 2+  Skin:normal texture and turgor, no rashes, no lesions  Neuro:cannot assess                Labs: Results:       Chemistry Recent Labs     09/03/22 0420 09/02/22  0540 09/01/22  1730 09/01/22  1021 09/01/22  0300   * 223*  --   --  232*   * 146*  --   --  145   K 3.6 3.9 4.0   < > 3.4*   * 114*  --   --  113*   CO2 21 17*  --   --  25   BUN 39* 42*  --   --  51*   CREA 3.13* 3.16*  --   --  3.03*   CA 8.5 8.3*  --   --  7.7*   AGAP 10 15  --   --  7   BUCR 12 13  --   --  17    111  --   --  98   TP 6.3* 5.7*  --   --  6.3*   ALB 1.9* 2.0*  --   --  2.2*   GLOB 4.4* 3.7  --   --  4.1*   AGRAT 0.4* 0.5*  --   --  0.5*    < > = values in this interval not displayed. CBC w/Diff Recent Labs     09/03/22 0420 09/01/22 0300 08/31/22  1415 08/31/22  1000   WBC 15.1* 12.4 16.8* 9.6   RBC 3.17* 3.78* 4.69 3.62*   HGB 9.0* 10.7* 13.1 10.5*   HCT 28.0* 32.9* 42.3 33.8*    174 162 146   GRANS 81*  --  79* 69   LYMPH 8*  --  6* 21   EOS 1  --  0 1      Cardiac Enzymes No results for input(s): CPK, CKND1, ROB in the last 72 hours. No lab exists for component: CKRMB, TROIP   Coagulation Recent Labs     09/03/22 0420 09/02/22 2235 08/31/22  1415 08/31/22  1000   PTP  --   --   --  18.7*   INR  --   --   --  1.5*   APTT 131.0* >180.0*   < >  --     < > = values in this interval not displayed. Lipid Panel No results found for: CHOL, CHOLPOCT, CHOLX, CHLST, CHOLV, 448274, HDL, HDLP, LDL, LDLC, DLDLP, 163817, VLDLC, VLDL, TGLX, TRIGL, TRIGP, TGLPOCT, CHHD, CHHDX   BNP No results for input(s): BNPP in the last 72 hours.    Liver Enzymes Recent Labs     09/03/22  0420   TP 6.3*   ALB 1.9*         Thyroid Studies No results found for: T4, T3U, TSH, TSHEXT     Procedures/imaging: see electronic medical records for all procedures/Xrays and details which were not copied into this note but were reviewed prior to creation of P[facility]

## 2022-09-03 NOTE — PROGRESS NOTES
NEUROLOGY PROGRESS NOTE        Patient: Stephanie Brannon        Sex: male          DOA: 8/31/2022  YOB: 1960      Age:  65 y.o.         LOS: 3 days     Identification:  [id number] male presents with coma after cardiac arrest.            SUBJECTIVE:   Patient remains to be in coma when propofol was on hold this morning. Propofol restarted after eyelid twitching appears this morning. REVIEW OF SYSTEMS: Unable to obtain. OBJECTIVE:    Visit Vitals  BP (!) 150/85   Pulse (!) 112   Temp 100.2 °F (37.9 °C)   Resp 15   Ht 5' 10\" (1.778 m)   Wt 111.6 kg (246 lb)   SpO2 99%   BMI 35.30 kg/m²     Physical Exam:  GEN: Alert, NAD  EYES: conjunctiva normal, lids with out lesions  HEENT: MMM. HEART: RRR +S1 +S2  LUNGS: CTA B/L no rales or rhonchi. ABDOMEN: Soft, non-tender. EXTREMITIES: No edema cyanosis  SKIN: no rashes or skin breakdown, no nodules  NEURO: Coma, not respond to verbal or physical stimuli. Cranials: Neck is supple. Face is symmetric. PERRLA, positive corneal reflex, positive gag reflex, normal oculocephalic reflex. He is fully breathing over the vent. Motor: No spontaneous movement all 4 extremities. Sensory: No response to stimuli. Coordination: Unable to obtain.   Current Facility-Administered Medications   Medication Dose Route Frequency    dextrose 5% infusion  50 mL/hr IntraVENous CONTINUOUS    insulin glargine (LANTUS) injection 12 Units  12 Units SubCUTAneous Q24H    acyclovir (ZOVIRAX) 750 mg in 0.9% sodium chloride 250 mL IVPB  10 mg/kg (Ideal) IntraVENous Q12H    insulin lispro (HUMALOG) injection   SubCUTAneous Q6H    ELECTROLYTE REPLACEMENT PROTOCOL - Potassium Renal Dosing  1 Each Other PRN    piperacillin-tazobactam (ZOSYN) 3.375 g in 0.9% sodium chloride (MBP/ADV) 100 mL MBP  3.375 g IntraVENous Q8H    NOREPINephrine (LEVOPHED) 8 mg in 5% dextrose 250mL (32 mcg/mL) infusion  2-30 mcg/min IntraVENous TITRATE    heparin (porcine) 25,000 units in 0.45% saline 250 ml infusion  18-36 Units/kg/hr IntraVENous TITRATE    sodium chloride (NS) flush 5-40 mL  5-40 mL IntraVENous Q8H    sodium chloride (NS) flush 5-40 mL  5-40 mL IntraVENous PRN    acetaminophen (TYLENOL) tablet 650 mg  650 mg Oral Q6H PRN    Or    acetaminophen (TYLENOL) suppository 650 mg  650 mg Rectal Q6H PRN    polyethylene glycol (MIRALAX) packet 17 g  17 g Oral DAILY PRN    ondansetron (ZOFRAN ODT) tablet 4 mg  4 mg Oral Q8H PRN    Or    ondansetron (ZOFRAN) injection 4 mg  4 mg IntraVENous Q6H PRN    pantoprazole (PROTONIX) 40 mg in 0.9% sodium chloride 10 mL injection  40 mg IntraVENous Q12H    arformoteroL (BROVANA) neb solution 15 mcg  15 mcg Nebulization BID RT    levETIRAcetam (KEPPRA) 1,000 mg in 0.9% sodium chloride 100 mL IVPB  1,000 mg IntraVENous Q12H    propofol (DIPRIVAN) 10 mg/mL infusion  0-50 mcg/kg/min IntraVENous TITRATE       Laboratory  Recent Results (from the past 24 hour(s))   GLUCOSE, POC    Collection Time: 09/02/22  1:07 PM   Result Value Ref Range    Glucose (POC) 226 (H) 70 - 110 mg/dL   GLUCOSE, POC    Collection Time: 09/02/22  5:35 PM   Result Value Ref Range    Glucose (POC) 205 (H) 70 - 110 mg/dL   PTT    Collection Time: 09/02/22 10:35 PM   Result Value Ref Range    aPTT >180.0 (HH) 23.0 - 36.4 SEC   GLUCOSE, POC    Collection Time: 09/02/22 11:55 PM   Result Value Ref Range    Glucose (POC) 241 (H) 70 - 110 mg/dL   PTT    Collection Time: 09/03/22  4:20 AM   Result Value Ref Range    aPTT 131.0 (H) 23.0 - 16.4 SEC   METABOLIC PANEL, COMPREHENSIVE    Collection Time: 09/03/22  4:20 AM   Result Value Ref Range    Sodium 151 (H) 136 - 145 mmol/L    Potassium 3.6 3.5 - 5.5 mmol/L    Chloride 120 (H) 100 - 111 mmol/L    CO2 21 21 - 32 mmol/L    Anion gap 10 3.0 - 18 mmol/L    Glucose 190 (H) 74 - 99 mg/dL    BUN 39 (H) 7.0 - 18 MG/DL    Creatinine 3.13 (H) 0.6 - 1.3 MG/DL    BUN/Creatinine ratio 12 12 - 20      GFR est AA 25 (L) >60 ml/min/1.73m2    GFR est non-AA 20 (L) >60 ml/min/1.73m2    Calcium 8.5 8.5 - 10.1 MG/DL    Bilirubin, total 0.6 0.2 - 1.0 MG/DL    ALT (SGPT) 100 (H) 16 - 61 U/L    AST (SGOT) 111 (H) 10 - 38 U/L    Alk. phosphatase 112 45 - 117 U/L    Protein, total 6.3 (L) 6.4 - 8.2 g/dL    Albumin 1.9 (L) 3.4 - 5.0 g/dL    Globulin 4.4 (H) 2.0 - 4.0 g/dL    A-G Ratio 0.4 (L) 0.8 - 1.7     MAGNESIUM    Collection Time: 09/03/22  4:20 AM   Result Value Ref Range    Magnesium 2.5 1.6 - 2.6 mg/dL   PHOSPHORUS    Collection Time: 09/03/22  4:20 AM   Result Value Ref Range    Phosphorus 3.0 2.5 - 4.9 MG/DL   CBC WITH AUTOMATED DIFF    Collection Time: 09/03/22  4:20 AM   Result Value Ref Range    WBC 15.1 (H) 4.6 - 13.2 K/uL    RBC 3.17 (L) 4.35 - 5.65 M/uL    HGB 9.0 (L) 13.0 - 16.0 g/dL    HCT 28.0 (L) 36.0 - 48.0 %    MCV 88.3 78.0 - 100.0 FL    MCH 28.4 24.0 - 34.0 PG    MCHC 32.1 31.0 - 37.0 g/dL    RDW 16.0 (H) 11.6 - 14.5 %    PLATELET 528 012 - 049 K/uL    MPV 10.0 9.2 - 11.8 FL    NRBC 0.3 (H) 0  WBC    ABSOLUTE NRBC 0.05 (H) 0.00 - 0.01 K/uL    NEUTROPHILS 81 (H) 40 - 73 %    LYMPHOCYTES 8 (L) 21 - 52 %    MONOCYTES 9 3 - 10 %    EOSINOPHILS 1 0 - 5 %    BASOPHILS 0 0 - 2 %    IMMATURE GRANULOCYTES 1 (H) 0.0 - 0.5 %    ABS. NEUTROPHILS 12.3 (H) 1.8 - 8.0 K/UL    ABS. LYMPHOCYTES 1.2 0.9 - 3.6 K/UL    ABS. MONOCYTES 1.4 (H) 0.05 - 1.2 K/UL    ABS. EOSINOPHILS 0.1 0.0 - 0.4 K/UL    ABS. BASOPHILS 0.0 0.0 - 0.1 K/UL    ABS. IMM.  GRANS. 0.1 (H) 0.00 - 0.04 K/UL    DF AUTOMATED     GLUCOSE, POC    Collection Time: 09/03/22  5:25 AM   Result Value Ref Range    Glucose (POC) 221 (H) 70 - 110 mg/dL   BLOOD GAS, ARTERIAL POC    Collection Time: 09/03/22  5:56 AM   Result Value Ref Range    Device: ADULT VENT      FIO2 (POC) 35 %    pH (POC) 7.40 7.35 - 7.45      pCO2 (POC) 31.2 (L) 35.0 - 45.0 MMHG    pO2 (POC) 95 80 - 100 MMHG    HCO3 (POC) 19.4 (L) 22 - 26 MMOL/L    sO2 (POC) 97.5 (H) 92 - 97 %    Base deficit (POC) 4.7 mmol/L    Mode ASSIST CONTROL      Tidal volume 450 ml    Set Rate 14 bpm    PEEP/CPAP (POC) 5 cmH2O    Allens test (POC) Positive      Site RIGHT RADIAL      Specimen type (POC) ARTERIAL      Performed by Terra Crowe    POTASSIUM    Collection Time: 09/03/22 10:43 AM   Result Value Ref Range    Potassium 3.5 3.5 - 5.5 mmol/L   PTT    Collection Time: 09/03/22 10:48 AM   Result Value Ref Range    aPTT 106.6 (H) 23.0 - 36.4 SEC       Radiology:  CT HEAD WO CONT    Result Date: 8/31/2022  EXAM: CT head INDICATION: Cardiac arrest COMPARISON: None. TECHNIQUE: Axial CT imaging of the head was performed without intravenous contrast. Standard multiplanar coronal and sagittal reformatted images were obtained and are included in interpretation. One or more dose reduction techniques were used on this CT: automated exposure control, adjustment of the mAs and/or kVp according to patient size, and iterative reconstruction techniques. The specific techniques used on this CT exam have been documented in the patient's electronic medical record. Digital Imaging and Communications in Medicine (DICOM) format image data are available to nonaffiliated external healthcare facilities or entities on a secure, media free, reciprocally searchable basis with patient authorization for at least a 12-month period after this study. _______________ FINDINGS: BRAIN AND POSTERIOR FOSSA: Exam quality is mildly degraded secondary to motion artifact. Ventricular size and configuration appears within normal limits. Basilar cisterns are patent. Within the limitations of motion, no acute intra-axial hemorrhage. No evidence of mass effect or midline shift. Gray-white matter differentiation appears within normal limits as visualized. EXTRA-AXIAL SPACES AND MENINGES: There are no abnormal extra-axial fluid collections. CALVARIUM: Intact. SINUSES: Minor nonspecific mucosal thickening ethmoid air cells, sphenoid sinus with air-fluid level present in the right maxillary sinus.  OTHER: None. _______________ 1. Mildly motion limited study without evidence of acute intracranial abnormality. 2. Paranasal mucosal disease as described above with air-fluid level in the right maxillary sinus as can be seen with sinusitis. CT CHEST ABD PELV WO CONT    Result Date: 8/31/2022  EXAM: CT chest, abdomen, and pelvis INDICATION: Pain COMPARISON: No prior study. TECHNIQUE: Axial CT imaging of the chest, abdomen, and pelvis was performed without IV contrast administration. Multiplanar reformats were generated. One or more dose reduction techniques were used on this CT: automated exposure control, adjustment of the mAs and/or kVp according to patient size, and iterative reconstruction techniques. The specific techniques used on this CT exam have been documented in the patient's electronic medical record. Digital Imaging and Communications in Medicine (DICOM) format image data are available to nonaffiliated external healthcare facilities or entities on a secure, media free, reciprocally searchable basis with patient authorization for at least a 12-month period after this study. _______________ FINDINGS: CHEST: MEDIASTINUM: Left IJV central venous catheter tip at the cavoatrial junction. Normal caliber aorta with vascular calcifications. No pericardial effusion. LYMPH NODES: No pathologically enlarged mediastinal or hilar lymph nodes. PLEURA: No pleural effusion or pneumothorax. LUNGS/AIRWAY: Endotracheal tube tip in the midthoracic trachea. No consolidation or suspicious pulmonary nodule is seen. OTHER: None. ABDOMEN/PELVIS: LIVER, BILIARY: Liver is unremarkable. No abnormal biliary dilation. Gallbladder is unremarkable. PANCREAS: Unremarkable. SPLEEN: Unremarkable. ADRENALS: Unremarkable. KIDNEYS: Severe left and moderate right hydroureteronephrosis with marked urinary bladder distention. No obstructing calculus. Left lower pole 5.8 cm simple cysts, no follow-up necessary. LYMPH NODES: No pathologically enlarged lymph nodes. GASTROINTESTINAL TRACT: No abnormal bowel dilation or wall thickening. PELVIC ORGANS: Unremarkable. VASCULATURE: Unremarkable. OSSEOUS: No area of erosion or aggressive-appearing bone destruction. OTHER: None. _______________     Severe left and moderate right hydroureteronephrosis with marked urinary bladder distention. No obstructing calculus. No acute intrathoracic abnormality. US RETROPERITONEUM COMP    Result Date: 9/2/2022  US RETROPERITONEUM COMP INDICATION: Acute kidney injury. TECHNIQUE: Renal ultrasound. COMPARISON: 8/31/2022 CT FINDINGS: Right kidney measures 10.7 cm. Left kidney measures 6.7 cm. Bilateral increased cortical echogenicity. Severe left hydronephrosis with cortical atrophy. No shadowing calculus or perinephric abnormality. No solid renal mass identified. Indwelling Lyle catheter within decompressed urinary bladder. Bilateral increased cortical echogenicity. Severe left hydronephrosis with cortical atrophy. XR CHEST PORT    Result Date: 8/31/2022  EXAM: XR CHEST PORT CLINICAL INDICATION/HISTORY: central line -Additional: None COMPARISON: 4 hours prior TECHNIQUE: Portable frontal view of the chest _______________ FINDINGS: SUPPORT DEVICES: Endotracheal tube, gastric tube, and left IJ approach central venous catheter noted. Tip of the central venous catheter is at the level of the superior cavoatrial junction. HEART AND MEDIASTINUM: Normal cardiac size and mediastinal contours. LUNGS AND PLEURAL SPACES: No focal pneumonic opacity. No evidence of pneumothorax or pleural effusion. BONY THORAX AND SOFT TISSUES: Unremarkable. _______________     1. Support devices in stable/appropriate position as visualized. 2. No superimposed acute radiographic cardiopulmonary abnormality.      XR CHEST PORT    Result Date: 8/31/2022  XR CHEST PORT CLINICAL INDICATION/HISTORY: Tube placement -Additional: None COMPARISON: None TECHNIQUE: Portable frontal view of the chest _______________ FINDINGS: SUPPORT DEVICES: Enteric tube tip projecting over the thoracic inlet. Endotracheal tube tip in the midthoracic trachea. Left IJV central venous catheter tip at the caval atrial junction. HEART AND MEDIASTINUM: Cardiomediastinal silhouette within normal limits. LUNGS AND PLEURAL SPACES: No dense consolidation, large effusion or pneumothorax. _______________     Enteric tube tip projecting over the thoracic inlet. Endotracheal tube tip in the midthoracic trachea. No acute cardiopulmonary abnormality. ECHO ADULT COMPLETE    Result Date: 8/31/2022  Formatting of this result is different from the original.   Left Ventricle: Normal left ventricular systolic function with a visually estimated EF of 60 - 65%. Not well visualized. Left ventricle size is normal. Normal wall thickness. Normal wall motion. Right Ventricle: Not well visualized. Right ventricle is moderately dilated. Moderately reduced systolic function. Visually moderately dilated RV and moderately reduced RV systolic function. Aortic Valve: Tricuspid valve. Tricuspid Valve: The estimated RVSP is 25 mmHg. Right Atrium: Not well visualized. Right atrium is moderately dilated. No old echocardiogram available to compare. DUPLEX LOWER EXT VENOUS BILAT    Result Date: 9/1/2022  · Age indeterminate thrombus present in the right popliteal vein. · No evidence of deep vein thrombosis in the left lower extremity. ASSESSMENT/IMPRESSION:   78-year-old male with past medical history of shingles at lumbar/sacral dermatome, probable septic shock presents with, after cardiac arrest.  Coma: Anoxic brain injury after cardiac arrest.  Prognosis s poor. Cardiac arrest  Septic shock  Right lower limb popliteal DVT  Disseminated herpes zoster  PLAN/RECOMMENDATIONS:  Pending brain MRI. Continue Keppra 1000 mg twice daily for myoclonic jerk. Supportive care. Signed:   Poppy Villanueva MD  9/3/2022  9:37 AM

## 2022-09-03 NOTE — PROGRESS NOTES
1900- Bedside and Verbal shift change report given to Jamia Call RN (oncoming nurse) by Elsa Salinas RN (offgoing nurse). Report included the following information SBAR, Kardex, ED Summary, Intake/Output, MAR, Recent Results, Med Rec Status, and Cardiac Rhythm Sinus Tach . 2000- Shift assessment completed. Pt remains on the ventilator with minimal sedation, is unarousable and does not respond to any stimuli. Pupils are sluggish, round and equally reactive to light. Family present at the bedside and all questions answered at this time. CHG bath completed and electrodes replaced. 0000- Reassessment completed, no change to pt condition. 0400- Reassessment completed, no change to pt condition. 0700- Bedside and Verbal shift change report given to Jose L Rajan RN (oncoming nurse) by Jamia Call RN (offgoing nurse).  Report included the following information SBAR, Kardex, ED Summary, Intake/Output, MAR, Recent Results, Med Rec Status, and Cardiac Rhythm Sinus Tach/ NSR .

## 2022-09-03 NOTE — PROGRESS NOTES
I discussed with patient's wife over the phone. MRI brain findings are discusses. Prognosis is poor. Patient most probably will not have meaningful recovery. Family understands. Wife has a cousin who is a trauma surgeon at [facility], who recommended to monitor for 2 more weeks. Family feels that he has some improvement after hospitalization although the neurologic exam seems to be unchanged compared to previous notes. According to wife, patient is a fighter, therefore they will do everything for him and continue to monitor.

## 2022-09-03 NOTE — PROGRESS NOTES
0730-Received pt resting in bed with eyes closed, no sign of distress, vss on ventilator, sedation, and heparin drip, no sign of bleeding, will continue to monitor  1800-Pt stable throughout shift on ventilator and sedation, tolerated MRI well, heparin drip off due to MRI findings, tolerated PVL study well, family updated on pt status throughout shift  1919-Bedside and Verbal shift change report given to Jim [address] (oncoming nurse) by Lily Zamarripa RN (offgoing nurse). Report included the following information SBAR, Kardex, MAR, Recent Results, and Cardiac Rhythm SR/ST .

## 2022-09-03 NOTE — PROGRESS NOTES
/  /                        Cardiology Progress Note        Patient: Lauren Denis        Sex: male          DOA: 8/31/2022  YOB: 1960      Age:  65 y.o.        LOS:  LOS: 3 days   Assessment/Plan     Principal Problem:    Cardiac arrest ([address] (8/31/2022)    Active Problems:    Respiratory failure ([address] (8/31/2022)      GENO (acute kidney injury) ([address] (8/31/2022)      Disseminated herpes zoster (8/31/2022)      Hydronephrosis (8/31/2022)      Shock ([address] (8/31/2022)      Sepsis ([address] (8/31/2022)      UTI (urinary tract infection) (9/1/2022)      Plan:  9/3/2022  Blood pressure is mildly elevated with mild tachycardia  I will add low-dose IV metoprolol 1.25 mg every 6 hours  Continue with rest of the treatment  Discussed with Dr. Manoj Jordan        9/2/2022  Patient remained intubated  Off sedation  Of Levophed  Cardiac telemetry stable with occasional PVCs  Continue with supportive treatment  Discussed with wife and family members  Discussed with Dr. Manoj Jordan    Cardiac arrest PEA arrest  Mild troponin elevation after CPR and PEA cardiac arrest, normal EF and wall motion, no evidence of ACS  DVT  Continue anticoagulation if no active bleeding  Continue with supportive treatment  I have long and lengthy discussion with family about management plan. All the questions were answered. 35 minutes of critical care time spent in the direct evaluation and treatment of this high risk patient. The reason for providing this level of medical care for this critically ill patient was due a critical illness that impaired one or more vital organ systems such that there was a high probability of imminent or life threatening deterioration in the patients condition.  This care involved high complexity decision making to assess, manipulate, and support vital system functions, to treat this degreee vital organ system failure and to prevent further life threatening deterioration of the patients condition. Subjective:    cc:  Cardiac arrest        REVIEW OF SYSTEMS:     Limited due to intubation      Objective:      Visit Vitals  BP (!) 150/85   Pulse (!) 112   Temp 100.2 °F (37.9 °C)   Resp 15   Ht 5' 10\" (1.778 m)   Wt 111.6 kg (246 lb)   SpO2 99%   BMI 35.30 kg/m²     Body mass index is 35.3 kg/m². Physical Exam:  General Appearance: Intubated  NECK: No JVD, no thyroidomeglay. LUNGS: Clear bilaterally. HEART: S1+S2 audible,    ABD: Non-tender, BS Audible    EXT: No edema, and no cysnosis. VASCULAR EXAM: Pulses are intact.     PSYCHIATRIC EXAM: Intubated    Medication:  Current Facility-Administered Medications   Medication Dose Route Frequency    dextrose 5% infusion  50 mL/hr IntraVENous CONTINUOUS    amLODIPine (NORVASC) tablet 2.5 mg  2.5 mg Oral DAILY    metoprolol (LOPRESSOR) injection 2.5 mg  2.5 mg IntraVENous Q6H PRN    insulin glargine (LANTUS) injection 12 Units  12 Units SubCUTAneous Q24H    acyclovir (ZOVIRAX) 750 mg in 0.9% sodium chloride 250 mL IVPB  10 mg/kg (Ideal) IntraVENous Q12H    insulin lispro (HUMALOG) injection   SubCUTAneous Q6H    ELECTROLYTE REPLACEMENT PROTOCOL - Potassium Renal Dosing  1 Each Other PRN    piperacillin-tazobactam (ZOSYN) 3.375 g in 0.9% sodium chloride (MBP/ADV) 100 mL MBP  3.375 g IntraVENous Q8H    [Held by provider] NOREPINephrine (LEVOPHED) 8 mg in 5% dextrose 250mL (32 mcg/mL) infusion  2-30 mcg/min IntraVENous TITRATE    heparin (porcine) 25,000 units in 0.45% saline 250 ml infusion  18-36 Units/kg/hr IntraVENous TITRATE    sodium chloride (NS) flush 5-40 mL  5-40 mL IntraVENous Q8H    sodium chloride (NS) flush 5-40 mL  5-40 mL IntraVENous PRN    acetaminophen (TYLENOL) tablet 650 mg  650 mg Oral Q6H PRN    Or    acetaminophen (TYLENOL) suppository 650 mg  650 mg Rectal Q6H PRN    polyethylene glycol (MIRALAX) packet 17 g  17 g Oral DAILY PRN    ondansetron (ZOFRAN ODT) tablet 4 mg  4 mg Oral Q8H PRN    Or    ondansetron (ZOFRAN) injection 4 mg  4 mg IntraVENous Q6H PRN    pantoprazole (PROTONIX) 40 mg in 0.9% sodium chloride 10 mL injection  40 mg IntraVENous Q12H    arformoteroL (BROVANA) neb solution 15 mcg  15 mcg Nebulization BID RT    levETIRAcetam (KEPPRA) 1,000 mg in 0.9% sodium chloride 100 mL IVPB  1,000 mg IntraVENous Q12H    propofol (DIPRIVAN) 10 mg/mL infusion  0-50 mcg/kg/min IntraVENous TITRATE               Lab/Data Reviewed:  Procedures/imaging: see electronic medical records for all procedures/Xrays   and details which were not copied into this note but were reviewed prior to creation of Plan       All lab results for the last 24 hours reviewed. Recent Labs     09/03/22  0420 09/01/22  0300 08/31/22  1415   WBC 15.1* 12.4 16.8*   HGB 9.0* 10.7* 13.1   HCT 28.0* 32.9* 42.3    174 162       Recent Labs     09/03/22  1043 09/03/22  0420 09/02/22  0540 09/01/22  1021 09/01/22  0300   NA  --  151* 146*  --  145   K 3.5 3.6 3.9   < > 3.4*   CL  --  120* 114*  --  113*   CO2  --  21 17*  --  25   GLU  --  190* 223*  --  232*   BUN  --  39* 42*  --  51*   CREA  --  3.13* 3.16*  --  3.03*   CA  --  8.5 8.3*  --  7.7*    < > = values in this interval not displayed. RADIOLOGY:      CXR Results  (Last 48 hours)                 09/03/22 1250  XR CHEST PORT Final result    Impression:      No active cardiopulmonary disease. Narrative:  EXAM: CHEST RADIOGRAPH, SINGLE VIEW       CLINICAL INDICATION/HISTORY: Shortness of breath, endotracheal tube placement       COMPARISON: 8/31/2022       TECHNIQUE: Portable frontal view of the chest was obtained.        _______________       FINDINGS:       SUPPORT DEVICES: Adequately positioned endotracheal tube, unchanged. Left   jugular central venous catheter and nasogastric tube also appear unchanged,   adequately positioned. HEART AND MEDIASTINUM: Cardiomediastinal silhouette appears within normal   limits. Normal caliber thoracic aorta. No central vascular congestion. LUNGS AND PLEURAL SPACES: Retrocardiac left lower lobe subsegmental atelectasis. Lungs are otherwise adequately aerated with no confluent airspace opacity. No   pleural effusion or pneumothorax. BONY THORAX AND SOFT TISSUES: No acute osseous abnormality.        _______________                     Cardiology Procedures:   Results for orders placed or performed during the hospital encounter of 08/31/22   EKG, 12 LEAD, INITIAL   Result Value Ref Range    Ventricular Rate 112 BPM    Atrial Rate 112 BPM    P-R Interval 158 ms    QRS Duration 100 ms    Q-T Interval 360 ms    QTC Calculation (Bezet) 491 ms    Calculated P Axis 73 degrees    Calculated R Axis -69 degrees    Calculated T Axis 51 degrees    Diagnosis       Sinus tachycardia  Left axis deviation  Low voltage QRS  Inferior infarct , age undetermined  Abnormal ECG  No previous ECGs available  Confirmed by Leny Guerra MD. ([id number]) on 8/31/2022 11:30:53 PM        Echo Results  (Last 48 hours)      None         Cardiolite (Tc-99m Sestamibi) stress test    Signed By: Terra Russell MD     September 3, 2022

## 2022-09-03 NOTE — PROGRESS NOTES
[facility]  Pulmonary, Critical Care, and Sleep Medicine    Name: Dottie Cloud MRN: [id number]   : 1960 Hospital: [facility] [place]    Date: 9/3/2022  Room: [facility]/[address] [place] [address] Note                                              Consult requesting physician: Dr. Hali Caraballo     Reason for Consult: Cardiac arrest , shock, respiratory failure       IMPRESSION:   Cardiac arrest  Shock  Sepsis  Respiratory failure   Shingles  Urosepsis     Patient Active Problem List   Diagnosis Code    Cardiac arrest ([facility] [address] I46.9    Respiratory failure ([facility] [address] J96.90    GENO (acute kidney injury) ([facility] [address] N17.9    Disseminated herpes zoster B02.7    Hydronephrosis N13.30    Shock ([facility] [address] R57.9    Sepsis ([facility] [address] A41.9    UTI (urinary tract infection) N39.0           Code status: full code       RECOMMENDATIONS:   Respiratory: Acute respiratory failure post cardiac arrest  Full ventilatory support  pressure control 16/13/peep 5 Fio2 35  7.4/31/95/97  Ventilator stable on low dose propofol RR above the rate   CXR pending  Propofol for vent synchrony and myocloninc jerks   Off sedation does not follow commands   Add bronchodilators  Due to myoclonic jerks on propofol    PVL  small DVT on heparin drip. Renal failure unable to do CTA  Ventilator bundle & Sedation protocol followed. Daily sedation holiday, assessment for readiness for SBT and then re-titrate if required. Chlorhexidine mouth washes. Keep SPO2 >=92%. HOB 30 degree elevation all the time. Aggressive pulmonary toileting. Aspiration precautions. Incentive spirometry. CVS: cardiac arrest initally on pressors now slightly hypertensive  Pea arrest most likley due to sepsis ( UTI and zoster)  Now off pressors BP stable  Resume oral hypertensive   Prn metoprolol  Echo stable   Has DVT heparin drip as tolerated had some OG bleeding now on hold   I suspect related to sepsis recently disseminated zoster was on antiviral has DM/HTN  ? Pea arrest   Echo Result status: Final result       Left Ventricle: Normal left ventricular systolic function with a visually estimated EF of 60 - 65%. Not well visualized. Left ventricle size is normal. Normal wall thickness. Normal wall motion. Right Ventricle: Not well visualized. Right ventricle is moderately dilated. Moderately reduced systolic function. Visually moderately dilated RV and moderately reduced RV systolic function. Aortic Valve: Tricuspid valve. Tricuspid Valve: The estimated RVSP is 25 mmHg. Right Atrium: Not well visualized. Right atrium is moderately dilated. No old echocardiogram available to compare. CPR 20-30 minutes codes 3 times CPR epi  Due to renal failure cannot exclude PE  PVL pending , bleeding now cannot give full dose heparin but will reevaluate daily   Now on levophed 16  ID:  sepsis   On triple abx   UTI culure pending but positive U/a and zoster   Hydronephrosis urology consulted urinalysis pending  10 days of zoster active disseminate back and left leg consulted ID    Sepsis bundle and protocol followed. Follow serial lactic acid, frequent BMP check, fluid resuscitation. Follow cultures. Deescalate antibiotic when appropriate. 1. Mildly motion limited study without evidence of acute intracranial  abnormality. 2. Paranasal mucosal disease as described above with air-fluid level in the  right maxillary sinus as can be seen with sinusitis. IMPRESSION     Severe left and moderate right hydroureteronephrosis with marked urinary bladder  distention. No obstructing calculus. No acute intrathoracic abnormality. Hematology/Oncology: follow hb  Chronic anemia   Renal: CRI around 1.8 now Glen suspect due to sepsis and hydronephrosis   GI/: PPI  Hydronephrosis   Endocrine: Monitor BS. SSI. has DM  Neurology: intubated on propofol post cardiac arrest suspect  anoxia   Ct of the head negative  EEG abnormal strongly suggestive of anoxic brain injury  Ct of the head 9/2 normal  MRI of the brain pending once stable   Neurology consulted myoclonic jerks  Off propofol today did not follow commands  RR above the Vent   Reapt Ct of the head 9/2 normal  Not waking up MRI today    Toxicology: pending   Pain/Sedation: propofol  Skin/Wound: has extensive back and left lower extremity rash typical for zoster   Active lesions   Electrolytes: Replace electrolytes per ICU electrolyte replacement protocol. IVF: NACL  Nutrition: npo  Prophylaxis: DVT Prophylaxis: SCD/sq heaprin. GI Prophylaxis: Protonix/ppi. Restraints:   Wrist soft restraints for patient interfering with medical therapy/management and patient safety. Lines/Tubes: PIV  ETT: in place . OGT/NGT: in place . Central line: left internal jugular placed on 8/31 in ED  (site examined, no erythema, induration, discharge or sign of infection. Dressing intact. Medically necessary, will remove it when not needed. Central line bundle followed). Other:  PT/OT eval and treat. OOB. Advance Directive/Palliative Care: consulted    Will defer respective systems problem management to primary and other respective consultant and follow patient in ICU with primary and other medical team.  Further recommendations will be based on the patient's response to recommended treatment and results of the investigation ordered. Quality Care: PPI, DVT prophylaxis, HOB elevated, Infection control all reviewed and addressed. Care of plan d/w hospitalist team, RN, RT, MDR.  D/w , family-wife and brother  (answered all questions to satisfaction). High  complexity decision making was performed during the evaluation of this patient at high risk for decompensation with multiple organ involvement. Total critical care time spent rendering care exclusive of procedures/family discussion/coordination of care: 40 minutes.   I evaluated for cooling protocol spoke to cardiology and hospitalist due to sepsis zoster and hydronephrosis high risk for cooling  Familu updated daily wife and sons   Family is updated daily          Subjective/History of Present Illness:     Patient is a 65 y.o. male with PMHx significant for HTN/CRI/DM recently diagnosed with severe zoster. Ling was on antiviral tx very weak , sob,cough. EMS called in ambulance 3 times cardiac arrest. Witnessed CPR/Epi  Repored PEA. 6 doses of epi and no shocks. He came on epi drip but in Ed chnaged to levophed. Ling had seizure vs myocloninc jerks was started on propofol. 9/3/2022:      Off propofol does not response to painful stimuli  RR above vent has gaga and corneal  I strongly suspect anoxia sepsis much better off pressors      Full code spoke to family        I/O last 24 hrs: Intake/Output Summary (Last 24 hours) at 9/3/2022 1111  Last data filed at 9/3/2022 0600  Gross per 24 hour   Intake 1911.33 ml   Output 1700 ml   Net 211.33 ml           The patient is critically ill and can not provide additional history due to Ventilated    History taken from  family and EMR    Review of Systems:    ROS not obtained due to patient factor. No Known Allergies   Past Medical History:   Diagnosis Date    Gout     Shingles       No past surgical history on file. Social History     Tobacco Use    Smoking status: Not on file    Smokeless tobacco: Not on file   Substance Use Topics    Alcohol use: Not on file      No family history on file.    Prior to Admission medications    Not on File     Current Facility-Administered Medications   Medication Dose Route Frequency    insulin glargine (LANTUS) injection 12 Units  12 Units SubCUTAneous Q24H    acyclovir (ZOVIRAX) 750 mg in 0.9% sodium chloride 250 mL IVPB  10 mg/kg (Ideal) IntraVENous Q12H    insulin lispro (HUMALOG) injection   SubCUTAneous Q6H    piperacillin-tazobactam (ZOSYN) 3.375 g in 0.9% sodium chloride (MBP/ADV) 100 mL MBP  3.375 g IntraVENous Q8H    NOREPINephrine (LEVOPHED) 8 mg in 5% dextrose 250mL (32 mcg/mL) infusion  2-30 mcg/min IntraVENous TITRATE    Vancomycin Pharmacy to Dose  1 Each Other Rx Dosing/Monitoring    heparin (porcine) 25,000 units in 0.45% saline 250 ml infusion  18-36 Units/kg/hr IntraVENous TITRATE    0.9% sodium chloride infusion  25 mL/hr IntraVENous CONTINUOUS    sodium chloride (NS) flush 5-40 mL  5-40 mL IntraVENous Q8H    pantoprazole (PROTONIX) 40 mg in 0.9% sodium chloride 10 mL injection  40 mg IntraVENous Q12H    arformoteroL (BROVANA) neb solution 15 mcg  15 mcg Nebulization BID RT    levETIRAcetam (KEPPRA) 1,000 mg in 0.9% sodium chloride 100 mL IVPB  1,000 mg IntraVENous Q12H    propofol (DIPRIVAN) 10 mg/mL infusion  0-50 mcg/kg/min IntraVENous TITRATE         Objective:   Vital Signs:    Visit Vitals  BP (!) 150/85   Pulse (!) 112   Temp 100.2 °F (37.9 °C)   Resp 11   Ht 5' 10\" (1.778 m)   Wt 111.6 kg (246 lb)   SpO2 99%   BMI 35.30 kg/m²       O2 Device: Endotracheal tube, Ventilator       Temp (24hrs), Av.7 °F (37.6 °C), Min:98.8 °F (37.1 °C), Max:100.6 °F (38.1 °C)       Intake/Output:   Last shift:      No intake/output data recorded.     Last 3 shifts:  1901 -  0700  In: 3464.1 [I.V.:3404.1]  Out: 2250 [Urine:2250]      Intake/Output Summary (Last 24 hours) at 9/3/2022 1111  Last data filed at 9/3/2022 0600  Gross per 24 hour   Intake 1911.33 ml   Output 1700 ml   Net 211.33 ml         Last 3 Recorded Weights in this Encounter    22 0958 22 1000 22 1524   Weight: 111.9 kg (246 lb 12.8 oz) 111.6 kg (246 lb 1.6 oz) 111.6 kg (246 lb)         Ventilator Settings:  Mode Rate Tidal Volume Pressure FiO2 PEEP   Assist control   450 ml    35 % 5 cm H20     Peak airway pressure: 20 cm H2O    Plateau pressure:     Tidal volume:    Minute ventilation: 8.14 l/min     Recent Labs     22  0556 22  0545 22  0559   PHI 7.40 7.42 7.42   PCO2I 31.2* 21.7* 35.5   PO2I 95 84 137*   HCO3I 19.4* 14.1* 23.1   FIO2I 35 35 40         Physical Exam:     Impresson general : intubated off sedation , RR above the evnt no resposne to verbal or painful stimuli  Pupils reactive positive corneal and gaga RR above the vent but   Does not follow commands   Head:   Normocephalic,atraumatic. ENT:   EOM , no scleral icterus, no pallor, no cyanosis. Nose:   No sinus tenderness  Throat:  Oropharynx ,mucosa, and tongue normal. No oral thrush. Neck:   Supple, symmetric. Lymph nodes. Trachea ismidline   Lung: Moderate air entry bilateral equal. No rales. No rhonchi. No wheezing. No stridors. No prolongded expiration. No accessory muscle use. Heart:   Regular  rate & rhythm. S1 S2 present. No murmur. No JVD. Abdomen:  Soft. NT. ND. +BS. No masses. liver  and spleen  Extremities:  No pedal edema. No cyanosis. No clubbing. Pulses: 2+ and symmetric in DP. Capillary refill: normal  Lymphatic:  neck and supraclavicular    Musculoskeletal: No joint swelling. No tenderness.    Skin:   Back and left lower extremity vesicular rash typical for zoster      Data:       Recent Results (from the past 24 hour(s))   PTT    Collection Time: 09/02/22 11:56 AM   Result Value Ref Range    aPTT 29.5 23.0 - 36.4 SEC   VANCOMYCIN, RANDOM    Collection Time: 09/02/22 12:00 PM   Result Value Ref Range    Vancomycin, random 16.2 5.0 - 40.0 UG/ML   GLUCOSE, POC    Collection Time: 09/02/22  1:07 PM   Result Value Ref Range    Glucose (POC) 226 (H) 70 - 110 mg/dL   GLUCOSE, POC    Collection Time: 09/02/22  5:35 PM   Result Value Ref Range    Glucose (POC) 205 (H) 70 - 110 mg/dL   PTT    Collection Time: 09/02/22 10:35 PM   Result Value Ref Range    aPTT >180.0 (HH) 23.0 - 36.4 SEC   GLUCOSE, POC    Collection Time: 09/02/22 11:55 PM   Result Value Ref Range    Glucose (POC) 241 (H) 70 - 110 mg/dL   PTT    Collection Time: 09/03/22  4:20 AM   Result Value Ref Range    aPTT 131.0 (H) 23.0 - 36.4 SEC   METABOLIC PANEL, COMPREHENSIVE    Collection Time: 09/03/22  4:20 AM   Result Value Ref Range    Sodium 151 (H) 136 - 145 mmol/L    Potassium 3.6 3.5 - 5.5 mmol/L    Chloride 120 (H) 100 - 111 mmol/L    CO2 21 21 - 32 mmol/L    Anion gap 10 3.0 - 18 mmol/L    Glucose 190 (H) 74 - 99 mg/dL    BUN 39 (H) 7.0 - 18 MG/DL    Creatinine 3.13 (H) 0.6 - 1.3 MG/DL    BUN/Creatinine ratio 12 12 - 20      GFR est AA 25 (L) >60 ml/min/1.73m2    GFR est non-AA 20 (L) >60 ml/min/1.73m2    Calcium 8.5 8.5 - 10.1 MG/DL    Bilirubin, total 0.6 0.2 - 1.0 MG/DL    ALT (SGPT) 100 (H) 16 - 61 U/L    AST (SGOT) 111 (H) 10 - 38 U/L    Alk. phosphatase 112 45 - 117 U/L    Protein, total 6.3 (L) 6.4 - 8.2 g/dL    Albumin 1.9 (L) 3.4 - 5.0 g/dL    Globulin 4.4 (H) 2.0 - 4.0 g/dL    A-G Ratio 0.4 (L) 0.8 - 1.7     MAGNESIUM    Collection Time: 09/03/22  4:20 AM   Result Value Ref Range    Magnesium 2.5 1.6 - 2.6 mg/dL   PHOSPHORUS    Collection Time: 09/03/22  4:20 AM   Result Value Ref Range    Phosphorus 3.0 2.5 - 4.9 MG/DL   CBC WITH AUTOMATED DIFF    Collection Time: 09/03/22  4:20 AM   Result Value Ref Range    WBC 15.1 (H) 4.6 - 13.2 K/uL    RBC 3.17 (L) 4.35 - 5.65 M/uL    HGB 9.0 (L) 13.0 - 16.0 g/dL    HCT 28.0 (L) 36.0 - 48.0 %    MCV 88.3 78.0 - 100.0 FL    MCH 28.4 24.0 - 34.0 PG    MCHC 32.1 31.0 - 37.0 g/dL    RDW 16.0 (H) 11.6 - 14.5 %    PLATELET 947 797 - 788 K/uL    MPV 10.0 9.2 - 11.8 FL    NRBC 0.3 (H) 0  WBC    ABSOLUTE NRBC 0.05 (H) 0.00 - 0.01 K/uL    NEUTROPHILS 81 (H) 40 - 73 %    LYMPHOCYTES 8 (L) 21 - 52 %    MONOCYTES 9 3 - 10 %    EOSINOPHILS 1 0 - 5 %    BASOPHILS 0 0 - 2 %    IMMATURE GRANULOCYTES 1 (H) 0.0 - 0.5 %    ABS. NEUTROPHILS 12.3 (H) 1.8 - 8.0 K/UL    ABS. LYMPHOCYTES 1.2 0.9 - 3.6 K/UL    ABS. MONOCYTES 1.4 (H) 0.05 - 1.2 K/UL    ABS. EOSINOPHILS 0.1 0.0 - 0.4 K/UL    ABS. BASOPHILS 0.0 0.0 - 0.1 K/UL    ABS. IMM.  GRANS. 0.1 (H) 0.00 - 0.04 K/UL    DF AUTOMATED     GLUCOSE, POC    Collection Time: 09/03/22  5:25 AM   Result Value Ref Range    Glucose (POC) 221 (H) 70 - 110 mg/dL   BLOOD GAS, ARTERIAL POC Collection Time: 09/03/22  5:56 AM   Result Value Ref Range    Device: ADULT VENT      FIO2 (POC) 35 %    pH (POC) 7.40 7.35 - 7.45      pCO2 (POC) 31.2 (L) 35.0 - 45.0 MMHG    pO2 (POC) 95 80 - 100 MMHG    HCO3 (POC) 19.4 (L) 22 - 26 MMOL/L    sO2 (POC) 97.5 (H) 92 - 97 %    Base deficit (POC) 4.7 mmol/L    Mode ASSIST CONTROL      Tidal volume 450 ml    Set Rate 14 bpm    PEEP/CPAP (POC) 5 cmH2O    Allens test (POC) Positive      Site RIGHT RADIAL      Specimen type (POC) ARTERIAL      Performed by Margarita Bansal          Chemistry Recent Labs     09/03/22  0420 09/02/22  0540 09/01/22  1730 09/01/22  1021 09/01/22  0300   * 223*  --   --  232*   * 146*  --   --  145   K 3.6 3.9 4.0   < > 3.4*   * 114*  --   --  113*   CO2 21 17*  --   --  25   BUN 39* 42*  --   --  51*   CREA 3.13* 3.16*  --   --  3.03*   CA 8.5 8.3*  --   --  7.7*   MG 2.5 2.5  --   --  2.4   PHOS 3.0 3.4  --   --  2.6   AGAP 10 15  --   --  7   BUCR 12 13  --   --  17    111  --   --  98   TP 6.3* 5.7*  --   --  6.3*   ALB 1.9* 2.0*  --   --  2.2*   GLOB 4.4* 3.7  --   --  4.1*   AGRAT 0.4* 0.5*  --   --  0.5*    < > = values in this interval not displayed. Lactic Acid Lactic acid   Date Value Ref Range Status   09/01/2022 1.8 0.4 - 2.0 MMOL/L Final     Recent Labs     09/01/22  1330   LAC 1.8          Liver Enzymes Protein, total   Date Value Ref Range Status   09/03/2022 6.3 (L) 6.4 - 8.2 g/dL Final     Albumin   Date Value Ref Range Status   09/03/2022 1.9 (L) 3.4 - 5.0 g/dL Final     Globulin   Date Value Ref Range Status   09/03/2022 4.4 (H) 2.0 - 4.0 g/dL Final     A-G Ratio   Date Value Ref Range Status   09/03/2022 0.4 (L) 0.8 - 1.7   Final     Alk.  phosphatase   Date Value Ref Range Status   09/03/2022 112 45 - 117 U/L Final     Recent Labs     09/03/22  0420 09/02/22  0540 09/01/22  0300   TP 6.3* 5.7* 6.3*   ALB 1.9* 2.0* 2.2*   GLOB 4.4* 3.7 4.1*   AGRAT 0.4* 0.5* 0.5*    111 98          CBC w/Diff Recent Labs     09/03/22  0420 09/01/22  0300 08/31/22  1415   WBC 15.1* 12.4 16.8*   RBC 3.17* 3.78* 4.69   HGB 9.0* 10.7* 13.1   HCT 28.0* 32.9* 42.3    174 162   GRANS 81*  --  79*   LYMPH 8*  --  6*   EOS 1  --  0          Cardiac Enzymes No results found for: CPK, CK, CKMMB, CKMB, RCK3, CKMBT, CKNDX, CKND1, ROB, TROPT, TROIQ, BAY, TROPT, TNIPOC, BNP, BNPP     BNP No results found for: BNP, BNPP, XBNPT     Coagulation Recent Labs     09/03/22  0420 09/02/22  2235 09/02/22  1156   APTT 131.0* >180.0* 29.5           Thyroid  No results found for: T4, T3U, TSH, TSHEXT, TSHEXT    No results found for: T4     Urinalysis Lab Results   Component Value Date/Time    Color YELLOW 08/31/2022 05:16 PM    Appearance TURBID 08/31/2022 05:16 PM    Specific gravity 1.016 08/31/2022 05:16 PM    pH (UA) 5.5 08/31/2022 05:16 PM    Protein 100 (A) 08/31/2022 05:16 PM    Glucose >1,000 (A) 08/31/2022 05:16 PM    Ketone Negative 08/31/2022 05:16 PM    Bilirubin Negative 08/31/2022 05:16 PM    Urobilinogen 0.2 08/31/2022 05:16 PM    Nitrites Negative 08/31/2022 05:16 PM    Leukocyte Esterase LARGE (A) 08/31/2022 05:16 PM    Epithelial cells Negative 08/31/2022 05:16 PM    Bacteria 2+ (A) 08/31/2022 05:16 PM    WBC TOO NUMEROUS TO COUNT 08/31/2022 05:16 PM    RBC 0 to 1 08/31/2022 05:16 PM        Micro  No results for input(s): SDES, CULT in the last 72 hours. No results for input(s): CULT in the last 72 hours. Culture data during this hospitalization.    All Micro Results       Procedure Component Value Units Date/Time    CULTURE, BLOOD [[id number]] Collected: 08/31/22 1015    Order Status: Completed Specimen: Blood Updated: 09/03/22 0744     Special Requests: NO SPECIAL REQUESTS        Culture result: NO GROWTH 3 DAYS       CULTURE, BLOOD [[id number]] Collected: 08/31/22 1000    Order Status: Completed Specimen: Blood Updated: 09/03/22 0744     Special Requests: NO SPECIAL REQUESTS        Culture result: NO GROWTH 3 DAYS       COVID-19 RAPID TEST [[id number]] Collected: 08/31/22 1050    Order Status: Completed Specimen: Nasopharyngeal Updated: 08/31/22 1131     Specimen source Nasopharyngeal        COVID-19 rapid test Not detected        Comment: Rapid Abbott ID Now       Rapid NAAT:  The specimen is NEGATIVE for SARS-CoV-2, the novel coronavirus associated with COVID-19. Negative results should be treated as presumptive and, if inconsistent with clinical signs and symptoms or necessary for patient management, should be tested with an alternative molecular assay. Negative results do not preclude SARS-CoV-2 infection and should not be used as the sole basis for patient management decisions. This test has been authorized by the FDA under an Emergency Use Authorization (EUA) for use by authorized laboratories. Fact sheet for Healthcare Providers: [employer]co[employer]nz  Fact sheet for Patients: [employer]       Methodology: Isothermal Nucleic Acid Amplification                      PFT       Ultrasound       LE Doppler       ECHO       Images report reviewed by me:  CT (Most Recent) (CT chest reviewed by me)        CXR reviewed by me:  XR (Most Recent). CXR  reviewed by me and compared with previous CXR Results from Hospital Encounter encounter on 08/31/22    XR CHEST PORT    Narrative  EXAM: XR CHEST PORT    CLINICAL INDICATION/HISTORY: central line  -Additional: None    COMPARISON: 4 hours prior    TECHNIQUE: Portable frontal view of the chest    _______________    FINDINGS:    SUPPORT DEVICES: Endotracheal tube, gastric tube, and left IJ approach central  venous catheter noted. Tip of the central venous catheter is at the level of the  superior cavoatrial junction. HEART AND MEDIASTINUM: Normal cardiac size and mediastinal contours. LUNGS AND PLEURAL SPACES: No focal pneumonic opacity. No evidence of  pneumothorax or pleural effusion.     BONY THORAX AND SOFT TISSUES: Unremarkable.    _______________    Impression  1. Support devices in stable/appropriate position as visualized. 2. No superimposed acute radiographic cardiopulmonary abnormality.            Ike Rinaldi MD  9/3/2022

## 2022-09-03 NOTE — PROGRESS NOTES
MRI revised  Anoxia as suspected. Prognosis is very poor  Hold heparin drip due to pituitary abnormalities bleeding  as per neurolgy  Small DVT I will reapt ultrasound and consult vascularsuregry  I spoke to Dr. Maria A Ventura about possible IVC filter   He will evalaute    ANNE MARIE Oliveira MD      BRAIN AND EXTRA-AXIAL SPACE:               ACUTE/SUBACUTE INFARCT:  There is diffusion restriction diffusely  involving the infratentorial and supratentorial gray matter in a pattern  indicative of high-grade anoxic brain injury. MASS:  None. HEMORRHAGE:  None. SUBDURAL FLUID COLLECTION:  None. HYDROCEPHALUS:  None. ICA AND DOMINANT VA T2 FLOW VOIDS:  Unremarkable. REMOTE CEREBRAL TERRITORIAL INFARCT:  None definite. REMOTE CEREBELLAR INFARCT:  None definite. STRIVE (STandards for Reporting Vascular changes on nEuroimaging):                             --[place] of white matter hyperintensity   (\"leukoaraiosis\") of presumed vascular origin:  Mild. --[place] of chronic lacunes of presumed vascular  origin:  Mild. --[place] of perivascular spaces:  None significant. --[place] of \"microbleeds\":   None definite. --Degree of brain atrophy: Not characterizable in the  setting of diffuse cerebral edema. SELLA/PITUITARY:  A T1 and heterogeneously peripherally hyperintense, T2  heterogeneously mildly hyperintense, and T2 FLAIR hyperintense expansile lesion  in the sella abutting the undersurface of the optic chiasm measures 12 mm  anterior-posterior, 16 mm medial-lateral, 18 mm orthogonal superior-inferior. The lesion is nonspecific are favored to reflect pituitary hemorrhage. HEENT: Intubated. ORBITS:  Unremarkable.             PARANASAL SINUSES:  The right maxillary sinus and right anterior  ethmoid air cells are opacified. There is scattered paranasal sinus mucosal  thickening. MASTOID AIR CELLS:  Predominantly clear. INCLUDED UPPER CERVICAL LYMPH NODES:  Unremarkable. INCLUDED UPPER PAROTIDS:  Unremarkable. NASOPHARYNX:  Unremarkable. BONE MARROW SIGNAL:  Unremarkable. SUPERFICIAL SOFT TISSUES: Unremarkable.     _______________     IMPRESSION     1. High-grade acute/subacute anoxic brain injury without herniation. 2.  Heterogeneous expansile sellar lesion, nonspecific, pituitary  hematoma/hemorrhage favored over other etiologies.       _______________

## 2022-09-04 PROBLEM — G93.1 BRAIN ANOXIA (HCC): Status: ACTIVE | Noted: 2022-01-01

## 2022-09-04 NOTE — PROGRESS NOTES
Spoke with intensivist Cuauhtemoc Rosa regarding starting sub q DVT prophylactic heparin - it is okay to start per neurologist and vascular note, advised to draw Pt/INR prior to start of heparin SQ    PT/INR added on to blood in lab pending results    PT/INR resulted and wnl; sq heparin order placed to start 9/5/2022

## 2022-09-04 NOTE — PROGRESS NOTES
[facility]  Pulmonary, Critical Care, and Sleep Medicine    Name: Levy Willis MRN: [id number]   : 1960 Hospital: [facility] [place]    Date: 2022  Room: [facility]/[address] [place] [address] Note                                              Consult requesting physician: Dr. Mickey Lacy     Reason for Consult: Cardiac arrest , shock, respiratory failure       IMPRESSION:   Cardiac arrest  Shock  Sepsis  Respiratory failure   Shingles  Urosepsis   GENO  Patient Active Problem List   Diagnosis Code    Cardiac arrest ([facility] I46.9    Respiratory failure ([address] J96.90    GENO (acute kidney injury) ([facility] [address] N17.9    Disseminated herpes zoster B02.7    Hydronephrosis N13.30    Shock ([facility] [address] R57.9    Sepsis ([facility] [address] A41.9    UTI (urinary tract infection) N39.0           Code status: full code       RECOMMENDATIONS:   Respiratory: Acute respiratory failure post cardiac arrest  Full ventilatory support      Ventilator stable on low dose propofol   RR above the rate   CXR   Propofol for vent synchrony and myocloninc jerks   Off sedation does not follow commands   Add bronchodilator    PVL  small DVT chronic was on heparin now on hold due to MRI findings    Renal failure unable to do CTA  Ventilator bundle & Sedation protocol followed. Daily sedation holiday, assessment for readiness for SBT and then re-titrate if required. Chlorhexidine mouth washes. Keep SPO2 >=92%. HOB 30 degree elevation all the time. Aggressive pulmonary toileting. Aspiration precautions. Incentive spirometry.   CVS: status post Cardiac arrest initally on pressors now slightly hypertensive  PEA arrest most likley due to sepsis ( UTI and zoster)  Now off pressors BP stable  Resume oral hypertensive   Prn metoprolol  Echo stable   Has small chronic DVT heparin drip now on hold due to MRI findings  I spoke to vascular surgery reapt PVL in 5 days if worse he will place IVC filter    Age indeterminate thrombus present in the right popliteal vein. No evidence of deep vein thrombosis in the left lower extremity. 9/3  Chronic non-occlusive thrombus present in the right popliteal vein. No evidence of deep vein thrombosis in the left lower extremity. ?Pea arrest   Echo   Result status: Final result       Left Ventricle: Normal left ventricular systolic function with a visually estimated EF of 60 - 65%. Not well visualized. Left ventricle size is normal. Normal wall thickness. Normal wall motion. Right Ventricle: Not well visualized. Right ventricle is moderately dilated. Moderately reduced systolic function. Visually moderately dilated RV and moderately reduced RV systolic function. Aortic Valve: Tricuspid valve. Tricuspid Valve: The estimated RVSP is 25 mmHg. Right Atrium: Not well visualized. Right atrium is moderately dilated. No old echocardiogram available to compare. CPR 20-30 minutes codes 3 times CPR epi  Due to renal failure cannot exclude PE  PVL   ID:  sepsis   On triple abx   UTI culure pending but positive U/a and zoster   Hydronephrosis urology consulted urinalysis pending  10 days of zoster active disseminate back and left leg consulted ID    Sepsis bundle and protocol followed. Follow serial lactic acid, frequent BMP check, fluid resuscitation. Follow cultures. Deescalate antibiotic when appropriate. 1. Mildly motion limited study without evidence of acute intracranial  abnormality. 2. Paranasal mucosal disease as described above with air-fluid level in the  right maxillary sinus as can be seen with sinusitis. IMPRESSION     Severe left and moderate right hydroureteronephrosis with marked urinary bladder  distention. No obstructing calculus. No acute intrathoracic abnormality. 9/1     IMPRESSION     Bilateral increased cortical echogenicity. Severe left hydronephrosis with cortical atrophy.    Hematology/Oncology: follow hb  Chronic anemia   Renal: CRI around 1.8 now [place] suspect due to sepsis and hydronephrosis   GI/: PPI  Hydronephrosis   Endocrine: Monitor BS. SSI. has DM  Neurology: off propofol anoxia     IMPRESSION     1. High-grade acute/subacute anoxic brain injury without herniation. 2.  Heterogeneous expansile sellar lesion, nonspecific, pituitary  hematoma/hemorrhage favored over other etiologies. _______________  intubated on propofol post cardiac arrest suspect  anoxia   Ct of the head negative  EEG abnormal strongly suggestive of anoxic brain injury  Ct of the head 9/2 normal  MRI of the brain pending once stable   Neurology consulted myoclonic jerks  Off propofol today did not follow commands  RR above the Vent   Reapt Ct of the head 9/2 normal  Not waking up MRI today    Toxicology: pending   Pain/Sedation: propofol  Skin/Wound: has extensive back and left lower extremity rash typical for zoster   Active lesions   Electrolytes: Replace electrolytes per ICU electrolyte replacement protocol. IVF: NACL  Nutrition: npo  Prophylaxis: DVT Prophylaxis: SCD/sq heaprin. GI Prophylaxis: Protonix/ppi. Restraints:   Wrist soft restraints for patient interfering with medical therapy/management and patient safety. Lines/Tubes: PIV  ETT: in place . OGT/NGT: in place . Central line: left internal jugular placed on 8/31 in ED  (site examined, no erythema, induration, discharge or sign of infection. Dressing intact. Medically necessary, will remove it when not needed. Central line bundle followed). Other:  PT/OT eval and treat. OOB. Advance Directive/Palliative Care: consulted    Will defer respective systems problem management to primary and other respective consultant and follow patient in ICU with primary and other medical team.  Further recommendations will be based on the patient's response to recommended treatment and results of the investigation ordered. Quality Care: PPI, DVT prophylaxis, HOB elevated, Infection control all reviewed and addressed.     Care of plan d/w hospitalist team, RN, RT, MDR.  D/w , family-wife and brother  (answered all questions to satisfaction). High  complexity decision making was performed during the evaluation of this patient at high risk for decompensation with multiple organ involvement. Total critical care time spent rendering care exclusive of procedures/family discussion/coordination of care: 41 minutes. I evaluated for cooling protocol spoke to cardiology and hospitalist due to sepsis zoster and hydronephrosis high risk for cooling  Familu updated daily wife and sons   Family is updated daily   On 9/3 long discussion about anoxic brain injury MRI results  Continue full code        Subjective/History of Present Illness:     Patient is a 65 y.o. male with PMHx significant for HTN/CRI/DM recently diagnosed with severe zoster. Ling was on antiviral tx very weak , sob,cough. EMS called in ambulance 3 times cardiac arrest. Witnessed CPR/Epi  Repored PEA. 6 doses of epi and no shocks. He came on epi drip but in Ed chnaged to levophed. Ling had seizure vs myocloninc jerks was started on propofol. 9/4/2022: Intubated  Lighly sedated for vent synchrony  Anoxic brain injury  Spoke to vascular for now no ivc filter   Speak to neurology if ok sq heparin      I/O last 24 hrs: Intake/Output Summary (Last 24 hours) at 9/4/2022 1206  Last data filed at 9/4/2022 0830  Gross per 24 hour   Intake 2543.6 ml   Output 2475 ml   Net 68.6 ml           The patient is critically ill and can not provide additional history due to Ventilated    History taken from  family and EMR    Review of Systems:    ROS not obtained due to patient factor. No Known Allergies   Past Medical History:   Diagnosis Date    Gout     Shingles       No past surgical history on file.    Social History     Tobacco Use    Smoking status: Not on file    Smokeless tobacco: Not on file   Substance Use Topics    Alcohol use: Not on file      No family history on file.   Prior to Admission medications    Not on File     Current Facility-Administered Medications   Medication Dose Route Frequency    furosemide (LASIX) injection 20 mg  20 mg IntraVENous DAILY    dextrose 5% infusion  50 mL/hr IntraVENous CONTINUOUS    amLODIPine (NORVASC) tablet 2.5 mg  2.5 mg Oral DAILY    metoprolol (LOPRESSOR) injection 1.25 mg  1.25 mg IntraVENous Q6H    insulin glargine (LANTUS) injection 12 Units  12 Units SubCUTAneous Q24H    acyclovir (ZOVIRAX) 750 mg in 0.9% sodium chloride 250 mL IVPB  10 mg/kg (Ideal) IntraVENous Q12H    insulin lispro (HUMALOG) injection   SubCUTAneous Q6H    piperacillin-tazobactam (ZOSYN) 3.375 g in 0.9% sodium chloride (MBP/ADV) 100 mL MBP  3.375 g IntraVENous Q8H    [Held by provider] NOREPINephrine (LEVOPHED) 8 mg in 5% dextrose 250mL (32 mcg/mL) infusion  2-30 mcg/min IntraVENous TITRATE    [Held by provider] heparin (porcine) 25,000 units in 0.45% saline 250 ml infusion  18-36 Units/kg/hr IntraVENous TITRATE    sodium chloride (NS) flush 5-40 mL  5-40 mL IntraVENous Q8H    pantoprazole (PROTONIX) 40 mg in 0.9% sodium chloride 10 mL injection  40 mg IntraVENous Q12H    arformoteroL (BROVANA) neb solution 15 mcg  15 mcg Nebulization BID RT    levETIRAcetam (KEPPRA) 1,000 mg in 0.9% sodium chloride 100 mL IVPB  1,000 mg IntraVENous Q12H    propofol (DIPRIVAN) 10 mg/mL infusion  0-50 mcg/kg/min IntraVENous TITRATE         Objective:   Vital Signs:    Visit Vitals  BP (!) 143/75   Pulse 73   Temp (!) 95.4 °F (35.2 °C)   Resp 15   Ht 5' 10\" (1.778 m)   Wt 103.9 kg (229 lb 0.9 oz)   SpO2 99%   BMI 32.87 kg/m²       O2 Device: Endotracheal tube, Ventilator       Temp (24hrs), Av.5 °F (36.4 °C), Min:93 °F (33.9 °C), Max:100.2 °F (37.9 °C)       Intake/Output:   Last shift:      701 - 1900  In: 131.7 [I.V.:31.7]  Out: 300 [Urine:300]    Last 3 shifts: 1901 - 700  In: 4323.2 [I.V.:4053.2]  Out: 9145 [Urine:2925]      Intake/Output Summary (Last 24 hours) at 9/4/2022 1206  Last data filed at 9/4/2022 0830  Gross per 24 hour   Intake 2543.6 ml   Output 2475 ml   Net 68.6 ml         Last 3 Recorded Weights in this Encounter    08/31/22 1000 08/31/22 1524 09/04/22 0757   Weight: 111.6 kg (246 lb 1.6 oz) 111.6 kg (246 lb) 103.9 kg (229 lb 0.9 oz)         Ventilator Settings:  Mode Rate Tidal Volume Pressure FiO2 PEEP   Assist control, VC+   450 ml    35 % 5 cm H20     Peak airway pressure: 20 cm H2O    Plateau pressure:     Tidal volume:    Minute ventilation: 11.2 l/min     Recent Labs     09/04/22  0449 09/03/22  0556 09/02/22  0545   PHI 7.44 7.40 7.42   PCO2I 36.5 31.2* 21.7*   PO2I 118* 95 84   HCO3I 25.0 19.4* 14.1*   FIO2I 35 35 35         Physical Exam:     Impresson general : intubated off sedation , RR above the evnt no resposne to verbal or painful stimuli  Pupils reactive positive corneal and gaga RR above the vent but   Does not follow commands   Head:   Normocephalic,atraumatic. ENT:   EOM , no scleral icterus, no pallor, no cyanosis. Nose:   No sinus tenderness  Throat:  Oropharynx ,mucosa, and tongue normal. No oral thrush. Neck:   Supple, symmetric. Lymph nodes. Trachea ismidline   Lung: Moderate air entry bilateral equal. No rales. No rhonchi. No wheezing. No stridors. No prolongded expiration. No accessory muscle use. Heart:   Regular  rate & rhythm. S1 S2 present. No murmur. No JVD. Abdomen:  Soft. NT. ND. +BS. No masses. liver  and spleen  Extremities:  No pedal edema. No cyanosis. No clubbing. Pulses: 2+ and symmetric in DP. Capillary refill: normal  Lymphatic:  neck and supraclavicular    Musculoskeletal: No joint swelling. No tenderness.    Skin:   Back and left lower extremity vesicular rash typical for zoster      Data:       Recent Results (from the past 24 hour(s))   GLUCOSE, POC    Collection Time: 09/03/22  1:00 PM   Result Value Ref Range    Glucose (POC) 208 (H) 70 - 110 mg/dL   URINALYSIS W/MICROSCOPIC Collection Time: 09/03/22  1:45 PM   Result Value Ref Range    Color YELLOW      Appearance CLOUDY      Specific gravity 1.023 1.005 - 1.030      pH (UA) 5.5 5.0 - 8.0      Protein 100 (A) NEG mg/dL    Glucose >1,000 (A) NEG mg/dL    Ketone 15 (A) NEG mg/dL    Bilirubin Negative NEG      Blood LARGE (A) NEG      Urobilinogen 0.2 0.2 - 1.0 EU/dL    Nitrites Negative NEG      Leukocyte Esterase SMALL (A) NEG      WBC 11 to 20 0 - 5 /hpf    RBC 4 to 10 0 - 5 /hpf    Epithelial cells 1+ 0 - 5 /lpf    Bacteria FEW (A) NEG /hpf    Amorphous Crystals 2+ (A) NEG    Granular cast 4 to 10 NEG /lpf   GLUCOSE, POC    Collection Time: 09/03/22  5:30 PM   Result Value Ref Range    Glucose (POC) 192 (H) 70 - 110 mg/dL   POTASSIUM    Collection Time: 09/03/22  8:49 PM   Result Value Ref Range    Potassium 3.5 3.5 - 5.5 mmol/L   GLUCOSE, POC    Collection Time: 09/03/22 11:18 PM   Result Value Ref Range    Glucose (POC) 188 (H) 70 - 110 mg/dL   BLOOD GAS, ARTERIAL POC    Collection Time: 09/04/22  4:49 AM   Result Value Ref Range    Device: ADULT VENT      FIO2 (POC) 35 %    pH (POC) 7.44 7.35 - 7.45      pCO2 (POC) 36.5 35.0 - 45.0 MMHG    pO2 (POC) 118 (H) 80 - 100 MMHG    HCO3 (POC) 25.0 22 - 26 MMOL/L    sO2 (POC) 98.8 (H) 92 - 97 %    Base excess (POC) 0.8 mmol/L    Mode ASSIST CONTROL      Tidal volume 450 ml    Set Rate 14 bpm    PEEP/CPAP (POC) 5 cmH2O    Allens test (POC) NOT APPLICABLE      Site DRAWN FROM ARTERIAL LINE      Patient temp.  98      Specimen type (POC) ARTERIAL      Performed by Gray Gonzalez    Collection Time: 09/04/22  5:00 AM   Result Value Ref Range    Magnesium 2.5 1.6 - 2.6 mg/dL   PHOSPHORUS    Collection Time: 09/04/22  5:00 AM   Result Value Ref Range    Phosphorus 2.9 2.5 - 4.9 MG/DL   METABOLIC PANEL, BASIC    Collection Time: 09/04/22  5:00 AM   Result Value Ref Range    Sodium 151 (H) 136 - 145 mmol/L    Potassium 3.4 (L) 3.5 - 5.5 mmol/L    Chloride 119 (H) 100 - 111 mmol/L    CO2 28 21 - 32 mmol/L    Anion gap 4 3.0 - 18 mmol/L    Glucose 193 (H) 74 - 99 mg/dL    BUN 31 (H) 7.0 - 18 MG/DL    Creatinine 2.38 (H) 0.6 - 1.3 MG/DL    BUN/Creatinine ratio 13 12 - 20      GFR est AA 34 (L) >60 ml/min/1.73m2    GFR est non-AA 28 (L) >60 ml/min/1.73m2    Calcium 8.3 (L) 8.5 - 10.1 MG/DL   CBC WITH AUTOMATED DIFF    Collection Time: 09/04/22  5:00 AM   Result Value Ref Range    WBC 8.8 4.6 - 13.2 K/uL    RBC 2.76 (L) 4.35 - 5.65 M/uL    HGB 7.9 (L) 13.0 - 16.0 g/dL    HCT 24.5 (L) 36.0 - 48.0 %    MCV 88.8 78.0 - 100.0 FL    MCH 28.6 24.0 - 34.0 PG    MCHC 32.2 31.0 - 37.0 g/dL    RDW 16.4 (H) 11.6 - 14.5 %    PLATELET 315 797 - 381 K/uL    MPV 10.3 9.2 - 11.8 FL    NRBC 0.9 (H) 0  WBC    ABSOLUTE NRBC 0.08 (H) 0.00 - 0.01 K/uL    NEUTROPHILS 74 (H) 40 - 73 %    LYMPHOCYTES 12 (L) 21 - 52 %    MONOCYTES 11 (H) 3 - 10 %    EOSINOPHILS 2 0 - 5 %    BASOPHILS 0 0 - 2 %    IMMATURE GRANULOCYTES 1 (H) 0.0 - 0.5 %    ABS. NEUTROPHILS 6.5 1.8 - 8.0 K/UL    ABS. LYMPHOCYTES 1.0 0.9 - 3.6 K/UL    ABS. MONOCYTES 1.0 0.05 - 1.2 K/UL    ABS. EOSINOPHILS 0.2 0.0 - 0.4 K/UL    ABS. BASOPHILS 0.0 0.0 - 0.1 K/UL    ABS. IMM.  GRANS. 0.1 (H) 0.00 - 0.04 K/UL    DF AUTOMATED     TRIGLYCERIDE    Collection Time: 09/04/22  5:00 AM   Result Value Ref Range    Triglyceride 201 (H) <150 MG/DL   PTT    Collection Time: 09/04/22  5:00 AM   Result Value Ref Range    aPTT 26.2 23.0 - 36.4 SEC   GLUCOSE, POC    Collection Time: 09/04/22  5:01 AM   Result Value Ref Range    Glucose (POC) 183 (H) 70 - 110 mg/dL   PTT    Collection Time: 09/04/22 11:21 AM   Result Value Ref Range    aPTT 25.5 23.0 - 36.4 SEC   GLUCOSE, POC    Collection Time: 09/04/22 11:39 AM   Result Value Ref Range    Glucose (POC) 213 (H) 70 - 110 mg/dL         Chemistry Recent Labs     09/04/22  0500 09/03/22  2049 09/03/22  1043 09/03/22  0420 09/02/22  0540   *  --   --  190* 223*   *  --   --  151* 146*   K 3.4* 3.5 3.5 3.6 3.9   * --   --  120* 114*   CO2 28  --   --  21 17*   BUN 31*  --   --  39* 42*   CREA 2.38*  --   --  3.13* 3.16*   CA 8.3*  --   --  8.5 8.3*   MG 2.5  --   --  2.5 2.5   PHOS 2.9  --   --  3.0 3.4   AGAP 4  --   --  10 15   BUCR 13  --   --  12 13   AP  --   --   --  112 111   TP  --   --   --  6.3* 5.7*   ALB  --   --   --  1.9* 2.0*   GLOB  --   --   --  4.4* 3.7   AGRAT  --   --   --  0.4* 0.5*          Lactic Acid Lactic acid   Date Value Ref Range Status   09/01/2022 1.8 0.4 - 2.0 MMOL/L Final     Recent Labs     09/01/22  1330   LAC 1.8          Liver Enzymes Protein, total   Date Value Ref Range Status   09/03/2022 6.3 (L) 6.4 - 8.2 g/dL Final     Albumin   Date Value Ref Range Status   09/03/2022 1.9 (L) 3.4 - 5.0 g/dL Final     Globulin   Date Value Ref Range Status   09/03/2022 4.4 (H) 2.0 - 4.0 g/dL Final     A-G Ratio   Date Value Ref Range Status   09/03/2022 0.4 (L) 0.8 - 1.7   Final     Alk.  phosphatase   Date Value Ref Range Status   09/03/2022 112 45 - 117 U/L Final     Recent Labs     09/03/22  0420 09/02/22  0540   TP 6.3* 5.7*   ALB 1.9* 2.0*   GLOB 4.4* 3.7   AGRAT 0.4* 0.5*    111          CBC w/Diff Recent Labs     09/04/22  0500 09/03/22  0420   WBC 8.8 15.1*   RBC 2.76* 3.17*   HGB 7.9* 9.0*   HCT 24.5* 28.0*    167   GRANS 74* 81*   LYMPH 12* 8*   EOS 2 1          Cardiac Enzymes No results found for: CPK, CK, CKMMB, CKMB, RCK3, CKMBT, CKNDX, CKND1, ROB, TROPT, TROIQ, BAY, TROPT, TNIPOC, BNP, BNPP     BNP No results found for: BNP, BNPP, XBNPT     Coagulation Recent Labs     09/04/22  1121 09/04/22  0500 09/03/22  1048   APTT 25.5 26.2 106.6*           Thyroid  No results found for: T4, T3U, TSH, TSHEXT, TSHEXT    No results found for: T4     Urinalysis Lab Results   Component Value Date/Time    Color YELLOW 09/03/2022 01:45 PM    Appearance CLOUDY 09/03/2022 01:45 PM    Specific gravity 1.023 09/03/2022 01:45 PM    pH (UA) 5.5 09/03/2022 01:45 PM    Protein 100 (A) 09/03/2022 01:45 PM    Glucose >1,000 (A) 09/03/2022 01:45 PM    Ketone 15 (A) 09/03/2022 01:45 PM    Bilirubin Negative 09/03/2022 01:45 PM    Urobilinogen 0.2 09/03/2022 01:45 PM    Nitrites Negative 09/03/2022 01:45 PM    Leukocyte Esterase SMALL (A) 09/03/2022 01:45 PM    Epithelial cells 1+ 09/03/2022 01:45 PM    Bacteria FEW (A) 09/03/2022 01:45 PM    WBC 11 to 20 09/03/2022 01:45 PM    RBC 4 to 10 09/03/2022 01:45 PM        Micro  No results for input(s): SDES, CULT in the last 72 hours. No results for input(s): CULT in the last 72 hours. Culture data during this hospitalization. All Micro Results       Procedure Component Value Units Date/Time    CULTURE, BLOOD [[id number]] Collected: 08/31/22 1015    Order Status: Completed Specimen: Blood Updated: 09/04/22 0807     Special Requests: NO SPECIAL REQUESTS        Culture result: NO GROWTH 4 DAYS       CULTURE, BLOOD [[id number]] Collected: 08/31/22 1000    Order Status: Completed Specimen: Blood Updated: 09/04/22 0807     Special Requests: NO SPECIAL REQUESTS        Culture result: NO GROWTH 4 DAYS       CULTURE, RESPIRATORY/SPUTUM/BRONCH Alexander Matt STAIN [[id number]]     Order Status: Sent Specimen: Sputum from Tracheal Aspirate     COVID-19 RAPID TEST [[id number]] Collected: 08/31/22 1050    Order Status: Completed Specimen: Nasopharyngeal Updated: 08/31/22 1131     Specimen source Nasopharyngeal        COVID-19 rapid test Not detected        Comment: Rapid Abbott ID Now       Rapid NAAT:  The specimen is NEGATIVE for SARS-CoV-2, the novel coronavirus associated with COVID-19. Negative results should be treated as presumptive and, if inconsistent with clinical signs and symptoms or necessary for patient management, should be tested with an alternative molecular assay. Negative results do not preclude SARS-CoV-2 infection and should not be used as the sole basis for patient management decisions.        This test has been authorized by the FDA under an Emergency Use Authorization (EUA) for use by authorized laboratories. Fact sheet for Healthcare Providers: Skip.fi  Fact sheet for Patients: BagFit.fi       Methodology: Isothermal Nucleic Acid Amplification                      PFT       Ultrasound       LE Doppler       ECHO       Images report reviewed by me:  CT (Most Recent) (CT chest reviewed by me)        CXR reviewed by me:  XR (Most Recent). CXR  reviewed by me and compared with previous CXR Results from Hospital Encounter encounter on 08/31/22    XR CHEST PORT    Narrative  EXAM: CHEST RADIOGRAPH, SINGLE VIEW    CLINICAL INDICATION/HISTORY: Shortness of breath, intubated, follow-up    COMPARISON: 9/3/2022    TECHNIQUE: Portable frontal view of the chest was obtained.    _______________    FINDINGS:    SUPPORT DEVICES: Endotracheal tube, left jugular central venous catheter, and  nasogastric tube all appear adequately positioned. HEART AND MEDIASTINUM: Cardiomediastinal silhouette appears within normal  limits. Tortuous and atherosclerotic thoracic aorta. No central vascular  congestion. LUNGS AND PLEURAL SPACES: Unchanged retrocardiac left lower lobe atelectasis. Lungs are otherwise adequately aerated with no confluent airspace opacity. No  pleural effusion or pneumothorax. BONY THORAX AND SOFT TISSUES: No acute osseous abnormality. _______________    Impression  No active cardiopulmonary disease.            Adalid Hernandez MD  9/4/2022

## 2022-09-04 NOTE — PROGRESS NOTES
NEUROLOGY PROGRESS NOTE        Patient: Shannan Magallon        Sex: male          DOA: 8/31/2022  YOB: 1960      Age:  65 y.o.         LOS: 4 days     Identification:  [id number] male presents with coma after cardiac arrest.            SUBJECTIVE:   Patient remains to be in coma. Brain MRI showed 1. High-grade acute/subacute anoxic brain injury without herniation. 2.  Heterogeneous expansile sellar lesion, nonspecific, pituitary  hematoma/hemorrhage favored over other etiologies. Heparin drip is discontinued. REVIEW OF SYSTEMS: Unable to obtain. OBJECTIVE:    Visit Vitals  /74   Pulse 93   Temp 99.1 °F (37.3 °C)   Resp 15   Ht 5' 10\" (1.778 m)   Wt 103.9 kg (229 lb 0.9 oz)   SpO2 99%   BMI 32.87 kg/m²     Physical Exam:  GEN: Alert, NAD  EYES: conjunctiva normal, lids with out lesions  HEENT: MMM. HEART: RRR +S1 +S2  LUNGS: CTA B/L no rales or rhonchi. ABDOMEN: Soft, non-tender. EXTREMITIES: No edema cyanosis  SKIN: no rashes or skin breakdown, no nodules  NEURO: Coma, not respond to verbal or physical stimuli. Cranials: Neck is supple. Face is symmetric. PERRLA, positive corneal reflex, positive gag reflex, normal oculocephalic reflex. He is fully breathing over the vent. Motor: No spontaneous movement all 4 extremities. Sensory: No response to stimuli. Coordination: Unable to obtain.   Current Facility-Administered Medications   Medication Dose Route Frequency    furosemide (LASIX) injection 20 mg  20 mg IntraVENous DAILY    potassium chloride (K-DUR, KLOR-CON M20) SR tablet 40 mEq  40 mEq Oral ONCE    dextrose 5% infusion  50 mL/hr IntraVENous CONTINUOUS    amLODIPine (NORVASC) tablet 2.5 mg  2.5 mg Oral DAILY    metoprolol (LOPRESSOR) injection 2.5 mg  2.5 mg IntraVENous Q6H PRN    metoprolol (LOPRESSOR) injection 1.25 mg  1.25 mg IntraVENous Q6H    insulin glargine (LANTUS) injection 12 Units  12 Units SubCUTAneous Q24H    acyclovir (ZOVIRAX) 750 mg in 0.9% sodium chloride 250 mL IVPB  10 mg/kg (Ideal) IntraVENous Q12H    insulin lispro (HUMALOG) injection   SubCUTAneous Q6H    ELECTROLYTE REPLACEMENT PROTOCOL - Potassium Renal Dosing  1 Each Other PRN    piperacillin-tazobactam (ZOSYN) 3.375 g in 0.9% sodium chloride (MBP/ADV) 100 mL MBP  3.375 g IntraVENous Q8H    [Held by provider] NOREPINephrine (LEVOPHED) 8 mg in 5% dextrose 250mL (32 mcg/mL) infusion  2-30 mcg/min IntraVENous TITRATE    [Held by provider] heparin (porcine) 25,000 units in 0.45% saline 250 ml infusion  18-36 Units/kg/hr IntraVENous TITRATE    sodium chloride (NS) flush 5-40 mL  5-40 mL IntraVENous Q8H    sodium chloride (NS) flush 5-40 mL  5-40 mL IntraVENous PRN    acetaminophen (TYLENOL) tablet 650 mg  650 mg Oral Q6H PRN    Or    acetaminophen (TYLENOL) suppository 650 mg  650 mg Rectal Q6H PRN    polyethylene glycol (MIRALAX) packet 17 g  17 g Oral DAILY PRN    ondansetron (ZOFRAN ODT) tablet 4 mg  4 mg Oral Q8H PRN    Or    ondansetron (ZOFRAN) injection 4 mg  4 mg IntraVENous Q6H PRN    pantoprazole (PROTONIX) 40 mg in 0.9% sodium chloride 10 mL injection  40 mg IntraVENous Q12H    arformoteroL (BROVANA) neb solution 15 mcg  15 mcg Nebulization BID RT    levETIRAcetam (KEPPRA) 1,000 mg in 0.9% sodium chloride 100 mL IVPB  1,000 mg IntraVENous Q12H    propofol (DIPRIVAN) 10 mg/mL infusion  0-50 mcg/kg/min IntraVENous TITRATE       Laboratory  Recent Results (from the past 24 hour(s))   GLUCOSE, POC    Collection Time: 09/03/22  5:30 PM   Result Value Ref Range    Glucose (POC) 192 (H) 70 - 110 mg/dL   POTASSIUM    Collection Time: 09/03/22  8:49 PM   Result Value Ref Range    Potassium 3.5 3.5 - 5.5 mmol/L   GLUCOSE, POC    Collection Time: 09/03/22 11:18 PM   Result Value Ref Range    Glucose (POC) 188 (H) 70 - 110 mg/dL   BLOOD GAS, ARTERIAL POC    Collection Time: 09/04/22  4:49 AM   Result Value Ref Range    Device: ADULT VENT      FIO2 (POC) 35 %    pH (POC) 7.44 7.35 - 7.45      pCO2 (POC) 36.5 35.0 - 45.0 MMHG    pO2 (POC) 118 (H) 80 - 100 MMHG    HCO3 (POC) 25.0 22 - 26 MMOL/L    sO2 (POC) 98.8 (H) 92 - 97 %    Base excess (POC) 0.8 mmol/L    Mode ASSIST CONTROL      Tidal volume 450 ml    Set Rate 14 bpm    PEEP/CPAP (POC) 5 cmH2O    Allens test (POC) NOT APPLICABLE      Site DRAWN FROM ARTERIAL LINE      Patient temp. 98      Specimen type (POC) ARTERIAL      Performed by Rafael Zhu    MAGNESIUM    Collection Time: 09/04/22  5:00 AM   Result Value Ref Range    Magnesium 2.5 1.6 - 2.6 mg/dL   PHOSPHORUS    Collection Time: 09/04/22  5:00 AM   Result Value Ref Range    Phosphorus 2.9 2.5 - 4.9 MG/DL   METABOLIC PANEL, BASIC    Collection Time: 09/04/22  5:00 AM   Result Value Ref Range    Sodium 151 (H) 136 - 145 mmol/L    Potassium 3.4 (L) 3.5 - 5.5 mmol/L    Chloride 119 (H) 100 - 111 mmol/L    CO2 28 21 - 32 mmol/L    Anion gap 4 3.0 - 18 mmol/L    Glucose 193 (H) 74 - 99 mg/dL    BUN 31 (H) 7.0 - 18 MG/DL    Creatinine 2.38 (H) 0.6 - 1.3 MG/DL    BUN/Creatinine ratio 13 12 - 20      GFR est AA 34 (L) >60 ml/min/1.73m2    GFR est non-AA 28 (L) >60 ml/min/1.73m2    Calcium 8.3 (L) 8.5 - 10.1 MG/DL   CBC WITH AUTOMATED DIFF    Collection Time: 09/04/22  5:00 AM   Result Value Ref Range    WBC 8.8 4.6 - 13.2 K/uL    RBC 2.76 (L) 4.35 - 5.65 M/uL    HGB 7.9 (L) 13.0 - 16.0 g/dL    HCT 24.5 (L) 36.0 - 48.0 %    MCV 88.8 78.0 - 100.0 FL    MCH 28.6 24.0 - 34.0 PG    MCHC 32.2 31.0 - 37.0 g/dL    RDW 16.4 (H) 11.6 - 14.5 %    PLATELET 655 701 - 759 K/uL    MPV 10.3 9.2 - 11.8 FL    NRBC 0.9 (H) 0  WBC    ABSOLUTE NRBC 0.08 (H) 0.00 - 0.01 K/uL    NEUTROPHILS 74 (H) 40 - 73 %    LYMPHOCYTES 12 (L) 21 - 52 %    MONOCYTES 11 (H) 3 - 10 %    EOSINOPHILS 2 0 - 5 %    BASOPHILS 0 0 - 2 %    IMMATURE GRANULOCYTES 1 (H) 0.0 - 0.5 %    ABS. NEUTROPHILS 6.5 1.8 - 8.0 K/UL    ABS. LYMPHOCYTES 1.0 0.9 - 3.6 K/UL    ABS. MONOCYTES 1.0 0.05 - 1.2 K/UL    ABS. EOSINOPHILS 0.2 0.0 - 0.4 K/UL    ABS.  BASOPHILS 0.0 0.0 - 0.1 K/UL    ABS. IMM. GRANS. 0.1 (H) 0.00 - 0.04 K/UL    DF AUTOMATED     TRIGLYCERIDE    Collection Time: 09/04/22  5:00 AM   Result Value Ref Range    Triglyceride 201 (H) <150 MG/DL   PTT    Collection Time: 09/04/22  5:00 AM   Result Value Ref Range    aPTT 26.2 23.0 - 36.4 SEC   GLUCOSE, POC    Collection Time: 09/04/22  5:01 AM   Result Value Ref Range    Glucose (POC) 183 (H) 70 - 110 mg/dL   PTT    Collection Time: 09/04/22 11:21 AM   Result Value Ref Range    aPTT 25.5 23.0 - 36.4 SEC   GLUCOSE, POC    Collection Time: 09/04/22 11:39 AM   Result Value Ref Range    Glucose (POC) 213 (H) 70 - 110 mg/dL   POTASSIUM    Collection Time: 09/04/22  1:00 PM   Result Value Ref Range    Potassium 3.6 3.5 - 5.5 mmol/L   TROPONIN-HIGH SENSITIVITY    Collection Time: 09/04/22  1:00 PM   Result Value Ref Range    Troponin-High Sensitivity 61 0 - 78 ng/L       Radiology:  CT HEAD WO CONT    Result Date: 8/31/2022  EXAM: CT head INDICATION: Cardiac arrest COMPARISON: None. TECHNIQUE: Axial CT imaging of the head was performed without intravenous contrast. Standard multiplanar coronal and sagittal reformatted images were obtained and are included in interpretation. One or more dose reduction techniques were used on this CT: automated exposure control, adjustment of the mAs and/or kVp according to patient size, and iterative reconstruction techniques. The specific techniques used on this CT exam have been documented in the patient's electronic medical record. Digital Imaging and Communications in Medicine (DICOM) format image data are available to nonaffiliated external healthcare facilities or entities on a secure, media free, reciprocally searchable basis with patient authorization for at least a 12-month period after this study. _______________ FINDINGS: BRAIN AND POSTERIOR FOSSA: Exam quality is mildly degraded secondary to motion artifact. Ventricular size and configuration appears within normal limits.  Basilar cisterns are patent. Within the limitations of motion, no acute intra-axial hemorrhage. No evidence of mass effect or midline shift. Gray-white matter differentiation appears within normal limits as visualized. EXTRA-AXIAL SPACES AND MENINGES: There are no abnormal extra-axial fluid collections. CALVARIUM: Intact. SINUSES: Minor nonspecific mucosal thickening ethmoid air cells, sphenoid sinus with air-fluid level present in the right maxillary sinus. OTHER: None. _______________     1. Mildly motion limited study without evidence of acute intracranial abnormality. 2. Paranasal mucosal disease as described above with air-fluid level in the right maxillary sinus as can be seen with sinusitis. CT CHEST ABD PELV WO CONT    Result Date: 8/31/2022  EXAM: CT chest, abdomen, and pelvis INDICATION: Pain COMPARISON: No prior study. TECHNIQUE: Axial CT imaging of the chest, abdomen, and pelvis was performed without IV contrast administration. Multiplanar reformats were generated. One or more dose reduction techniques were used on this CT: automated exposure control, adjustment of the mAs and/or kVp according to patient size, and iterative reconstruction techniques. The specific techniques used on this CT exam have been documented in the patient's electronic medical record. Digital Imaging and Communications in Medicine (DICOM) format image data are available to nonaffiliated external healthcare facilities or entities on a secure, media free, reciprocally searchable basis with patient authorization for at least a 12-month period after this study. _______________ FINDINGS: CHEST: MEDIASTINUM: Left IJV central venous catheter tip at the cavoatrial junction. Normal caliber aorta with vascular calcifications. No pericardial effusion. LYMPH NODES: No pathologically enlarged mediastinal or hilar lymph nodes. PLEURA: No pleural effusion or pneumothorax. LUNGS/AIRWAY: Endotracheal tube tip in the midthoracic trachea.  No consolidation or suspicious pulmonary nodule is seen. OTHER: None. ABDOMEN/PELVIS: LIVER, BILIARY: Liver is unremarkable. No abnormal biliary dilation. Gallbladder is unremarkable. PANCREAS: Unremarkable. SPLEEN: Unremarkable. ADRENALS: Unremarkable. KIDNEYS: Severe left and moderate right hydroureteronephrosis with marked urinary bladder distention. No obstructing calculus. Left lower pole 5.8 cm simple cysts, no follow-up necessary. LYMPH NODES: No pathologically enlarged lymph nodes. GASTROINTESTINAL TRACT: No abnormal bowel dilation or wall thickening. PELVIC ORGANS: Unremarkable. VASCULATURE: Unremarkable. OSSEOUS: No area of erosion or aggressive-appearing bone destruction. OTHER: None. _______________     Severe left and moderate right hydroureteronephrosis with marked urinary bladder distention. No obstructing calculus. No acute intrathoracic abnormality. US RETROPERITONEUM COMP    Result Date: 9/2/2022  US RETROPERITONEUM COMP INDICATION: Acute kidney injury. TECHNIQUE: Renal ultrasound. COMPARISON: 8/31/2022 CT FINDINGS: Right kidney measures 10.7 cm. Left kidney measures 6.7 cm. Bilateral increased cortical echogenicity. Severe left hydronephrosis with cortical atrophy. No shadowing calculus or perinephric abnormality. No solid renal mass identified. Indwelling Lyle catheter within decompressed urinary bladder. Bilateral increased cortical echogenicity. Severe left hydronephrosis with cortical atrophy. XR CHEST PORT    Result Date: 8/31/2022  EXAM: XR CHEST PORT CLINICAL INDICATION/HISTORY: central line -Additional: None COMPARISON: 4 hours prior TECHNIQUE: Portable frontal view of the chest _______________ FINDINGS: SUPPORT DEVICES: Endotracheal tube, gastric tube, and left IJ approach central venous catheter noted. Tip of the central venous catheter is at the level of the superior cavoatrial junction. HEART AND MEDIASTINUM: Normal cardiac size and mediastinal contours.  LUNGS AND PLEURAL SPACES: No focal pneumonic opacity. No evidence of pneumothorax or pleural effusion. BONY THORAX AND SOFT TISSUES: Unremarkable. _______________     1. Support devices in stable/appropriate position as visualized. 2. No superimposed acute radiographic cardiopulmonary abnormality. XR CHEST PORT    Result Date: 8/31/2022  XR CHEST PORT CLINICAL INDICATION/HISTORY: Tube placement -Additional: None COMPARISON: None TECHNIQUE: Portable frontal view of the chest _______________ FINDINGS: SUPPORT DEVICES: Enteric tube tip projecting over the thoracic inlet. Endotracheal tube tip in the midthoracic trachea. Left IJV central venous catheter tip at the caval atrial junction. HEART AND MEDIASTINUM: Cardiomediastinal silhouette within normal limits. LUNGS AND PLEURAL SPACES: No dense consolidation, large effusion or pneumothorax. _______________     Enteric tube tip projecting over the thoracic inlet. Endotracheal tube tip in the midthoracic trachea. No acute cardiopulmonary abnormality. ECHO ADULT COMPLETE    Result Date: 8/31/2022  Formatting of this result is different from the original.   Left Ventricle: Normal left ventricular systolic function with a visually estimated EF of 60 - 65%. Not well visualized. Left ventricle size is normal. Normal wall thickness. Normal wall motion. Right Ventricle: Not well visualized. Right ventricle is moderately dilated. Moderately reduced systolic function. Visually moderately dilated RV and moderately reduced RV systolic function. Aortic Valve: Tricuspid valve. Tricuspid Valve: The estimated RVSP is 25 mmHg. Right Atrium: Not well visualized. Right atrium is moderately dilated. No old echocardiogram available to compare. DUPLEX LOWER EXT VENOUS BILAT    Result Date: 9/1/2022  · Age indeterminate thrombus present in the right popliteal vein. · No evidence of deep vein thrombosis in the left lower extremity.         ASSESSMENT/IMPRESSION:   71-year-old male with past medical history of shingles at lumbar/sacral dermatome, probable septic shock presents with, after cardiac arrest.  Coma: Anoxic brain injury after cardiac arrest.  Prognosis is poor. Cardiac arrest  Pituitary hemorrhage  Right lower limb popliteal DVT  Disseminated herpes zoster  PLAN/RECOMMENDATIONS:  OK to have heparin for DVT prophylaxis   Continue Keppra 1000 mg twice daily for myoclonic jerk. Supportive care. Family would like to continue full code. Signed:   Chayo Yeh MD  9/4/2022  9:37 AM

## 2022-09-04 NOTE — PROGRESS NOTES
Hospitalist Progress Note    Patient: Mary Quevedo MRN: [id number]  [facility]: [id number]    YOB: 1960  Age: 65 y.o.   Sex: male    DOA: 8/31/2022 LOS:  LOS: 4 days          Chief Complaint:    arrest      Assessment/Plan     Mary Quevedo is a 65 y.o. hypertension, dyslipidemia, type 2 diabetes mellitus, vitamin D deficiency, stage 3a CKD and gout male with history of who presents with cardiac arrest.      Cardiac arrest, PEA  Severe anoxia by clinical exam and confirmed by MRI  Acute respiratory failure  Anoxia  Myoclonic jerks  Shock   RLE popliteal DVT  Sepsis, likely due to urinary source  UTI  Disseminated herpes zoster  Severe left and moderate right hydroureteronephrosis   GENO on CKD 3  Hyperglycemia  anemia     CR slightly better     Hypernatremia-dextrose IV to help lower    Continue duran  Heparin gtt  IV antibiotics  IV acyclovir  IV PPI     Off sedation     Echo with nl EF     MRI brain shows anoxic injury  His prognosis is  likely that chances for meaningful recovery outside of vegetative state are extremely low  Family hoping for recovery and have not changed goals of care     Cardiology on case      GI - NPO      Full code      Disposition :  Patient Active Problem List   Diagnosis Code    Cardiac arrest ([facility] I46.9    Respiratory failure ([facility] [address] J96.90    GENO (acute kidney injury) ([address] N17.9    Disseminated herpes zoster B02.7    Hydronephrosis N13.30    Shock ([address] R57.9    Sepsis ([facility] [address] A41.9    UTI (urinary tract infection) N39.0    Brain anoxia ([facility] [address] G93.1       Subjective:  No changes  MRI and neuro notes reviewed  No family in this am      Review of systems:  UTO due to his condition      Vital signs/Intake and Output:  Visit Vitals  /70   Pulse 93   Temp 99.1 °F (37.3 °C)   Resp 15   Ht 5' 10\" (1.778 m)   Wt 103.9 kg (229 lb 0.9 oz)   SpO2 99%   BMI 32.87 kg/m²     Current Shift:  09/04 0701 - 09/04 1900  In: 640.7 [I.V.:440.7]  Out: 600 [Urine:600]  Last three shifts: 09/02 1901 - 09/04 0700  In: 4323.2 [I.V.:4053.2]  Out: 2925 [Urine:2925]    Exam:    General: mildly jaundiced appearing AAM unresponsive on vent, NAD  CVS:Regular rate and rhythm, no M/R/G, S1/S2 heard, no thrill  Lungs:Clear to auscultation bilaterally, no wheezes, rhonchi, or rales  Abdomen: Soft, Nontender, No distention  Extremities: No C/C/E, pulses palpable 2+  Neuro:no response to painful or verbal stim  Excoriated scabbed lesions on right thigh                Labs: Results:       Chemistry Recent Labs     09/04/22  0500 09/03/22  2049 09/03/22  1043 09/03/22  0420 09/02/22  0540   *  --   --  190* 223*   *  --   --  151* 146*   K 3.4* 3.5 3.5 3.6 3.9   *  --   --  120* 114*   CO2 28  --   --  21 17*   BUN 31*  --   --  39* 42*   CREA 2.38*  --   --  3.13* 3.16*   CA 8.3*  --   --  8.5 8.3*   AGAP 4  --   --  10 15   BUCR 13  --   --  12 13   AP  --   --   --  112 111   TP  --   --   --  6.3* 5.7*   ALB  --   --   --  1.9* 2.0*   GLOB  --   --   --  4.4* 3.7   AGRAT  --   --   --  0.4* 0.5*      CBC w/Diff Recent Labs     09/04/22  0500 09/03/22  0420   WBC 8.8 15.1*   RBC 2.76* 3.17*   HGB 7.9* 9.0*   HCT 24.5* 28.0*    167   GRANS 74* 81*   LYMPH 12* 8*   EOS 2 1      Cardiac Enzymes No results for input(s): CPK, CKND1, ROB in the last 72 hours. No lab exists for component: CKRMB, TROIP   Coagulation Recent Labs     09/04/22  1121 09/04/22  0500   APTT 25.5 26.2       Lipid Panel Lab Results   Component Value Date/Time    Triglyceride 201 (H) 09/04/2022 05:00 AM      BNP No results for input(s): BNPP in the last 72 hours.    Liver Enzymes Recent Labs     09/03/22  0420   TP 6.3*   ALB 1.9*         Thyroid Studies No results found for: T4, T3U, TSH, TSHEXT, TSHEXT     Procedures/imaging: see electronic medical records for all procedures/Xrays and details which were not copied into this note but were reviewed prior to creation of Pebbles Aeyrs MD

## 2022-09-04 NOTE — PROGRESS NOTES
Chart reviewed remotely  Vital signs, notes, imaging report reviewed  Fever curve-- on the low side 95.4  Leucocytosis is improved  Neuro report regarding MRI and disucssion with family reviewed    Will cont emperic Zosyn for Urosepsis and acyclovir for now  Will round on Monday    Bruna Goldberg MD  [facility]  Office #:     [phone]  [phone]   Office Fax: [phone]

## 2022-09-04 NOTE — PROGRESS NOTES
/  /                        Cardiology Progress Note        Patient: Elder Matamoros        Sex: male          DOA: 8/31/2022  YOB: 1960      Age:  65 y.o.        LOS:  LOS: 4 days   Assessment/Plan     Principal Problem:    Cardiac arrest ([address] (8/31/2022)    Active Problems:    Respiratory failure ([address] (8/31/2022)      GENO (acute kidney injury) ([address] (8/31/2022)      Disseminated herpes zoster (8/31/2022)      Hydronephrosis (8/31/2022)      Shock ([address] (8/31/2022)      Sepsis ([address] (8/31/2022)      UTI (urinary tract infection) (9/1/2022)      Plan:  9/4/2022  High-grade anoxic brain injury on MRI  Cardiac telemetry stable  Continue with current supportive treatment as per family  Discussed with RN and Dr. Jabari Meyer  Discussed with patient's wife and family          9/3/2022  Blood pressure is mildly elevated with mild tachycardia  I will add low-dose IV metoprolol 1.25 mg every 6 hours  Continue with rest of the treatment  Discussed with Dr. Cuenca Resides        9/2/2022  Patient remained intubated  Off sedation  Of Levophed  Cardiac telemetry stable with occasional PVCs  Continue with supportive treatment  Discussed with wife and family members  Discussed with Dr. Muñoz Kins arrest PEA arrest  Mild troponin elevation after CPR and PEA cardiac arrest, normal EF and wall motion, no evidence of ACS  DVT  Continue anticoagulation if no active bleeding  Continue with supportive treatment  I have long and lengthy discussion with family about management plan. All the questions were answered. 35 minutes of critical care time spent in the direct evaluation and treatment of this high risk patient. The reason for providing this level of medical care for this critically ill patient was due a critical illness that impaired one or more vital organ systems such that there was a high probability of imminent or life threatening deterioration in the patients condition.  This care involved high complexity decision making to assess, manipulate, and support vital system functions, to treat this degreee vital organ system failure and to prevent further life threatening deterioration of the patients condition. Subjective:    cc:  Cardiac arrest        REVIEW OF SYSTEMS:     Limited due to intubation      Objective:      Visit Vitals  /76   Pulse 77   Temp (!) 95.4 °F (35.2 °C)   Resp 16   Ht 5' 10\" (1.778 m)   Wt 103.9 kg (229 lb 0.9 oz)   SpO2 98%   BMI 32.87 kg/m²     Body mass index is 32.87 kg/m². Physical Exam:  General Appearance: Intubated  NECK: No JVD, no thyroidomeglay. LUNGS: Clear bilaterally. HEART: S1+S2 audible,    ABD: Non-tender, BS Audible    EXT: No edema, and no cysnosis. VASCULAR EXAM: Pulses are intact.     PSYCHIATRIC EXAM: Intubated    Medication:  Current Facility-Administered Medications   Medication Dose Route Frequency    furosemide (LASIX) injection 20 mg  20 mg IntraVENous DAILY    dextrose 5% infusion  50 mL/hr IntraVENous CONTINUOUS    amLODIPine (NORVASC) tablet 2.5 mg  2.5 mg Oral DAILY    metoprolol (LOPRESSOR) injection 2.5 mg  2.5 mg IntraVENous Q6H PRN    metoprolol (LOPRESSOR) injection 1.25 mg  1.25 mg IntraVENous Q6H    insulin glargine (LANTUS) injection 12 Units  12 Units SubCUTAneous Q24H    acyclovir (ZOVIRAX) 750 mg in 0.9% sodium chloride 250 mL IVPB  10 mg/kg (Ideal) IntraVENous Q12H    insulin lispro (HUMALOG) injection   SubCUTAneous Q6H    ELECTROLYTE REPLACEMENT PROTOCOL - Potassium Renal Dosing  1 Each Other PRN    piperacillin-tazobactam (ZOSYN) 3.375 g in 0.9% sodium chloride (MBP/ADV) 100 mL MBP  3.375 g IntraVENous Q8H    [Held by provider] NOREPINephrine (LEVOPHED) 8 mg in 5% dextrose 250mL (32 mcg/mL) infusion  2-30 mcg/min IntraVENous TITRATE    [Held by provider] heparin (porcine) 25,000 units in 0.45% saline 250 ml infusion  18-36 Units/kg/hr IntraVENous TITRATE    sodium chloride (NS) flush 5-40 mL  5-40 mL IntraVENous Q8H sodium chloride (NS) flush 5-40 mL  5-40 mL IntraVENous PRN    acetaminophen (TYLENOL) tablet 650 mg  650 mg Oral Q6H PRN    Or    acetaminophen (TYLENOL) suppository 650 mg  650 mg Rectal Q6H PRN    polyethylene glycol (MIRALAX) packet 17 g  17 g Oral DAILY PRN    ondansetron (ZOFRAN ODT) tablet 4 mg  4 mg Oral Q8H PRN    Or    ondansetron (ZOFRAN) injection 4 mg  4 mg IntraVENous Q6H PRN    pantoprazole (PROTONIX) 40 mg in 0.9% sodium chloride 10 mL injection  40 mg IntraVENous Q12H    arformoteroL (BROVANA) neb solution 15 mcg  15 mcg Nebulization BID RT    levETIRAcetam (KEPPRA) 1,000 mg in 0.9% sodium chloride 100 mL IVPB  1,000 mg IntraVENous Q12H    propofol (DIPRIVAN) 10 mg/mL infusion  0-50 mcg/kg/min IntraVENous TITRATE               Lab/Data Reviewed:  Procedures/imaging: see electronic medical records for all procedures/Xrays   and details which were not copied into this note but were reviewed prior to creation of Plan       All lab results for the last 24 hours reviewed. Recent Labs     09/04/22  0500 09/03/22  0420   WBC 8.8 15.1*   HGB 7.9* 9.0*   HCT 24.5* 28.0*    167       Recent Labs     09/04/22  0500 09/03/22  2049 09/03/22  1043 09/03/22  0420 09/02/22  0540   *  --   --  151* 146*   K 3.4* 3.5 3.5 3.6 3.9   *  --   --  120* 114*   CO2 28  --   --  21 17*   *  --   --  190* 223*   BUN 31*  --   --  39* 42*   CREA 2.38*  --   --  3.13* 3.16*   CA 8.3*  --   --  8.5 8.3*         RADIOLOGY:      CXR Results  (Last 48 hours)                 09/04/22 0615  XR CHEST PORT Final result    Impression:      No active cardiopulmonary disease.        Narrative:  EXAM: CHEST RADIOGRAPH, SINGLE VIEW       CLINICAL INDICATION/HISTORY: Shortness of breath, intubated, follow-up       COMPARISON: 9/3/2022       TECHNIQUE: Portable frontal view of the chest was obtained.        _______________       FINDINGS:       SUPPORT DEVICES: Endotracheal tube, left jugular central venous catheter, and   nasogastric tube all appear adequately positioned. HEART AND MEDIASTINUM: Cardiomediastinal silhouette appears within normal   limits. Tortuous and atherosclerotic thoracic aorta. No central vascular   congestion. LUNGS AND PLEURAL SPACES: Unchanged retrocardiac left lower lobe atelectasis. Lungs are otherwise adequately aerated with no confluent airspace opacity. No   pleural effusion or pneumothorax. BONY THORAX AND SOFT TISSUES: No acute osseous abnormality. _______________           09/03/22 1250  XR CHEST PORT Final result    Impression:      No active cardiopulmonary disease. Narrative:  EXAM: CHEST RADIOGRAPH, SINGLE VIEW       CLINICAL INDICATION/HISTORY: Shortness of breath, endotracheal tube placement       COMPARISON: 8/31/2022       TECHNIQUE: Portable frontal view of the chest was obtained.        _______________       FINDINGS:       SUPPORT DEVICES: Adequately positioned endotracheal tube, unchanged. Left   jugular central venous catheter and nasogastric tube also appear unchanged,   adequately positioned. HEART AND MEDIASTINUM: Cardiomediastinal silhouette appears within normal   limits. Normal caliber thoracic aorta. No central vascular congestion. LUNGS AND PLEURAL SPACES: Retrocardiac left lower lobe subsegmental atelectasis. Lungs are otherwise adequately aerated with no confluent airspace opacity. No   pleural effusion or pneumothorax. BONY THORAX AND SOFT TISSUES: No acute osseous abnormality.        _______________                     Cardiology Procedures:   Results for orders placed or performed during the hospital encounter of 08/31/22   EKG, 12 LEAD, INITIAL   Result Value Ref Range    Ventricular Rate 112 BPM    Atrial Rate 112 BPM    P-R Interval 158 ms    QRS Duration 100 ms    Q-T Interval 360 ms    QTC Calculation (Bezet) 491 ms    Calculated P Axis 73 degrees    Calculated R Axis -69 degrees    Calculated T Axis 51 degrees    Diagnosis       Sinus tachycardia  Left axis deviation  Low voltage QRS  Inferior infarct , age undetermined  Abnormal ECG  No previous ECGs available  Confirmed by Reuben Crowley MD. ([id number]) on 8/31/2022 11:30:53 PM        Echo Results  (Last 48 hours)      None         Cardiolite (Tc-99m Sestamibi) stress test    Signed By: Christy Chew MD     September 4, 2022

## 2022-09-04 NOTE — PROGRESS NOTES
PVL results reviewed no acute DVT  Chronic DVT in pop vein on one side. [facility] based on [facility]L exam finding. If he can be on sub Q heparin for DVT PPX if okay with neuro. Other options is repeat venous duplex in 5-7 days. To evaluate for any propagation. And if any acute DVT in proximal system filter will be indicated.     Tati Rasmussen MD

## 2022-09-05 NOTE — PROGRESS NOTES
[facility]  (A Division of [address] [place] [address])    Meagan Mccord MD  Office #: [phone] Option # [phone]  Fax #: [phone]     Date of Admission: 8/31/2022   Date of Note: 9/5/2022   Reason for Referral: Evaluation and antibiotic management of dissiminated shingles/ shock post carrest  .        Current Antimicrobials:    Prior Antimicrobials:  Zosyn-31 to date  Acyclovir IV 9/1 o date Oral antiviral( Valtrex) X7 days PTA  Vanco 8/31 to 9/2   Antibiotic allergy: None     Assessment:     Critically ill patient /guraded prognosis with :    Probable Septic Shock  Likely urine source--skin( has shingles) doesn't look superinfected  --Severe L and mod R hydronephrosis, without obstructing stone. UA with TNTC wbc  --BCX  8/31: NGSF, no UCX available for review . UA with TNTC WBC -8/31  --Fluctuating WBC-- up to 15.1, not on steroid- resolved  --off pressor  S/P Cardiac arrest with elevated troponins-PE not ruled out  -- age indetermiante DVT on LLE  Anoxic encephalopathy- -MRI brain : high grade acute/subacute anoxic brain injury w/out herniation, pit hematoma/hge like picture. Prognosis poor per Neurology  L lumbar/sacral dermatome Shingles-- was on viral medication at home per reports  --dried up skin lesion, no bacterial superinfection  Transaminitis-- from shock liver-- Improving  Acute on chronick GENO (Cr 1.6 5/2022)- slowly improving  Chronic RLE DVT   History of gout treatment with colchicine recently per wife--new diagnosis    Recommendation related to ID issues:       FU BCX: remain negative,  Monitor cbc/cmp daily  Cont Zosyn- adjusted to CrCl-- will complete 10 days-- until 9/9  Cont acyclovir IV-- dosed for crCl-- monitor electrolytes closely- until 9/4/22( was on oral PTA)--will give for 7 days and DC    DW ICU RN and MD and pt wife in room    Subjective:      Pt seen and DW ICU ream. No acute event over weekend. Remains intubated/ unresponsive. Not on pressor. Sedated with Propofol  \"Family reports he is responding to their voice\"  Afebrile, wbc is normal and Cr improving  MRI report finding reviewed    Notes/Labs reviewed-- Prognosis poor with anoxic brain injury per Neuro    CXR this am:  1. Tubes and lines appear unchanged, adequately positioned. 2. Left basilar opacity, atelectasis versus infiltrate. HPI 9/1/22: Jessika Vela is 65 y.o. M patient with PMH of shingles, on medication. History is limited and obtained from chart review and med staff. Presented to ED yesterday and was in cardiac arrest with CPR that took 30 minutes. He was intubated/ placed on pressor and admitted to ICU. He had shingles at home and was on treatment. He had urine retention  and had about 2L of urine with duran placement per RN. Hydronephrosis on CT scan, no CPD on CXR, LLE DVT of unknown age on FORBES. He was placed on emperic abx    He is currently on pressor- Levophed, unresponsive, with myoclonus jerk that requires propfol    Past Medical History:   Diagnosis Date    Gout     Shingles      No past surgical history on file. No family history on file.   Medications reviewed as below:   Current Facility-Administered Medications   Medication Dose Route Frequency Provider Last Rate Last Admin    heparin (porcine) injection 5,000 Units  5,000 Units SubCUTAneous Q8H Serena Juan MD   5,000 Units at 09/05/22 0630    furosemide (LASIX) injection 20 mg  20 mg IntraVENous DAILY Serena Juan MD   20 mg at 09/04/22 1112    dextrose 5% infusion  50 mL/hr IntraVENous CONTINUOUS Serena Juan MD 50 mL/hr at 09/04/22 1329 50 mL/hr at 09/04/22 1329    amLODIPine (NORVASC) tablet 2.5 mg  2.5 mg Oral DAILY Serena Juan MD   2.5 mg at 09/03/22 1254    metoprolol (LOPRESSOR) injection 2.5 mg  2.5 mg IntraVENous Q6H PRN Serena Juan MD        metoprolol (LOPRESSOR) injection 1.25 mg  1.25 mg IntraVENous Q6H Ventura Salazar MD   1.25 mg at 09/05/22 0233    insulin glargine (LANTUS) injection 12 Units  12 Units SubCUTAneous Q24H Sumit Payan MD   12 Units at 09/04/22 0800    acyclovir (ZOVIRAX) 750 mg in 0.9% sodium chloride 250 mL IVPB  10 mg/kg (Ideal) IntraVENous Q12H Bruna Zhang  mL/hr at 09/05/22 0233 750 mg at 09/05/22 0233    insulin lispro (HUMALOG) injection   SubCUTAneous Q6H Sumit Payan MD   3 Units at 09/05/22 0550    ELECTROLYTE REPLACEMENT PROTOCOL - Potassium Renal Dosing  1 Each Other PRN Drew Conti MD        piperacillin-tazobactam (ZOSYN) 3.375 g in 0.9% sodium chloride (MBP/ADV) 100 mL MBP  3.375 g IntraVENous Q8H [place] Norma K DO 25 mL/hr at 09/05/22 0550 3.375 g at 09/05/22 0550    [Held by provider] NOREPINephrine (LEVOPHED) 8 mg in 5% dextrose 250mL (32 mcg/mL) infusion  2-30 mcg/min IntraVENous TITRATE Chasity Argueta DO   Held at 09/01/22 0900    sodium chloride (NS) flush 5-40 mL  5-40 mL IntraVENous Q8H Racheal Chakraborty MD   10 mL at 09/05/22 0550    sodium chloride (NS) flush 5-40 mL  5-40 mL IntraVENous PRN Racheal Chakraborty MD        acetaminophen (TYLENOL) tablet 650 mg  650 mg Oral Q6H PRN Racheal Chakraborty MD   650 mg at 09/03/22 2027    Or    acetaminophen (TYLENOL) suppository 650 mg  650 mg Rectal Q6H PRN Brigette Chakraborty MD        polyethylene glycol (MIRALAX) packet 17 g  17 g Oral DAILY PRN Brigette Chakraborty MD        ondansetron (ZOFRAN ODT) tablet 4 mg  4 mg Oral Q8H PRN Brigette Chakraborty MD        Or    ondansetron (ZOFRAN) injection 4 mg  4 mg IntraVENous Q6H PRN Brigette Chakraborty MD        pantoprazole (PROTONIX) 40 mg in 0.9% sodium chloride 10 mL injection  40 mg IntraVENous Q12H Brigette Chakraborty MD   40 mg at 09/04/22 2057    arformoteroL (BROVANA) neb solution 15 mcg  15 mcg Nebulization BID RT Sumit Payan MD   15 mcg at 09/05/22 0726    levETIRAcetam (KEPPRA) 1,000 mg in 0.9% sodium chloride 100 mL IVPB  1,000 mg IntraVENous Q12H Fide Willard  mL/hr at 22 1,000 mg at 22    propofol (DIPRIVAN) 10 mg/mL infusion  0-50 mcg/kg/min IntraVENous TITRATE Fide Willard MD 10 mL/hr at 22 15 mcg/kg/min at 22     No Known Allergies  Social History     Socioeconomic History    Marital status:      Spouse name: Not on file    Number of children: Not on file    Years of education: Not on file    Highest education level: Not on file   Occupational History    Not on file   Tobacco Use    Smoking status: Not on file    Smokeless tobacco: Not on file   Substance and Sexual Activity    Alcohol use: Not on file    Drug use: Not on file    Sexual activity: Not on file   Other Topics Concern    Not on file   Social History Narrative    Not on file     Social Determinants of Health     Financial Resource Strain: Not on file   Food Insecurity: Not on file   Transportation Needs: Not on file   Physical Activity: Not on file   Stress: Not on file   Social Connections: Not on file   Intimate Partner Violence: Not on file   Housing Stability: Not on file        Review of Systems: Unable to provide      Objective:      Visit Vitals  /82   Pulse 88   Temp 98.3 °F (36.8 °C)   Resp 14   Ht 5' 10\" (1.778 m)   Wt 95 kg (209 lb 7 oz)   SpO2 100%   BMI 30.05 kg/m²     Temp (24hrs), Av.1 °F (36.7 °C), Min:95.4 °F (35.2 °C), Max:99.9 °F (37.7 °C)         GEN: WD unresponsive, intubated    Lines / Catheters: Left IJ central line, L PIV, Lyle  . HEENT: Unicteric. Edematous sclera. --no neck swelling  CHEST: Non laboured breathing. CTA  CVS: Tachycardic- HS 1 and 2 heard, no mur/gallop  ABD: Obese/soft. Non tender. Non distended. Hypoactive BS  VERNELL: Lyle in place  EXT: No apparent swelling or redness on UE/LE joints. Skin: Dry and intact. No obvious cellulitis-- has L thigh/buttock Shingles rash-- multiple dermatome, no active vesicular lesion on exam. No cyanosis.  Warm feet  CNS: unresponsive, no jerky movement noted during ecam      Labs: Results:   Chemistry Recent Labs     09/05/22  0450 09/04/22  2345 09/04/22  1755 09/04/22  1300 09/04/22  0500 09/03/22  1043 09/03/22  0420   * 226*  --   --  193*  --  190*   * 151*  --   --  151*  --  151*   K 3.7 3.7 3.7   < > 3.4*   < > 3.6   * 118*  --   --  119*  --  120*   CO2 27 28  --   --  28  --  21   BUN 32* 32*  --   --  31*  --  39*   CREA 2.26* 2.27*  --   --  2.38*  --  3.13*   CA 8.0* 7.8*  --   --  8.3*  --  8.5   AGAP 6 5  --   --  4  --  10   BUCR 14 14  --   --  13  --  12   AP  --   --   --   --   --   --  112   TP  --   --   --   --   --   --  6.3*   ALB  --   --   --   --   --   --  1.9*   GLOB  --   --   --   --   --   --  4.4*   AGRAT  --   --   --   --   --   --  0.4*    < > = values in this interval not displayed. CBC w/Diff Recent Labs     09/05/22  0450 09/04/22  0500 09/03/22  0420   WBC 7.1 8.8 15.1*   RBC 2.78* 2.76* 3.17*   HGB 8.0* 7.9* 9.0*   HCT 25.6* 24.5* 28.0*    166 167   GRANS 65 74* 81*   LYMPH 19* 12* 8*   EOS 5 2 1            No results found for: SDES Lab Results   Component Value Date/Time    Culture result: NO GROWTH 4 DAYS 08/31/2022 10:15 AM    Culture result: NO GROWTH 4 DAYS 08/31/2022 10:00 AM        Results       Procedure Component Value Units Date/Time    CULTURE, RESPIRATORY/SPUTUM/BRONCH Jennye Coombe STAIN [[id number]]     Order Status: Sent Specimen: Sputum from Tracheal Aspirate     COVID-19 RAPID TEST [[id number]] Collected: 08/31/22 1050    Order Status: Completed Specimen: Nasopharyngeal Updated: 08/31/22 1131     Specimen source Nasopharyngeal        COVID-19 rapid test Not detected        Comment: Rapid Abbott ID Now       Rapid NAAT:  The specimen is NEGATIVE for SARS-CoV-2, the novel coronavirus associated with COVID-19.        Negative results should be treated as presumptive and, if inconsistent with clinical signs and symptoms or necessary for patient management, should be tested with an alternative molecular assay. Negative results do not preclude SARS-CoV-2 infection and should not be used as the sole basis for patient management decisions. This test has been authorized by the FDA under an Emergency Use Authorization (EUA) for use by authorized laboratories. Fact sheet for Healthcare Providers: ConventionUpdate.co.nz  Fact sheet for Patients: ConventionUpdate.co.nz       Methodology: Isothermal Nucleic Acid Amplification         CULTURE, BLOOD [[id number]] Collected: 08/31/22 1015    Order Status: Completed Specimen: Blood Updated: 09/04/22 0807     Special Requests: NO SPECIAL REQUESTS        Culture result: NO GROWTH 4 DAYS       CULTURE, BLOOD [[id number]] Collected: 08/31/22 1000    Order Status: Completed Specimen: Blood Updated: 09/04/22 0807     Special Requests: NO SPECIAL REQUESTS        Culture result: NO GROWTH 4 DAYS                 Imaging:      All imaging reviewed from Admission to present as per radiology interpretation in [facility]

## 2022-09-05 NOTE — PROGRESS NOTES
NEUROLOGY PROGRESS NOTE        Patient: Topher Covarrubias        Sex: male          DOA: 8/31/2022  YOB: 1960      Age:  65 y.o.         LOS: 5 days     Identification:  [id number] male presents with coma after cardiac arrest.            SUBJECTIVE:   Patient remains to be in coma. Family report that he has mild response to pain stimuli today. REVIEW OF SYSTEMS: Unable to obtain. OBJECTIVE:    Visit Vitals  BP (!) 152/86   Pulse 88   Temp 98.2 °F (36.8 °C)   Resp 14   Ht 5' 10\" (1.778 m)   Wt 95 kg (209 lb 7 oz)   SpO2 100%   BMI 30.05 kg/m²     Physical Exam:  GEN: Alert, NAD  EYES: conjunctiva normal, lids with out lesions  HEENT: MMM. HEART: RRR +S1 +S2  LUNGS: CTA B/L no rales or rhonchi. ABDOMEN: Soft, non-tender. EXTREMITIES: No edema cyanosis  SKIN: no rashes or skin breakdown, no nodules  NEURO: Coma, not respond to verbal or physical stimuli. Cranials: Neck is supple. Face is symmetric. PERRLA, positive corneal reflex, positive gag reflex, normal oculocephalic reflex. He is breathing over the vent. Motor: No spontaneous movement all 4 extremities. Sensory: No response to stimuli. Coordination: Unable to obtain.   Current Facility-Administered Medications   Medication Dose Route Frequency    heparin (porcine) injection 5,000 Units  5,000 Units SubCUTAneous Q8H    dexmedeTOMidine (PRECEDEX) 400 mcg in 0.9% sodium chloride 100 mL infusion  0.2-0.7 mcg/kg/hr IntraVENous TITRATE    potassium, sodium phosphates (NEUTRA-PHOS) packet 2 Packet  2 Packet Oral Q2H    furosemide (LASIX) injection 20 mg  20 mg IntraVENous DAILY    dextrose 5% infusion  50 mL/hr IntraVENous CONTINUOUS    amLODIPine (NORVASC) tablet 2.5 mg  2.5 mg Oral DAILY    metoprolol (LOPRESSOR) injection 2.5 mg  2.5 mg IntraVENous Q6H PRN    metoprolol (LOPRESSOR) injection 1.25 mg  1.25 mg IntraVENous Q6H    insulin glargine (LANTUS) injection 12 Units  12 Units SubCUTAneous Q24H    acyclovir (ZOVIRAX) 750 mg in 0.9% sodium chloride 250 mL IVPB  10 mg/kg (Ideal) IntraVENous Q12H    insulin lispro (HUMALOG) injection   SubCUTAneous Q6H    ELECTROLYTE REPLACEMENT PROTOCOL - Potassium Renal Dosing  1 Each Other PRN    piperacillin-tazobactam (ZOSYN) 3.375 g in 0.9% sodium chloride (MBP/ADV) 100 mL MBP  3.375 g IntraVENous Q8H    [Held by provider] NOREPINephrine (LEVOPHED) 8 mg in 5% dextrose 250mL (32 mcg/mL) infusion  2-30 mcg/min IntraVENous TITRATE    sodium chloride (NS) flush 5-40 mL  5-40 mL IntraVENous Q8H    sodium chloride (NS) flush 5-40 mL  5-40 mL IntraVENous PRN    acetaminophen (TYLENOL) tablet 650 mg  650 mg Oral Q6H PRN    Or    acetaminophen (TYLENOL) suppository 650 mg  650 mg Rectal Q6H PRN    polyethylene glycol (MIRALAX) packet 17 g  17 g Oral DAILY PRN    ondansetron (ZOFRAN ODT) tablet 4 mg  4 mg Oral Q8H PRN    Or    ondansetron (ZOFRAN) injection 4 mg  4 mg IntraVENous Q6H PRN    pantoprazole (PROTONIX) 40 mg in 0.9% sodium chloride 10 mL injection  40 mg IntraVENous Q12H    arformoteroL (BROVANA) neb solution 15 mcg  15 mcg Nebulization BID RT    levETIRAcetam (KEPPRA) 1,000 mg in 0.9% sodium chloride 100 mL IVPB  1,000 mg IntraVENous Q12H    [Held by provider] propofol (DIPRIVAN) 10 mg/mL infusion  0-50 mcg/kg/min IntraVENous TITRATE       Laboratory  Recent Results (from the past 24 hour(s))   POTASSIUM    Collection Time: 09/04/22  1:00 PM   Result Value Ref Range    Potassium 3.6 3.5 - 5.5 mmol/L   TROPONIN-HIGH SENSITIVITY    Collection Time: 09/04/22  1:00 PM   Result Value Ref Range    Troponin-High Sensitivity 61 0 - 78 ng/L   GLUCOSE, POC    Collection Time: 09/04/22  5:28 PM   Result Value Ref Range    Glucose (POC) 176 (H) 70 - 110 mg/dL   POTASSIUM    Collection Time: 09/04/22  5:55 PM   Result Value Ref Range    Potassium 3.7 3.5 - 5.5 mmol/L   GLUCOSE, POC    Collection Time: 09/04/22 11:26 PM   Result Value Ref Range    Glucose (POC) 219 (H) 70 - 936 mg/dL   METABOLIC PANEL, BASIC Collection Time: 09/04/22 11:45 PM   Result Value Ref Range    Sodium 151 (H) 136 - 145 mmol/L    Potassium 3.7 3.5 - 5.5 mmol/L    Chloride 118 (H) 100 - 111 mmol/L    CO2 28 21 - 32 mmol/L    Anion gap 5 3.0 - 18 mmol/L    Glucose 226 (H) 74 - 99 mg/dL    BUN 32 (H) 7.0 - 18 MG/DL    Creatinine 2.27 (H) 0.6 - 1.3 MG/DL    BUN/Creatinine ratio 14 12 - 20      GFR est AA 36 (L) >60 ml/min/1.73m2    GFR est non-AA 29 (L) >60 ml/min/1.73m2    Calcium 7.8 (L) 8.5 - 10.1 MG/DL   BLOOD GAS, ARTERIAL POC    Collection Time: 09/05/22  4:36 AM   Result Value Ref Range    Device: ADULT VENT      FIO2 (POC) 35 %    pH (POC) 7.45 7.35 - 7.45      pCO2 (POC) 37.5 35.0 - 45.0 MMHG    pO2 (POC) 116 (H) 80 - 100 MMHG    HCO3 (POC) 25.8 22 - 26 MMOL/L    sO2 (POC) 98.7 (H) 92 - 97 %    Base excess (POC) 1.7 mmol/L    Mode ASSIST CONTROL      Tidal volume 450 ml    Set Rate 14 bpm    PEEP/CPAP (POC) 5 cmH2O    Allens test (POC) NOT APPLICABLE      Site LEFT RADIAL      Patient temp. 99      Specimen type (POC) ARTERIAL      Performed by Jaren Hernandes    MAGNESIUM    Collection Time: 09/05/22  4:50 AM   Result Value Ref Range    Magnesium 2.2 1.6 - 2.6 mg/dL   PHOSPHORUS    Collection Time: 09/05/22  4:50 AM   Result Value Ref Range    Phosphorus 2.4 (L) 2.5 - 4.9 MG/DL   CBC WITH AUTOMATED DIFF    Collection Time: 09/05/22  4:50 AM   Result Value Ref Range    WBC 7.1 4.6 - 13.2 K/uL    RBC 2.78 (L) 4.35 - 5.65 M/uL    HGB 8.0 (L) 13.0 - 16.0 g/dL    HCT 25.6 (L) 36.0 - 48.0 %    MCV 92.1 78.0 - 100.0 FL    MCH 28.8 24.0 - 34.0 PG    MCHC 31.3 31.0 - 37.0 g/dL    RDW 16.2 (H) 11.6 - 14.5 %    PLATELET 142 330 - 999 K/uL    MPV 10.9 9.2 - 11.8 FL    NRBC 2.2 (H) 0  WBC    ABSOLUTE NRBC 0.16 (H) 0.00 - 0.01 K/uL    NEUTROPHILS 65 40 - 73 %    LYMPHOCYTES 19 (L) 21 - 52 %    MONOCYTES 9 3 - 10 %    EOSINOPHILS 5 0 - 5 %    BASOPHILS 1 0 - 2 %    IMMATURE GRANULOCYTES 2 (H) 0.0 - 0.5 %    ABS.  NEUTROPHILS 4.6 1.8 - 8.0 K/UL ABS. LYMPHOCYTES 1.3 0.9 - 3.6 K/UL    ABS. MONOCYTES 0.7 0.05 - 1.2 K/UL    ABS. EOSINOPHILS 0.4 0.0 - 0.4 K/UL    ABS. BASOPHILS 0.0 0.0 - 0.1 K/UL    ABS. IMM. GRANS. 0.1 (H) 0.00 - 0.04 K/UL    DF AUTOMATED     METABOLIC PANEL, BASIC    Collection Time: 09/05/22  4:50 AM   Result Value Ref Range    Sodium 152 (H) 136 - 145 mmol/L    Potassium 3.7 3.5 - 5.5 mmol/L    Chloride 119 (H) 100 - 111 mmol/L    CO2 27 21 - 32 mmol/L    Anion gap 6 3.0 - 18 mmol/L    Glucose 188 (H) 74 - 99 mg/dL    BUN 32 (H) 7.0 - 18 MG/DL    Creatinine 2.26 (H) 0.6 - 1.3 MG/DL    BUN/Creatinine ratio 14 12 - 20      GFR est AA 36 (L) >60 ml/min/1.73m2    GFR est non-AA 30 (L) >60 ml/min/1.73m2    Calcium 8.0 (L) 8.5 - 10.1 MG/DL   PTT    Collection Time: 09/05/22  4:50 AM   Result Value Ref Range    aPTT 26.0 23.0 - 36.4 SEC   GLUCOSE, POC    Collection Time: 09/05/22  5:10 AM   Result Value Ref Range    Glucose (POC) 191 (H) 70 - 110 mg/dL   PTT    Collection Time: 09/05/22 11:27 AM   Result Value Ref Range    aPTT 27.9 23.0 - 36.4 SEC   GLUCOSE, POC    Collection Time: 09/05/22 12:24 PM   Result Value Ref Range    Glucose (POC) 192 (H) 70 - 110 mg/dL       Radiology:  CT HEAD WO CONT    Result Date: 8/31/2022  EXAM: CT head INDICATION: Cardiac arrest COMPARISON: None. TECHNIQUE: Axial CT imaging of the head was performed without intravenous contrast. Standard multiplanar coronal and sagittal reformatted images were obtained and are included in interpretation. One or more dose reduction techniques were used on this CT: automated exposure control, adjustment of the mAs and/or kVp according to patient size, and iterative reconstruction techniques. The specific techniques used on this CT exam have been documented in the patient's electronic medical record.   Digital Imaging and Communications in Medicine (DICOM) format image data are available to nonaffiliated external healthcare facilities or entities on a secure, media free, reciprocally searchable basis with patient authorization for at least a 12-month period after this study. _______________ FINDINGS: BRAIN AND POSTERIOR FOSSA: Exam quality is mildly degraded secondary to motion artifact. Ventricular size and configuration appears within normal limits. Basilar cisterns are patent. Within the limitations of motion, no acute intra-axial hemorrhage. No evidence of mass effect or midline shift. Gray-white matter differentiation appears within normal limits as visualized. EXTRA-AXIAL SPACES AND MENINGES: There are no abnormal extra-axial fluid collections. CALVARIUM: Intact. SINUSES: Minor nonspecific mucosal thickening ethmoid air cells, sphenoid sinus with air-fluid level present in the right maxillary sinus. OTHER: None. _______________     1. Mildly motion limited study without evidence of acute intracranial abnormality. 2. Paranasal mucosal disease as described above with air-fluid level in the right maxillary sinus as can be seen with sinusitis. CT CHEST ABD PELV WO CONT    Result Date: 8/31/2022  EXAM: CT chest, abdomen, and pelvis INDICATION: Pain COMPARISON: No prior study. TECHNIQUE: Axial CT imaging of the chest, abdomen, and pelvis was performed without IV contrast administration. Multiplanar reformats were generated. One or more dose reduction techniques were used on this CT: automated exposure control, adjustment of the mAs and/or kVp according to patient size, and iterative reconstruction techniques. The specific techniques used on this CT exam have been documented in the patient's electronic medical record. Digital Imaging and Communications in Medicine (DICOM) format image data are available to nonaffiliated external healthcare facilities or entities on a secure, media free, reciprocally searchable basis with patient authorization for at least a 12-month period after this study.  _______________ FINDINGS: CHEST: MEDIASTINUM: Left IJV central venous catheter tip at the cavoatrial junction. Normal caliber aorta with vascular calcifications. No pericardial effusion. LYMPH NODES: No pathologically enlarged mediastinal or hilar lymph nodes. PLEURA: No pleural effusion or pneumothorax. LUNGS/AIRWAY: Endotracheal tube tip in the midthoracic trachea. No consolidation or suspicious pulmonary nodule is seen. OTHER: None. ABDOMEN/PELVIS: LIVER, BILIARY: Liver is unremarkable. No abnormal biliary dilation. Gallbladder is unremarkable. PANCREAS: Unremarkable. SPLEEN: Unremarkable. ADRENALS: Unremarkable. KIDNEYS: Severe left and moderate right hydroureteronephrosis with marked urinary bladder distention. No obstructing calculus. Left lower pole 5.8 cm simple cysts, no follow-up necessary. LYMPH NODES: No pathologically enlarged lymph nodes. GASTROINTESTINAL TRACT: No abnormal bowel dilation or wall thickening. PELVIC ORGANS: Unremarkable. VASCULATURE: Unremarkable. OSSEOUS: No area of erosion or aggressive-appearing bone destruction. OTHER: None. _______________     Severe left and moderate right hydroureteronephrosis with marked urinary bladder distention. No obstructing calculus. No acute intrathoracic abnormality. US RETROPERITONEUM COMP    Result Date: 9/2/2022  US RETROPERITONEUM COMP INDICATION: Acute kidney injury. TECHNIQUE: Renal ultrasound. COMPARISON: 8/31/2022 CT FINDINGS: Right kidney measures 10.7 cm. Left kidney measures 6.7 cm. Bilateral increased cortical echogenicity. Severe left hydronephrosis with cortical atrophy. No shadowing calculus or perinephric abnormality. No solid renal mass identified. Indwelling Lyle catheter within decompressed urinary bladder. Bilateral increased cortical echogenicity. Severe left hydronephrosis with cortical atrophy.      XR CHEST PORT    Result Date: 8/31/2022  EXAM: XR CHEST PORT CLINICAL INDICATION/HISTORY: central line -Additional: None COMPARISON: 4 hours prior TECHNIQUE: Portable frontal view of the chest _______________ FINDINGS: SUPPORT DEVICES: Endotracheal tube, gastric tube, and left IJ approach central venous catheter noted. Tip of the central venous catheter is at the level of the superior cavoatrial junction. HEART AND MEDIASTINUM: Normal cardiac size and mediastinal contours. LUNGS AND PLEURAL SPACES: No focal pneumonic opacity. No evidence of pneumothorax or pleural effusion. BONY THORAX AND SOFT TISSUES: Unremarkable. _______________     1. Support devices in stable/appropriate position as visualized. 2. No superimposed acute radiographic cardiopulmonary abnormality. XR CHEST PORT    Result Date: 8/31/2022  XR CHEST PORT CLINICAL INDICATION/HISTORY: Tube placement -Additional: None COMPARISON: None TECHNIQUE: Portable frontal view of the chest _______________ FINDINGS: SUPPORT DEVICES: Enteric tube tip projecting over the thoracic inlet. Endotracheal tube tip in the midthoracic trachea. Left IJV central venous catheter tip at the caval atrial junction. HEART AND MEDIASTINUM: Cardiomediastinal silhouette within normal limits. LUNGS AND PLEURAL SPACES: No dense consolidation, large effusion or pneumothorax. _______________     Enteric tube tip projecting over the thoracic inlet. Endotracheal tube tip in the midthoracic trachea. No acute cardiopulmonary abnormality. ECHO ADULT COMPLETE    Result Date: 8/31/2022  Formatting of this result is different from the original.   Left Ventricle: Normal left ventricular systolic function with a visually estimated EF of 60 - 65%. Not well visualized. Left ventricle size is normal. Normal wall thickness. Normal wall motion. Right Ventricle: Not well visualized. Right ventricle is moderately dilated. Moderately reduced systolic function. Visually moderately dilated RV and moderately reduced RV systolic function. Aortic Valve: Tricuspid valve. Tricuspid Valve: The estimated RVSP is 25 mmHg. Right Atrium: Not well visualized. Right atrium is moderately dilated.    No old echocardiogram available to compare. DUPLEX LOWER EXT VENOUS BILAT    Result Date: 9/1/2022  · Age indeterminate thrombus present in the right popliteal vein. · No evidence of deep vein thrombosis in the left lower extremity. ASSESSMENT/IMPRESSION:   81-year-old male with past medical history of shingles at lumbar/sacral dermatome, probable septic shock presents with, after cardiac arrest.  Coma: Anoxic brain injury after cardiac arrest.  Prognosis is poor. Cardiac arrest  Pituitary hemorrhage  Right lower limb popliteal DVT  Disseminated herpes zoster    PLAN/RECOMMENDATIONS:  OK to have heparin for DVT prophylaxis   Continue Keppra 1000 mg twice daily for myoclonic jerk. Supportive care. Family would like to continue full code. Neurology sign off. Please do not hesitate to call for questions. Signed:   Ladi Bolden MD  9/5/2022  9:37 AM

## 2022-09-05 NOTE — PROGRESS NOTES
/  /                        Cardiology Progress Note        Patient: Suri Diaz        Sex: male          DOA: 8/31/2022  YOB: 1960      Age:  65 y.o.        LOS:  LOS: 5 days   Assessment/Plan     Principal Problem:    Cardiac arrest ([address] (8/31/2022)    Active Problems:    Respiratory failure ([address] (8/31/2022)      GENO (acute kidney injury) ([address] (8/31/2022)      Disseminated herpes zoster (8/31/2022)      Hydronephrosis (8/31/2022)      Shock ([address] (8/31/2022)      Sepsis ([address] (8/31/2022)      UTI (urinary tract infection) (9/1/2022)      Brain anoxia ([address] (9/4/2022)      Plan:  9/5/2022  No change from cardiac standpoint  Continue with current supportive treatment      9/4/2022  High-grade anoxic brain injury on MRI  Cardiac telemetry stable  Continue with current supportive treatment as per family  Discussed with RN and Dr. Tavia Nieves  Discussed with patient's wife and family          9/3/2022  Blood pressure is mildly elevated with mild tachycardia  I will add low-dose IV metoprolol 1.25 mg every 6 hours  Continue with rest of the treatment  Discussed with Dr. Aditya Swann        9/2/2022  Patient remained intubated  Off sedation  Of Levophed  Cardiac telemetry stable with occasional PVCs  Continue with supportive treatment  Discussed with wife and family members  Discussed with Dr. James President arrest PEA arrest  Mild troponin elevation after CPR and PEA cardiac arrest, normal EF and wall motion, no evidence of ACS  DVT  Continue anticoagulation if no active bleeding  Continue with supportive treatment  I have long and lengthy discussion with family about management plan. All the questions were answered. 35 minutes of critical care time spent in the direct evaluation and treatment of this high risk patient.  The reason for providing this level of medical care for this critically ill patient was due a critical illness that impaired one or more vital organ systems such that there was a high probability of imminent or life threatening deterioration in the patients condition. This care involved high complexity decision making to assess, manipulate, and support vital system functions, to treat this degreee vital organ system failure and to prevent further life threatening deterioration of the patients condition. Subjective:    cc:  Cardiac arrest        REVIEW OF SYSTEMS:     Limited due to intubation      Objective:      Visit Vitals  BP (!) 155/91   Pulse 92   Temp 98.4 °F (36.9 °C)   Resp 13   Ht 5' 10\" (1.778 m)   Wt 95 kg (209 lb 7 oz)   SpO2 100%   BMI 30.05 kg/m²     Body mass index is 30.05 kg/m². Physical Exam:  General Appearance: Intubated  NECK: No JVD, no thyroidomeglay. LUNGS: Clear bilaterally. HEART: S1+S2 audible,    ABD: Non-tender, BS Audible    EXT: No edema, and no cysnosis. VASCULAR EXAM: Pulses are intact.     PSYCHIATRIC EXAM: Intubated    Medication:  Current Facility-Administered Medications   Medication Dose Route Frequency    heparin (porcine) injection 5,000 Units  5,000 Units SubCUTAneous Q8H    dexmedeTOMidine (PRECEDEX) 400 mcg in 0.9% sodium chloride 100 mL infusion  0.2-0.7 mcg/kg/hr IntraVENous TITRATE    potassium, sodium phosphates (NEUTRA-PHOS) packet 2 Packet  2 Packet Oral Q2H    amLODIPine (NORVASC) tablet 5 mg  5 mg Oral DAILY    lactulose (CHRONULAC) 10 gram/15 mL solution 15 mL  15 mL Oral DAILY    furosemide (LASIX) injection 20 mg  20 mg IntraVENous DAILY    dextrose 5% infusion  50 mL/hr IntraVENous CONTINUOUS    metoprolol (LOPRESSOR) injection 2.5 mg  2.5 mg IntraVENous Q6H PRN    metoprolol (LOPRESSOR) injection 1.25 mg  1.25 mg IntraVENous Q6H    insulin glargine (LANTUS) injection 12 Units  12 Units SubCUTAneous Q24H    acyclovir (ZOVIRAX) 750 mg in 0.9% sodium chloride 250 mL IVPB  10 mg/kg (Ideal) IntraVENous Q12H    insulin lispro (HUMALOG) injection   SubCUTAneous Q6H    ELECTROLYTE REPLACEMENT PROTOCOL - Potassium Renal Dosing  1 Each Other PRN    piperacillin-tazobactam (ZOSYN) 3.375 g in 0.9% sodium chloride (MBP/ADV) 100 mL MBP  3.375 g IntraVENous Q8H    [Held by provider] NOREPINephrine (LEVOPHED) 8 mg in 5% dextrose 250mL (32 mcg/mL) infusion  2-30 mcg/min IntraVENous TITRATE    sodium chloride (NS) flush 5-40 mL  5-40 mL IntraVENous Q8H    sodium chloride (NS) flush 5-40 mL  5-40 mL IntraVENous PRN    acetaminophen (TYLENOL) tablet 650 mg  650 mg Oral Q6H PRN    Or    acetaminophen (TYLENOL) suppository 650 mg  650 mg Rectal Q6H PRN    polyethylene glycol (MIRALAX) packet 17 g  17 g Oral DAILY PRN    ondansetron (ZOFRAN ODT) tablet 4 mg  4 mg Oral Q8H PRN    Or    ondansetron (ZOFRAN) injection 4 mg  4 mg IntraVENous Q6H PRN    pantoprazole (PROTONIX) 40 mg in 0.9% sodium chloride 10 mL injection  40 mg IntraVENous Q12H    arformoteroL (BROVANA) neb solution 15 mcg  15 mcg Nebulization BID RT    levETIRAcetam (KEPPRA) 1,000 mg in 0.9% sodium chloride 100 mL IVPB  1,000 mg IntraVENous Q12H    [Held by provider] propofol (DIPRIVAN) 10 mg/mL infusion  0-50 mcg/kg/min IntraVENous TITRATE               Lab/Data Reviewed:  Procedures/imaging: see electronic medical records for all procedures/Xrays   and details which were not copied into this note but were reviewed prior to creation of Plan       All lab results for the last 24 hours reviewed. Recent Labs     09/05/22  0450 09/04/22  0500 09/03/22  0420   WBC 7.1 8.8 15.1*   HGB 8.0* 7.9* 9.0*   HCT 25.6* 24.5* 28.0*    166 167       Recent Labs     09/05/22  0450 09/04/22  2345 09/04/22  1755 09/04/22  1300 09/04/22  0500   * 151*  --   --  151*   K 3.7 3.7 3.7   < > 3.4*   * 118*  --   --  119*   CO2 27 28  --   --  28   * 226*  --   --  193*   BUN 32* 32*  --   --  31*   CREA 2.26* 2.27*  --   --  2.38*   CA 8.0* 7.8*  --   --  8.3*    < > = values in this interval not displayed.          RADIOLOGY:      CXR Results  (Last 48 hours) 09/05/22 0630  XR CHEST PORT Final result    Impression:      1. Tubes and lines appear unchanged, adequately positioned. 2. Left basilar opacity, atelectasis versus infiltrate. Narrative:  EXAM: CHEST RADIOGRAPH, SINGLE VIEW       CLINICAL INDICATION/HISTORY: Shortness of breath, intubated, follow-up       COMPARISON: 9/4/2022       TECHNIQUE: Portable frontal view of the chest was obtained.        _______________       FINDINGS:       SUPPORT DEVICES: Unchanged left jugular central venous catheter, endotracheal   tube, and gastric tube, all adequately positioned. HEART AND MEDIASTINUM: Cardiomediastinal silhouette appears within normal   limits. Tortuous and atherosclerotic thoracic aorta. No central vascular   congestion. LUNGS AND PLEURAL SPACES: Left basilar opacity partially silhouettes left   hemidiaphragm. Left mid and upper lung zones and all of the right lung remain   adequately aerated. No definite pleural effusion. No pneumothorax. BONY THORAX AND SOFT TISSUES: No acute osseous abnormality. _______________           09/04/22 0615  XR CHEST PORT Final result    Impression:      No active cardiopulmonary disease. Narrative:  EXAM: CHEST RADIOGRAPH, SINGLE VIEW       CLINICAL INDICATION/HISTORY: Shortness of breath, intubated, follow-up       COMPARISON: 9/3/2022       TECHNIQUE: Portable frontal view of the chest was obtained.        _______________       FINDINGS:       SUPPORT DEVICES: Endotracheal tube, left jugular central venous catheter, and   nasogastric tube all appear adequately positioned. HEART AND MEDIASTINUM: Cardiomediastinal silhouette appears within normal   limits. Tortuous and atherosclerotic thoracic aorta. No central vascular   congestion. LUNGS AND PLEURAL SPACES: Unchanged retrocardiac left lower lobe atelectasis. Lungs are otherwise adequately aerated with no confluent airspace opacity.   No   pleural effusion or pneumothorax. BONY THORAX AND SOFT TISSUES: No acute osseous abnormality.        _______________                     Cardiology Procedures:   Results for orders placed or performed during the hospital encounter of 08/31/22   EKG, 12 LEAD, INITIAL   Result Value Ref Range    Ventricular Rate 112 BPM    Atrial Rate 112 BPM    P-R Interval 158 ms    QRS Duration 100 ms    Q-T Interval 360 ms    QTC Calculation (Bezet) 491 ms    Calculated P Axis 73 degrees    Calculated R Axis -69 degrees    Calculated T Axis 51 degrees    Diagnosis       Sinus tachycardia  Left axis deviation  Low voltage QRS  Inferior infarct , age undetermined  Abnormal ECG  No previous ECGs available  Confirmed by Nehal Perez MD. ([id number]) on 8/31/2022 11:30:53 PM        Echo Results  (Last 48 hours)      None         Cardiolite (Tc-99m Sestamibi) stress test    Signed By: Rosie Espinosa MD     September 5, 2022

## 2022-09-05 NOTE — PROGRESS NOTES
1915 : Bedside and Verbal shift change report given to Mirna Lucero RN (oncoming nurse) by Cheryl Andujar RN (offgoing nurse). Report included the following information SBAR, Kardex, Intake/Output, MAR, and Recent Results. 0715 : Bedside and Verbal shift change report given to Cheryl Andujar RN (oncoming nurse) by Raphael Tucker RN (offgoing nurse). Report included the following information SBAR, Kardex, Intake/Output, MAR, and Recent Results.

## 2022-09-05 NOTE — PROGRESS NOTES
[facility]  Pulmonary, Critical Care, and Sleep Medicine    Name: Zelda Dukes MRN: [id number]   : 1960 Hospital: [facility] [place]    Date: 2022  Room: [address] [place] [address] Note                                              Consult requesting physician: Dr. Gómez Alcala     Reason for Consult: Cardiac arrest , shock, respiratory failure       IMPRESSION:   Cardiac arrest  Shock  Sepsis  Respiratory failure   Shingles  Urosepsis   GENO  Patient Active Problem List   Diagnosis Code    Cardiac arrest ([address] I46.9    Respiratory failure ([address] J96.90    GENO (acute kidney injury) ([address] N17.9    Disseminated herpes zoster B02.7    Hydronephrosis N13.30    Shock ([facility] R57.9    Sepsis ([facility] A41.9    UTI (urinary tract infection) N39.0    Brain anoxia ([facility] G93.1           Code status: full code       RECOMMENDATIONS:   Respiratory: Acute respiratory failure post cardiac arrest  Full ventilatory support    /14/35/5  7.45/37/116/98  Ventilator stable on low dose propofol switch to minimal precedex for vent synchrony  RR above the rate   CXR   Propofol for vent synchrony and myocloninc jerks   Off sedation does not follow commands   Bronchodilators   Ventilator bundle   PVL  small DVT chronic was on heparin now on hold due to MRI findings    Renal failure unable to do CTA  Ventilator bundle & Sedation protocol followed. Daily sedation holiday, assessment for readiness for SBT and then re-titrate if required. Chlorhexidine mouth washes. Keep SPO2 >=92%. HOB 30 degree elevation all the time. Aggressive pulmonary toileting. Aspiration precautions. Incentive spirometry.   CVS:   Status post Cardiac arrest initally on pressors now slightly hypertensive stared low dose Norvasc   PEA arrest most likley due to sepsis ( UTI and zoster)  Now off pressors BP stable  Resume oral hypertensive amlodipine   Prn metoprolol  Echo stable   Has small chronic DVT heparin drip now on hold due to MRI findings  I spoke to vascular surgery reapt PVL in 3-4 days if worse he will place IVC filter  9/1  Age indeterminate thrombus present in the right popliteal vein. No evidence of deep vein thrombosis in the left lower extremity. 9/3  Chronic non-occlusive thrombus present in the right popliteal vein. No evidence of deep vein thrombosis in the left lower extremity. ?Pea arrest   Echo   Result status: Final result       Left Ventricle: Normal left ventricular systolic function with a visually estimated EF of 60 - 65%. Not well visualized. Left ventricle size is normal. Normal wall thickness. Normal wall motion. Right Ventricle: Not well visualized. Right ventricle is moderately dilated. Moderately reduced systolic function. Visually moderately dilated RV and moderately reduced RV systolic function. Aortic Valve: Tricuspid valve. Tricuspid Valve: The estimated RVSP is 25 mmHg. Right Atrium: Not well visualized. Right atrium is moderately dilated. No old echocardiogram available to compare. CPR 20-30 minutes codes 3 times CPR epi  Due to renal failure cannot exclude PE  PVL   ID:  sepsis resloved  BC negative  No fever WBC normal today 7.1  Check procal   On triple abx   UTI culure pending but positive U/a and zoster   Hydronephrosis urology consulted urinalysis pending  10 days of zoster active disseminate back and left leg consulted ID abx adjusted     Sepsis bundle and protocol followed. Follow serial lactic acid, frequent BMP check, fluid resuscitation. Follow cultures. Deescalate antibiotic when appropriate. 1. Mildly motion limited study without evidence of acute intracranial  abnormality. 2. Paranasal mucosal disease as described above with air-fluid level in the  right maxillary sinus as can be seen with sinusitis. IMPRESSION     Severe left and moderate right hydroureteronephrosis with marked urinary bladder  distention. No obstructing calculus.      No acute intrathoracic abnormality. 9/1     IMPRESSION     Bilateral increased cortical echogenicity. Severe left hydronephrosis with cortical atrophy. Hematology/Oncology: hb 8.0  Chronic anemia   Renal: CRI around 1.8 now [place] suspect due to sepsis and hydronephrosis   GI/: PPI  Hydronephrosis   Endocrine: Monitor BS. SSI. has DM  Neurology: off propofol anoxia, prn low dose precedex      IMPRESSION     1. High-grade acute/subacute anoxic brain injury without herniation. 2.  Heterogeneous expansile sellar lesion, nonspecific, pituitary  hematoma/hemorrhage favored over other etiologies. _______________  intubated on propofol post cardiac arrest suspect  anoxia   Ct of the head negative  EEG abnormal strongly suggestive of anoxic brain injury  Ct of the head 9/2 normal  MRI of the brain pending once stable   Neurology consulted myoclonic jerks  Off propofol today did not follow commands  RR above the Vent   Reapt Ct of the head 9/2 normal  Not waking up MRI today    Toxicology: pending   Pain/Sedation: propofol  Skin/Wound: has extensive back and left lower extremity rash typical for zoster   Active lesions   Electrolytes: Replace electrolytes per ICU electrolyte replacement protocol. IVF: NACL  Nutrition: npo  Prophylaxis: DVT Prophylaxis: SCD/sq heaprin. GI Prophylaxis: Protonix/ppi. Restraints:   Wrist soft restraints for patient interfering with medical therapy/management and patient safety. Lines/Tubes: PIV  ETT: in place . OGT/NGT: in place . Central line: left internal jugular placed on 8/31 in ED  (site examined, no erythema, induration, discharge or sign of infection. Dressing intact. Medically necessary, will remove it when not needed. Central line bundle followed). Other:  PT/OT eval and treat. OOB.    Advance Directive/Palliative Care: consulted    Will defer respective systems problem management to primary and other respective consultant and follow patient in ICU with primary and other medical team.  Further recommendations will be based on the patient's response to recommended treatment and results of the investigation ordered. Quality Care: PPI, DVT prophylaxis, HOB elevated, Infection control all reviewed and addressed. Care of plan d/w hospitalist team, RN, RT, MDR.  D/w , family-wife and brother  (answered all questions to satisfaction). High  complexity decision making was performed during the evaluation of this patient at high risk for decompensation with multiple organ involvement. Total critical care time spent rendering care exclusive of procedures/family discussion/coordination of care: 39 minutes. I evaluated for cooling protocol spoke to cardiology and hospitalist due to sepsis zoster and hydronephrosis high risk for cooling  Familu updated daily wife and sons   Family is updated daily   On 9/3 long discussion about anoxic brain injury MRI results  Continue full code   9/5 family updated full code        Subjective/History of Present Illness:     Patient is a 65 y.o. male with PMHx significant for HTN/CRI/DM recently diagnosed with severe zoster. Ling was on antiviral tx very weak , sob,cough. EMS called in ambulance 3 times cardiac arrest. Witnessed CPR/Epi  Repored PEA. 6 doses of epi and no shocks. He came on epi drip but in Ed chnaged to levophed. Ling had seizure vs myocloninc jerks was started on propofol. 9/5/2022: Intubated  Lighly sedated for vent synchrony switched from propofol to precedex  He is off sedation for a couple of hrs every day   Anoxic brain injury minimal movement of left arm to pain   Spoke to vascular for now no ivc filter   Speak to neurology if ok sq heparin  Case discussed with neurology    I/O last 24 hrs:    Intake/Output Summary (Last 24 hours) at 9/5/2022 1249  Last data filed at 9/5/2022 1233  Gross per 24 hour   Intake 3020 ml   Output 2900 ml   Net 120 ml           The patient is critically ill and can not provide additional history due to Ventilated    History taken from  family and EMR    Review of Systems:    ROS not obtained due to patient factor. No Known Allergies   Past Medical History:   Diagnosis Date    Gout     Shingles       No past surgical history on file. Social History     Tobacco Use    Smoking status: Not on file    Smokeless tobacco: Not on file   Substance Use Topics    Alcohol use: Not on file      No family history on file.    Prior to Admission medications    Not on File     Current Facility-Administered Medications   Medication Dose Route Frequency    heparin (porcine) injection 5,000 Units  5,000 Units SubCUTAneous Q8H    dexmedeTOMidine (PRECEDEX) 400 mcg in 0.9% sodium chloride 100 mL infusion  0.2-0.7 mcg/kg/hr IntraVENous TITRATE    potassium, sodium phosphates (NEUTRA-PHOS) packet 2 Packet  2 Packet Oral Q2H    amLODIPine (NORVASC) tablet 5 mg  5 mg Oral DAILY    lactulose (CHRONULAC) 10 gram/15 mL solution 15 mL  15 mL Oral DAILY    furosemide (LASIX) injection 20 mg  20 mg IntraVENous DAILY    dextrose 5% infusion  50 mL/hr IntraVENous CONTINUOUS    metoprolol (LOPRESSOR) injection 1.25 mg  1.25 mg IntraVENous Q6H    insulin glargine (LANTUS) injection 12 Units  12 Units SubCUTAneous Q24H    acyclovir (ZOVIRAX) 750 mg in 0.9% sodium chloride 250 mL IVPB  10 mg/kg (Ideal) IntraVENous Q12H    insulin lispro (HUMALOG) injection   SubCUTAneous Q6H    piperacillin-tazobactam (ZOSYN) 3.375 g in 0.9% sodium chloride (MBP/ADV) 100 mL MBP  3.375 g IntraVENous Q8H    [Held by provider] NOREPINephrine (LEVOPHED) 8 mg in 5% dextrose 250mL (32 mcg/mL) infusion  2-30 mcg/min IntraVENous TITRATE    sodium chloride (NS) flush 5-40 mL  5-40 mL IntraVENous Q8H    pantoprazole (PROTONIX) 40 mg in 0.9% sodium chloride 10 mL injection  40 mg IntraVENous Q12H    arformoteroL (BROVANA) neb solution 15 mcg  15 mcg Nebulization BID RT    levETIRAcetam (KEPPRA) 1,000 mg in 0.9% sodium chloride 100 mL IVPB 1,000 mg IntraVENous Q12H    [Held by provider] propofol (DIPRIVAN) 10 mg/mL infusion  0-50 mcg/kg/min IntraVENous TITRATE         Objective:   Vital Signs:    Visit Vitals  BP (!) 152/86   Pulse 88   Temp 98.2 °F (36.8 °C)   Resp 14   Ht 5' 10\" (1.778 m)   Wt 95 kg (209 lb 7 oz)   SpO2 100%   BMI 30.05 kg/m²       O2 Device: Endotracheal tube, Ventilator       Temp (24hrs), Av.1 °F (36.7 °C), Min:96.6 °F (35.9 °C), Max:99.9 °F (37.7 °C)       Intake/Output:   Last shift:       07 -  190  In: 097 [I.V.:420]  Out: 1500 [Urine:1500]    Last 3 shifts:  190 -  0700  In: 5161.9 [I.V.:4131.9]  Out: 5211 [Urine:3400]      Intake/Output Summary (Last 24 hours) at 2022 1249  Last data filed at 2022 1233  Gross per 24 hour   Intake 3020 ml   Output 2900 ml   Net 120 ml         Last 3 Recorded Weights in this Encounter    22 1524 22 0757 22 0904   Weight: 111.6 kg (246 lb) 103.9 kg (229 lb 0.9 oz) 95 kg (209 lb 7 oz)         Ventilator Settings:  Mode Rate Tidal Volume Pressure FiO2 PEEP   Assist control   450 ml    35 % 5 cm H20     Peak airway pressure: 21 cm H2O    Plateau pressure:     Tidal volume:    Minute ventilation: 8.23 l/min     Recent Labs     22  0436 22  0449 22  0556   PHI 7.45 7.44 7.40   PCO2I 37.5 36.5 31.2*   PO2I 116* 118* 95   HCO3I 25.8 25.0 19.4*   FIO2I 35 35 35         Physical Exam:     Impresson general : intubated off sedation , RR above the ventilator no resposne to verbal but moves left upper extremity to  painful stimuli  Positive Babinski bilaterally   Pupils reactive, positive corneal and gag RR above the vent but   Does not follow commands   Head:   Normocephalic,atraumatic. ENT:   EOM , no scleral icterus, no pallor, no cyanosis. Nose:   No sinus tenderness  Throat:  Oropharynx ,mucosa, and tongue normal. No oral thrush. Neck:   Supple, symmetric. Lymph nodes. Trachea ismidline   Lung:    Moderate air entry bilateral equal. No rales. LLL decreased breath sounds No rhonchi. No wheezing. No stridors. No prolongded expiration. No accessory muscle use. Heart:   Regular  rate & rhythm. S1 S2 present. No murmur. No JVD. Abdomen:  Soft. NT. ND. +BS. No masses. liver  and spleen  Extremities:  No pedal edema. No cyanosis. No clubbing. Pulses: 2+ and symmetric in DP. Capillary refill: normal  Lymphatic:  neck and supraclavicular    Musculoskeletal: No joint swelling. No tenderness.    Skin:   Back and left lower extremity vesicular rash typical for zoster      Data:       Recent Results (from the past 24 hour(s))   POTASSIUM    Collection Time: 09/04/22  1:00 PM   Result Value Ref Range    Potassium 3.6 3.5 - 5.5 mmol/L   TROPONIN-HIGH SENSITIVITY    Collection Time: 09/04/22  1:00 PM   Result Value Ref Range    Troponin-High Sensitivity 61 0 - 78 ng/L   GLUCOSE, POC    Collection Time: 09/04/22  5:28 PM   Result Value Ref Range    Glucose (POC) 176 (H) 70 - 110 mg/dL   POTASSIUM    Collection Time: 09/04/22  5:55 PM   Result Value Ref Range    Potassium 3.7 3.5 - 5.5 mmol/L   GLUCOSE, POC    Collection Time: 09/04/22 11:26 PM   Result Value Ref Range    Glucose (POC) 219 (H) 70 - 477 mg/dL   METABOLIC PANEL, BASIC    Collection Time: 09/04/22 11:45 PM   Result Value Ref Range    Sodium 151 (H) 136 - 145 mmol/L    Potassium 3.7 3.5 - 5.5 mmol/L    Chloride 118 (H) 100 - 111 mmol/L    CO2 28 21 - 32 mmol/L    Anion gap 5 3.0 - 18 mmol/L    Glucose 226 (H) 74 - 99 mg/dL    BUN 32 (H) 7.0 - 18 MG/DL    Creatinine 2.27 (H) 0.6 - 1.3 MG/DL    BUN/Creatinine ratio 14 12 - 20      GFR est AA 36 (L) >60 ml/min/1.73m2    GFR est non-AA 29 (L) >60 ml/min/1.73m2    Calcium 7.8 (L) 8.5 - 10.1 MG/DL   BLOOD GAS, ARTERIAL POC    Collection Time: 09/05/22  4:36 AM   Result Value Ref Range    Device: ADULT VENT      FIO2 (POC) 35 %    pH (POC) 7.45 7.35 - 7.45      pCO2 (POC) 37.5 35.0 - 45.0 MMHG    pO2 (POC) 116 (H) 80 - 100 MMHG    HCO3 (POC) 25.8 22 - 26 MMOL/L    sO2 (POC) 98.7 (H) 92 - 97 %    Base excess (POC) 1.7 mmol/L    Mode ASSIST CONTROL      Tidal volume 450 ml    Set Rate 14 bpm    PEEP/CPAP (POC) 5 cmH2O    Allens test (POC) NOT APPLICABLE      Site LEFT RADIAL      Patient temp. 99      Specimen type (POC) ARTERIAL      Performed by Jayden Philip    MAGNESIUM    Collection Time: 09/05/22  4:50 AM   Result Value Ref Range    Magnesium 2.2 1.6 - 2.6 mg/dL   PHOSPHORUS    Collection Time: 09/05/22  4:50 AM   Result Value Ref Range    Phosphorus 2.4 (L) 2.5 - 4.9 MG/DL   CBC WITH AUTOMATED DIFF    Collection Time: 09/05/22  4:50 AM   Result Value Ref Range    WBC 7.1 4.6 - 13.2 K/uL    RBC 2.78 (L) 4.35 - 5.65 M/uL    HGB 8.0 (L) 13.0 - 16.0 g/dL    HCT 25.6 (L) 36.0 - 48.0 %    MCV 92.1 78.0 - 100.0 FL    MCH 28.8 24.0 - 34.0 PG    MCHC 31.3 31.0 - 37.0 g/dL    RDW 16.2 (H) 11.6 - 14.5 %    PLATELET 101 586 - 693 K/uL    MPV 10.9 9.2 - 11.8 FL    NRBC 2.2 (H) 0  WBC    ABSOLUTE NRBC 0.16 (H) 0.00 - 0.01 K/uL    NEUTROPHILS 65 40 - 73 %    LYMPHOCYTES 19 (L) 21 - 52 %    MONOCYTES 9 3 - 10 %    EOSINOPHILS 5 0 - 5 %    BASOPHILS 1 0 - 2 %    IMMATURE GRANULOCYTES 2 (H) 0.0 - 0.5 %    ABS. NEUTROPHILS 4.6 1.8 - 8.0 K/UL    ABS. LYMPHOCYTES 1.3 0.9 - 3.6 K/UL    ABS. MONOCYTES 0.7 0.05 - 1.2 K/UL    ABS. EOSINOPHILS 0.4 0.0 - 0.4 K/UL    ABS. BASOPHILS 0.0 0.0 - 0.1 K/UL    ABS. IMM.  GRANS. 0.1 (H) 0.00 - 0.04 K/UL    DF AUTOMATED     METABOLIC PANEL, BASIC    Collection Time: 09/05/22  4:50 AM   Result Value Ref Range    Sodium 152 (H) 136 - 145 mmol/L    Potassium 3.7 3.5 - 5.5 mmol/L    Chloride 119 (H) 100 - 111 mmol/L    CO2 27 21 - 32 mmol/L    Anion gap 6 3.0 - 18 mmol/L    Glucose 188 (H) 74 - 99 mg/dL    BUN 32 (H) 7.0 - 18 MG/DL    Creatinine 2.26 (H) 0.6 - 1.3 MG/DL    BUN/Creatinine ratio 14 12 - 20      GFR est AA 36 (L) >60 ml/min/1.73m2    GFR est non-AA 30 (L) >60 ml/min/1.73m2    Calcium 8.0 (L) 8.5 - 10.1 MG/DL   PTT Collection Time: 09/05/22  4:50 AM   Result Value Ref Range    aPTT 26.0 23.0 - 36.4 SEC   GLUCOSE, POC    Collection Time: 09/05/22  5:10 AM   Result Value Ref Range    Glucose (POC) 191 (H) 70 - 110 mg/dL   PTT    Collection Time: 09/05/22 11:27 AM   Result Value Ref Range    aPTT 27.9 23.0 - 36.4 SEC   GLUCOSE, POC    Collection Time: 09/05/22 12:24 PM   Result Value Ref Range    Glucose (POC) 192 (H) 70 - 110 mg/dL         Chemistry Recent Labs     09/05/22  0450 09/04/22  2345 09/04/22  1755 09/04/22  1300 09/04/22  0500 09/03/22  1043 09/03/22  0420   * 226*  --   --  193*  --  190*   * 151*  --   --  151*  --  151*   K 3.7 3.7 3.7   < > 3.4*   < > 3.6   * 118*  --   --  119*  --  120*   CO2 27 28  --   --  28  --  21   BUN 32* 32*  --   --  31*  --  39*   CREA 2.26* 2.27*  --   --  2.38*  --  3.13*   CA 8.0* 7.8*  --   --  8.3*  --  8.5   MG 2.2  --   --   --  2.5  --  2.5   PHOS 2.4*  --   --   --  2.9  --  3.0   AGAP 6 5  --   --  4  --  10   BUCR 14 14  --   --  13  --  12   AP  --   --   --   --   --   --  112   TP  --   --   --   --   --   --  6.3*   ALB  --   --   --   --   --   --  1.9*   GLOB  --   --   --   --   --   --  4.4*   AGRAT  --   --   --   --   --   --  0.4*    < > = values in this interval not displayed. Lactic Acid Lactic acid   Date Value Ref Range Status   09/01/2022 1.8 0.4 - 2.0 MMOL/L Final     No results for input(s): LAC in the last 72 hours. Liver Enzymes Protein, total   Date Value Ref Range Status   09/03/2022 6.3 (L) 6.4 - 8.2 g/dL Final     Albumin   Date Value Ref Range Status   09/03/2022 1.9 (L) 3.4 - 5.0 g/dL Final     Globulin   Date Value Ref Range Status   09/03/2022 4.4 (H) 2.0 - 4.0 g/dL Final     A-G Ratio   Date Value Ref Range Status   09/03/2022 0.4 (L) 0.8 - 1.7   Final     Alk.  phosphatase   Date Value Ref Range Status   09/03/2022 112 45 - 117 U/L Final     Recent Labs     09/03/22  0420   TP 6.3*   ALB 1.9*   GLOB 4.4*   AGRAT 0.4*             CBC w/Diff Recent Labs     09/05/22  0450 09/04/22  0500 09/03/22  0420   WBC 7.1 8.8 15.1*   RBC 2.78* 2.76* 3.17*   HGB 8.0* 7.9* 9.0*   HCT 25.6* 24.5* 28.0*    166 167   GRANS 65 74* 81*   LYMPH 19* 12* 8*   EOS 5 2 1          Cardiac Enzymes No results found for: CPK, CK, CKMMB, CKMB, RCK3, CKMBT, CKNDX, CKND1, ROB, TROPT, TROIQ, BAY, TROPT, TNIPOC, BNP, BNPP     BNP No results found for: BNP, BNPP, XBNPT     Coagulation Recent Labs     09/05/22  1127 09/05/22  0450 09/04/22  1121   PTP  --   --  13.3   INR  --   --  1.0   APTT 27.9 26.0 25.5           Thyroid  No results found for: T4, T3U, TSH, TSHEXT, TSHEXT    No results found for: T4     Urinalysis Lab Results   Component Value Date/Time    Color YELLOW 09/03/2022 01:45 PM    Appearance CLOUDY 09/03/2022 01:45 PM    Specific gravity 1.023 09/03/2022 01:45 PM    pH (UA) 5.5 09/03/2022 01:45 PM    Protein 100 (A) 09/03/2022 01:45 PM    Glucose >1,000 (A) 09/03/2022 01:45 PM    Ketone 15 (A) 09/03/2022 01:45 PM    Bilirubin Negative 09/03/2022 01:45 PM    Urobilinogen 0.2 09/03/2022 01:45 PM    Nitrites Negative 09/03/2022 01:45 PM    Leukocyte Esterase SMALL (A) 09/03/2022 01:45 PM    Epithelial cells 1+ 09/03/2022 01:45 PM    Bacteria FEW (A) 09/03/2022 01:45 PM    WBC 11 to 20 09/03/2022 01:45 PM    RBC 4 to 10 09/03/2022 01:45 PM        Micro  No results for input(s): SDES, CULT in the last 72 hours. No results for input(s): CULT in the last 72 hours. Culture data during this hospitalization.    All Micro Results       Procedure Component Value Units Date/Time    CULTURE, BLOOD [[id number]] Collected: 08/31/22 1015    Order Status: Completed Specimen: Blood Updated: 09/04/22 0807     Special Requests: NO SPECIAL REQUESTS        Culture result: NO GROWTH 4 DAYS       CULTURE, BLOOD [[id number]] Collected: 08/31/22 1000    Order Status: Completed Specimen: Blood Updated: 09/04/22 0807     Special Requests: NO SPECIAL REQUESTS        Culture result: NO GROWTH 4 DAYS       CULTURE, RESPIRATORY/SPUTUM/BRONCH Jesse Dumas [[id number]]     Order Status: Sent Specimen: Sputum from Tracheal Aspirate     COVID-19 RAPID TEST [[id number]] Collected: 08/31/22 1050    Order Status: Completed Specimen: Nasopharyngeal Updated: 08/31/22 1131     Specimen source Nasopharyngeal        COVID-19 rapid test Not detected        Comment: Rapid Abbott ID Now       Rapid NAAT:  The specimen is NEGATIVE for SARS-CoV-2, the novel coronavirus associated with COVID-19. Negative results should be treated as presumptive and, if inconsistent with clinical signs and symptoms or necessary for patient management, should be tested with an alternative molecular assay. Negative results do not preclude SARS-CoV-2 infection and should not be used as the sole basis for patient management decisions. This test has been authorized by the FDA under an Emergency Use Authorization (EUA) for use by authorized laboratories. Fact sheet for Healthcare Providers: [employer]  Fact sheet for Patients: [employer]       Methodology: Isothermal Nucleic Acid Amplification                      PFT       Ultrasound       LE Doppler       ECHO       Images report reviewed by me:  CT (Most Recent) (CT chest reviewed by me)        CXR reviewed by me:  XR (Most Recent). CXR  reviewed by me and compared with previous CXR Results from Hospital Encounter encounter on 08/31/22    XR CHEST PORT    Narrative  EXAM: CHEST RADIOGRAPH, SINGLE VIEW    CLINICAL INDICATION/HISTORY: Shortness of breath, intubated, follow-up    COMPARISON: 9/4/2022    TECHNIQUE: Portable frontal view of the chest was obtained.    _______________    FINDINGS:    SUPPORT DEVICES: Unchanged left jugular central venous catheter, endotracheal  tube, and gastric tube, all adequately positioned.     HEART AND MEDIASTINUM: Cardiomediastinal silhouette appears within normal  limits. Tortuous and atherosclerotic thoracic aorta. No central vascular  congestion. LUNGS AND PLEURAL SPACES: Left basilar opacity partially silhouettes left  hemidiaphragm. Left mid and upper lung zones and all of the right lung remain  adequately aerated. No definite pleural effusion. No pneumothorax. BONY THORAX AND SOFT TISSUES: No acute osseous abnormality. _______________    Impression  1. Tubes and lines appear unchanged, adequately positioned. 2. Left basilar opacity, atelectasis versus infiltrate.            Steven Montanez MD  9/5/2022

## 2022-09-05 NOTE — PROGRESS NOTES
Hospitalist Progress Note    Patient: Luis Alberto Farias MRN: [id number]  [facility]: [id number]    YOB: 1960  Age: 65 y.o.   Sex: male    DOA: 8/31/2022 LOS:  LOS: 5 days          Chief Complaint:    arrest      Assessment/Plan     Luis Alberto Farias is a 65 y.o. hypertension, dyslipidemia, type 2 diabetes mellitus, vitamin D deficiency, stage 3a CKD and gout male with history of who presents with cardiac arrest.      Cardiac arrest, PEA  Severe anoxia by clinical exam and confirmed by MRI  Acute respiratory failure  Anoxia  Myoclonic jerks  Shock   RLE popliteal DVT  Sepsis, likely due to urinary source  UTI  Disseminated herpes zoster  Severe left and moderate right hydroureteronephrosis   GENO on CKD 3  Hyperglycemia  anemia     Renal fxn improved  On precedex  No clear neurologic recovery     Hypernatremia-dextrose IV to help lower     Continue duran  Heparin gtt  IV antibiotics  IV acyclovir  IV PPI     Off sedation     Echo with nl EF     MRI brain shows anoxic injury  His prognosis is  likely that chances for meaningful recovery outside of vegetative state are extremely low, concern for Na rising and coma persisting indicate worsening cerebral progression  Family in this am at bedside, talking to him, and hopeful he will make recovery, they understand the prognosis they have been told but are understandably holding on to hope it may be different     Full code  Disposition :  Patient Active Problem List   Diagnosis Code    Cardiac arrest ([facility] [address] I46.9    Respiratory failure ([address] J96.90    GENO (acute kidney injury) ([address] N17.9    Disseminated herpes zoster B02.7    Hydronephrosis N13.30    Shock ([address] R57.9    Sepsis ([address] A41.9    UTI (urinary tract infection) N39.0    Brain anoxia ([address] G93.1       Subjective:    No new changes  Daughter and wife at bedside  They are hopeful he can hear them    Review of systems:    UTO due to condition      Vital signs/Intake and Output:  Visit Vitals  BP (!) 152/86   Pulse 88   Temp 98.2 °F (36.8 °C)   Resp 14   Ht 5' 10\" (1.778 m)   Wt 95 kg (209 lb 7 oz)   SpO2 100%   BMI 30.05 kg/m²     Current Shift:  09/05 0701 - 09/05 1900  In: 620 [I.V.:420]  Out: 250 [Urine:250]  Last three shifts:  09/03 1901 - 09/05 0700  In: 5161.9 [I.V.:4131.9]  Out: 3400 [YBXRF:6587]    Exam:    General: well developed AAM sedate, intubated, does not respond to painful stim  No clear corneal reflex  Patient blinks intermittently but it seems nonpurposeful  CVS:Regular rate and rhythm, no M/R/G, S1/S2 heard, no thrill  Lungs:Clear to auscultation bilaterally, no wheezes, rhonchi, or rales  Abdomen: Soft, Nontender, No distention  Extremities: No C/C/E, pulses palpable 2+  Neuro:not responding appropriately, did not open eyes or pull away from painful stim this am                Labs: Results:       Chemistry Recent Labs     09/05/22  0450 09/04/22  2345 09/04/22  1755 09/04/22  1300 09/04/22  0500 09/03/22  1043 09/03/22  0420   * 226*  --   --  193*  --  190*   * 151*  --   --  151*  --  151*   K 3.7 3.7 3.7   < > 3.4*   < > 3.6   * 118*  --   --  119*  --  120*   CO2 27 28  --   --  28  --  21   BUN 32* 32*  --   --  31*  --  39*   CREA 2.26* 2.27*  --   --  2.38*  --  3.13*   CA 8.0* 7.8*  --   --  8.3*  --  8.5   AGAP 6 5  --   --  4  --  10   BUCR 14 14  --   --  13  --  12   AP  --   --   --   --   --   --  112   TP  --   --   --   --   --   --  6.3*   ALB  --   --   --   --   --   --  1.9*   GLOB  --   --   --   --   --   --  4.4*   AGRAT  --   --   --   --   --   --  0.4*    < > = values in this interval not displayed. CBC w/Diff Recent Labs     09/05/22  0450 09/04/22  0500 09/03/22  0420   WBC 7.1 8.8 15.1*   RBC 2.78* 2.76* 3.17*   HGB 8.0* 7.9* 9.0*   HCT 25.6* 24.5* 28.0*    166 167   GRANS 65 74* 81*   LYMPH 19* 12* 8*   EOS 5 2 1      Cardiac Enzymes No results for input(s): CPK, CKND1, ROB in the last 72 hours.     No lab exists for component: Scottie Mitchell Coagulation Recent Labs     09/05/22  0450 09/04/22  1121   PTP  --  13.3   INR  --  1.0   APTT 26.0 25.5       Lipid Panel Lab Results   Component Value Date/Time    Triglyceride 201 (H) 09/04/2022 05:00 AM      BNP No results for input(s): BNPP in the last 72 hours.    Liver Enzymes Recent Labs     09/03/22  0420   TP 6.3*   ALB 1.9*         Thyroid Studies No results found for: T4, T3U, TSH, TSHEXT     Procedures/imaging: see electronic medical records for all procedures/Xrays and details which were not copied into this note but were reviewed prior to creation of Diana Barakat MD

## 2022-09-05 NOTE — CONSULTS
Comprehensive Nutrition Assessment    Type and Reason for Visit: Initial, Consult, NPO/clear liquid    Nutrition Recommendations/Plan:   Initiate tube feeding of Glucerna 1.5 at 20 mL/hr and advance as tolerated by 10 mL q 8 hours to goal rate of 55 mL/hr with 150 mL q 4 hour water flushes. Goal regimen to provide 1980 kcal, 109g PRO, 1003 ml free water (1903 mL with water flushes). Meets 100% RDIs. Consider discontinuing or modifying IVF once tube feeding is started. Malnutrition Assessment:  Malnutrition Status: At risk for malnutrition (specify) (R/t NPO status with insufficient tube feeding order currently.) (09/05/22 1129)      Nutrition History and Allergies:   PMHx of HTN, dyslipidemia, T2DM, vitamin D deficiency CKD 3A, gout. Pt presents with cardiac arrest, acute respiratory failure, GENO, sepsis, shock. NKFA. Nutrition Assessment:    MRI shows anoxic brain injury for pt. Pt remains ventilated. Currently, chart shows pt has OGT with Glucerna 1.5 infusing at 20 mL/hr. Received consult for pt and MD stated it was for tube feeding recommendations- MD approved TF advancement for pt. Will attach recommendations. Wt hx unavailable in chart. Nutrition Related Findings:    Labs reviewed- Na (152)/CL (119) high, Ca (8.0)/ P (2.4) labs low. Pertinent meds-  D5% @ 50 mL/hr (provides 60g dextrose and 204 kcal), lasix, Lantus, Humalog, Miralax, Effer-K, Neutra-phos. Propofol currently held. BM 9/4. Wound Type: Skin tears     Current Nutrition Intake & Therapies:  Average Meal Intake: NPO  Average Supplement Intake: NPO  DIET NPO  ADULT TUBE FEEDING Nasogastric; Diabetic; Delivery Method: Continuous; Continuous Initial Rate (mL/hr): 20; Continuous Advance Tube Feeding: No; Water Flush Volume (mL): 200; Water Flush Frequency: Q 6 hours    Anthropometric Measures:  Height: 5' 10\" (177.8 cm)  Ideal Body Weight (IBW): 166 lbs (75 kg)  Current Body Wt:  95 kg (209 lb 7 oz), 126.2 % IBW.  Bed scale  Current BMI (kg/m2): 30.1  Weight Adjustment: No adjustment  BMI Category: Obese class 1 (BMI 30.0-34. 9)    Estimated Daily Nutrient Needs:  Energy Requirements Based On: Formula ([facility] 2003b x1-1.1)  Weight Used for Energy Requirements: Current  Energy (kcal/day): 1879 - 2067  Weight Used for Protein Requirements: Current (0.8-1)  Protein (g/day): 76 - 95  Method Used for Fluid Requirements: 1 ml/kcal  Fluid (ml/day): 1879 - 2067    Nutrition Diagnosis:   Inadequate oral intake related to acute injury/trauma, inadequate protein-energy intake as evidenced by NPO or clear liquid status due to medical condition, intubation    Nutrition Interventions:   Food and/or Nutrient Delivery: Continue tube feeding, Modify tube feeding, Continue NPO  Nutrition Education/Counseling: No recommendations at this time  Coordination of Nutrition Care: Continue to monitor while inpatient  Plan of Care discussed with: MD    Goals:     Goals: Tolerate nutrition support at goal rate, by next RD assessment       Nutrition Monitoring and Evaluation:   Behavioral-Environmental Outcomes: None identified  Food/Nutrient Intake Outcomes: Enteral nutrition intake/tolerance  Physical Signs/Symptoms Outcomes: Biochemical data, GI status, Skin, Weight    Discharge Planning:     Too soon to determine    Chance Post, [address]  Contact: [phone]

## 2022-09-06 NOTE — PROGRESS NOTES
1915 : Bedside and Verbal shift change report given to Mirna Levi, RN (oncoming nurse) by Faby Silveira RN (offgoing nurse). Report included the following information SBAR, Kardex, Intake/Output, MAR, and Recent Results. 2000 : Assessment completed. VSS. Intubated and synchronous with ventilator. Frequent eye twitching while at rest. No response noted to noxious stimuli, + corneal and gag reflex. No s/s of bleeding present. 0000 : Reassessment. Temp low, layers added without improvement. Bare hugger applied. 0400 : Reassessment. Temp within normal range at this time. Nursing care continues. 0630 : Dark, red blood noted during oral suctioning. SQ Heparin held and Hospitalist made aware. 0715 : Bedside and Verbal shift change report given to Faby Silveira RN (oncoming nurse) by Markus Snyder RN (offgoing nurse). Report included the following information SBAR, Kardex, Intake/Output, MAR, and Recent Results.

## 2022-09-06 NOTE — PROGRESS NOTES
Palliative Medicine Consult    Patient Name: Zofia Ibarra  YOB: 1960    Date of Initial Consult: 9/1/2022  Date of follow up: 9/6/2022  Reason for Consult: goals of care discussions  Requesting Provider: Dr. Frannie Iraheta  Primary Care Physician: Marty Bee MD      SUMMARY:   Zofia Ibarra is a 65 y.o. with a past history of hypertension, dyslipidemia, type 2 diabetes mellitus, vitamin D deficiency, stage 3a CKD and gout , who was admitted on 8/31/2022 from home with a diagnosis of cardiac arrest, acute respiratory failure, sepsis, shock, disseminated herpes zoster, and Severe left and moderate right hydroureteronephrosis with marked urinary bladder distention. Current medical issues leading to Palliative Medicine involvement include: goals of care discussions. 9/6/2022: Patient remains orally intubated, not sedated, no response to noxious stimuli. PALLIATIVE DIAGNOSES:   Goals of care discussions  Cardiac arrest  Acute respiratory failure  Sepsis, shock  Disseminated herpes zoster  Severe left and moderate right hydroureteronephrosis with marked urinary bladder distention       PLAN:   9/6/2022: Palliative medicine team including Destin Rizvi RN and I met with patient at patient's bedside. Patient is orally intubated, no response to noxious stimuli. Family meeting in ICU waiting room with spouse and family to readdress goals of care. Discussed the benefits and burdens of continuing aggressive measures with potential option of trach/peg in the near future versus implementing comfort measures with compassionate extubation. Family states they have a strong katelynn in God, and that they understand the anoxic brain injury and the poor prognosis. Family wishes to meet again in 24 hours to readdress goals of care (would like to talk as a family about treatment options). At this time, no changes in goals of care. Continue Full code with full interventions.      See previous discussions below:    9/1/2022: Goals of care discussions: Palliative medicine team including Yoshi Smith RN and I met with patient at patient's bedside. Patient is orally intubated, no response to noxious stimuli. Edmund Mcmullen at bedside. Family meeting in ICU waiting room today with patient's wife Starr Bear, patient's daughter, and 2 sister-in laws. Family has a good understanding of current health situation and they are waiting for results from ongoing neurological testing to determine the existence of and the severity of anoxic brain injury. Prior to admission, patient was independent in ADLs, worked as a  at [employer], and is a  in [employer]. Patient has no known AMD, and his wife Antionette Clark is legal NOK. Family is hopeful for recovery but recognize consideration of quality of life will be discussed further based on patient's response to medical treatment. Discussed the benefits and burdens of CPR in the event of cardiopulmonary arrest.  Encouraged ongoing conversations as patient is very high risk for unsuccessful attempts and/or post-arrest complications. At this time continue full code with full interventions. We will continue to follow with you. Cardiac arrest: Multiple, witnessed by EMS, total downtime ~30 minutes. Cardiology following. EEG done for myoclonic movements revealing: \"This EEG is abnormal due to severely depressed EEG background, which may be due to severe anoxic brain injury. \"   Acute respiratory failure: secondary to number 2. Orally intubated on mechanical vent, managed by PCCM. SBT ongoing. Sepsis, shock: likely due to urinary source. On IVAB per primary team. Lyle placed by urology for retention, bleeding, and hydronephrosis. Disseminated herpes zoster: Family reports patient was experiencing significant pain.    Severe left and moderate right hydroureteronephrosis with marked urinary bladder distention: Lyle placed by urology for retention, bleeding, and hydronephrosis. Initial consult note routed to primary continuity provider  Communicated plan of care with: Palliative IDT       GOALS OF CARE / TREATMENT PREFERENCES:   [====Goals of Care====]  GOALS OF CARE: Full code with full interventions  Patient/Health Care Proxy Stated Goals: Prolong life      TREATMENT PREFERENCES:   Code Status: Full Code    Advance Care Planning:  Advance Care Planning 9/1/2022   Patient's Healthcare Decision Maker is: Legal Next of Kin   Confirm Advance Directive None   Patient Would Like to Complete Advance Directive Unable       Medical Interventions: Full interventions            The palliative care team has discussed with patient / health care proxy about goals of care / treatment preferences for patient.  [====Goals of Care====]         HISTORY:     History obtained from: patient's chart, family    CHIEF COMPLAINT: cardiac arrest    HPI/SUBJECTIVE:    The patient is:   [] Verbal and participatory  [x] Non-participatory due to:   Orally intubated on mechanical ventilator, no response to noxious stimuli      Clinical Pain Assessment (nonverbal scale for severity on nonverbal patients):   Clinical Pain Assessment  Severity: 0    Adult Nonverbal Pain Scale  Face: No particular expression or smile  Activity (Movement): Lying quietly, normal position  Guarding: Lying quietly, no positioning of hands over areas of body  Physiology (Vital Signs):  Stable vital signs  Respiratory: Baseline RR/SpO2 compliant with ventilator  Total Score: 0       FUNCTIONAL ASSESSMENT:     Palliative Performance Scale (PPS):  PPS: 30       PSYCHOSOCIAL/SPIRITUAL SCREENING:     Advance Care Planning:  Advance Care Planning 9/1/2022   Patient's Healthcare Decision Maker is: Legal Next of Kin   Confirm Advance Directive None   Patient Would Like to Complete Advance Directive Unable        Any spiritual / Congregational concerns: unable to assess  [] Yes /  [] No    Caregiver Burnout:  [] Yes /  [x] No / [] No Caregiver Present      Anticipatory grief assessment: unable to assess  [] Normal  / [] Maladaptive             REVIEW OF SYSTEMS:     Positive and pertinent negative findings in ROS are noted above in HPI. The following systems were [] reviewed / [x] unable to be reviewed as noted in HPI  Other findings are noted below. Systems: constitutional, ears/nose/mouth/throat, respiratory, gastrointestinal, genitourinary, musculoskeletal, integumentary, neurologic, psychiatric, endocrine. Positive findings noted below. Modified ESAS Completed by: provider           Pain: 0           Dyspnea: 0           Stool Occurrence(s): 0        PHYSICAL EXAM:     From RN flowsheet:  Wt Readings from Last 3 Encounters:   09/05/22 95 kg (209 lb 7 oz)     Blood pressure (!) 141/83, pulse 89, temperature 98.8 °F (37.1 °C), resp. rate 14, height 5' 10\" (1.778 m), weight 95 kg (209 lb 7 oz), SpO2 99 %. Pain Scale 1: Adult Nonverbal Pain Scale                      Constitutional: orally intubated, critically ill appearing  Eyes: closed  ENMT: orally intubated   Cardiovascular: regular rhythm, distal pulses intact  Respiratory: orally intubated on mechanical ventilation  Gastrointestinal: soft non-tender   Musculoskeletal: no deformity, no tenderness to palpation  Skin: warm, dry  Neurologic: off sedation, no response to noxious stimuli        HISTORY:     Principal Problem:    Cardiac arrest ([address] (8/31/2022)    Active Problems:    Respiratory failure ([address] (8/31/2022)      GENO (acute kidney injury) ([address] (8/31/2022)      Disseminated herpes zoster (8/31/2022)      Hydronephrosis (8/31/2022)      Shock ([address] (8/31/2022)      Sepsis ([address] (8/31/2022)      UTI (urinary tract infection) (9/1/2022)      Brain anoxia ([address] (9/4/2022)    Past Medical History:   Diagnosis Date    Gout     Shingles       No past surgical history on file. No family history on file. History reviewed, no pertinent family history.   Social History Tobacco Use    Smoking status: Not on file    Smokeless tobacco: Not on file   Substance Use Topics    Alcohol use: Not on file     No Known Allergies   Current Facility-Administered Medications   Medication Dose Route Frequency    [START ON 9/7/2022] insulin glargine (LANTUS) injection 15 Units  15 Units SubCUTAneous Q24H    dextrose 5% with KCl 20 mEq/L infusion   IntraVENous CONTINUOUS    heparin (porcine) injection 5,000 Units  5,000 Units SubCUTAneous Q8H    amLODIPine (NORVASC) tablet 5 mg  5 mg Oral DAILY    lactulose (CHRONULAC) 10 gram/15 mL solution 15 mL  15 mL Oral DAILY    furosemide (LASIX) injection 20 mg  20 mg IntraVENous DAILY    metoprolol (LOPRESSOR) injection 2.5 mg  2.5 mg IntraVENous Q6H PRN    metoprolol (LOPRESSOR) injection 1.25 mg  1.25 mg IntraVENous Q6H    acyclovir (ZOVIRAX) 750 mg in 0.9% sodium chloride 250 mL IVPB  10 mg/kg (Ideal) IntraVENous Q12H    insulin lispro (HUMALOG) injection   SubCUTAneous Q6H    ELECTROLYTE REPLACEMENT PROTOCOL - Potassium Renal Dosing  1 Each Other PRN    piperacillin-tazobactam (ZOSYN) 3.375 g in 0.9% sodium chloride (MBP/ADV) 100 mL MBP  3.375 g IntraVENous Q8H    sodium chloride (NS) flush 5-40 mL  5-40 mL IntraVENous Q8H    sodium chloride (NS) flush 5-40 mL  5-40 mL IntraVENous PRN    acetaminophen (TYLENOL) tablet 650 mg  650 mg Oral Q6H PRN    Or    acetaminophen (TYLENOL) suppository 650 mg  650 mg Rectal Q6H PRN    polyethylene glycol (MIRALAX) packet 17 g  17 g Oral DAILY PRN    ondansetron (ZOFRAN ODT) tablet 4 mg  4 mg Oral Q8H PRN    Or    ondansetron (ZOFRAN) injection 4 mg  4 mg IntraVENous Q6H PRN    pantoprazole (PROTONIX) 40 mg in 0.9% sodium chloride 10 mL injection  40 mg IntraVENous Q12H    levETIRAcetam (KEPPRA) 1,000 mg in 0.9% sodium chloride 100 mL IVPB  1,000 mg IntraVENous Q12H          LAB AND IMAGING FINDINGS:     Lab Results   Component Value Date/Time    WBC 7.3 09/06/2022 04:36 AM    HGB 7.7 (L) 09/06/2022 04:36 AM PLATELET 218 55/16/4203 04:36 AM     Lab Results   Component Value Date/Time    Sodium 150 (H) 09/06/2022 04:36 AM    Potassium 3.3 (L) 09/06/2022 04:36 AM    Chloride 115 (H) 09/06/2022 04:36 AM    CO2 29 09/06/2022 04:36 AM    BUN 30 (H) 09/06/2022 04:36 AM    Creatinine 2.11 (H) 09/06/2022 04:36 AM    Calcium 8.1 (L) 09/06/2022 04:36 AM    Magnesium 2.0 09/06/2022 04:36 AM    Phosphorus 2.8 09/06/2022 04:36 AM      Lab Results   Component Value Date/Time    Alk. phosphatase 188 (H) 09/06/2022 04:36 AM    Protein, total 5.8 (L) 09/06/2022 04:36 AM    Albumin 1.7 (L) 09/06/2022 04:36 AM    Globulin 4.1 (H) 09/06/2022 04:36 AM     Lab Results   Component Value Date/Time    INR 1.1 09/06/2022 04:36 AM    Prothrombin time 14.2 09/06/2022 04:36 AM    aPTT 27.9 09/05/2022 11:27 AM      No results found for: IRON, FE, TIBC, IBCT, PSAT, FERR   No results found for: PH, PCO2, PO2  No components found for: GLPOC   No results found for: CPK, CKMB             Total time: 35 minutes  Counseling / coordination time, spent as noted above:   > 50% counseling / coordination?: yes, patient, family, and medical team    Prolonged service was provided for  []30 min   []75 min in face to face time in the presence of the patient, spent as noted above.   Time Start:   Time End:

## 2022-09-06 NOTE — PROGRESS NOTES
[facility]  Pulmonary, Critical Care, and Sleep Medicine    Name: Tab Delacruz MRN: [id number]   : 1960 Hospital: [facility] [place]    Date: 2022  Room: [address] [place] [address] Note                                              Consult requesting physician: Dr. Dr. Gary Crawford   Reason for Consult: Cardiac arrest , shock, respiratory failure     IMPRESSION:   Acute respiratory failure, post-arrest - J96.0  Cardiac arrest - I46.9  Sepsis - A41.9, R65.20  Hypernatremia - E87.0  Anemia - D64.9  Chronic DVT right leg  Elevated LFTs, post-arrest  Anoxic encephalopathy post-arrest   Pituitary hemorrhage/hematoma  Patient Active Problem List   Diagnosis Code    Cardiac arrest ([facility] I46.9    Respiratory failure ([facility] [address] J96.90    GENO (acute kidney injury) ([facility] [address] N17.9    Disseminated herpes zoster B02.7    Hydronephrosis N13.30    Shock ([facility] [address] R57.9    Sepsis ([facility] [address] A41.9    UTI (urinary tract infection) N39.0    Brain anoxia ([facility] [address] G93.1     Code status: Full Code       RECOMMENDATIONS:     Respiratory: Acute respiratory failure secondary to post cardiac arrest anoxic encephalopathy  On VC+, FiO2 35%, PEEP 5+  Chest x-ray for follow-up in a.m. Ventilator bundle & Sedation protocol followed. Daily sedation holiday, assessment for readiness for SBT and then re-titrate if required. Chlorhexidine mouth washes. Keep SPO2 >=92%. HOB 30 degree elevation all the time. Aggressive pulmonary toileting. Aspiration precautions. CVS: Hemodynamically stable  On Norvasc, monitor blood pressure and adjust dose as needed  Cardiology following    ID: No leukocytosis or fever  ID following  Blood cultures 2022: NGT final  Antibiotics: Zosyn  Antiviral: Acyclovir  Sepsis bundle and protocol followed. Follow cultures. Deescalate antibiotic when appropriate per ID.     Hematology/Oncology: Stable hemoglobin and platelet  No bleeding anywhere  Monitor CBC daily  Okay to use heparin for DVT prophylaxis per neurology  Not a candidate for full dose anticoagulation  Has a small chronic calf vein DVT right lower extremity  Follow-up PVL study this week  Vascular surgery is aware-if worsening in DVT then may need IVC filter    Renal: Monitor S. Na+ daily  Increase free water bolus to 300 mL every 4 hours while NG tube  Monitor renal functions, electrolytes, urine output  Any renal evaluation as per    GI/: PPI for GI prophylaxis  Tube feeding via NG tube  Lyle was placed by urology; no hematuria    Endocrine: Monitor BS. SSI and adjust Lantus    Neurology: Not on any sedation  Opens eyes intermittently spontaneously, does not follow commands, no involuntary movements  Continue Keppra per neurology  EEG abnormal  MRI brain 9/3/2022: High-grade acute/subacute anoxic changes  Neurology following    Toxicology: UDS negative 8/31/22    Pain/Sedation: Avoid sedatives, hypnotic if possible    Skin/Wound: As per nursing care    Electrolytes: Replace electrolytes per ICU electrolyte replacement protocol. Prophylaxis: DVT Prophylaxis: SCD/heparin. GI Prophylaxis: PPI. Restraints: none. May use wrist soft restraints for patient interfering with medical therapy/management and patient safety. Lines/Tubes: PIV  ETT: 8/31/2021. OGT: 9/2/22. Central line: 8/31/22 LT IJ (site examined, no erythema, induration, discharge or sign of infection. Dressing intact. Medically necessary, will remove it when not needed. Central line bundle followed). Lyle: 8/31/22 (Medically necessary for strict input/output monitoring in critically ill patient, will remove it when not needed. Lyle bundle followed). Advance Directive/Palliative Care: Full code. Palliative care Consulted.      Will defer respective systems problem management to primary and other respective consultant and follow patient in ICU with primary and other medical team.  Further recommendations will be based on the patient's response to recommended treatment and results of the investigation ordered. Quality Care: PPI, DVT prophylaxis, HOB elevated, Infection control all reviewed and addressed. Care of plan d/w RN, RT, MDR, hospitalist team.  Overall, poor long-term prognosis with high risk for prolonged hospitalization, prolonged ventilator need, nosocomial infection, hemodynamic instability, cardiac arrest, need for RRT, bleeding, morbidity, mortality, other    High complexity decision making was performed during the evaluation of this patient at high risk for decompensation with multiple organ involvement. Total critical care time spent rendering care exclusive of procedures/family discussion/coordination of care: 39 minutes. Subjective/History of Present Illness:   Patient is a 65 y.o. male with PMHx significant for HTN/CRI/DM recently diagnosed with severe zoster. Patient was on antiviral tx, very weak, sob, cough. EMS called, in ambulance 3 times cardiac arrest. Witnessed CPR/Epi. Repored PEA. 6 doses of epi and no shocks. He came on epi drip but in ER chnaged to levophed. Pateitn had seizure vs myocloninc jerks was started on propofol. 9/6/2022 :   Patient seen at bedside in ICU room #104  No family at bedside  On ventilator, FiO2 35%, PEEP 5+  No major ET secretions  Opens eyes intermittently, does not follow commands, rightward gaze, no involuntary movements  Not on any sedation. No response to painful stimuli  Hemodynamically stable; not on any vasopressors  No fever. Good urine output. Tolerating tube feeding  [facility] was not contacted by staff on anything about patient overnight. I/O last 24 hrs:    Intake/Output Summary (Last 24 hours) at 9/6/2022 1018  Last data filed at 9/6/2022 0846  Gross per 24 hour   Intake 3760 ml   Output 3700 ml   Net 60 ml         The patient is critically ill and can not provide additional history due to Unconsciousness and Ventilated    History taken from EMR    Review of Systems:  ROS not obtained due to patient factor. No Known Allergies     Past Medical History:   Diagnosis Date    Gout     Shingles       No past surgical history on file. Social History     Tobacco Use    Smoking status: Not on file    Smokeless tobacco: Not on file   Substance Use Topics    Alcohol use: Not on file      No family history on file.      Prior to Admission medications    Not on File     Current Facility-Administered Medications   Medication Dose Route Frequency    [START ON 9/7/2022] insulin glargine (LANTUS) injection 15 Units  15 Units SubCUTAneous Q24H    heparin (porcine) injection 5,000 Units  5,000 Units SubCUTAneous Q8H    amLODIPine (NORVASC) tablet 5 mg  5 mg Oral DAILY    lactulose (CHRONULAC) 10 gram/15 mL solution 15 mL  15 mL Oral DAILY    furosemide (LASIX) injection 20 mg  20 mg IntraVENous DAILY    dextrose 5% infusion  50 mL/hr IntraVENous CONTINUOUS    metoprolol (LOPRESSOR) injection 1.25 mg  1.25 mg IntraVENous Q6H    acyclovir (ZOVIRAX) 750 mg in 0.9% sodium chloride 250 mL IVPB  10 mg/kg (Ideal) IntraVENous Q12H    insulin lispro (HUMALOG) injection   SubCUTAneous Q6H    piperacillin-tazobactam (ZOSYN) 3.375 g in 0.9% sodium chloride (MBP/ADV) 100 mL MBP  3.375 g IntraVENous Q8H    [Held by provider] NOREPINephrine (LEVOPHED) 8 mg in 5% dextrose 250mL (32 mcg/mL) infusion  2-30 mcg/min IntraVENous TITRATE    sodium chloride (NS) flush 5-40 mL  5-40 mL IntraVENous Q8H    pantoprazole (PROTONIX) 40 mg in 0.9% sodium chloride 10 mL injection  40 mg IntraVENous Q12H    levETIRAcetam (KEPPRA) 1,000 mg in 0.9% sodium chloride 100 mL IVPB  1,000 mg IntraVENous Q12H    [Held by provider] propofol (DIPRIVAN) 10 mg/mL infusion  0-50 mcg/kg/min IntraVENous TITRATE         Objective:   Vital Signs:    Visit Vitals  BP (!) 141/83   Pulse 89   Temp 98.8 °F (37.1 °C)   Resp 14   Ht 5' 10\" (1.778 m)   Wt 95 kg (209 lb 7 oz)   SpO2 99%   BMI 30.05 kg/m²       O2 Device: Endotracheal tube, Ventilator       Temp (24hrs), Av.2 °F (36.8 °C), Min:95.4 °F (35.2 °C), Max:100.2 °F (37.9 °C)       Intake/Output:   Last shift:       07 - 1900  In: 0313 [I.V.:850]  Out: 725 [Urine:725]    Last 3 shifts: 1901 -  07  In: 4900 [I.V.:3060]  Out: 5363 [Urine:4125]      Intake/Output Summary (Last 24 hours) at 2022 1018  Last data filed at 2022 0846  Gross per 24 hour   Intake 3760 ml   Output 3700 ml   Net 60 ml       Last 3 Recorded Weights in this Encounter    22 1524 22 0757 22 0904   Weight: 111.6 kg (246 lb) 103.9 kg (229 lb 0.9 oz) 95 kg (209 lb 7 oz)         Ventilator Settings:  Mode Rate Tidal Volume Pressure FiO2 PEEP   Assist control   450 ml    35 % 5 cm H20     Peak airway pressure: 17 cm H2O    Plateau pressure:     Tidal volume:    Minute ventilation: 7.47 l/min     Recent Labs     22  0436 22  0449   PHI 7.45 7.44   PCO2I 37.5 36.5   PO2I 116* 118*   HCO3I 25.8 25.0   FIO2I 35 35       Physical Exam:     General/Neurology: eyes open intermittently, doesn't follow commands, RT gaze preference, no withdrawal to painful stimuli, no involuntary movements, exam limitation on ventilator  Head:   Normocephalic, without obvious abnormality, atraumatic. Eye:   No scleral icterus, no cyanosis. Nose:   No sinus tenderness  Throat:  Visible lips, mucosa, and tongue normal. No oral thrush. Neck:   Supple, symmetric. No lymphadenopathy. Trachea midline  Lung: Moderate air entry bilateral equal. Few scattered rhonchi BL, no wheezing. No stridors. No prolongded expiration. No accessory muscle use. Heart:   Regular rate & rhythm. S1 S2 present. No murmur. No JVD. Abdomen:  Soft. NT. ND. +BS. No masses. Extremities:  Trace BL pedal edema. No cyanosis. No clubbing. Pulses: 2+ and symmetric in DP. Capillary refill: normal  Lymphatic:  No cervical or supraclavicular palpable lymphadenopathy. Musculoskeletal: No joint swelling. No tenderness.   Skin:   Color, texture, turgor fair      Data:       Recent Results (from the past 24 hour(s))   PTT    Collection Time: 09/05/22 11:27 AM   Result Value Ref Range    aPTT 27.9 23.0 - 36.4 SEC   GLUCOSE, POC    Collection Time: 09/05/22 12:24 PM   Result Value Ref Range    Glucose (POC) 192 (H) 70 - 110 mg/dL   GLUCOSE, POC    Collection Time: 09/05/22  5:35 PM   Result Value Ref Range    Glucose (POC) 192 (H) 70 - 110 mg/dL   POTASSIUM    Collection Time: 09/05/22  6:24 PM   Result Value Ref Range    Potassium 3.6 3.5 - 5.5 mmol/L   PHOSPHORUS    Collection Time: 09/05/22  6:24 PM   Result Value Ref Range    Phosphorus 2.9 2.5 - 4.9 MG/DL   GLUCOSE, POC    Collection Time: 09/05/22 11:52 PM   Result Value Ref Range    Glucose (POC) 197 (H) 70 - 110 mg/dL   MAGNESIUM    Collection Time: 09/06/22  4:36 AM   Result Value Ref Range    Magnesium 2.0 1.6 - 2.6 mg/dL   PHOSPHORUS    Collection Time: 09/06/22  4:36 AM   Result Value Ref Range    Phosphorus 2.8 2.5 - 4.9 MG/DL   CBC WITH AUTOMATED DIFF    Collection Time: 09/06/22  4:36 AM   Result Value Ref Range    WBC 7.3 4.6 - 13.2 K/uL    RBC 2.70 (L) 4.35 - 5.65 M/uL    HGB 7.7 (L) 13.0 - 16.0 g/dL    HCT 24.4 (L) 36.0 - 48.0 %    MCV 90.4 78.0 - 100.0 FL    MCH 28.5 24.0 - 34.0 PG    MCHC 31.6 31.0 - 37.0 g/dL    RDW 16.1 (H) 11.6 - 14.5 %    PLATELET 899 745 - 394 K/uL    MPV 10.7 9.2 - 11.8 FL    NRBC 2.1 (H) 0  WBC    ABSOLUTE NRBC 0.15 (H) 0.00 - 0.01 K/uL    NEUTROPHILS 64 40 - 73 %    LYMPHOCYTES 20 (L) 21 - 52 %    MONOCYTES 10 3 - 10 %    EOSINOPHILS 4 0 - 5 %    BASOPHILS 0 0 - 2 %    IMMATURE GRANULOCYTES 1 (H) 0.0 - 0.5 %    ABS. NEUTROPHILS 4.7 1.8 - 8.0 K/UL    ABS. LYMPHOCYTES 1.5 0.9 - 3.6 K/UL    ABS. MONOCYTES 0.7 0.05 - 1.2 K/UL    ABS. EOSINOPHILS 0.3 0.0 - 0.4 K/UL    ABS. BASOPHILS 0.0 0.0 - 0.1 K/UL    ABS. IMM.  GRANS. 0.1 (H) 0.00 - 0.04 K/UL    DF AUTOMATED     METABOLIC PANEL, BASIC    Collection Time: 09/06/22  4:36 AM   Result Value Ref Range Sodium 150 (H) 136 - 145 mmol/L    Potassium 3.3 (L) 3.5 - 5.5 mmol/L    Chloride 115 (H) 100 - 111 mmol/L    CO2 29 21 - 32 mmol/L    Anion gap 6 3.0 - 18 mmol/L    Glucose 212 (H) 74 - 99 mg/dL    BUN 30 (H) 7.0 - 18 MG/DL    Creatinine 2.11 (H) 0.6 - 1.3 MG/DL    BUN/Creatinine ratio 14 12 - 20      GFR est AA 39 (L) >60 ml/min/1.73m2    GFR est non-AA 32 (L) >60 ml/min/1.73m2    Calcium 8.1 (L) 8.5 - 10.1 MG/DL   PROTHROMBIN TIME + INR    Collection Time: 09/06/22  4:36 AM   Result Value Ref Range    Prothrombin time 14.2 11.5 - 15.2 sec    INR 1.1 0.8 - 1.2     HEPATIC FUNCTION PANEL    Collection Time: 09/06/22  4:36 AM   Result Value Ref Range    Protein, total 5.8 (L) 6.4 - 8.2 g/dL    Albumin 1.7 (L) 3.4 - 5.0 g/dL    Globulin 4.1 (H) 2.0 - 4.0 g/dL    A-G Ratio 0.4 (L) 0.8 - 1.7      Bilirubin, total 0.5 0.2 - 1.0 MG/DL    Bilirubin, direct 0.2 0.0 - 0.2 MG/DL    Alk.  phosphatase 188 (H) 45 - 117 U/L    AST (SGOT) 103 (H) 10 - 38 U/L    ALT (SGPT) 55 16 - 61 U/L   AMMONIA    Collection Time: 09/06/22  5:03 AM   Result Value Ref Range    Ammonia, plasma 18 11 - 32 UMOL/L   GLUCOSE, POC    Collection Time: 09/06/22  5:18 AM   Result Value Ref Range    Glucose (POC) 198 (H) 70 - 110 mg/dL         Chemistry Recent Labs     09/06/22  0436 09/05/22  1824 09/05/22  0450 09/04/22  2345 09/04/22  1300 09/04/22  0500   *  --  188* 226*  --  193*   *  --  152* 151*  --  151*   K 3.3* 3.6 3.7 3.7   < > 3.4*   *  --  119* 118*  --  119*   CO2 29  --  27 28  --  28   BUN 30*  --  32* 32*  --  31*   CREA 2.11*  --  2.26* 2.27*  --  2.38*   CA 8.1*  --  8.0* 7.8*  --  8.3*   MG 2.0  --  2.2  --   --  2.5   PHOS 2.8 2.9 2.4*  --   --  2.9   AGAP 6  --  6 5  --  4   BUCR 14  --  14 14  --  13   *  --   --   --   --   --    TP 5.8*  --   --   --   --   --    ALB 1.7*  --   --   --   --   --    GLOB 4.1*  --   --   --   --   --    AGRAT 0.4*  --   --   --   --   --     < > = values in this interval not displayed. Lactic Acid Lactic acid   Date Value Ref Range Status   09/01/2022 1.8 0.4 - 2.0 MMOL/L Final     No results for input(s): LAC in the last 72 hours. Liver Enzymes Protein, total   Date Value Ref Range Status   09/06/2022 5.8 (L) 6.4 - 8.2 g/dL Final     Albumin   Date Value Ref Range Status   09/06/2022 1.7 (L) 3.4 - 5.0 g/dL Final     Globulin   Date Value Ref Range Status   09/06/2022 4.1 (H) 2.0 - 4.0 g/dL Final     A-G Ratio   Date Value Ref Range Status   09/06/2022 0.4 (L) 0.8 - 1.7   Final     Alk.  phosphatase   Date Value Ref Range Status   09/06/2022 188 (H) 45 - 117 U/L Final     Recent Labs     09/06/22  0436   TP 5.8*   ALB 1.7*   GLOB 4.1*   AGRAT 0.4*   *        CBC w/Diff Recent Labs     09/06/22  0436 09/05/22  0450 09/04/22  0500   WBC 7.3 7.1 8.8   RBC 2.70* 2.78* 2.76*   HGB 7.7* 8.0* 7.9*   HCT 24.4* 25.6* 24.5*    170 166   GRANS 64 65 74*   LYMPH 20* 19* 12*   EOS 4 5 2        Cardiac Enzymes No results found for: CPK, CK, CKMMB, CKMB, RCK3, CKMBT, CKNDX, CKND1, ROB, TROPT, TROIQ, BAY, TROPT, TNIPOC, BNP, BNPP     BNP No results found for: BNP, BNPP, XBNPT     Coagulation Recent Labs     09/06/22  0436 09/05/22  1127 09/05/22  0450 09/04/22  1121   PTP 14.2  --   --  13.3   INR 1.1  --   --  1.0   APTT  --  27.9 26.0 25.5         Thyroid  No results found for: T4, T3U, TSH, TSHEXT    No results found for: T4     Urinalysis Lab Results   Component Value Date/Time    Color YELLOW 09/03/2022 01:45 PM    Appearance CLOUDY 09/03/2022 01:45 PM    Specific gravity 1.023 09/03/2022 01:45 PM    pH (UA) 5.5 09/03/2022 01:45 PM    Protein 100 (A) 09/03/2022 01:45 PM    Glucose >1,000 (A) 09/03/2022 01:45 PM    Ketone 15 (A) 09/03/2022 01:45 PM    Bilirubin Negative 09/03/2022 01:45 PM    Urobilinogen 0.2 09/03/2022 01:45 PM    Nitrites Negative 09/03/2022 01:45 PM    Leukocyte Esterase SMALL (A) 09/03/2022 01:45 PM    Epithelial cells 1+ 09/03/2022 01:45 PM Bacteria FEW (A) 09/03/2022 01:45 PM    WBC 11 to 20 09/03/2022 01:45 PM    RBC 4 to 10 09/03/2022 01:45 PM        Micro  No results for input(s): SDES, CULT in the last 72 hours. No results for input(s): CULT in the last 72 hours. Culture data during this hospitalization. All Micro Results       Procedure Component Value Units Date/Time    CULTURE, BLOOD [[id number]] Collected: 08/31/22 1015    Order Status: Completed Specimen: Blood Updated: 09/06/22 0711     Special Requests: NO SPECIAL REQUESTS        Culture result: NO GROWTH 6 DAYS       CULTURE, BLOOD [[id number]] Collected: 08/31/22 1000    Order Status: Completed Specimen: Blood Updated: 09/06/22 0711     Special Requests: NO SPECIAL REQUESTS        Culture result: NO GROWTH 6 DAYS       CULTURE, RESPIRATORY/SPUTUM/BRONCH Kellee Sprout STAIN [[id number]] Collected: 09/03/22 1230    Order Status: Canceled Specimen: Sputum from Tracheal Aspirate     COVID-19 RAPID TEST [[id number]] Collected: 08/31/22 1050    Order Status: Completed Specimen: Nasopharyngeal Updated: 08/31/22 1131     Specimen source Nasopharyngeal        COVID-19 rapid test Not detected        Comment: Rapid Abbott ID Now       Rapid NAAT:  The specimen is NEGATIVE for SARS-CoV-2, the novel coronavirus associated with COVID-19. Negative results should be treated as presumptive and, if inconsistent with clinical signs and symptoms or necessary for patient management, should be tested with an alternative molecular assay. Negative results do not preclude SARS-CoV-2 infection and should not be used as the sole basis for patient management decisions. This test has been authorized by the FDA under an Emergency Use Authorization (EUA) for use by authorized laboratories.    Fact sheet for Healthcare Providers: iTendency.uy  Fact sheet for Patients: iTendency.uy       Methodology: Isothermal Nucleic Acid Amplification MRI BRAIN WO CONT    Result Date: 9/3/2022  EXAM: MRI of the brain without intravenous contrast. INDICATION:  \"coma r/o anoxia. \" COMPARISON:  No prior MRI is available for direct comparison. CORRELATION (related prior exam):  Recent CT. PROTOCOL:  Routine brain. _______________ FINDINGS:       IMAGE QUALITY:  Diagnostic. BRAIN AND EXTRA-AXIAL SPACE:           ACUTE/SUBACUTE INFARCT:  There is diffusion restriction diffusely involving the infratentorial and supratentorial gray matter in a pattern indicative of high-grade anoxic brain injury. MASS:  None. HEMORRHAGE:  None. SUBDURAL FLUID COLLECTION:  None. HYDROCEPHALUS:  None. ICA AND DOMINANT VA T2 FLOW VOIDS:  Unremarkable. REMOTE CEREBRAL TERRITORIAL INFARCT:  None definite. REMOTE CEREBELLAR INFARCT:  None definite. STRIVE (STandards for Reporting Vascular changes on nEuroimaging):                            --[place] of white matter hyperintensity (\"leukoaraiosis\") of presumed vascular origin:  Mild. --[place] of chronic lacunes of presumed vascular origin:  Mild. --[place] of perivascular spaces:  None significant. --[place] of \"microbleeds\":   None definite. --Degree of brain atrophy: Not characterizable in the setting of diffuse cerebral edema. SELLA/PITUITARY:  A T1 and heterogeneously peripherally hyperintense, T2 heterogeneously mildly hyperintense, and T2 FLAIR hyperintense expansile lesion in the sella abutting the undersurface of the optic chiasm measures 12 mm anterior-posterior, 16 mm medial-lateral, 18 mm orthogonal superior-inferior. The lesion is nonspecific are favored to reflect pituitary hemorrhage. HEENT: Intubated. ORBITS:  Unremarkable.            PARANASAL SINUSES:  The right maxillary sinus and right anterior ethmoid air cells are opacified. There is scattered paranasal sinus mucosal thickening. MASTOID AIR CELLS:  Predominantly clear. INCLUDED UPPER CERVICAL LYMPH NODES:  Unremarkable. INCLUDED UPPER PAROTIDS:  Unremarkable. NASOPHARYNX:  Unremarkable. BONE MARROW SIGNAL:  Unremarkable. SUPERFICIAL SOFT TISSUES: Unremarkable. _______________     1. High-grade acute/subacute anoxic brain injury without herniation. 2.  Heterogeneous expansile sellar lesion, nonspecific, pituitary hematoma/hemorrhage favored over other etiologies. _______________     CT HEAD WO CONT    Result Date: 9/2/2022  EXAM: CT head INDICATION: Anoxic brain injury. COMPARISON: 8/31/2022 TECHNIQUE: Axial CT imaging of the head was performed without intravenous contrast. Standard multiplanar coronal and sagittal reformatted images were obtained and are included in interpretation. One or more dose reduction techniques were used on this CT: automated exposure control, adjustment of the mAs and/or kVp according to patient size, and iterative reconstruction techniques. The specific techniques used on this CT exam have been documented in the patient's electronic medical record. Digital Imaging and Communications in Medicine (DICOM) format image data are available to nonaffiliated external healthcare facilities or entities on a secure, media free, reciprocally searchable basis with patient authorization for at least a 12-month period after this study. _______________ FINDINGS: BRAIN AND POSTERIOR FOSSA: Mild patchy periventricular, deep and subcortical white matter hypoattenuation which is nonspecific but likely represents chronic ischemic changes. No evidence of acute large vessel transcortical infarct or acute parenchymal hemorrhage. No midline shift or hydrocephalus. EXTRA-AXIAL SPACES AND MENINGES: There are no abnormal extra-axial fluid collections. CALVARIUM: Intact.  SINUSES: Right maxillary and ethmoid sinus mucosal disease. OTHER: None. _______________     No acute intracranial abnormality. CT HEAD WO CONT    Result Date: 8/31/2022  EXAM: CT head INDICATION: Cardiac arrest COMPARISON: None. TECHNIQUE: Axial CT imaging of the head was performed without intravenous contrast. Standard multiplanar coronal and sagittal reformatted images were obtained and are included in interpretation. One or more dose reduction techniques were used on this CT: automated exposure control, adjustment of the mAs and/or kVp according to patient size, and iterative reconstruction techniques. The specific techniques used on this CT exam have been documented in the patient's electronic medical record. Digital Imaging and Communications in Medicine (DICOM) format image data are available to nonaffiliated external healthcare facilities or entities on a secure, media free, reciprocally searchable basis with patient authorization for at least a 12-month period after this study. _______________ FINDINGS: BRAIN AND POSTERIOR FOSSA: Exam quality is mildly degraded secondary to motion artifact. Ventricular size and configuration appears within normal limits. Basilar cisterns are patent. Within the limitations of motion, no acute intra-axial hemorrhage. No evidence of mass effect or midline shift. Gray-white matter differentiation appears within normal limits as visualized. EXTRA-AXIAL SPACES AND MENINGES: There are no abnormal extra-axial fluid collections. CALVARIUM: Intact. SINUSES: Minor nonspecific mucosal thickening ethmoid air cells, sphenoid sinus with air-fluid level present in the right maxillary sinus. OTHER: None. _______________     1. Mildly motion limited study without evidence of acute intracranial abnormality. 2. Paranasal mucosal disease as described above with air-fluid level in the right maxillary sinus as can be seen with sinusitis.     CT CHEST ABD PELV WO CONT    Result Date: 8/31/2022  EXAM: CT chest, abdomen, and pelvis INDICATION: Pain COMPARISON: No prior study. TECHNIQUE: Axial CT imaging of the chest, abdomen, and pelvis was performed without IV contrast administration. Multiplanar reformats were generated. One or more dose reduction techniques were used on this CT: automated exposure control, adjustment of the mAs and/or kVp according to patient size, and iterative reconstruction techniques. The specific techniques used on this CT exam have been documented in the patient's electronic medical record. Digital Imaging and Communications in Medicine (DICOM) format image data are available to nonaffiliated external healthcare facilities or entities on a secure, media free, reciprocally searchable basis with patient authorization for at least a 12-month period after this study. _______________ FINDINGS: CHEST: MEDIASTINUM: Left IJV central venous catheter tip at the cavoatrial junction. Normal caliber aorta with vascular calcifications. No pericardial effusion. LYMPH NODES: No pathologically enlarged mediastinal or hilar lymph nodes. PLEURA: No pleural effusion or pneumothorax. LUNGS/AIRWAY: Endotracheal tube tip in the midthoracic trachea. No consolidation or suspicious pulmonary nodule is seen. OTHER: None. ** ABDOMEN/PELVIS: LIVER, BILIARY: Liver is unremarkable. No abnormal biliary dilation. Gallbladder is unremarkable. PANCREAS: Unremarkable. SPLEEN: Unremarkable. ADRENALS: Unremarkable. KIDNEYS: Severe left and moderate right hydroureteronephrosis with marked urinary bladder distention. No obstructing calculus. Left lower pole 5.8 cm simple cysts, no follow-up necessary. LYMPH NODES: No pathologically enlarged lymph nodes. GASTROINTESTINAL TRACT: No abnormal bowel dilation or wall thickening. PELVIC ORGANS: Unremarkable. VASCULATURE: Unremarkable. OSSEOUS: No area of erosion or aggressive-appearing bone destruction.  OTHER: None. _______________     Severe left and moderate right hydroureteronephrosis with marked urinary bladder distention. No obstructing calculus. No acute intrathoracic abnormality. US RETROPERITONEUM COMP    Result Date: 9/2/2022  US RETROPERITONEUM COMP INDICATION: Acute kidney injury. TECHNIQUE: Renal ultrasound. COMPARISON: 8/31/2022 CT FINDINGS: Right kidney measures 10.7 cm. Left kidney measures 6.7 cm. Bilateral increased cortical echogenicity. Severe left hydronephrosis with cortical atrophy. No shadowing calculus or perinephric abnormality. No solid renal mass identified. Indwelling Lyle catheter within decompressed urinary bladder. Bilateral increased cortical echogenicity. Severe left hydronephrosis with cortical atrophy. XR CHEST PORT    Result Date: 9/5/2022  EXAM: CHEST RADIOGRAPH, SINGLE VIEW CLINICAL INDICATION/HISTORY: Shortness of breath, intubated, follow-up COMPARISON: 9/4/2022 TECHNIQUE: Portable frontal view of the chest was obtained. _______________ FINDINGS: SUPPORT DEVICES: Unchanged left jugular central venous catheter, endotracheal tube, and gastric tube, all adequately positioned. HEART AND MEDIASTINUM: Cardiomediastinal silhouette appears within normal limits. Tortuous and atherosclerotic thoracic aorta. No central vascular congestion. LUNGS AND PLEURAL SPACES: Left basilar opacity partially silhouettes left hemidiaphragm. Left mid and upper lung zones and all of the right lung remain adequately aerated. No definite pleural effusion. No pneumothorax. BONY THORAX AND SOFT TISSUES: No acute osseous abnormality. _______________     1. Tubes and lines appear unchanged, adequately positioned. 2. Left basilar opacity, atelectasis versus infiltrate.     XR CHEST PORT    Result Date: 9/4/2022  EXAM: CHEST RADIOGRAPH, SINGLE VIEW CLINICAL INDICATION/HISTORY: Shortness of breath, intubated, follow-up COMPARISON: 9/3/2022 TECHNIQUE: Portable frontal view of the chest was obtained. _______________ FINDINGS: SUPPORT DEVICES: Endotracheal tube, left jugular central venous catheter, and nasogastric tube all appear adequately positioned. HEART AND MEDIASTINUM: Cardiomediastinal silhouette appears within normal limits. Tortuous and atherosclerotic thoracic aorta. No central vascular congestion. LUNGS AND PLEURAL SPACES: Unchanged retrocardiac left lower lobe atelectasis. Lungs are otherwise adequately aerated with no confluent airspace opacity. No pleural effusion or pneumothorax. BONY THORAX AND SOFT TISSUES: No acute osseous abnormality. _______________     No active cardiopulmonary disease. XR CHEST PORT    Result Date: 9/3/2022  EXAM: CHEST RADIOGRAPH, SINGLE VIEW CLINICAL INDICATION/HISTORY: Shortness of breath, endotracheal tube placement COMPARISON: 8/31/2022 TECHNIQUE: Portable frontal view of the chest was obtained. _______________ FINDINGS: SUPPORT DEVICES: Adequately positioned endotracheal tube, unchanged. Left jugular central venous catheter and nasogastric tube also appear unchanged, adequately positioned. HEART AND MEDIASTINUM: Cardiomediastinal silhouette appears within normal limits. Normal caliber thoracic aorta. No central vascular congestion. LUNGS AND PLEURAL SPACES: Retrocardiac left lower lobe subsegmental atelectasis. Lungs are otherwise adequately aerated with no confluent airspace opacity. No pleural effusion or pneumothorax. BONY THORAX AND SOFT TISSUES: No acute osseous abnormality. _______________     No active cardiopulmonary disease. XR CHEST PORT    Result Date: 8/31/2022  EXAM: XR CHEST PORT CLINICAL INDICATION/HISTORY: central line -Additional: None COMPARISON: 4 hours prior TECHNIQUE: Portable frontal view of the chest _______________ FINDINGS: SUPPORT DEVICES: Endotracheal tube, gastric tube, and left IJ approach central venous catheter noted. Tip of the central venous catheter is at the level of the superior cavoatrial junction. HEART AND MEDIASTINUM: Normal cardiac size and mediastinal contours. LUNGS AND PLEURAL SPACES: No focal pneumonic opacity. No evidence of pneumothorax or pleural effusion. BONY THORAX AND SOFT TISSUES: Unremarkable. _______________     1. Support devices in stable/appropriate position as visualized. 2. No superimposed acute radiographic cardiopulmonary abnormality. XR CHEST PORT    Result Date: 8/31/2022  XR CHEST PORT CLINICAL INDICATION/HISTORY: Tube placement -Additional: None COMPARISON: None TECHNIQUE: Portable frontal view of the chest _______________ FINDINGS: SUPPORT DEVICES: Enteric tube tip projecting over the thoracic inlet. Endotracheal tube tip in the midthoracic trachea. Left IJV central venous catheter tip at the caval atrial junction. HEART AND MEDIASTINUM: Cardiomediastinal silhouette within normal limits. LUNGS AND PLEURAL SPACES: No dense consolidation, large effusion or pneumothorax. _______________     Enteric tube tip projecting over the thoracic inlet. Endotracheal tube tip in the midthoracic trachea. No acute cardiopulmonary abnormality. ECHO ADULT COMPLETE    Result Date: 8/31/2022  Formatting of this result is different from the original.   Left Ventricle: Normal left ventricular systolic function with a visually estimated EF of 60 - 65%. Not well visualized. Left ventricle size is normal. Normal wall thickness. Normal wall motion. Right Ventricle: Not well visualized. Right ventricle is moderately dilated. Moderately reduced systolic function. Visually moderately dilated RV and moderately reduced RV systolic function. Aortic Valve: Tricuspid valve. Tricuspid Valve: The estimated RVSP is 25 mmHg. Right Atrium: Not well visualized. Right atrium is moderately dilated. No old echocardiogram available to compare. 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 · No evidence of deep vein thrombosis in the left lower extremity.       DUPLEX LOWER EXT VENOUS BILAT    Result Date: 9/1/2022  · Age indeterminate thrombus present in the right popliteal vein. · No evidence of deep vein thrombosis in the left lower extremity. Images report reviewed by me:  CT (Most Recent) (CT reviewed by me) Results from Hospital Encounter encounter on 08/31/22    CT HEAD WO CONT    Narrative  EXAM: CT head    INDICATION: Anoxic brain injury. COMPARISON: 8/31/2022    TECHNIQUE: Axial CT imaging of the head was performed without intravenous  contrast. Standard multiplanar coronal and sagittal reformatted images were  obtained and are included in interpretation. One or more dose reduction techniques were used on this CT: automated exposure  control, adjustment of the mAs and/or kVp according to patient size, and  iterative reconstruction techniques. The specific techniques used on this CT  exam have been documented in the patient's electronic medical record. Digital  Imaging and Communications in Medicine (DICOM) format image data are available  to nonaffiliated external healthcare facilities or entities on a secure, media  free, reciprocally searchable basis with patient authorization for at least a  12-month period after this study. _______________    FINDINGS:    BRAIN AND POSTERIOR FOSSA: Mild patchy periventricular, deep and subcortical  white matter hypoattenuation which is nonspecific but likely represents chronic  ischemic changes. No evidence of acute large vessel transcortical infarct or  acute parenchymal hemorrhage. No midline shift or hydrocephalus. EXTRA-AXIAL SPACES AND MENINGES: There are no abnormal extra-axial fluid  collections. CALVARIUM: Intact. SINUSES: Right maxillary and ethmoid sinus mucosal disease. OTHER: None.    _______________    Impression  No acute intracranial abnormality. CXR reviewed by me:  XR (Most Recent).  XR  reviewed by me and compared with previous XR Results from Hospital Encounter encounter on 08/31/22    XR CHEST PORT    Narrative  EXAM: CHEST RADIOGRAPH, SINGLE VIEW    CLINICAL INDICATION/HISTORY: Shortness of breath, intubated, follow-up    COMPARISON: 9/4/2022    TECHNIQUE: Portable frontal view of the chest was obtained.    _______________    FINDINGS:    SUPPORT DEVICES: Unchanged left jugular central venous catheter, endotracheal  tube, and gastric tube, all adequately positioned. HEART AND MEDIASTINUM: Cardiomediastinal silhouette appears within normal  limits. Tortuous and atherosclerotic thoracic aorta. No central vascular  congestion. LUNGS AND PLEURAL SPACES: Left basilar opacity partially silhouettes left  hemidiaphragm. Left mid and upper lung zones and all of the right lung remain  adequately aerated. No definite pleural effusion. No pneumothorax. BONY THORAX AND SOFT TISSUES: No acute osseous abnormality. _______________    Impression  1. Tubes and lines appear unchanged, adequately positioned. 2. Left basilar opacity, atelectasis versus infiltrate. ·Please note: Voice-recognition software may have been used to generate this report, which may have resulted in some phonetic-based errors in grammar and contents. Even though attempts were made to correct all the mistakes, some may have been missed, and remained in the body of the document.       Shantel Rizzo MD  9/6/2022

## 2022-09-06 NOTE — PROGRESS NOTES
Pt remains on a vent. Pt is not medically stable to transition from an acute care setting at this time. CM to continue to follow and assist with care transition once needs have been identified. .    Care Management Interventions  Transition of Care Consult (CM Consult): Discharge Planning  Health Maintenance Reviewed: Yes  Support Systems: Spouse/Significant Other, Child(lesley), Other Family Member(s)  The Plan for Transition of Care is Related to the Following Treatment Goals : SNF vs HH/Hospice  Discharge Location  Patient Expects to be Discharged to[de-identified] Skilled nursing facility (vs HH/Hospice)

## 2022-09-06 NOTE — PROGRESS NOTES
Hospitalist Progress Note    Patient: Henry Gomez MRN: [id number]  [facility]: [id number]    YOB: 1960  Age: 65 y.o.   Sex: male    DOA: 8/31/2022 LOS:  LOS: 6 days          Chief Complaint:    Cardiac arrest      Assessment/Plan     Henry Gomez is a 65 y.o. hypertension, dyslipidemia, type 2 diabetes mellitus, vitamin D deficiency, stage 3a CKD and gout male with history of who presents with cardiac arrest.      Cardiac arrest, PEA  Severe anoxia by clinical exam and confirmed by MRI, with associated pituitary hemorrhage  Acute respiratory failure  Anoxia  Myoclonic jerks  Shock   RLE popliteal DVT  Sepsis, likely due to urinary source  UTI  Disseminated herpes zoster  Severe left and moderate right hydroureteronephrosis   GENO on CKD 3  Hyperglycemia  hypernatremia  anemia     Renal fxn improved    No clear neurologic recovery     Hypernatremia-dextrose IV to help lower     Continue duran    IV antibiotics  IV acyclovir  IV PPI    SQ heparin     Off sedation     Echo with nl EF     MRI brain shows anoxic injury    Prognosis dismal     Full code  Disposition :  Patient Active Problem List   Diagnosis Code    Cardiac arrest ([facility] I46.9    Respiratory failure ([facility] [address] J96.90    GENO (acute kidney injury) ([facility] [address] N17.9    Disseminated herpes zoster B02.7    Hydronephrosis N13.30    Shock ([address] R57.9    Sepsis ([facility] [address] A41.9    UTI (urinary tract infection) N39.0    Brain anoxia (HCC) G93.1       Subjective:  No changes of note      Review of systems:    UTO due to illness      Vital signs/Intake and Output:  Visit Vitals  BP (!) 155/88   Pulse 98   Temp 99 °F (37.2 °C)   Resp 17   Ht 5' 10\" (1.778 m)   Wt 95 kg (209 lb 7 oz)   SpO2 98%   BMI 30.05 kg/m²     Current Shift:  09/06 0701 - 09/06 1900  In: -   Out: 400 [Urine:400]  Last three shifts:  09/04 1901 - 09/06 0700  In: 4900 [I.V.:3060]  Out: 4125 [Urine:4125]    Exam:    General: unresponsive intubated AAM, NAD  CVS:Regular rate and rhythm, no M/R/G, S1/S2 heard, no thrill  Lungs:Clear to auscultation bilaterally, no wheezes, rhonchi, or rales  Abdomen: Soft, Nontender, No distention  Extremities: No C/C/E, pulses palpable 2+  Neuro:did not withdraw from painful stim, or have corneal reflex                  Labs: Results:       Chemistry Recent Labs     09/06/22 0436 09/05/22  1824 09/05/22  0450 09/04/22  2345   *  --  188* 226*   *  --  152* 151*   K 3.3* 3.6 3.7 3.7   *  --  119* 118*   CO2 29  --  27 28   BUN 30*  --  32* 32*   CREA 2.11*  --  2.26* 2.27*   CA 8.1*  --  8.0* 7.8*   AGAP 6  --  6 5   BUCR 14  --  14 14   *  --   --   --    TP 5.8*  --   --   --    ALB 1.7*  --   --   --    GLOB 4.1*  --   --   --    AGRAT 0.4*  --   --   --       CBC w/Diff Recent Labs     09/06/22 0436 09/05/22  0450 09/04/22  0500   WBC 7.3 7.1 8.8   RBC 2.70* 2.78* 2.76*   HGB 7.7* 8.0* 7.9*   HCT 24.4* 25.6* 24.5*    170 166   GRANS 64 65 74*   LYMPH 20* 19* 12*   EOS 4 5 2      Cardiac Enzymes No results for input(s): CPK, CKND1, ROB in the last 72 hours. No lab exists for component: CKRMB, TROIP   Coagulation Recent Labs     09/06/22 0436 09/05/22  1127 09/05/22  0450 09/04/22  1121   PTP 14.2  --   --  13.3   INR 1.1  --   --  1.0   APTT  --  27.9 26.0 25.5       Lipid Panel Lab Results   Component Value Date/Time    Triglyceride 201 (H) 09/04/2022 05:00 AM      BNP No results for input(s): BNPP in the last 72 hours.    Liver Enzymes Recent Labs     09/06/22 0436   TP 5.8*   ALB 1.7*   *      Thyroid Studies No results found for: T4, T3U, TSH, TSHEXT     Procedures/imaging: see electronic medical records for all procedures/Xrays and details which were not copied into this note but were reviewed prior to creation of Ash Bales MD

## 2022-09-06 NOTE — DIABETES MGMT
Diabetes/ Glycemic Control Plan of Care  Recommendations:   Recommend increasing Lantus to 15 units every 24 hours    Assessment: Blood sugars remaining consistent between 176-226 mg/dL over the past 72 hours on current insulin regimen. Recommending an increase in basal dose to achieve ICU target of <180 mg/dL. DX:   1. Cardiac arrest ([facility] [address]        2. Acute renal failure, unspecified acute renal failure type ([facility] [address]        3. Urinary retention        4. Shock circulatory (HCC)           Recent Glucose Results:   Lab Results   Component Value Date/Time     (H) 09/06/2022 04:36 AM    GLUCPOC 198 (H) 09/06/2022 05:18 AM    GLUCPOC 197 (H) 09/05/2022 11:52 PM    GLUCPOC 192 (H) 09/05/2022 05:35 PM        IV Fluids containing dextrose: D5W  Steroids:  no    Blood glucose values: Within target range (70-180mg/dL):  NO  Current insulin orders:    Lantus 12 units every 24 hours   Corrective Humalog Q6 hours - very resistant scale  Total Daily Dose previous 24 hours = 24 units    Current A1c:   Lab Results   Component Value Date/Time    Hemoglobin A1c 8.8 (H) 09/01/2022 10:21 AM      equivalent  to ave Blood Glucose of 206 mg/dl for 2-3 months prior to admission  Adequate glycemic control PTA: NO    Nutrition/Diet:   Active Orders   Diet    DIET NPO      Home diabetes medications:   Key Antihyperglycemic Medications       Patient is on no antihyperglycemic meds.           Plan/Goals:   Blood glucose will be within target of 70 - 180 mg/dl within 72 hours    Education:  [] Refer to Diabetes Education Record                       [x] Education not indicated at this time       Grover Rainey RN, BSN, [id number] Specialist  Glycemic Control Team   Phone:  [phone]  Tues - Thurs 8:30 - 4:30

## 2022-09-06 NOTE — PROGRESS NOTES
/  /                        Cardiology Progress Note        Patient: Brooklynn Wilson        Sex: male          DOA: 8/31/2022  YOB: 1960      Age:  65 y.o.        LOS:  LOS: 6 days   Assessment/Plan     Principal Problem:    Cardiac arrest ([address] (8/31/2022)    Active Problems:    Respiratory failure ([address] (8/31/2022)      GENO (acute kidney injury) ([address] (8/31/2022)      Disseminated herpes zoster (8/31/2022)      Hydronephrosis (8/31/2022)      Shock ([address] (8/31/2022)      Sepsis ([address] (8/31/2022)      UTI (urinary tract infection) (9/1/2022)      Brain anoxia ([address] (9/4/2022)      Plan:    9/6/2022  Cardiac telemetry sinus rhythm with occasional PACs and PVCs  Continue with metoprolol, amlodipine and Lasix        9/5/2022  No change from cardiac standpoint  Continue with current supportive treatment      9/4/2022  High-grade anoxic brain injury on MRI  Cardiac telemetry stable  Continue with current supportive treatment as per family  Discussed with RN and Dr. Humaira Narvaez  Discussed with patient's wife and family          9/3/2022  Blood pressure is mildly elevated with mild tachycardia  I will add low-dose IV metoprolol 1.25 mg every 6 hours  Continue with rest of the treatment  Discussed with Dr. Ronald Lyle        9/2/2022  Patient remained intubated  Off sedation  Of Levophed  Cardiac telemetry stable with occasional PVCs  Continue with supportive treatment  Discussed with wife and family members  Discussed with Dr. Simpson Lands arrest PEA arrest  Mild troponin elevation after CPR and PEA cardiac arrest, normal EF and wall motion, no evidence of ACS  DVT  Continue anticoagulation if no active bleeding  Continue with supportive treatment  I have long and lengthy discussion with family about management plan. All the questions were answered. 35 minutes of critical care time spent in the direct evaluation and treatment of this high risk patient.  The reason for providing this level of medical care for this critically ill patient was due a critical illness that impaired one or more vital organ systems such that there was a high probability of imminent or life threatening deterioration in the patients condition. This care involved high complexity decision making to assess, manipulate, and support vital system functions, to treat this degreee vital organ system failure and to prevent further life threatening deterioration of the patients condition. Subjective:    cc:  Cardiac arrest        REVIEW OF SYSTEMS:     Limited due to intubation      Objective:      Visit Vitals  BP (!) 145/87   Pulse (!) 101   Temp 99.5 °F (37.5 °C)   Resp (!) 7   Ht 5' 10\" (1.778 m)   Wt 95 kg (209 lb 7 oz)   SpO2 100%   BMI 30.05 kg/m²     Body mass index is 30.05 kg/m². Physical Exam:  General Appearance: Intubated  NECK: No JVD, no thyroidomeglay. LUNGS: Clear bilaterally. HEART: S1+S2 audible,    ABD: Non-tender, BS Audible    EXT: No edema, and no cysnosis. VASCULAR EXAM: Pulses are intact.     PSYCHIATRIC EXAM: Intubated    Medication:  Current Facility-Administered Medications   Medication Dose Route Frequency    [START ON 9/7/2022] insulin glargine (LANTUS) injection 15 Units  15 Units SubCUTAneous Q24H    dextrose 5% with KCl 20 mEq/L infusion   IntraVENous CONTINUOUS    heparin (porcine) injection 5,000 Units  5,000 Units SubCUTAneous Q8H    amLODIPine (NORVASC) tablet 5 mg  5 mg Oral DAILY    lactulose (CHRONULAC) 10 gram/15 mL solution 15 mL  15 mL Oral DAILY    furosemide (LASIX) injection 20 mg  20 mg IntraVENous DAILY    metoprolol (LOPRESSOR) injection 2.5 mg  2.5 mg IntraVENous Q6H PRN    metoprolol (LOPRESSOR) injection 1.25 mg  1.25 mg IntraVENous Q6H    acyclovir (ZOVIRAX) 750 mg in 0.9% sodium chloride 250 mL IVPB  10 mg/kg (Ideal) IntraVENous Q12H    insulin lispro (HUMALOG) injection   SubCUTAneous Q6H    ELECTROLYTE REPLACEMENT PROTOCOL - Potassium Renal Dosing  1 Each Other PRN piperacillin-tazobactam (ZOSYN) 3.375 g in 0.9% sodium chloride (MBP/ADV) 100 mL MBP  3.375 g IntraVENous Q8H    sodium chloride (NS) flush 5-40 mL  5-40 mL IntraVENous Q8H    sodium chloride (NS) flush 5-40 mL  5-40 mL IntraVENous PRN    acetaminophen (TYLENOL) tablet 650 mg  650 mg Oral Q6H PRN    Or    acetaminophen (TYLENOL) suppository 650 mg  650 mg Rectal Q6H PRN    polyethylene glycol (MIRALAX) packet 17 g  17 g Oral DAILY PRN    ondansetron (ZOFRAN ODT) tablet 4 mg  4 mg Oral Q8H PRN    Or    ondansetron (ZOFRAN) injection 4 mg  4 mg IntraVENous Q6H PRN    pantoprazole (PROTONIX) 40 mg in 0.9% sodium chloride 10 mL injection  40 mg IntraVENous Q12H    levETIRAcetam (KEPPRA) 1,000 mg in 0.9% sodium chloride 100 mL IVPB  1,000 mg IntraVENous Q12H               Lab/Data Reviewed:  Procedures/imaging: see electronic medical records for all procedures/Xrays   and details which were not copied into this note but were reviewed prior to creation of Plan       All lab results for the last 24 hours reviewed. Recent Labs     09/06/22  0436 09/05/22  0450 09/04/22  0500   WBC 7.3 7.1 8.8   HGB 7.7* 8.0* 7.9*   HCT 24.4* 25.6* 24.5*    170 166       Recent Labs     09/06/22  0940 09/06/22  0436 09/05/22  1824 09/05/22  0450 09/04/22  2345   NA  --  150*  --  152* 151*   K 3.8 3.3* 3.6 3.7 3.7   CL  --  115*  --  119* 118*   CO2  --  29  --  27 28   GLU  --  212*  --  188* 226*   BUN  --  30*  --  32* 32*   CREA  --  2.11*  --  2.26* 2.27*   CA  --  8.1*  --  8.0* 7.8*         RADIOLOGY:      CXR Results  (Last 48 hours)                 09/05/22 0630  XR CHEST PORT Final result    Impression:      1. Tubes and lines appear unchanged, adequately positioned. 2. Left basilar opacity, atelectasis versus infiltrate.        Narrative:  EXAM: CHEST RADIOGRAPH, SINGLE VIEW       CLINICAL INDICATION/HISTORY: Shortness of breath, intubated, follow-up       COMPARISON: 9/4/2022       TECHNIQUE: Portable frontal view of the chest was obtained.        _______________       FINDINGS:       SUPPORT DEVICES: Unchanged left jugular central venous catheter, endotracheal   tube, and gastric tube, all adequately positioned. HEART AND MEDIASTINUM: Cardiomediastinal silhouette appears within normal   limits. Tortuous and atherosclerotic thoracic aorta. No central vascular   congestion. LUNGS AND PLEURAL SPACES: Left basilar opacity partially silhouettes left   hemidiaphragm. Left mid and upper lung zones and all of the right lung remain   adequately aerated. No definite pleural effusion. No pneumothorax. BONY THORAX AND SOFT TISSUES: No acute osseous abnormality.        _______________                     Cardiology Procedures:   Results for orders placed or performed during the hospital encounter of 08/31/22   EKG, 12 LEAD, INITIAL   Result Value Ref Range    Ventricular Rate 112 BPM    Atrial Rate 112 BPM    P-R Interval 158 ms    QRS Duration 100 ms    Q-T Interval 360 ms    QTC Calculation (Bezet) 491 ms    Calculated P Axis 73 degrees    Calculated R Axis -69 degrees    Calculated T Axis 51 degrees    Diagnosis       Sinus tachycardia  Left axis deviation  Low voltage QRS  Inferior infarct , age undetermined  Abnormal ECG  No previous ECGs available  Confirmed by Nehal Perez MD. ([id number]) on 8/31/2022 11:30:53 PM        Echo Results  (Last 48 hours)      None         Cardiolite (Tc-99m Sestamibi) stress test    Signed By: Rosie Espinosa MD     September 6, 2022

## 2022-09-06 NOTE — PROGRESS NOTES
[facility]  (A Division of [address] [place] [address] Road)    Joe West MD  Office #: - Option # [phone]  Fax #: [phone]     Date of Admission: 8/31/2022   Date of Note: 9/6/2022   Reason for Referral: Evaluation and antibiotic management of dissiminated shingles/ shock post carrest  .        Current Antimicrobials:    Prior Antimicrobials:  Zosyn- 8/31 to date  Acyclovir IV 9/1 o date Oral antiviral( Valtrex) X7 days PTA  Vanco 8/31 to 9/2   Antibiotic allergy: None     Assessment:     Critically ill patient /poor prognosis with :    Probable Septic Shock  Likely urine source--skin( has shingles) doesn't look superinfected  --Severe L and mod R hydronephrosis, without obstructing stone. UA with TNTC wbc  --BCX  8/31: NG, no UCX available for review . UA with TNTC WBC -8/31  --Fluctuating WBC-- up to 15.1, not on steroid- Improved  --off pressor  S/P Cardiac arrest with elevated troponins-PE not ruled out  -- age indetermiante DVT on LLE  Anoxic encephalopathy- -MRI brain : high grade acute/subacute anoxic brain injury w/out herniation, pit hematoma/hge like picture.  Prognosis poor per Neurology  L lumbar/sacral dermatome Shingles-- was on viral medication at home per reports  --dried up skin lesion, no bacterial superinfection  Transaminitis-- from shock liver-- Improving  Acute on chronick GENO (Cr 1.6 5/2022)- slowly improving  Chronic RLE DVT   History of gout treatment with colchicine recently per wife--new diagnosis    Recommendation related to ID issues:       Cont Zosyn- adjusted to CrCl-- will complete 10 days for Probable Urosepsis-- until 9/9  Cont acyclovir IV-- dosed for crCl-- monitor electrolytes closely- until 9/4/22( was on oral PTA)--will give for 7 days and will DC tomorrow  Removal of central lines per ICU  Palliative team involved for plan of care discussion    DW ICU RN     Subjective:      Pt seen and DW ICU team. No acute event over night Remains intubated/ unresponsive. Not on pressor. Off sedation  Afebrile, wbc is normal and Cr improving      Notes/Labs reviewed-- Prognosis poor with anoxic brain injury per Neuro      HPI 9/1/22: Zofia Ibarra is 65 y.o. M patient with PMH of shingles, on medication. History is limited and obtained from chart review and med staff. Presented to ED yesterday and was in cardiac arrest with CPR that took 30 minutes. He was intubated/ placed on pressor and admitted to ICU. He had shingles at home and was on treatment. He had urine retention  and had about 2L of urine with duran placement per RN. Hydronephrosis on CT scan, no CPD on CXR, LLE DVT of unknown age on FORBES. He was placed on emperic abx    He is currently on pressor- Levophed, unresponsive, with myoclonus jerk that requires propfol    Past Medical History:   Diagnosis Date    Gout     Shingles      No past surgical history on file. No family history on file.   Medications reviewed as below:   Current Facility-Administered Medications   Medication Dose Route Frequency Provider Last Rate Last Admin    [START ON 9/7/2022] insulin glargine (LANTUS) injection 15 Units  15 Units SubCUTAneous Q24H Mo Damon MD        dextrose 5% with KCl 20 mEq/L infusion   IntraVENous CONTINUOUS Dunlap Starch MD PAULO 50 mL/hr at 09/06/22 1044 New Bag at 09/06/22 1044    heparin (porcine) injection 5,000 Units  5,000 Units SubCUTAneous Q8H Rosario Rodriguez MD   5,000 Units at 09/05/22 2353    amLODIPine (NORVASC) tablet 5 mg  5 mg Oral DAILY Rosario Rodriguez MD   5 mg at 09/06/22 0810    lactulose (CHRONULAC) 10 gram/15 mL solution 15 mL  15 mL Oral DAILY Rosario Rodriguez MD   15 mL at 09/06/22 0809    furosemide (LASIX) injection 20 mg  20 mg IntraVENous DAILY Rosario Rodriguez MD   20 mg at 09/06/22 0810    metoprolol (LOPRESSOR) injection 2.5 mg  2.5 mg IntraVENous Q6H PRN Rosario Rodriguez MD   2.5 mg at 09/05/22 1434    metoprolol (LOPRESSOR) injection 1.25 mg  1.25 mg IntraVENous Q6H [employer] Tina Bowen MD   1.25 mg at 09/06/22 0809    acyclovir (ZOVIRAX) 750 mg in 0.9% sodium chloride 250 mL IVPB  10 mg/kg (Ideal) IntraVENous Q12H Bruna Zhang  mL/hr at 09/06/22 0125 750 mg at 09/06/22 0125    insulin lispro (HUMALOG) injection   SubCUTAneous Q6H Jonny Pennington MD   3 Units at 09/06/22 0533    ELECTROLYTE REPLACEMENT PROTOCOL - Potassium Renal Dosing  1 Each Other PRN Anupam Luna MD        piperacillin-tazobactam (ZOSYN) 3.375 g in 0.9% sodium chloride (MBP/ADV) 100 mL MBP  3.375 g IntraVENous Q8H Mila CANADA DO 25 mL/hr at 09/06/22 0427 3.375 g at 09/06/22 0427    sodium chloride (NS) flush 5-40 mL  5-40 mL IntraVENous Q8H Julius Chakraborty MD   10 mL at 09/06/22 0539    sodium chloride (NS) flush 5-40 mL  5-40 mL IntraVENous PRN Julius Chakraborty MD        acetaminophen (TYLENOL) tablet 650 mg  650 mg Oral Q6H PRN Julius Chakraborty MD   650 mg at 09/03/22 2027    Or    acetaminophen (TYLENOL) suppository 650 mg  650 mg Rectal Q6H PRN Brigette Chakraborty MD        polyethylene glycol (MIRALAX) packet 17 g  17 g Oral DAILY PRN Brigette Chakraborty MD        ondansetron (ZOFRAN ODT) tablet 4 mg  4 mg Oral Q8H PRN Brigette Chakraborty MD        Or    ondansetron (ZOFRAN) injection 4 mg  4 mg IntraVENous Q6H PRN Brigette Chakraborty MD        pantoprazole (PROTONIX) 40 mg in 0.9% sodium chloride 10 mL injection  40 mg IntraVENous Q12H Brigette Chakraborty MD   40 mg at 09/06/22 0809    levETIRAcetam (KEPPRA) 1,000 mg in 0.9% sodium chloride 100 mL IVPB  1,000 mg IntraVENous Q12H Anupam Luna  mL/hr at 09/06/22 0825 1,000 mg at 09/06/22 0825     No Known Allergies  Social History     Socioeconomic History    Marital status:      Spouse name: Not on file    Number of children: Not on file    Years of education: Not on file    Highest education level: Not on file   Occupational History    Not on file   Tobacco Use    Smoking status: Not on file    Smokeless tobacco: Not on file   Substance and Sexual Activity    Alcohol use: Not on file    Drug use: Not on file    Sexual activity: Not on file   Other Topics Concern    Not on file   Social History Narrative    Not on file     Social Determinants of Health     Financial Resource Strain: Not on file   Food Insecurity: Not on file   Transportation Needs: Not on file   Physical Activity: Not on file   Stress: Not on file   Social Connections: Not on file   Intimate Partner Violence: Not on file   Housing Stability: Not on file        Review of Systems: Unable to provide      Objective:      Visit Vitals  BP (!) 147/88   Pulse (!) 101   Temp 99.3 °F (37.4 °C)   Resp 16   Ht 5' 10\" (1.778 m)   Wt 95 kg (209 lb 7 oz)   SpO2 100%   BMI 30.05 kg/m²     Temp (24hrs), Av.3 °F (36.8 °C), Min:95.4 °F (35.2 °C), Max:100.2 °F (37.9 °C)         GEN: WD unresponsive, intubated    Lines / Catheters: Left IJ central line, L PIV, Lyle  . HEENT: Unicteric. Edematous sclera. --no neck swelling  CHEST: Non laboured breathing. CTA  CVS: Tachycardic- HS 1 and 2 heard, no mur/gallop  ABD: Obese/soft. Non tender. Non distended. Hypoactive BS  VERNELL: Lyle in place  EXT: No apparent swelling or redness on UE/LE joints. Skin: Dry and intact. No obvious cellulitis-- has L thigh/buttock Shingles rash-- multiple dermatome, no active vesicular lesion on exam. No cyanosis.  Warm feet  CNS: unresponsive, no jerky movement noted during ecam      Labs: Results:   Chemistry Recent Labs     22  0940 22  0436 22  1824 22  0450 22  2345   GLU  --  212*  --  188* 226*   NA  --  150*  --  152* 151*   K 3.8 3.3* 3.6 3.7 3.7   CL  --  115*  --  119* 118*   CO2  --  29  --  27 28   BUN  --  30*  --  32* 32*   CREA  --  2.11*  --  2.26* 2.27*   CA  --  8.1*  --  8.0* 7.8*   AGAP  --  6  --  6 5   BUCR  --  14  --  14 14   AP  --  188*  --   --   -- TP  --  5.8*  --   --   --    ALB  --  1.7*  --   --   --    GLOB  --  4.1*  --   --   --    AGRAT  --  0.4*  --   --   --       CBC w/Diff Recent Labs     09/06/22  0436 09/05/22  0450 09/04/22  0500   WBC 7.3 7.1 8.8   RBC 2.70* 2.78* 2.76*   HGB 7.7* 8.0* 7.9*   HCT 24.4* 25.6* 24.5*    170 166   GRANS 64 65 74*   LYMPH 20* 19* 12*   EOS 4 5 2            No results found for: SDES Lab Results   Component Value Date/Time    Culture result: NO GROWTH 6 DAYS 08/31/2022 10:15 AM    Culture result: NO GROWTH 6 DAYS 08/31/2022 10:00 AM        Results       Procedure Component Value Units Date/Time    CULTURE, RESPIRATORY/SPUTUM/BRONCH Myrle Loach STAIN [[id number]] Collected: 09/03/22 1230    Order Status: Canceled Specimen: Sputum from Tracheal Aspirate     COVID-19 RAPID TEST [[id number]] Collected: 08/31/22 1050    Order Status: Completed Specimen: Nasopharyngeal Updated: 08/31/22 1131     Specimen source Nasopharyngeal        COVID-19 rapid test Not detected        Comment: Rapid Abbott ID Now       Rapid NAAT:  The specimen is NEGATIVE for SARS-CoV-2, the novel coronavirus associated with COVID-19. Negative results should be treated as presumptive and, if inconsistent with clinical signs and symptoms or necessary for patient management, should be tested with an alternative molecular assay. Negative results do not preclude SARS-CoV-2 infection and should not be used as the sole basis for patient management decisions. This test has been authorized by the FDA under an Emergency Use Authorization (EUA) for use by authorized laboratories.    Fact sheet for Healthcare Providers: ConventionUpdate.co.nz  Fact sheet for Patients: ConventionUpdate.co.nz       Methodology: Isothermal Nucleic Acid Amplification         CULTURE, BLOOD [[id number]] Collected: 08/31/22 1015    Order Status: Completed Specimen: Blood Updated: 09/06/22 0711     Special Requests: NO SPECIAL REQUESTS        Culture result: NO GROWTH 6 DAYS       CULTURE, BLOOD [[id number]] Collected: 08/31/22 1000    Order Status: Completed Specimen: Blood Updated: 09/06/22 0711     Special Requests: NO SPECIAL REQUESTS        Culture result: NO GROWTH 6 DAYS                 Imaging:      All imaging reviewed from Admission to present as per radiology interpretation in [facility]

## 2022-09-06 NOTE — PROGRESS NOTES
Performed all tasks documented alongside Heriberto Ramsey RN; tasks to include medication administration, assessment of pt and accurate charting; concur with all documentation completed by [place] during this shift

## 2022-09-06 NOTE — PROGRESS NOTES
Palliative Medicine    CODE STATUS: FULL CODE    AMD Status: none on file. Meilssa Trevino, wife, is legal next of kin     9/6/2022 0701 Seen today in room ICU 4 along with Flakita Torres NP. Lying in bed supine. Intubated/ventilated. FiO2 35% PEEP 5. Wife and sister at bedside. No sedation. No response to verbal or painful stimulation    Family meeting with family in ICU waiting room (wife, daughter, sister-in-law, and brother-in-law). Reviewed previous discussions with neurologist, intensivist, and nursing team. They asked many thoughtful questions about recovery from hypoxic/anoxic brain injury. Began discussion about options in the event of continuing lack of responsiveness including trach/PEG or compassionate extubation. Again, thoughtful questions asked. They wanted to continuing talking as a family about their options incorporating what the patient would want if he could speak. Highly encouraged to continue these conversations and reach out to medical and nursing staffs as questions arise. Disposition plan: to be determined based on response to treatment and family decisions    Palliative care will continue to follow Zelda Dukes  and his family during his hospitalization and support them as they make healthcare decisions and define goals of care.       Lindsey Rizo, RN, MSN  Palliative Medicine  P: [phone]

## 2022-09-07 NOTE — PROGRESS NOTES
Bedside shift change report received from BRITTNY Cabrera RN. Report included the following information SBAR, Kardex, Intake/Output, MAR, Recent Results, Med Rec Status and Cardiac Rhythm NSR and plan of care. Bedside shift change report given to [facility]. Report included the following information SBAR, Kardex, Intake/Output, MAR, Recent Results, Med Rec Status and Cardiac Rhythm NSR and plan of care.

## 2022-09-07 NOTE — PROGRESS NOTES
Hospitalist Progress Note    Patient: Mary Quevedo MRN: [id number]  [facility]: [id number]    YOB: 1960  Age: 65 y.o.   Sex: male    DOA: 8/31/2022 LOS:  LOS: 7 days          Chief Complaint:    Cardiac arrest      Assessment/Plan     Mary Quevedo is a 65 y.o. hypertension, dyslipidemia, type 2 diabetes mellitus, vitamin D deficiency, stage 3a CKD and gout male with history of who presents with cardiac arrest.      Cardiac arrest, PEA  Severe anoxia by clinical exam and confirmed by MRI, with associated pituitary hemorrhage  Acute respiratory failure  Anoxia  Myoclonic jerks  Shock   RLE popliteal DVT  Sepsis, likely due to urinary source  UTI  Disseminated herpes zoster  Severe left and moderate right hydroureteronephrosis   GENO on CKD 3  Hyperglycemia  hypernatremia  anemia     Renal fxn improved    No clear neurologic recovery     Hypernatremia-improved      Continue duran    IV antibiotics  IV acyclovir  IV PPI    SQ heparin     Off sedation     Echo with nl EF     MRI brain shows anoxic injury    Prognosis dismal    Family discussion with intensivist     So far, no family decision to change code status or change plan of care to comfort care     Full code  Disposition :  Patient Active Problem List   Diagnosis Code    Cardiac arrest ([facility] [address] I46.9    Respiratory failure ([facility] [address] J96.90    GENO (acute kidney injury) ([facility] [address] N17.9    Disseminated herpes zoster B02.7    Hydronephrosis N13.30    Shock ([facility] [address] R57.9    Sepsis ([facility] [address] A41.9    UTI (urinary tract infection) N39.0    Brain anoxia ([facility] [address] G93.1       Subjective:  No changes of note      Review of systems:    UTO due to illness      Vital signs/Intake and Output:  Visit Vitals  /82 (BP 1 Location: Left arm, BP Patient Position: At rest)   Pulse 97   Temp 98.4 °F (36.9 °C)   Resp (!) 0   Ht 5' 10\" (1.778 m)   Wt 95 kg (209 lb 7 oz)   SpO2 96%   BMI 30.05 kg/m²     Current Shift:  09/07 0701 - 09/07 1900  In: 1890 [I.V.:750]  Out: -   Last three shifts:  09/05 1901 - 09/07 0700  In: 6390 [I.V.:2975]  Out: 8247 [Urine:4325]    Exam:    General: unresponsive intubated AAM, NAD  CVS:Regular rate and rhythm, no M/R/G, S1/S2 heard, no thrill  Lungs:Clear to auscultation bilaterally, no wheezes, rhonchi, or rales  Abdomen: Soft, Nontender, No distention  Extremities: No C/C/E, pulses palpable 2+  Neuro: Eyes open intermittently, rightward gaze, does not follow commands, no involuntary movements, no withdrawal to painful stimuli                  Labs: Results:       Chemistry Recent Labs     09/07/22 0357 09/06/22  0940 09/06/22  0436 09/05/22  1824 09/05/22  0450   *  --  212*  --  188*     --  150*  --  152*   K 3.7 3.8 3.3*   < > 3.7     --  115*  --  119*   CO2 31  --  29  --  27   BUN 31*  --  30*  --  32*   CREA 2.13*  --  2.11*  --  2.26*   CA 8.1*  --  8.1*  --  8.0*   AGAP 5  --  6  --  6   BUCR 15  --  14  --  14   AP  --   --  188*  --   --    TP  --   --  5.8*  --   --    ALB  --   --  1.7*  --   --    GLOB  --   --  4.1*  --   --    AGRAT  --   --  0.4*  --   --     < > = values in this interval not displayed. CBC w/Diff Recent Labs     09/07/22 0357 09/06/22 0436 09/05/22  0450   WBC 9.0 7.3 7.1   RBC 2.89* 2.70* 2.78*   HGB 8.2* 7.7* 8.0*   HCT 26.2* 24.4* 25.6*    170 170   GRANS 70 64 65   LYMPH 17* 20* 19*   EOS 4 4 5        Cardiac Enzymes No results for input(s): CPK, CKND1, ROB in the last 72 hours. No lab exists for component: CKRMB, TROIP   Coagulation Recent Labs     09/07/22  0357 09/06/22  0436 09/05/22  1127 09/05/22  0450   PTP 13.6 14.2  --   --    INR 1.0 1.1  --   --    APTT  --   --  27.9 26.0         Lipid Panel Lab Results   Component Value Date/Time    Triglyceride 201 (H) 09/04/2022 05:00 AM      BNP No results for input(s): BNPP in the last 72 hours.    Liver Enzymes Recent Labs     09/06/22 0436   TP 5.8*   ALB 1.7*   *        Thyroid Studies No results found for: T4, T3U, TSH, Saw Mccarty     Procedures/imaging: see electronic medical records for all procedures/Xrays and details which were not copied into this note but were reviewed prior to creation of Isma Varela MD

## 2022-09-07 NOTE — DIABETES MGMT
Diabetes/ Glycemic Control Plan of Care  Recommendations:   Recommend discontinuing dextrose 5% in IV fluid if medically feasible    Assessment:   Blood sugars remain consistently elevated, 176-243 mg/dL over the past 24 hours. Lantus dose increasing this AM.  At time of note - patient currently on  dextrose 5% with KCl 20 mEq/L infusion @ 50 mL/hr, and continuous tube feedings at goal rate of 55 mL/hr. Patient discussed during ICU interdisciplinary rounds - dextrose IV fluids discontinued per MD.         Recent Glucose Results:   Lab Results   Component Value Date/Time     (H) 09/07/2022 03:57 AM    GLUCPOC 176 (H) 09/06/2022 11:54 PM    GLUCPOC 243 (H) 09/06/2022 05:38 PM    GLUCPOC 209 (H) 09/06/2022 12:06 PM         Within target range (70-180mg/dL):  NO  Current insulin orders:    Lantus 15 units every 24 hours   Corrective Humalog - very resistant scale  Total Daily Dose previous 24 hours = 30 units     Plan/Goals:   Blood glucose will be within target of 70 - 180 mg/dl within 72 hours  Reinforce dietary and medication compliance at home.           Education:  [] Refer to Diabetes Education Record                       [x] Education not indicated at this time       Isaac Hinton RN, BSN, [facility]  Professional   Glycemic Control Team   Phone:  [phone]  Tues - Thurs 8:30 - 4:30

## 2022-09-07 NOTE — PROGRESS NOTES
[facility]  (A Division of [address] [place] [address])    Gerard Mallory MD  Office #: - Option # [phone]  Fax #: [phone]     Date of Admission: 8/31/2022   Date of Note: 9/7/2022   Reason for Referral: Evaluation and antibiotic management of dissiminated shingles/ shock post carrest  .        Current Antimicrobials:    Prior Antimicrobials:  Zosyn- 8/31 to date  Acyclovir IV 9/1 o date Oral antiviral( Valtrex) X7 days PTA  Vanco 8/31 to 9/2   Antibiotic allergy: None     Assessment:     Critically ill patient /poor prognosis with :    Probable Septic Shock : Improved  Likely urine source--skin( has shingles) doesn't look superinfected  Severe L and mod R hydronephrosis, without obstructing stone. UA with TNTC wbc  BCX  8/31: NG, no UCX available for review . UA with TNTC WBC -8/31  Fluctuating WBC-- up to 15.1, not on steroid- Improved  off pressor  S/P Cardiac arrest with elevated troponins-PE not ruled out  Anoxic encephalopathy- -MRI brain : high grade acute/subacute anoxic brain injury w/out herniation, pit hematoma/hge like picture. Prognosis poor per Neurology  L lumbar/sacral dermatome Shingles-- was on viral medication at home per reports  --dried up skin lesion, no bacterial superinfection  Transaminitis-- from shock liver-- Improving  Acute on chronick GENO (Cr 1.6 5/2022)- slowly improving  Chronic RLE DVT   Age indetermiante DVT on LLE  History of gout treatment with colchicine recently per wife--new diagnosis    Recommendation related to ID issues:       Cont Zosyn- adjusted to CrCl-- will complete 10 days for Probable Urosepsis-- until 9/9  Cont acyclovir IV-- dosed for crCl-- monitor electrolytes closely- until 9/4/22( was on oral PTA)--will give for 7 days -- DC after dose today  Removal of central lines per ICU  Palliative team involved for plan of care discussion    DW ICU team    Subjective:      Pt seen , wife and his brother in room with him.   No acute event over night   Remains intubated/ unresponsive. Not on pressor. Off sedation  Afebrile, no change on vent setting  Family meeting in plans    Notes/Labs reviewed-- Prognosis poor with anoxic brain injury per Neuro  CXR this am:  1. Support lines and tubes as above remain unchanged and in satisfactory  position. 2. Progressive volume loss with bibasilar opacities, more so on the right  concerning for combination of atelectasis versus superimposed pneumonia. Small  pleural effusions not excluded. HPI 9/1/22: Elder Matamoros is 65 y.o. M patient with PMH of shingles, on medication. History is limited and obtained from chart review and med staff. Presented to ED yesterday and was in cardiac arrest with CPR that took 30 minutes. He was intubated/ placed on pressor and admitted to ICU. He had shingles at home and was on treatment. He had urine retention  and had about 2L of urine with duran placement per RN. Hydronephrosis on CT scan, no CPD on CXR, LLE DVT of unknown age on FORBES. He was placed on emperic abx    He is currently on pressor- Levophed, unresponsive, with myoclonus jerk that requires propfol    Past Medical History:   Diagnosis Date    Gout     Shingles      No past surgical history on file. No family history on file.   Medications reviewed as below:   Current Facility-Administered Medications   Medication Dose Route Frequency Provider Last Rate Last Admin    insulin glargine (LANTUS) injection 15 Units  15 Units SubCUTAneous Q24H Mo Damon MD        dextrose 5% with KCl 20 mEq/L infusion   IntraVENous CONTINUOUS Blu BATES MD 50 mL/hr at 09/07/22 0537 New Bag at 09/07/22 0537    heparin (porcine) injection 5,000 Units  5,000 Units SubCUTAneous Q8H John Walsh MD   5,000 Units at 09/07/22 0536    amLODIPine (NORVASC) tablet 5 mg  5 mg Oral DAILY John Walsh MD   5 mg at 09/06/22 0810    lactulose (CHRONULAC) 10 gram/15 mL solution 15 mL  15 mL Oral DAILY Jabari Meyer, Cassidy Joseph MD   15 mL at 09/06/22 0809    furosemide (LASIX) injection 20 mg  20 mg IntraVENous DAILY Christine Aparicio MD   20 mg at 09/06/22 0810    metoprolol (LOPRESSOR) injection 2.5 mg  2.5 mg IntraVENous Q6H PRN Christine Aparicio MD   2.5 mg at 09/06/22 1747    metoprolol (LOPRESSOR) injection 1.25 mg  1.25 mg IntraVENous Q6H Ahmed, Arva Heimlich, MD   1.25 mg at 09/07/22 0228    acyclovir (ZOVIRAX) 750 mg in 0.9% sodium chloride 250 mL IVPB  10 mg/kg (Ideal) IntraVENous Q12H Bruna Zhang  mL/hr at 09/06/22 1650 750 mg at 09/06/22 1650    insulin lispro (HUMALOG) injection   SubCUTAneous Q6H Christine Aparicio MD   3 Units at 09/07/22 0536    ELECTROLYTE REPLACEMENT PROTOCOL - Potassium Renal Dosing  1 Each Other PRN Marta Eduardo MD        piperacillin-tazobactam (ZOSYN) 3.375 g in 0.9% sodium chloride (MBP/ADV) 100 mL MBP  3.375 g IntraVENous Q8H Loretta CANADA DO 25 mL/hr at 09/07/22 0536 3.375 g at 09/07/22 0536    sodium chloride (NS) flush 5-40 mL  5-40 mL IntraVENous Q8H Gisell Chakraborty MD   10 mL at 09/07/22 0536    sodium chloride (NS) flush 5-40 mL  5-40 mL IntraVENous PRN Gisell Chakraborty MD        acetaminophen (TYLENOL) tablet 650 mg  650 mg Oral Q6H PRN Gisell Chakraborty MD   650 mg at 09/03/22 2027    Or    acetaminophen (TYLENOL) suppository 650 mg  650 mg Rectal Q6H PRN Gisell Chakraborty MD        polyethylene glycol (MIRALAX) packet 17 g  17 g Oral DAILY PRN Gisell Chakraborty MD        ondansetron (ZOFRAN ODT) tablet 4 mg  4 mg Oral Q8H PRN Gisell Chakraborty MD        Or    ondansetron (ZOFRAN) injection 4 mg  4 mg IntraVENous Q6H PRN Gisell Chakraborty MD        pantoprazole (PROTONIX) 40 mg in 0.9% sodium chloride 10 mL injection  40 mg IntraVENous Q12H Gisell Chakraborty MD   40 mg at 09/06/22 2022    levETIRAcetam (KEPPRA) 1,000 mg in 0.9% sodium chloride 100 mL IVPB  1,000 mg IntraVENous Q12H Marta Eduardo  mL/hr at 22 1,000 mg at 22     No Known Allergies  Social History     Socioeconomic History    Marital status:      Spouse name: Not on file    Number of children: Not on file    Years of education: Not on file    Highest education level: Not on file   Occupational History    Not on file   Tobacco Use    Smoking status: Not on file    Smokeless tobacco: Not on file   Substance and Sexual Activity    Alcohol use: Not on file    Drug use: Not on file    Sexual activity: Not on file   Other Topics Concern    Not on file   Social History Narrative    Not on file     Social Determinants of Health     Financial Resource Strain: Not on file   Food Insecurity: Not on file   Transportation Needs: Not on file   Physical Activity: Not on file   Stress: Not on file   Social Connections: Not on file   Intimate Partner Violence: Not on file   Housing Stability: Not on file        Review of Systems: Unable to provide      Objective:      Visit Vitals  BP (!) 141/85   Pulse 94   Temp 97.7 °F (36.5 °C)   Resp 20   Ht 5' 10\" (1.778 m)   Wt 95 kg (209 lb 7 oz)   SpO2 97%   BMI 30.05 kg/m²     Temp (24hrs), Av.8 °F (36.6 °C), Min:94.5 °F (34.7 °C), Max:99.9 °F (37.7 °C)         GEN: WD unresponsive, intubated    Lines / Catheters: Left IJ central line, L PIV, Lyle  . HEENT: Unicteric. Edematous sclera. --no neck swelling  CHEST: Non laboured breathing. CTA  CVS: Tachycardic- HS 1 and 2 heard, no mur/gallop  ABD: Obese/soft. Non tender. Non distended. Hypoactive BS  VERNELL: Lyle in place  EXT: No apparent swelling or redness on UE/LE joints. Skin: Dry and intact. No obvious cellulitis-- has L thigh/buttock Shingles rash-- multiple dermatome, no active vesicular lesion on exam. No cyanosis.  Warm feet  CNS: unresponsive, no jerky movement noted during exam. Eye blinking present      Labs: Results:   Chemistry Recent Labs     22  0357 22  0940 22  0436 22  1824 22  0450 *  --  212*  --  188*     --  150*  --  152*   K 3.7 3.8 3.3*   < > 3.7     --  115*  --  119*   CO2 31  --  29  --  27   BUN 31*  --  30*  --  32*   CREA 2.13*  --  2.11*  --  2.26*   CA 8.1*  --  8.1*  --  8.0*   AGAP 5  --  6  --  6   BUCR 15  --  14  --  14   AP  --   --  188*  --   --    TP  --   --  5.8*  --   --    ALB  --   --  1.7*  --   --    GLOB  --   --  4.1*  --   --    AGRAT  --   --  0.4*  --   --     < > = values in this interval not displayed. CBC w/Diff Recent Labs     09/07/22  0357 09/06/22  0436 09/05/22  0450   WBC 9.0 7.3 7.1   RBC 2.89* 2.70* 2.78*   HGB 8.2* 7.7* 8.0*   HCT 26.2* 24.4* 25.6*    170 170   GRANS 70 64 65   LYMPH 17* 20* 19*   EOS 4 4 5            No results found for: SDES Lab Results   Component Value Date/Time    Culture result: NO GROWTH 6 DAYS 08/31/2022 10:15 AM    Culture result: NO GROWTH 6 DAYS 08/31/2022 10:00 AM        Results       Procedure Component Value Units Date/Time    CULTURE, RESPIRATORY/SPUTUM/BRONCH Hsayy Protestant STAIN [[id number]] Collected: 09/03/22 1230    Order Status: Canceled Specimen: Sputum from Tracheal Aspirate     COVID-19 RAPID TEST [[id number]] Collected: 08/31/22 1050    Order Status: Completed Specimen: Nasopharyngeal Updated: 08/31/22 1131     Specimen source Nasopharyngeal        COVID-19 rapid test Not detected        Comment: Rapid Abbott ID Now       Rapid NAAT:  The specimen is NEGATIVE for SARS-CoV-2, the novel coronavirus associated with COVID-19. Negative results should be treated as presumptive and, if inconsistent with clinical signs and symptoms or necessary for patient management, should be tested with an alternative molecular assay. Negative results do not preclude SARS-CoV-2 infection and should not be used as the sole basis for patient management decisions. This test has been authorized by the FDA under an Emergency Use Authorization (EUA) for use by authorized laboratories.    Fact sheet for Healthcare Providers: CyndyUpdate.co.nz  Fact sheet for Patients: ConventionUpdate.co.nz       Methodology: Isothermal Nucleic Acid Amplification         CULTURE, BLOOD [[id number]] Collected: 08/31/22 1015    Order Status: Completed Specimen: Blood Updated: 09/06/22 0711     Special Requests: NO SPECIAL REQUESTS        Culture result: NO GROWTH 6 DAYS       CULTURE, BLOOD [[id number]] Collected: 08/31/22 1000    Order Status: Completed Specimen: Blood Updated: 09/06/22 0711     Special Requests: NO SPECIAL REQUESTS        Culture result: NO GROWTH 6 DAYS                 Imaging:      All imaging reviewed from Admission to present as per radiology interpretation in [address] [place] [address]

## 2022-09-07 NOTE — PROGRESS NOTES
[facility]  Pulmonary, Critical Care, and Sleep Medicine    Name: Tony Desai MRN: [id number]   : 1960 Hospital: [facility] [place]    Date: 2022  Room: [address] [place] [address] Note                                              Consult requesting physician: Dr. Dr. Ignacio Julian   Reason for Consult: Cardiac arrest , shock, respiratory failure     IMPRESSION:   Acute respiratory failure, post-arrest - J96.0  Cardiac arrest - I46.9  Sepsis - A41.9, R65.20  Hypernatremia - E87.0  Anemia - D64.9  Chronic DVT right leg  Elevated LFTs, post-arrest  Anoxic encephalopathy post-arrest   Pituitary hemorrhage/hematoma  Patient Active Problem List   Diagnosis Code    Cardiac arrest ([facility] [address] I46.9    Respiratory failure ([facility] [address] J96.90    GENO (acute kidney injury) ([facility] [address] [facility] N17.9    Disseminated herpes zoster B02.7    Hydronephrosis N13.30    Shock ([facility] [address] R57.9    Sepsis ([facility] [address] A41.9    UTI (urinary tract infection) N39.0    Brain anoxia ([facility] [address] G93.1     Code status: Full Code       RECOMMENDATIONS:     Respiratory: Acute respiratory failure secondary to post cardiac arrest anoxic encephalopathy  On VC+, FiO2 35%, PEEP 5+  Chest x-ray for follow-up in a.m. showed worsening in bibasilar opacities likely pl effusion  Ventilator bundle & Sedation protocol followed. Not on any sedation; daily assessment for readiness for SBT - not a candidate given unreliable ability to protect airways. Chlorhexidine mouth washes. Keep SPO2 >=92%. HOB 30 degree elevation all the time. Aggressive pulmonary toileting. Aspiration precautions. CVS: BP stable  On Norvasc, monitor blood pressure and adjust dose as needed  Cardiology following    ID: No leukocytosis or fever  ID following  Blood cultures 2022: NGT final  Antibiotics: Zosyn  Antiviral: Acyclovir  Sepsis bundle and protocol followed. Follow cultures. Deescalate antibiotic when appropriate per ID.     Hematology/Oncology: Stable Hgb and plt  No bleeding anywhere  Monitor CBC daily  Okay to use heparin for DVT prophylaxis per neurology - required to hold Heparin for some oral bleeding during suctioning 9/6/22 per RN - since resolved  Not a candidate for full dose anticoagulation  Has a small chronic calf vein DVT right lower extremity  Follow-up PVL study this week  Vascular surgery is aware-if worsening in DVT then may need IVC filter    Renal: Monitor S. Na+ daily - improved  Free water bolus 300 mL every 4 hours while NG tube  Stop D5 with KCL - may replace KCL via OG tube if needed  Monitor renal functions, electrolytes, urine output    GI/: PPI for GI prophylaxis  Tube feeding as tolerated via NG tube  Lyle was placed by urology; no hematuria    Endocrine: Monitor BS. SSI and adjust Lantus    Neurology: Not on any sedation  Eyes open intermittently spontaneously, does not follow commands, no involuntary movements, no response to pain  Continue Keppra per neurology  EEG abnormal  MRI brain 9/3/2022: High-grade acute/subacute anoxic changes  Neurology following    Toxicology: UDS negative 8/31/22    Pain/Sedation: Avoid sedatives, hypnotic if possible    Skin/Wound: As per nursing care    Electrolytes: Replace electrolytes per ICU electrolyte replacement protocol. Prophylaxis: DVT Prophylaxis: SCD/heparin. GI Prophylaxis: PPI. Restraints: none. May use wrist soft restraints for patient interfering with medical therapy/management and patient safety. Lines/Tubes: PIV  ETT: 8/31/2021. OGT: 9/2/22. Central line: 8/31/22 LT IJ (site examined, no erythema, induration, discharge or sign of infection. Dressing intact. Medically necessary, will remove it when not needed. Central line bundle followed). Lyle: 8/31/22 (Medically necessary for strict input/output monitoring in critically ill patient, will remove it when not needed. Lyle bundle followed). Advance Directive/Palliative Care: Full code. Palliative care Consulted.    Possible family meeting with palliative care in follow up later today    Will defer respective systems problem management to primary and other respective consultant and follow patient in ICU with primary and other medical team.  Further recommendations will be based on the patient's response to recommended treatment and results of the investigation ordered. Quality Care: PPI, DVT prophylaxis, HOB elevated, Infection control all reviewed and addressed. Care of plan d/w RN, RT, MDR, hospitalist team.  Met and discussed with family including wife, father, other 09/7/22 and answered all questions to their satisfaction. Discussed overall, poor long-term prognosis with high risk for prolonged hospitalization, prolonged ventilator need, nosocomial infection, hemodynamic instability, cardiac arrest, need for RRT, bleeding, morbidity, mortality, other    High complexity decision making was performed during the evaluation of this patient at high risk for decompensation with multiple organ involvement. Total critical care time spent rendering care exclusive of procedures/family discussion/coordination of care: 39 minutes. Subjective/History of Present Illness:   Patient is a 65 y.o. male with PMHx significant for HTN/CRI/DM recently diagnosed with severe zoster. Patient was on antiviral tx, very weak, sob, cough. EMS called, in ambulance 3 times cardiac arrest. Witnessed CPR/Epi. Repored PEA. 6 doses of epi and no shocks. He came on epi drip but in ER chnaged to levophed. Pateitn had seizure vs myocloninc jerks was started on propofol. 9/7/2022 :   Patient seen at bedside in ICU room #104  Remains on stable ventilator support; no major ET secretions  Eyes open intermittently, rightward gaze, does not follow commands, no involuntary movements, no withdrawal to painful stimuli  Blood pressure stable; on Norvasc  No fever.   Good urine output  Tolerating tube feeding  Improved S. Na+; electrolytes being replaced  PCCM was not contacted by staff on anything about patient overnight. I/O last 24 hrs: Intake/Output Summary (Last 24 hours) at 9/7/2022 1037  Last data filed at 9/7/2022 0830  Gross per 24 hour   Intake 4880.01 ml   Output 2875 ml   Net 2005.01 ml           The patient is critically ill and can not provide additional history due to Unconsciousness and Ventilated    History taken from EMR    Review of Systems:  ROS not obtained due to patient factor. No Known Allergies     Past Medical History:   Diagnosis Date    Gout     Shingles       No past surgical history on file. Social History     Tobacco Use    Smoking status: Not on file    Smokeless tobacco: Not on file   Substance Use Topics    Alcohol use: Not on file      No family history on file.      Prior to Admission medications    Not on File     Current Facility-Administered Medications   Medication Dose Route Frequency    insulin glargine (LANTUS) injection 15 Units  15 Units SubCUTAneous Q24H    heparin (porcine) injection 5,000 Units  5,000 Units SubCUTAneous Q8H    amLODIPine (NORVASC) tablet 5 mg  5 mg Oral DAILY    lactulose (CHRONULAC) 10 gram/15 mL solution 15 mL  15 mL Oral DAILY    furosemide (LASIX) injection 20 mg  20 mg IntraVENous DAILY    metoprolol (LOPRESSOR) injection 1.25 mg  1.25 mg IntraVENous Q6H    acyclovir (ZOVIRAX) 750 mg in 0.9% sodium chloride 250 mL IVPB  10 mg/kg (Ideal) IntraVENous Q12H    insulin lispro (HUMALOG) injection   SubCUTAneous Q6H    piperacillin-tazobactam (ZOSYN) 3.375 g in 0.9% sodium chloride (MBP/ADV) 100 mL MBP  3.375 g IntraVENous Q8H    sodium chloride (NS) flush 5-40 mL  5-40 mL IntraVENous Q8H    pantoprazole (PROTONIX) 40 mg in 0.9% sodium chloride 10 mL injection  40 mg IntraVENous Q12H    levETIRAcetam (KEPPRA) 1,000 mg in 0.9% sodium chloride 100 mL IVPB  1,000 mg IntraVENous Q12H         Objective:   Vital Signs:    Visit Vitals  BP (!) 168/106   Pulse 100   Temp 97.9 °F (36.6 °C)   Resp 19   Ht 5' 10\" (1.778 m)   Wt 95 kg (209 lb 7 oz)   SpO2 98%   BMI 30.05 kg/m²       O2 Device: Endotracheal tube, Ventilator       Temp (24hrs), Av.7 °F (36.5 °C), Min:94.5 °F (34.7 °C), Max:99.9 °F (37.7 °C)       Intake/Output:   Last shift:      701 - 1900  In: 845 [I.V.:225]  Out: -     Last 3 shifts: 1901 -  07  In: 9601 [I.V.:2975]  Out: 4407 [Urine:4325]      Intake/Output Summary (Last 24 hours) at 2022 1037  Last data filed at 2022 0830  Gross per 24 hour   Intake 4880.01 ml   Output 2875 ml   Net 2005.01 ml         Last 3 Recorded Weights in this Encounter    22 1524 22 0757 22 0904   Weight: 111.6 kg (246 lb) 103.9 kg (229 lb 0.9 oz) 95 kg (209 lb 7 oz)         Ventilator Settings:  Mode Rate Tidal Volume Pressure FiO2 PEEP   Assist control   450 ml    35 % 5 cm H20     Peak airway pressure: 20 cm H2O    Plateau pressure:     Tidal volume:    Minute ventilation: 9.15 l/min     Recent Labs     22  0436   PHI 7.45   PCO2I 37.5   PO2I 116*   HCO3I 25.8   FIO2I 35         Physical Exam:     General: in no respiratory distress, appears stated age, on ventilator  HEENT: PERRL, ET and OG tubes are in place  Neck: No abnormally enlarged lymph nodes or thyroid, supple  Chest: normal  Lungs: moderate air entry, scattered bilateral rhonchi and no wheezes bilaterally, dullness to percussion R base, no tenderness/ rash  Heart: Regular rate and rhythm, S1S2 present, or without murmur or extra heart sounds  Abdomen: Nondistended, bowel sounds normoactive, tympanic, abdomen is soft without significant tenderness, masses, organomegaly or guarding, rigidity, rebound  Extremity: Trace bilateral pedal edema, no cyanosis; peripheral pulses 2+ and symmetric, capillary refill normal bilateral  Neuro: Sedated, does not follow commands, rightward gaze, no withdrawal to painful stimuli, eyes open intermittently, no involuntary movements, exam limitation on ventilator  Skin: Skin color, texture, turgor unchanged        Data:       Recent Results (from the past 24 hour(s))   GLUCOSE, POC    Collection Time: 09/06/22 12:06 PM   Result Value Ref Range    Glucose (POC) 209 (H) 70 - 110 mg/dL   GLUCOSE, POC    Collection Time: 09/06/22  5:38 PM   Result Value Ref Range    Glucose (POC) 243 (H) 70 - 110 mg/dL   GLUCOSE, POC    Collection Time: 09/06/22 11:54 PM   Result Value Ref Range    Glucose (POC) 176 (H) 70 - 110 mg/dL   CBC WITH AUTOMATED DIFF    Collection Time: 09/07/22  3:57 AM   Result Value Ref Range    WBC 9.0 4.6 - 13.2 K/uL    RBC 2.89 (L) 4.35 - 5.65 M/uL    HGB 8.2 (L) 13.0 - 16.0 g/dL    HCT 26.2 (L) 36.0 - 48.0 %    MCV 90.7 78.0 - 100.0 FL    MCH 28.4 24.0 - 34.0 PG    MCHC 31.3 31.0 - 37.0 g/dL    RDW 15.9 (H) 11.6 - 14.5 %    PLATELET 833 127 - 848 K/uL    MPV 10.9 9.2 - 11.8 FL    NRBC 1.5 (H) 0  WBC    ABSOLUTE NRBC 0.13 (H) 0.00 - 0.01 K/uL    NEUTROPHILS 70 40 - 73 %    LYMPHOCYTES 17 (L) 21 - 52 %    MONOCYTES 8 3 - 10 %    EOSINOPHILS 4 0 - 5 %    BASOPHILS 0 0 - 2 %    IMMATURE GRANULOCYTES 2 (H) 0.0 - 0.5 %    ABS. NEUTROPHILS 6.3 1.8 - 8.0 K/UL    ABS. LYMPHOCYTES 1.5 0.9 - 3.6 K/UL    ABS. MONOCYTES 0.7 0.05 - 1.2 K/UL    ABS. EOSINOPHILS 0.3 0.0 - 0.4 K/UL    ABS. BASOPHILS 0.0 0.0 - 0.1 K/UL    ABS. IMM.  GRANS. 0.2 (H) 0.00 - 0.04 K/UL    DF AUTOMATED     PROTHROMBIN TIME + INR    Collection Time: 09/07/22  3:57 AM   Result Value Ref Range    Prothrombin time 13.6 11.5 - 15.2 sec    INR 1.0 0.8 - 1.2     MAGNESIUM    Collection Time: 09/07/22  3:57 AM   Result Value Ref Range    Magnesium 2.3 1.6 - 2.6 mg/dL   METABOLIC PANEL, BASIC    Collection Time: 09/07/22  3:57 AM   Result Value Ref Range    Sodium 145 136 - 145 mmol/L    Potassium 3.7 3.5 - 5.5 mmol/L    Chloride 109 100 - 111 mmol/L    CO2 31 21 - 32 mmol/L    Anion gap 5 3.0 - 18 mmol/L    Glucose 184 (H) 74 - 99 mg/dL    BUN 31 (H) 7.0 - 18 MG/DL    Creatinine 2.13 (H) 0.6 - 1.3 MG/DL BUN/Creatinine ratio 15 12 - 20      GFR est AA 38 (L) >60 ml/min/1.73m2    GFR est non-AA 32 (L) >60 ml/min/1.73m2    Calcium 8.1 (L) 8.5 - 10.1 MG/DL   PHOSPHORUS    Collection Time: 09/07/22  3:57 AM   Result Value Ref Range    Phosphorus 3.5 2.5 - 4.9 MG/DL         Chemistry Recent Labs     09/07/22  0357 09/06/22  0940 09/06/22  0436 09/05/22  1824 09/05/22  0450   *  --  212*  --  188*     --  150*  --  152*   K 3.7 3.8 3.3* 3.6 3.7     --  115*  --  119*   CO2 31  --  29  --  27   BUN 31*  --  30*  --  32*   CREA 2.13*  --  2.11*  --  2.26*   CA 8.1*  --  8.1*  --  8.0*   MG 2.3  --  2.0  --  2.2   PHOS 3.5  --  2.8 2.9 2.4*   AGAP 5  --  6  --  6   BUCR 15  --  14  --  14   AP  --   --  188*  --   --    TP  --   --  5.8*  --   --    ALB  --   --  1.7*  --   --    GLOB  --   --  4.1*  --   --    AGRAT  --   --  0.4*  --   --           Lactic Acid Lactic acid   Date Value Ref Range Status   09/01/2022 1.8 0.4 - 2.0 MMOL/L Final     No results for input(s): LAC in the last 72 hours. Liver Enzymes Protein, total   Date Value Ref Range Status   09/06/2022 5.8 (L) 6.4 - 8.2 g/dL Final     Albumin   Date Value Ref Range Status   09/06/2022 1.7 (L) 3.4 - 5.0 g/dL Final     Globulin   Date Value Ref Range Status   09/06/2022 4.1 (H) 2.0 - 4.0 g/dL Final     A-G Ratio   Date Value Ref Range Status   09/06/2022 0.4 (L) 0.8 - 1.7   Final     Alk.  phosphatase   Date Value Ref Range Status   09/06/2022 188 (H) 45 - 117 U/L Final     Recent Labs     09/06/22  0436   TP 5.8*   ALB 1.7*   GLOB 4.1*   AGRAT 0.4*   *          CBC w/Diff Recent Labs     09/07/22  0357 09/06/22  0436 09/05/22  0450   WBC 9.0 7.3 7.1   RBC 2.89* 2.70* 2.78*   HGB 8.2* 7.7* 8.0*   HCT 26.2* 24.4* 25.6*    170 170   GRANS 70 64 65   LYMPH 17* 20* 19*   EOS 4 4 5          Cardiac Enzymes No results found for: CPK, CK, CKMMB, CKMB, RCK3, CKMBT, CKNDX, CKND1, ROB, TROPT, TROIQ, BAY, TROPT, TNIPOC, BNP, BNPP     BNP No results found for: BNP, BNPP, XBNPT     Coagulation Recent Labs     09/07/22  0357 09/06/22  0436 09/05/22  1127 09/05/22  0450 09/04/22  1121   PTP 13.6 14.2  --   --  13.3   INR 1.0 1.1  --   --  1.0   APTT  --   --  27.9 26.0 25.5           Thyroid  No results found for: T4, T3U, TSH, TSHEXT, TSHEXT    No results found for: T4     Urinalysis Lab Results   Component Value Date/Time    Color YELLOW 09/03/2022 01:45 PM    Appearance CLOUDY 09/03/2022 01:45 PM    Specific gravity 1.023 09/03/2022 01:45 PM    pH (UA) 5.5 09/03/2022 01:45 PM    Protein 100 (A) 09/03/2022 01:45 PM    Glucose >1,000 (A) 09/03/2022 01:45 PM    Ketone 15 (A) 09/03/2022 01:45 PM    Bilirubin Negative 09/03/2022 01:45 PM    Urobilinogen 0.2 09/03/2022 01:45 PM    Nitrites Negative 09/03/2022 01:45 PM    Leukocyte Esterase SMALL (A) 09/03/2022 01:45 PM    Epithelial cells 1+ 09/03/2022 01:45 PM    Bacteria FEW (A) 09/03/2022 01:45 PM    WBC 11 to 20 09/03/2022 01:45 PM    RBC 4 to 10 09/03/2022 01:45 PM        Micro  No results for input(s): SDES, CULT in the last 72 hours. No results for input(s): CULT in the last 72 hours. Culture data during this hospitalization.    All Micro Results       Procedure Component Value Units Date/Time    CULTURE, BLOOD [[id number]] Collected: 08/31/22 1015    Order Status: Completed Specimen: Blood Updated: 09/06/22 0711     Special Requests: NO SPECIAL REQUESTS        Culture result: NO GROWTH 6 DAYS       CULTURE, BLOOD [[id number]] Collected: 08/31/22 1000    Order Status: Completed Specimen: Blood Updated: 09/06/22 0711     Special Requests: NO SPECIAL REQUESTS        Culture result: NO GROWTH 6 DAYS       CULTURE, RESPIRATORY/SPUTUM/BRONCH Myrle Loach STAIN [[id number]] Collected: 09/03/22 1230    Order Status: Canceled Specimen: Sputum from Tracheal Aspirate     COVID-19 RAPID TEST [[id number]] Collected: 08/31/22 1050    Order Status: Completed Specimen: Nasopharyngeal Updated: 08/31/22 1131 Specimen source Nasopharyngeal        COVID-19 rapid test Not detected        Comment: Rapid Abbott ID Now       Rapid NAAT:  The specimen is NEGATIVE for SARS-CoV-2, the novel coronavirus associated with COVID-19. Negative results should be treated as presumptive and, if inconsistent with clinical signs and symptoms or necessary for patient management, should be tested with an alternative molecular assay. Negative results do not preclude SARS-CoV-2 infection and should not be used as the sole basis for patient management decisions. This test has been authorized by the FDA under an Emergency Use Authorization (EUA) for use by authorized laboratories. Fact sheet for Healthcare Providers: Tomfooleryco[employer]nz  Fact sheet for Patients: ViVu[employer]nz       Methodology: Isothermal Nucleic Acid Amplification                    MRI BRAIN WO CONT    Result Date: 9/3/2022  EXAM: MRI of the brain without intravenous contrast. INDICATION:  \"coma r/o anoxia. \" COMPARISON:  No prior MRI is available for direct comparison. CORRELATION (related prior exam):  Recent CT. PROTOCOL:  Routine brain. _______________ FINDINGS:       IMAGE QUALITY:  Diagnostic. BRAIN AND EXTRA-AXIAL SPACE:           ACUTE/SUBACUTE INFARCT:  There is diffusion restriction diffusely involving the infratentorial and supratentorial gray matter in a pattern indicative of high-grade anoxic brain injury. MASS:  None. HEMORRHAGE:  None. SUBDURAL FLUID COLLECTION:  None. HYDROCEPHALUS:  None. ICA AND DOMINANT VA T2 FLOW VOIDS:  Unremarkable. REMOTE CEREBRAL TERRITORIAL INFARCT:  None definite. REMOTE CEREBELLAR INFARCT:  None definite.            STRIVE (STandards for Reporting Vascular changes on nEuroimaging):                            --[place] of white matter hyperintensity (\"leukoaraiosis\") of presumed vascular origin: Mild.                            --[place] of chronic lacunes of presumed vascular origin:  Mild. --[place] of perivascular spaces:  None significant. --[place] of \"microbleeds\":   None definite. --Degree of brain atrophy: Not characterizable in the setting of diffuse cerebral edema. SELLA/PITUITARY:  A T1 and heterogeneously peripherally hyperintense, T2 heterogeneously mildly hyperintense, and T2 FLAIR hyperintense expansile lesion in the sella abutting the undersurface of the optic chiasm measures 12 mm anterior-posterior, 16 mm medial-lateral, 18 mm orthogonal superior-inferior. The lesion is nonspecific are favored to reflect pituitary hemorrhage. HEENT: Intubated. ORBITS:  Unremarkable. PARANASAL SINUSES:  The right maxillary sinus and right anterior ethmoid air cells are opacified. There is scattered paranasal sinus mucosal thickening. MASTOID AIR CELLS:  Predominantly clear. INCLUDED UPPER CERVICAL LYMPH NODES:  Unremarkable. INCLUDED UPPER PAROTIDS:  Unremarkable. NASOPHARYNX:  Unremarkable. BONE MARROW SIGNAL:  Unremarkable. SUPERFICIAL SOFT TISSUES: Unremarkable. _______________     1. High-grade acute/subacute anoxic brain injury without herniation. 2.  Heterogeneous expansile sellar lesion, nonspecific, pituitary hematoma/hemorrhage favored over other etiologies. _______________     CT HEAD WO CONT    Result Date: 9/2/2022  EXAM: CT head INDICATION: Anoxic brain injury. COMPARISON: 8/31/2022 TECHNIQUE: Axial CT imaging of the head was performed without intravenous contrast. Standard multiplanar coronal and sagittal reformatted images were obtained and are included in interpretation.  One or more dose reduction techniques were used on this CT: automated exposure control, adjustment of the mAs and/or kVp according to patient size, and iterative reconstruction techniques. The specific techniques used on this CT exam have been documented in the patient's electronic medical record. Digital Imaging and Communications in Medicine (DICOM) format image data are available to nonaffiliated external healthcare facilities or entities on a secure, media free, reciprocally searchable basis with patient authorization for at least a 12-month period after this study. _______________ FINDINGS: BRAIN AND POSTERIOR FOSSA: Mild patchy periventricular, deep and subcortical white matter hypoattenuation which is nonspecific but likely represents chronic ischemic changes. No evidence of acute large vessel transcortical infarct or acute parenchymal hemorrhage. No midline shift or hydrocephalus. EXTRA-AXIAL SPACES AND MENINGES: There are no abnormal extra-axial fluid collections. CALVARIUM: Intact. SINUSES: Right maxillary and ethmoid sinus mucosal disease. OTHER: None. _______________     No acute intracranial abnormality. CT HEAD WO CONT    Result Date: 8/31/2022  EXAM: CT head INDICATION: Cardiac arrest COMPARISON: None. TECHNIQUE: Axial CT imaging of the head was performed without intravenous contrast. Standard multiplanar coronal and sagittal reformatted images were obtained and are included in interpretation. One or more dose reduction techniques were used on this CT: automated exposure control, adjustment of the mAs and/or kVp according to patient size, and iterative reconstruction techniques. The specific techniques used on this CT exam have been documented in the patient's electronic medical record. Digital Imaging and Communications in Medicine (DICOM) format image data are available to nonaffiliated external healthcare facilities or entities on a secure, media free, reciprocally searchable basis with patient authorization for at least a 12-month period after this study.  _______________ FINDINGS: BRAIN AND POSTERIOR FOSSA: Exam quality is mildly degraded secondary to motion artifact. Ventricular size and configuration appears within normal limits. Basilar cisterns are patent. Within the limitations of motion, no acute intra-axial hemorrhage. No evidence of mass effect or midline shift. Gray-white matter differentiation appears within normal limits as visualized. EXTRA-AXIAL SPACES AND MENINGES: There are no abnormal extra-axial fluid collections. CALVARIUM: Intact. SINUSES: Minor nonspecific mucosal thickening ethmoid air cells, sphenoid sinus with air-fluid level present in the right maxillary sinus. OTHER: None. _______________     1. Mildly motion limited study without evidence of acute intracranial abnormality. 2. Paranasal mucosal disease as described above with air-fluid level in the right maxillary sinus as can be seen with sinusitis. CT CHEST ABD PELV WO CONT    Result Date: 8/31/2022  EXAM: CT chest, abdomen, and pelvis INDICATION: Pain COMPARISON: No prior study. TECHNIQUE: Axial CT imaging of the chest, abdomen, and pelvis was performed without IV contrast administration. Multiplanar reformats were generated. One or more dose reduction techniques were used on this CT: automated exposure control, adjustment of the mAs and/or kVp according to patient size, and iterative reconstruction techniques. The specific techniques used on this CT exam have been documented in the patient's electronic medical record. Digital Imaging and Communications in Medicine (DICOM) format image data are available to nonaffiliated external healthcare facilities or entities on a secure, media free, reciprocally searchable basis with patient authorization for at least a 12-month period after this study. _______________ FINDINGS: CHEST: MEDIASTINUM: Left IJV central venous catheter tip at the cavoatrial junction. Normal caliber aorta with vascular calcifications. No pericardial effusion. LYMPH NODES: No pathologically enlarged mediastinal or hilar lymph nodes.  PLEURA: No pleural effusion or pneumothorax. LUNGS/AIRWAY: Endotracheal tube tip in the midthoracic trachea. No consolidation or suspicious pulmonary nodule is seen. OTHER: None. ** ABDOMEN/PELVIS: LIVER, BILIARY: Liver is unremarkable. No abnormal biliary dilation. Gallbladder is unremarkable. PANCREAS: Unremarkable. SPLEEN: Unremarkable. ADRENALS: Unremarkable. KIDNEYS: Severe left and moderate right hydroureteronephrosis with marked urinary bladder distention. No obstructing calculus. Left lower pole 5.8 cm simple cysts, no follow-up necessary. LYMPH NODES: No pathologically enlarged lymph nodes. GASTROINTESTINAL TRACT: No abnormal bowel dilation or wall thickening. PELVIC ORGANS: Unremarkable. VASCULATURE: Unremarkable. OSSEOUS: No area of erosion or aggressive-appearing bone destruction. OTHER: None. _______________     Severe left and moderate right hydroureteronephrosis with marked urinary bladder distention. No obstructing calculus. No acute intrathoracic abnormality. US RETROPERITONEUM COMP    Result Date: 9/2/2022  US RETROPERITONEUM COMP INDICATION: Acute kidney injury. TECHNIQUE: Renal ultrasound. COMPARISON: 8/31/2022 CT FINDINGS: Right kidney measures 10.7 cm. Left kidney measures 6.7 cm. Bilateral increased cortical echogenicity. Severe left hydronephrosis with cortical atrophy. No shadowing calculus or perinephric abnormality. No solid renal mass identified. Indwelling Lyle catheter within decompressed urinary bladder. Bilateral increased cortical echogenicity. Severe left hydronephrosis with cortical atrophy. XR CHEST PORT    Result Date: 9/5/2022  EXAM: CHEST RADIOGRAPH, SINGLE VIEW CLINICAL INDICATION/HISTORY: Shortness of breath, intubated, follow-up COMPARISON: 9/4/2022 TECHNIQUE: Portable frontal view of the chest was obtained. _______________ FINDINGS: SUPPORT DEVICES: Unchanged left jugular central venous catheter, endotracheal tube, and gastric tube, all adequately positioned.  HEART AND MEDIASTINUM: Cardiomediastinal silhouette appears within normal limits. Tortuous and atherosclerotic thoracic aorta. No central vascular congestion. LUNGS AND PLEURAL SPACES: Left basilar opacity partially silhouettes left hemidiaphragm. Left mid and upper lung zones and all of the right lung remain adequately aerated. No definite pleural effusion. No pneumothorax. BONY THORAX AND SOFT TISSUES: No acute osseous abnormality. _______________     1. Tubes and lines appear unchanged, adequately positioned. 2. Left basilar opacity, atelectasis versus infiltrate. XR CHEST PORT    Result Date: 9/4/2022  EXAM: CHEST RADIOGRAPH, SINGLE VIEW CLINICAL INDICATION/HISTORY: Shortness of breath, intubated, follow-up COMPARISON: 9/3/2022 TECHNIQUE: Portable frontal view of the chest was obtained. _______________ FINDINGS: SUPPORT DEVICES: Endotracheal tube, left jugular central venous catheter, and nasogastric tube all appear adequately positioned. HEART AND MEDIASTINUM: Cardiomediastinal silhouette appears within normal limits. Tortuous and atherosclerotic thoracic aorta. No central vascular congestion. LUNGS AND PLEURAL SPACES: Unchanged retrocardiac left lower lobe atelectasis. Lungs are otherwise adequately aerated with no confluent airspace opacity. No pleural effusion or pneumothorax. BONY THORAX AND SOFT TISSUES: No acute osseous abnormality. _______________     No active cardiopulmonary disease. XR CHEST PORT    Result Date: 9/3/2022  EXAM: CHEST RADIOGRAPH, SINGLE VIEW CLINICAL INDICATION/HISTORY: Shortness of breath, endotracheal tube placement COMPARISON: 8/31/2022 TECHNIQUE: Portable frontal view of the chest was obtained. _______________ FINDINGS: SUPPORT DEVICES: Adequately positioned endotracheal tube, unchanged. Left jugular central venous catheter and nasogastric tube also appear unchanged, adequately positioned. HEART AND MEDIASTINUM: Cardiomediastinal silhouette appears within normal limits.   Normal caliber thoracic aorta. No central vascular congestion. LUNGS AND PLEURAL SPACES: Retrocardiac left lower lobe subsegmental atelectasis. Lungs are otherwise adequately aerated with no confluent airspace opacity. No pleural effusion or pneumothorax. BONY THORAX AND SOFT TISSUES: No acute osseous abnormality. _______________     No active cardiopulmonary disease. XR CHEST PORT    Result Date: 8/31/2022  EXAM: XR CHEST PORT CLINICAL INDICATION/HISTORY: central line -Additional: None COMPARISON: 4 hours prior TECHNIQUE: Portable frontal view of the chest _______________ FINDINGS: SUPPORT DEVICES: Endotracheal tube, gastric tube, and left IJ approach central venous catheter noted. Tip of the central venous catheter is at the level of the superior cavoatrial junction. HEART AND MEDIASTINUM: Normal cardiac size and mediastinal contours. LUNGS AND PLEURAL SPACES: No focal pneumonic opacity. No evidence of pneumothorax or pleural effusion. BONY THORAX AND SOFT TISSUES: Unremarkable. _______________     1. Support devices in stable/appropriate position as visualized. 2. No superimposed acute radiographic cardiopulmonary abnormality. XR CHEST PORT    Result Date: 8/31/2022  XR CHEST PORT CLINICAL INDICATION/HISTORY: Tube placement -Additional: None COMPARISON: None TECHNIQUE: Portable frontal view of the chest _______________ FINDINGS: SUPPORT DEVICES: Enteric tube tip projecting over the thoracic inlet. Endotracheal tube tip in the midthoracic trachea. Left IJV central venous catheter tip at the caval atrial junction. HEART AND MEDIASTINUM: Cardiomediastinal silhouette within normal limits. LUNGS AND PLEURAL SPACES: No dense consolidation, large effusion or pneumothorax. _______________     Enteric tube tip projecting over the thoracic inlet. Endotracheal tube tip in the midthoracic trachea. No acute cardiopulmonary abnormality.     ECHO ADULT COMPLETE    Result Date: 8/31/2022  Formatting of this result is different from the original.   Left Ventricle: Normal left ventricular systolic function with a visually estimated EF of 60 - 65%. Not well visualized. Left ventricle size is normal. Normal wall thickness. Normal wall motion. Right Ventricle: Not well visualized. Right ventricle is moderately dilated. Moderately reduced systolic function. Visually moderately dilated RV and moderately reduced RV systolic function. Aortic Valve: Tricuspid valve. Tricuspid Valve: The estimated RVSP is 25 mmHg. Right Atrium: Not well visualized. Right atrium is moderately dilated. No old echocardiogram available to compare. 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 · No evidence of deep vein thrombosis in the left lower extremity. DUPLEX LOWER EXT VENOUS BILAT    Result Date: 9/1/2022  · Age indeterminate thrombus present in the right popliteal vein. · No evidence of deep vein thrombosis in the left lower extremity. Images report reviewed by me:  CT (Most Recent) (CT reviewed by me) Results from Hospital Encounter encounter on 08/31/22    CT HEAD WO CONT    Narrative  EXAM: CT head    INDICATION: Anoxic brain injury. COMPARISON: 8/31/2022    TECHNIQUE: Axial CT imaging of the head was performed without intravenous  contrast. Standard multiplanar coronal and sagittal reformatted images were  obtained and are included in interpretation. One or more dose reduction techniques were used on this CT: automated exposure  control, adjustment of the mAs and/or kVp according to patient size, and  iterative reconstruction techniques. The specific techniques used on this CT  exam have been documented in the patient's electronic medical record.   Digital  Imaging and Communications in Medicine (DICOM) format image data are available  to nonaffiliated external healthcare facilities or entities on a secure, media  free, reciprocally searchable basis with patient authorization for at least a  12-month period after this study. _______________    FINDINGS:    BRAIN AND POSTERIOR FOSSA: Mild patchy periventricular, deep and subcortical  white matter hypoattenuation which is nonspecific but likely represents chronic  ischemic changes. No evidence of acute large vessel transcortical infarct or  acute parenchymal hemorrhage. No midline shift or hydrocephalus. EXTRA-AXIAL SPACES AND MENINGES: There are no abnormal extra-axial fluid  collections. CALVARIUM: Intact. SINUSES: Right maxillary and ethmoid sinus mucosal disease. OTHER: None.    _______________    Impression  No acute intracranial abnormality. CXR reviewed by me:  XR (Most Recent). XR  reviewed by me and compared with previous XR Results from Hospital Encounter encounter on 08/31/22    XR CHEST PORT    Narrative  EXAM: XR CHEST PORT    CLINICAL INDICATION/HISTORY: Pulmonary infiltrate, ET tube position, acute  respiratory failure  > Additional: None. COMPARISON: September 5, 2022    TECHNIQUE: Portable chest    _______________    FINDINGS:    SUPPORT DEVICES: Endotracheal tube, nasogastric tube, esophageal probe, and left  IJ approach central catheter are unchanged. HEART AND MEDIASTINUM: The heart is stable in size and contour. LUNGS AND PLEURAL SPACES: The lungs are underexpanded with worsening hazy  opacity of the right lung base partially obscuring the diaphragm. Left basilar  volume loss and opacity also obscures the left hemidiaphragm, similar to  slightly progressive. No definite pneumothorax. BONY THORAX AND SOFT TISSUES: No acute osseous abnormality. _______________    Impression  1. Support lines and tubes as above remain unchanged and in satisfactory  position. 2. Progressive volume loss with bibasilar opacities, more so on the right  concerning for combination of atelectasis versus superimposed pneumonia. Small  pleural effusions not excluded.     *    ** *           ·Please note: Voice-recognition software may have been used to generate this report, which may have resulted in some phonetic-based errors in grammar and contents. Even though attempts were made to correct all the mistakes, some may have been missed, and remained in the body of the document.       Sangita Gong MD  9/7/2022

## 2022-09-07 NOTE — PROGRESS NOTES
Comprehensive Nutrition Assessment    Type and Reason for Visit: Reassess    Nutrition Recommendations/Plan:   Continue with tube feeding to meet nutritional needs. Pt currently at goal rate. 09/07/22 0732   Enteral Nutrition   Feeding Route Nasogastric   EN Formula Diabetic   Schedule Continuous   Feeding Regimen Glucerna 1.5 @ 55ml/hr. Additives/Modulars None   Water Flushes 300ml q4   Cuurent EN & Flush Order Provides Glucerna 1.5 @ 55ml/hr:   1980 kcal, 109g PRO, 1003 ml free water + 1800ml (2803)        Malnutrition Assessment:  Malnutrition Status: At risk for malnutrition (specify) (R/t NPO status with insufficient tube feeding order currently.) (09/05/22 1129)      Nutrition Assessment:    Presents with cardiac arrest, acute respiratory failure, GENO, sepsis, shock. Anoxic brain injury. Remains on vent. Tube feeding @ goal of 55ml/hr. Nutrition Related Findings:    GFR est nonAA 32/AA 38, creatinine 2.13, BUN 31. Lantus, lactulose, D5% w/ KCL @ 50ml/hr, lasix, humalog, protonix. BM 9/6. Wound Type: Skin tears    Current Nutrition Intake & Therapies:  Average Meal Intake: NPO  Average Supplement Intake: NPO  DIET NPO  ADULT TUBE FEEDING Nasogastric; Diabetic; Delivery Method: Continuous; Continuous Initial Rate (mL/hr): 20; Continuous Advance Tube Feeding: Yes; Advancement Volume (mL/hr): 10; Advancement Frequency: Q 8 hours; Continuous Goal Rate (mL/hr): 55; W... Anthropometric Measures:  Height: 5' 10\" (177.8 cm)  Ideal Body Weight (IBW): 166 lbs (75 kg)     Current Body Wt:  95 kg (209 lb 7 oz), 126.2 % IBW. Bed scale  Current BMI (kg/m2): 30.1        Weight Adjustment: No adjustment                 BMI Category: Obese class 1 (BMI 30.0-34. 9)    Estimated Daily Nutrient Needs:  Energy Requirements Based On: Formula ([facility] 2003b x1-1.1)  Weight Used for Energy Requirements: Current  Energy (kcal/day): 1879 - 2067  Weight Used for Protein Requirements: Current (0.8-1)  Protein (g/day): 76 - 95  Method Used for Fluid Requirements: 1 ml/kcal  Fluid (ml/day): 1879 - 2067    Nutrition Diagnosis:   Inadequate oral intake related to acute injury/trauma, impaired respiratory function as evidenced by intubation, nutrition support-enteral nutrition    Nutrition Interventions:   Food and/or Nutrient Delivery: Continue NPO, Continue tube feeding  Nutrition Education/Counseling: No recommendations at this time  Coordination of Nutrition Care: Continue to monitor while inpatient  Plan of Care discussed with: MD    Goals:  Previous Goal Met: Progressing toward goal(s)  Goals: Tolerate nutrition support at goal rate, by next RD assessment       Nutrition Monitoring and Evaluation:   Behavioral-Environmental Outcomes: None identified  Food/Nutrient Intake Outcomes: Diet advancement/tolerance, Enteral nutrition intake/tolerance  Physical Signs/Symptoms Outcomes: Biochemical data, Weight, GI status, Nausea/vomiting, Hemodynamic status    Discharge Planning:     Too soon to determine    Richard Quinteros RD

## 2022-09-07 NOTE — PROGRESS NOTES
/  /                        Cardiology Progress Note        Patient: Jessika Vela        Sex: male          DOA: 8/31/2022  YOB: 1960      Age:  65 y.o.        LOS:  LOS: 7 days   Assessment/Plan     Principal Problem:    Cardiac arrest ([address] (8/31/2022)    Active Problems:    Respiratory failure ([address] (8/31/2022)      GENO (acute kidney injury) ([address] (8/31/2022)      Disseminated herpes zoster (8/31/2022)      Hydronephrosis (8/31/2022)      Shock ([address] (8/31/2022)      Sepsis ([address] (8/31/2022)      UTI (urinary tract infection) (9/1/2022)      Brain anoxia ([address] (9/4/2022)      Plan:    9/6/2022  Cardiac telemetry sinus rhythm with occasional PACs and PVCs  Continue with metoprolol, amlodipine and Lasix        9/5/2022  No change from cardiac standpoint  Continue with current supportive treatment      9/4/2022  High-grade anoxic brain injury on MRI  Cardiac telemetry stable  Continue with current supportive treatment as per family  Discussed with RN and Dr. Wilburn Severin  Discussed with patient's wife and family          9/3/2022  Blood pressure is mildly elevated with mild tachycardia  I will add low-dose IV metoprolol 1.25 mg every 6 hours  Continue with rest of the treatment  Discussed with Dr. Violeta Gonzalez        9/2/2022  Patient remained intubated  Off sedation  Of Levophed  Cardiac telemetry stable with occasional PVCs  Continue with supportive treatment  Discussed with wife and family members  Discussed with Dr. Rose Muhammad arrest PEA arrest  Mild troponin elevation after CPR and PEA cardiac arrest, normal EF and wall motion, no evidence of ACS  DVT  Continue anticoagulation if no active bleeding  Continue with supportive treatment  I have long and lengthy discussion with family about management plan. All the questions were answered. 35 minutes of critical care time spent in the direct evaluation and treatment of this high risk patient.  The reason for providing this level of medical care for this critically ill patient was due a critical illness that impaired one or more vital organ systems such that there was a high probability of imminent or life threatening deterioration in the patients condition. This care involved high complexity decision making to assess, manipulate, and support vital system functions, to treat this degreee vital organ system failure and to prevent further life threatening deterioration of the patients condition. Subjective:    cc:  Cardiac arrest        REVIEW OF SYSTEMS:     Limited due to intubation      Objective:      Visit Vitals  BP (!) 168/106   Pulse 100   Temp 97.9 °F (36.6 °C)   Resp 19   Ht 5' 10\" (1.778 m)   Wt 95 kg (209 lb 7 oz)   SpO2 98%   BMI 30.05 kg/m²     Body mass index is 30.05 kg/m². Physical Exam:  General Appearance: Intubated  NECK: No JVD, no thyroidomeglay. LUNGS: Clear bilaterally. HEART: S1+S2 audible,    ABD: Non-tender, BS Audible    EXT: No edema, and no cysnosis. VASCULAR EXAM: Pulses are intact.     PSYCHIATRIC EXAM: Intubated    Medication:  Current Facility-Administered Medications   Medication Dose Route Frequency    insulin glargine (LANTUS) injection 15 Units  15 Units SubCUTAneous Q24H    heparin (porcine) injection 5,000 Units  5,000 Units SubCUTAneous Q8H    amLODIPine (NORVASC) tablet 5 mg  5 mg Oral DAILY    lactulose (CHRONULAC) 10 gram/15 mL solution 15 mL  15 mL Oral DAILY    furosemide (LASIX) injection 20 mg  20 mg IntraVENous DAILY    metoprolol (LOPRESSOR) injection 2.5 mg  2.5 mg IntraVENous Q6H PRN    metoprolol (LOPRESSOR) injection 1.25 mg  1.25 mg IntraVENous Q6H    acyclovir (ZOVIRAX) 750 mg in 0.9% sodium chloride 250 mL IVPB  10 mg/kg (Ideal) IntraVENous Q12H    insulin lispro (HUMALOG) injection   SubCUTAneous Q6H    ELECTROLYTE REPLACEMENT PROTOCOL - Potassium Renal Dosing  1 Each Other PRN    piperacillin-tazobactam (ZOSYN) 3.375 g in 0.9% sodium chloride (MBP/ADV) 100 mL MBP  3.375 g IntraVENous Q8H    sodium chloride (NS) flush 5-40 mL  5-40 mL IntraVENous Q8H    sodium chloride (NS) flush 5-40 mL  5-40 mL IntraVENous PRN    acetaminophen (TYLENOL) tablet 650 mg  650 mg Oral Q6H PRN    Or    acetaminophen (TYLENOL) suppository 650 mg  650 mg Rectal Q6H PRN    polyethylene glycol (MIRALAX) packet 17 g  17 g Oral DAILY PRN    ondansetron (ZOFRAN ODT) tablet 4 mg  4 mg Oral Q8H PRN    Or    ondansetron (ZOFRAN) injection 4 mg  4 mg IntraVENous Q6H PRN    pantoprazole (PROTONIX) 40 mg in 0.9% sodium chloride 10 mL injection  40 mg IntraVENous Q12H    levETIRAcetam (KEPPRA) 1,000 mg in 0.9% sodium chloride 100 mL IVPB  1,000 mg IntraVENous Q12H               Lab/Data Reviewed:  Procedures/imaging: see electronic medical records for all procedures/Xrays   and details which were not copied into this note but were reviewed prior to creation of Plan       All lab results for the last 24 hours reviewed. Recent Labs     09/07/22  0357 09/06/22  0436 09/05/22  0450   WBC 9.0 7.3 7.1   HGB 8.2* 7.7* 8.0*   HCT 26.2* 24.4* 25.6*    170 170       Recent Labs     09/07/22  0357 09/06/22  0940 09/06/22  0436 09/05/22  1824 09/05/22  0450     --  150*  --  152*   K 3.7 3.8 3.3*   < > 3.7     --  115*  --  119*   CO2 31  --  29  --  27   *  --  212*  --  188*   BUN 31*  --  30*  --  32*   CREA 2.13*  --  2.11*  --  2.26*   CA 8.1*  --  8.1*  --  8.0*    < > = values in this interval not displayed. RADIOLOGY:      CXR Results  (Last 48 hours)                 09/07/22 0654  XR CHEST PORT Final result    Impression:          1. Support lines and tubes as above remain unchanged and in satisfactory   position. 2. Progressive volume loss with bibasilar opacities, more so on the right   concerning for combination of atelectasis versus superimposed pneumonia. Small   pleural effusions not excluded.        *    ** *        Narrative:  EXAM: XR CHEST PORT       CLINICAL INDICATION/HISTORY: Pulmonary infiltrate, ET tube position, acute   respiratory failure     > Additional: None. COMPARISON: September 5, 2022       TECHNIQUE: Portable chest       _______________       FINDINGS:       SUPPORT DEVICES: Endotracheal tube, nasogastric tube, esophageal probe, and left   IJ approach central catheter are unchanged. HEART AND MEDIASTINUM: The heart is stable in size and contour. LUNGS AND PLEURAL SPACES: The lungs are underexpanded with worsening hazy   opacity of the right lung base partially obscuring the diaphragm. Left basilar   volume loss and opacity also obscures the left hemidiaphragm, similar to   slightly progressive. No definite pneumothorax. BONY THORAX AND SOFT TISSUES: No acute osseous abnormality.        _______________                     Cardiology Procedures:   Results for orders placed or performed during the hospital encounter of 08/31/22   EKG, 12 LEAD, INITIAL   Result Value Ref Range    Ventricular Rate 112 BPM    Atrial Rate 112 BPM    P-R Interval 158 ms    QRS Duration 100 ms    Q-T Interval 360 ms    QTC Calculation (Bezet) 491 ms    Calculated P Axis 73 degrees    Calculated R Axis -69 degrees    Calculated T Axis 51 degrees    Diagnosis       Sinus tachycardia  Left axis deviation  Low voltage QRS  Inferior infarct , age undetermined  Abnormal ECG  No previous ECGs available  Confirmed by Rahul López MD. ([id number]) on 8/31/2022 11:30:53 PM        Echo Results  (Last 48 hours)      None         Cardiolite (Tc-99m Sestamibi) stress test    Signed By: Louis Garber MD     September 7, 2022

## 2022-09-08 NOTE — PROGRESS NOTES
Palliative Medicine    CODE STATUS: DNR in the event of cardiac arrest. No re-intubation if he is extubated    AMD Status: none on file. Rosangela Crum, wife,  is his legal next of kin     9/8/2022 0910 Seen today in room ICU 4 along with Katharine Clarke NP. Lying in bed supine. Intubated/ventilated. FiO2 35% PEEP 5. No purposeful movements. Opens eyes sporadically but no tracking. No sedation on    Met with Rosangela Crum, wife, and two other family members in the waiting room. They voiced desire to proceed with compassionate extubation but want Mr Flo Veliz' brother to arrive. He is flying in tomorrow afternoon. The decision was made to proceed with extubation on Saturday morning when they are prepared. Procedure described. They agreed to institute a DNR in the event of of cardiac arrest now. Orders placed. Intensivist, hospitalist, and clinical nurse updated with plan. Disposition plan: anticipate he will die soon after extubation. Palliative care will continue to follow Josie Hermosillo  and his family during his hospitalization and support them as they make healthcare decisions and define goals of care.       Kayli Jimenez, RN, MSN  Palliative Medicine  P: [phone]

## 2022-09-08 NOTE — PROGRESS NOTES
/  /                        Cardiology Progress Note        Patient: Negrito Danielle        Sex: male          DOA: 8/31/2022  YOB: 1960      Age:  65 y.o.        LOS:  LOS: 8 days   Assessment/Plan     Principal Problem:    Cardiac arrest ([address] (8/31/2022)    Active Problems:    Respiratory failure ([address] (8/31/2022)      GENO (acute kidney injury) ([address] (8/31/2022)      Disseminated herpes zoster (8/31/2022)      Hydronephrosis (8/31/2022)      Shock ([address] (8/31/2022)      Sepsis ([address] (8/31/2022)      UTI (urinary tract infection) (9/1/2022)      Brain anoxia ([address] (9/4/2022)      Plan:  9/8/2022  Cardiac telemetry stable  Stable blood pressure  Cardiac status unchanged      9/6/2022  Cardiac telemetry sinus rhythm with occasional PACs and PVCs  Continue with metoprolol, amlodipine and Lasix        9/5/2022  No change from cardiac standpoint  Continue with current supportive treatment      9/4/2022  High-grade anoxic brain injury on MRI  Cardiac telemetry stable  Continue with current supportive treatment as per family  Discussed with RN and Dr. Gucci Aguiar  Discussed with patient's wife and family          9/3/2022  Blood pressure is mildly elevated with mild tachycardia  I will add low-dose IV metoprolol 1.25 mg every 6 hours  Continue with rest of the treatment  Discussed with Dr. Magalis Smith        9/2/2022  Patient remained intubated  Off sedation  Of Levophed  Cardiac telemetry stable with occasional PVCs  Continue with supportive treatment  Discussed with wife and family members  Discussed with Dr. Magalis Smith    Cardiac arrest PEA arrest  Mild troponin elevation after CPR and PEA cardiac arrest, normal EF and wall motion, no evidence of ACS  DVT  Continue anticoagulation if no active bleeding  Continue with supportive treatment  I have long and lengthy discussion with family about management plan. All the questions were answered.                    35 minutes of critical care time spent in the direct evaluation and treatment of this high risk patient. The reason for providing this level of medical care for this critically ill patient was due a critical illness that impaired one or more vital organ systems such that there was a high probability of imminent or life threatening deterioration in the patients condition. This care involved high complexity decision making to assess, manipulate, and support vital system functions, to treat this degreee vital organ system failure and to prevent further life threatening deterioration of the patients condition. Subjective:    cc:  Cardiac arrest        REVIEW OF SYSTEMS:     Limited due to intubation      Objective:      Visit Vitals  /82   Pulse 98   Temp 99.1 °F (37.3 °C)   Resp 11   Ht 5' 10\" (1.778 m)   Wt 92 kg (202 lb 13.2 oz)   SpO2 100%   BMI 29.10 kg/m²     Body mass index is 29.1 kg/m². Physical Exam:  General Appearance: Intubated  NECK: No JVD, no thyroidomeglay. LUNGS: Clear bilaterally. HEART: S1+S2 audible,    ABD: Non-tender, BS Audible    EXT: No edema, and no cysnosis. VASCULAR EXAM: Pulses are intact.     PSYCHIATRIC EXAM: Intubated    Medication:  Current Facility-Administered Medications   Medication Dose Route Frequency    insulin glargine (LANTUS) injection 15 Units  15 Units SubCUTAneous Q24H    heparin (porcine) injection 5,000 Units  5,000 Units SubCUTAneous Q8H    amLODIPine (NORVASC) tablet 5 mg  5 mg Oral DAILY    lactulose (CHRONULAC) 10 gram/15 mL solution 15 mL  15 mL Oral DAILY    furosemide (LASIX) injection 20 mg  20 mg IntraVENous DAILY    metoprolol (LOPRESSOR) injection 2.5 mg  2.5 mg IntraVENous Q6H PRN    metoprolol (LOPRESSOR) injection 1.25 mg  1.25 mg IntraVENous Q6H    insulin lispro (HUMALOG) injection   SubCUTAneous Q6H    ELECTROLYTE REPLACEMENT PROTOCOL - Potassium Renal Dosing  1 Each Other PRN    piperacillin-tazobactam (ZOSYN) 3.375 g in 0.9% sodium chloride (MBP/ADV) 100 mL MBP  3.375 g IntraVENous Q8H sodium chloride (NS) flush 5-40 mL  5-40 mL IntraVENous Q8H    sodium chloride (NS) flush 5-40 mL  5-40 mL IntraVENous PRN    acetaminophen (TYLENOL) tablet 650 mg  650 mg Oral Q6H PRN    Or    acetaminophen (TYLENOL) suppository 650 mg  650 mg Rectal Q6H PRN    polyethylene glycol (MIRALAX) packet 17 g  17 g Oral DAILY PRN    ondansetron (ZOFRAN ODT) tablet 4 mg  4 mg Oral Q8H PRN    Or    ondansetron (ZOFRAN) injection 4 mg  4 mg IntraVENous Q6H PRN    pantoprazole (PROTONIX) 40 mg in 0.9% sodium chloride 10 mL injection  40 mg IntraVENous Q12H    levETIRAcetam (KEPPRA) 1,000 mg in 0.9% sodium chloride 100 mL IVPB  1,000 mg IntraVENous Q12H               Lab/Data Reviewed:  Procedures/imaging: see electronic medical records for all procedures/Xrays   and details which were not copied into this note but were reviewed prior to creation of Plan       All lab results for the last 24 hours reviewed. Recent Labs     09/08/22 0427 09/07/22  0357 09/06/22  0436   WBC 10.1 9.0 7.3   HGB 9.1* 8.2* 7.7*   HCT 28.4* 26.2* 24.4*    195 170       Recent Labs     09/08/22 0427 09/07/22  0357 09/06/22  0940 09/06/22  0436    145  --  150*   K 4.7 3.7 3.8 3.3*    109  --  115*   CO2 30 31  --  29   * 184*  --  212*   BUN 34* 31*  --  30*   CREA 2.36* 2.13*  --  2.11*   CA 8.5 8.1*  --  8.1*         RADIOLOGY:      CXR Results  (Last 48 hours)                 09/07/22 0654  XR CHEST PORT Final result    Impression:          1. Support lines and tubes as above remain unchanged and in satisfactory   position. 2. Progressive volume loss with bibasilar opacities, more so on the right   concerning for combination of atelectasis versus superimposed pneumonia. Small   pleural effusions not excluded. *    ** *        Narrative:  EXAM: XR CHEST PORT       CLINICAL INDICATION/HISTORY: Pulmonary infiltrate, ET tube position, acute   respiratory failure     > Additional: None.        COMPARISON: September 5, 2022       TECHNIQUE: Portable chest       _______________       FINDINGS:       SUPPORT DEVICES: Endotracheal tube, nasogastric tube, esophageal probe, and left   IJ approach central catheter are unchanged. HEART AND MEDIASTINUM: The heart is stable in size and contour. LUNGS AND PLEURAL SPACES: The lungs are underexpanded with worsening hazy   opacity of the right lung base partially obscuring the diaphragm. Left basilar   volume loss and opacity also obscures the left hemidiaphragm, similar to   slightly progressive. No definite pneumothorax. BONY THORAX AND SOFT TISSUES: No acute osseous abnormality.        _______________                     Cardiology Procedures:   Results for orders placed or performed during the hospital encounter of 08/31/22   EKG, 12 LEAD, INITIAL   Result Value Ref Range    Ventricular Rate 112 BPM    Atrial Rate 112 BPM    P-R Interval 158 ms    QRS Duration 100 ms    Q-T Interval 360 ms    QTC Calculation (Bezet) 491 ms    Calculated P Axis 73 degrees    Calculated R Axis -69 degrees    Calculated T Axis 51 degrees    Diagnosis       Sinus tachycardia  Left axis deviation  Low voltage QRS  Inferior infarct , age undetermined  Abnormal ECG  No previous ECGs available  Confirmed by Keli Ding MD. ([id number]) on 8/31/2022 11:30:53 PM        Echo Results  (Last 48 hours)      None         Cardiolite (Tc-99m Sestamibi) stress test    Signed By: Rosa You MD     September 8, 2022

## 2022-09-08 NOTE — PROGRESS NOTES
Palliative Medicine Consult    Patient Name: Cassi Johnson  YOB: 1960    Date of Initial Consult: 9/1/2022  Date of follow up: 9/8/2022  Reason for Consult: goals of care discussions  Requesting Provider: Dr. Elliott Daley  Primary Care Physician: Fernanda Hay MD      SUMMARY:   Cassi Johnson is a 65 y.o. with a past history of hypertension, dyslipidemia, type 2 diabetes mellitus, vitamin D deficiency, stage 3a CKD and gout , who was admitted on 8/31/2022 from home with a diagnosis of cardiac arrest, acute respiratory failure, sepsis, shock, disseminated herpes zoster, and Severe left and moderate right hydroureteronephrosis with marked urinary bladder distention. Current medical issues leading to Palliative Medicine involvement include: goals of care discussions. 9/6/2022: Patient remains orally intubated, not sedated, no response to noxious stimuli. 9/8/2022: Patient remains orally intubated, and unresponsive on no sedation. PALLIATIVE DIAGNOSES:   Goals of care discussions  Cardiac arrest  Acute respiratory failure  Sepsis, shock  Disseminated herpes zoster  Severe left and moderate right hydroureteronephrosis with marked urinary bladder distention       PLAN:   9/8/2022: Palliative medicine team including Jo Corey RN and I met with patient at patient's bedside. Patient remains orally intubated, and unresponsive on no sedation. Wife and family at bedside and stated they are ready to readdress goals of care. Met with wife and family in ICU waiting room; wife shares that they are waiting for patient's brother to arrive from out of town (estimated arrival Friday afternoon/evening). Wife states that once all family is here, they plan to move forward with comfort measures and compassionate extubation on Saturday 9/10/2022.  Explained comfort measures in detail including the initiation of medication for comfort, removal of ventilator support, and discontinuation of all medications, labs, tests, and treatments not intended for comfort. Family verbalizes understanding. Addressed goals of care in interim, Wife wants to change code status to DNR, do not re-intubate for any reason, and no escalation of care. Otherwise, continue current level of care until Saturday when family makes final goals of care decisions. Discussed with intensivist, hospitalist, and RN. See previous discussions below:    9/6/2022: Palliative medicine team including Wisam Wick RN and I met with patient at patient's bedside. Patient is orally intubated, no response to noxious stimuli. Family meeting in ICU waiting room with spouse and family to readdress goals of care. Discussed the benefits and burdens of continuing aggressive measures with potential option of trach/peg in the near future versus implementing comfort measures with compassionate extubation. Family states they have a strong katelynn in God, and that they understand the anoxic brain injury and the poor prognosis. Family wishes to meet again in 24 hours to readdress goals of care (would like to talk as a family about treatment options). At this time, no changes in goals of care. Continue Full code with full interventions. See previous discussions below:    9/1/2022: Goals of care discussions: Palliative medicine team including Wisam Wick RN and I met with patient at patient's bedside. Patient is orally intubated, no response to noxious stimuli. Ashanti Vallejo at bedside. Family meeting in ICU waiting room today with patient's wife Cintia Reddy, patient's daughter, and 2 sister-in laws. Family has a good understanding of current health situation and they are waiting for results from ongoing neurological testing to determine the existence of and the severity of anoxic brain injury. Prior to admission, patient was independent in ADLs, worked as a  at [employer], and is a  in [employer].  Patient has no known AMD, and his wife Fabio Abrams is legal NOK. Family is hopeful for recovery but recognize consideration of quality of life will be discussed further based on patient's response to medical treatment. Discussed the benefits and burdens of CPR in the event of cardiopulmonary arrest.  Encouraged ongoing conversations as patient is very high risk for unsuccessful attempts and/or post-arrest complications. At this time continue full code with full interventions. We will continue to follow with you. Cardiac arrest: Multiple, witnessed by EMS, total downtime ~30 minutes. Cardiology following. EEG done for myoclonic movements revealing: \"This EEG is abnormal due to severely depressed EEG background, which may be due to severe anoxic brain injury. \"   Acute respiratory failure: secondary to number 2. Orally intubated on mechanical vent, managed by PCCM. SBT ongoing. Sepsis, shock: likely due to urinary source. On IVAB per primary team. Lyle placed by urology for retention, bleeding, and hydronephrosis. Disseminated herpes zoster: Family reports patient was experiencing significant pain. Severe left and moderate right hydroureteronephrosis with marked urinary bladder distention: Lyle placed by urology for retention, bleeding, and hydronephrosis. Initial consult note routed to primary continuity provider  Communicated plan of care with: Palliative IDT       GOALS OF CARE / TREATMENT PREFERENCES:   [====Goals of Care====]  GOALS OF CARE: DNR, do not re-intubate for any reason, no escalation of care  Patient/Health Care Proxy Stated Goals:  Other (comment) (family deciding on compassionate extubation soon)      TREATMENT PREFERENCES:   Code Status: DNR    Advance Care Planning:  Advance Care Planning 9/1/2022   Patient's Healthcare Decision Maker is: Legal Next of Kin   Confirm Advance Directive None   Patient Would Like to Complete Advance Directive Unable                   The palliative care team has discussed with patient / health care proxy about goals of care / treatment preferences for patient.  [====Goals of Care====]         HISTORY:     History obtained from: patient's chart, family    CHIEF COMPLAINT: cardiac arrest    HPI/SUBJECTIVE:    The patient is:   [] Verbal and participatory  [x] Non-participatory due to:   Orally intubated on mechanical ventilator, no response to noxious stimuli      Clinical Pain Assessment (nonverbal scale for severity on nonverbal patients):   Clinical Pain Assessment  Severity: 0    Adult Nonverbal Pain Scale  Face: No particular expression or smile  Activity (Movement): Lying quietly, normal position  Guarding: Lying quietly, no positioning of hands over areas of body  Physiology (Vital Signs): Stable vital signs  Respiratory: Baseline RR/SpO2 compliant with ventilator  Total Score: 0       FUNCTIONAL ASSESSMENT:     Palliative Performance Scale (PPS):  PPS: 30       PSYCHOSOCIAL/SPIRITUAL SCREENING:     Advance Care Planning:  Advance Care Planning 9/1/2022   Patient's Healthcare Decision Maker is: Legal Next of Kin   Confirm Advance Directive None   Patient Would Like to Complete Advance Directive Unable        Any spiritual / Roman Catholic concerns: unable to assess  [] Yes /  [] No    Caregiver Burnout:  [] Yes /  [x] No /  [] No Caregiver Present      Anticipatory grief assessment: unable to assess  [] Normal  / [] Maladaptive             REVIEW OF SYSTEMS:     Positive and pertinent negative findings in ROS are noted above in HPI. The following systems were [] reviewed / [x] unable to be reviewed as noted in HPI  Other findings are noted below. Systems: constitutional, ears/nose/mouth/throat, respiratory, gastrointestinal, genitourinary, musculoskeletal, integumentary, neurologic, psychiatric, endocrine. Positive findings noted below. Modified ESAS Completed by: provider           Pain: 0           Dyspnea: 0           Stool Occurrence(s): 0        PHYSICAL EXAM:     From RN flowsheet:   Wt Readings from Last 3 Encounters:   09/05/22 95 kg (209 lb 7 oz)     Blood pressure 137/84, pulse 98, temperature 98.4 °F (36.9 °C), resp. rate 13, height 5' 10\" (1.778 m), weight 95 kg (209 lb 7 oz), SpO2 100 %. Pain Scale 1: Adult Nonverbal Pain Scale  Pain Intensity 1: 0                   Constitutional: orally intubated, critically ill appearing  Eyes: closed  ENMT: orally intubated   Cardiovascular: regular rhythm, distal pulses intact  Respiratory: orally intubated on mechanical ventilation  Gastrointestinal: soft non-tender   Musculoskeletal: no deformity, no tenderness to palpation  Skin: warm, dry  Neurologic: off sedation, no response to noxious stimuli        HISTORY:     Principal Problem:    Cardiac arrest ([facility] [address] [facility] (8/31/2022)    Active Problems:    Respiratory failure ([address] (8/31/2022)      GENO (acute kidney injury) ([address] (8/31/2022)      Disseminated herpes zoster (8/31/2022)      Hydronephrosis (8/31/2022)      Shock ([address] (8/31/2022)      Sepsis ([address] (8/31/2022)      UTI (urinary tract infection) (9/1/2022)      Brain anoxia ([address] (9/4/2022)    Past Medical History:   Diagnosis Date    Gout     Shingles       No past surgical history on file. No family history on file. History reviewed, no pertinent family history.   Social History     Tobacco Use    Smoking status: Not on file    Smokeless tobacco: Not on file   Substance Use Topics    Alcohol use: Not on file     No Known Allergies   Current Facility-Administered Medications   Medication Dose Route Frequency    insulin glargine (LANTUS) injection 15 Units  15 Units SubCUTAneous Q24H    heparin (porcine) injection 5,000 Units  5,000 Units SubCUTAneous Q8H    amLODIPine (NORVASC) tablet 5 mg  5 mg Oral DAILY    lactulose (CHRONULAC) 10 gram/15 mL solution 15 mL  15 mL Oral DAILY    furosemide (LASIX) injection 20 mg  20 mg IntraVENous DAILY    metoprolol (LOPRESSOR) injection 2.5 mg  2.5 mg IntraVENous Q6H PRN    metoprolol (LOPRESSOR) injection 1.25 mg  1.25 mg IntraVENous Q6H    insulin lispro (HUMALOG) injection   SubCUTAneous Q6H    ELECTROLYTE REPLACEMENT PROTOCOL - Potassium Renal Dosing  1 Each Other PRN    piperacillin-tazobactam (ZOSYN) 3.375 g in 0.9% sodium chloride (MBP/ADV) 100 mL MBP  3.375 g IntraVENous Q8H    sodium chloride (NS) flush 5-40 mL  5-40 mL IntraVENous Q8H    sodium chloride (NS) flush 5-40 mL  5-40 mL IntraVENous PRN    acetaminophen (TYLENOL) tablet 650 mg  650 mg Oral Q6H PRN    Or    acetaminophen (TYLENOL) suppository 650 mg  650 mg Rectal Q6H PRN    polyethylene glycol (MIRALAX) packet 17 g  17 g Oral DAILY PRN    ondansetron (ZOFRAN ODT) tablet 4 mg  4 mg Oral Q8H PRN    Or    ondansetron (ZOFRAN) injection 4 mg  4 mg IntraVENous Q6H PRN    pantoprazole (PROTONIX) 40 mg in 0.9% sodium chloride 10 mL injection  40 mg IntraVENous Q12H    levETIRAcetam (KEPPRA) 1,000 mg in 0.9% sodium chloride 100 mL IVPB  1,000 mg IntraVENous Q12H          LAB AND IMAGING FINDINGS:     Lab Results   Component Value Date/Time    WBC 10.1 09/08/2022 04:27 AM    HGB 9.1 (L) 09/08/2022 04:27 AM    PLATELET 550 84/67/8580 04:27 AM     Lab Results   Component Value Date/Time    Sodium 145 09/08/2022 04:27 AM    Potassium 4.7 09/08/2022 04:27 AM    Chloride 108 09/08/2022 04:27 AM    CO2 30 09/08/2022 04:27 AM    BUN 34 (H) 09/08/2022 04:27 AM    Creatinine 2.36 (H) 09/08/2022 04:27 AM    Calcium 8.5 09/08/2022 04:27 AM    Magnesium 2.2 09/08/2022 04:27 AM    Phosphorus 3.1 09/08/2022 04:27 AM      Lab Results   Component Value Date/Time    Alk.  phosphatase 188 (H) 09/06/2022 04:36 AM    Protein, total 5.8 (L) 09/06/2022 04:36 AM    Albumin 1.7 (L) 09/06/2022 04:36 AM    Globulin 4.1 (H) 09/06/2022 04:36 AM     Lab Results   Component Value Date/Time    INR 1.1 09/08/2022 04:27 AM    Prothrombin time 14.1 09/08/2022 04:27 AM    aPTT 27.9 09/05/2022 11:27 AM      No results found for: IRON, FE, TIBC, IBCT, PSAT, FERR   No results found for: PH, PCO2, PO2  No components found for: GLPOC   No results found for: CPK, CKMB             Total time: 35 minutes  Counseling / coordination time, spent as noted above:   > 50% counseling / coordination?: yes, patient, family, and medical team    Prolonged service was provided for  []30 min   []75 min in face to face time in the presence of the patient, spent as noted above.   Time Start:   Time End:

## 2022-09-08 NOTE — DIABETES MGMT
Diabetes/ Glycemic Control Plan of Care  Recommendations: If BG remains >180 mg/dL today, recommend increasing Lantus to 18 units q 24 hrs - starting with next scheduled dose. Assessment: Blood sugars 102-236 mg/dL over the past 24 hours, with a total of 24 units insulin (15 basal + 9 corrective). IV dextrose discontinued yesterday and Lantus increased. Patient discussed during ICU interdisciplinary rounds - no changes to be made at this time. However, if BG remains above target today, recommend increasing basal with next scheduled dose.         Recent Glucose Results:   Lab Results   Component Value Date/Time     (H) 09/08/2022 04:27 AM    GLUCPOC 236 (H) 09/08/2022 11:35 AM    GLUCPOC 190 (H) 09/08/2022 05:05 AM    GLUCPOC 136 (H) 09/07/2022 11:37 PM         Within target range (70-180mg/dL):  NO  Current insulin orders:    Lantus 15 units every 24 hours   Corrective Humalog Q6 hours - very resistant scale  Total Daily Dose previous 24 hours = 24 units     Plan/Goals:   Blood glucose will be within target of 70 - 180 mg/dl within 72 hours    Education:  [] Refer to Diabetes Education Record                       [x] Education not indicated at this time       [address], [address] [facility]  Glycemic Control Team   Phone:  [phone]  Tues - Thurs 8:30 - 4:30

## 2022-09-08 NOTE — PROGRESS NOTES
[facility] Specialists  Pulmonary, Critical Care, and Sleep Medicine    Name: Srini Singh MRN: [id number]   : 1960 Hospital: [facility] [place]    Date: 2022  Room: [address] [place] [address] Note                                              Consult requesting physician: Dr. Dr. Cleveland Mccormick   Reason for Consult: Cardiac arrest , shock, respiratory failure     IMPRESSION:   Acute respiratory failure, post-arrest - J96.0  Cardiac arrest - I46.9  Sepsis - A41.9, R65.20  Anemia - D64.9  Chronic DVT right leg  Elevated LFTs, post-arrest  Anoxic encephalopathy post-arrest   Pituitary hemorrhage/hematoma  Hypernatremia, improved - E87.0  Patient Active Problem List   Diagnosis Code    Cardiac arrest ([facility] [address] I46.9    Respiratory failure ([facility] [address] J96.90    GENO (acute kidney injury) ([facility] [address] N17.9    Disseminated herpes zoster B02.7    Hydronephrosis N13.30    Shock ([facility] [address] R57.9    Sepsis ([facility] [address] A41.9    UTI (urinary tract infection) N39.0    Brain anoxia ([facility] [address] G93.1     Code status: Full Code       RECOMMENDATIONS:     Respiratory: Acute respiratory failure secondary to post cardiac arrest anoxic encephalopathy  On VC+, FiO2 35%, PEEP 5+  Chest x-ray for follow-up in a.m. tomorrow  Monitor pl effusion on f/up - likely reactive with fluid resuscitation, hypoalbuminemia  Ventilator bundle & Sedation protocol followed. Not on any sedation; daily assessment for readiness for SBT - episodes of apnea during SBT at bedside today - not a candidate given unreliable ability to protect airways. Chlorhexidine mouth washes. Keep SPO2 >=92%. HOB 30 degree elevation all the time. Aggressive pulmonary toileting. Aspiration precautions. CVS: BP stable  On Norvasc, monitor blood pressure and adjust dose as needed  Cardiology following    ID: No leukocytosis or fever  ID following  Blood cultures 2022: NGT final  Antibiotics: Zosyn  Antiviral: Acyclovir finish 22 per ID  Sepsis bundle and protocol followed. Follow cultures. Deescalate antibiotic when appropriate per ID. Hematology/Oncology: Stable Hgb and Plt  No bleeding anywhere  Monitor CBC daily  Okay to use heparin for DVT prophylaxis per neurology  Not a candidate for full dose anticoagulation  Has a small chronic calf vein DVT right lower extremity  Follow-up PVL study this week ordered - hold for now - family under consideration for comfort measures this week  Vascular surgery is aware-if worsening in DVT during any future exam then may need IVC filter    Renal: Monitor S. Na+ daily  Free water bolus to 300 mL every 4 hours while NG tube  Monitor renal functions, electrolytes, urine output  Any renal evaluation as per clinical course    GI/: PPI for GI prophylaxis  Tube feeding via NG tube at goal  Lyle was placed by urology; no hematuria    Endocrine: Monitor BS. SSI and adjust Lantus    Neurology: Not on any sedation  Opens eyes intermittently spontaneously, does not track or follow commands, no involuntary movements  Moves extremities inward slightly to painful stimuli  Continue Keppra per neurology  EEG abnormal  MRI brain 9/3/2022: High-grade acute/subacute anoxic changes  Neurology following    Toxicology: UDS negative 8/31/22    Pain/Sedation: Avoid sedatives, hypnotic if possible    Skin/Wound: As per nursing care    Electrolytes: Replace electrolytes per ICU electrolyte replacement protocol. Prophylaxis: DVT Prophylaxis: SCD/heparin. GI Prophylaxis: PPI. Restraints: none. May use wrist soft restraints for patient interfering with medical therapy/management and patient safety. Lines/Tubes: PIV  ETT: 8/31/2021. OGT: 9/2/22. Central line: 8/31/22 LT IJ (site examined, no erythema, induration, discharge or sign of infection. Dressing intact. Medically necessary, will remove it when not needed. Central line bundle followed).    Lyle: 8/31/22 (Medically necessary for strict input/output monitoring in critically ill patient, will remove it when not needed. Lyle bundle followed). Advance Directive/Palliative Care: Full code. Palliative care Consulted. Will defer respective systems problem management to primary and other respective consultant and follow patient in ICU with primary and other medical team.  Further recommendations will be based on the patient's response to recommended treatment and results of the investigation ordered. Quality Care: PPI, DVT prophylaxis, HOB elevated, Infection control all reviewed and addressed. Care of plan d/w RN, RT, MDR, hospitalist team.  Met and discussed with family including wife, other 09/06/22 and 09/7/22; answered all questions to their satisfaction. Discussed overall, poor long-term prognosis with high risk for prolonged hospitalization, prolonged ventilator need, nosocomial infection, hemodynamic instability, cardiac arrest, need for RRT, bleeding, morbidity, mortality, other. Family considering comfort care measures over this weekend    High complexity decision making was performed during the evaluation of this patient at high risk for decompensation with multiple organ involvement. Total critical care time spent rendering care exclusive of procedures/family discussion/coordination of care: 36 minutes. Subjective/History of Present Illness:   Patient is a 65 y.o. male with PMHx significant for HTN/CRI/DM recently diagnosed with severe zoster. Patient was on antiviral tx, very weak, sob, cough. EMS called, in ambulance 3 times cardiac arrest. Witnessed CPR/Epi. Repored PEA. 6 doses of epi and no shocks. He came on epi drip but in ER chnaged to levophed. Pateitn had seizure vs myocloninc jerks was started on propofol.     9/8/2022 :   Patient seen at bedside in ICU room #104  Ventilator support stable; no major ET secretions  Unable to tolerate bedside SBT secondary to apnea episode  Eyes open spontaneously and intermittently, less rightward gaze, no tracking, does not follow commands, no involuntary movements, some inward upper extremities movements to painful stimuli  BP stable; on Norvasc  No fever. Good urine output. Tolerating tube feeding  No family at bedside  [facility] was not contacted by staff on anything about patient overnight. I/O last 24 hrs: Intake/Output Summary (Last 24 hours) at 9/8/2022 0844  Last data filed at 9/8/2022 0800  Gross per 24 hour   Intake 2265 ml   Output 850 ml   Net 1415 ml           The patient is critically ill and can not provide additional history due to Unconsciousness and Ventilated    History taken from EMR    Review of Systems:  ROS not obtained due to patient factor. No Known Allergies     Past Medical History:   Diagnosis Date    Gout     Shingles       No past surgical history on file. Social History     Tobacco Use    Smoking status: Not on file    Smokeless tobacco: Not on file   Substance Use Topics    Alcohol use: Not on file      No family history on file.      Prior to Admission medications    Not on File     Current Facility-Administered Medications   Medication Dose Route Frequency    insulin glargine (LANTUS) injection 15 Units  15 Units SubCUTAneous Q24H    heparin (porcine) injection 5,000 Units  5,000 Units SubCUTAneous Q8H    amLODIPine (NORVASC) tablet 5 mg  5 mg Oral DAILY    lactulose (CHRONULAC) 10 gram/15 mL solution 15 mL  15 mL Oral DAILY    furosemide (LASIX) injection 20 mg  20 mg IntraVENous DAILY    metoprolol (LOPRESSOR) injection 1.25 mg  1.25 mg IntraVENous Q6H    acyclovir (ZOVIRAX) 750 mg in 0.9% sodium chloride 250 mL IVPB  10 mg/kg (Ideal) IntraVENous Q12H    insulin lispro (HUMALOG) injection   SubCUTAneous Q6H    piperacillin-tazobactam (ZOSYN) 3.375 g in 0.9% sodium chloride (MBP/ADV) 100 mL MBP  3.375 g IntraVENous Q8H    sodium chloride (NS) flush 5-40 mL  5-40 mL IntraVENous Q8H    pantoprazole (PROTONIX) 40 mg in 0.9% sodium chloride 10 mL injection  40 mg IntraVENous Q12H    levETIRAcetam (KEPPRA) 1,000 mg in 0.9% sodium chloride 100 mL IVPB  1,000 mg IntraVENous Q12H         Objective:   Vital Signs:    Visit Vitals  /84   Pulse 98   Temp 98.4 °F (36.9 °C)   Resp 13   Ht 5' 10\" (1.778 m)   Wt 95 kg (209 lb 7 oz)   SpO2 100%   BMI 30.05 kg/m²       O2 Device: Ventilator, Endotracheal tube       Temp (24hrs), Av.3 °F (36.8 °C), Min:97.7 °F (36.5 °C), Max:98.8 °F (37.1 °C)       Intake/Output:   Last shift:       07 -  190  In: 300   Out: -     Last 3 shifts:  190 -  0700  In: 0455 [I.V.:1750]  Out: 2450 [Urine:2450]      Intake/Output Summary (Last 24 hours) at 2022 0844  Last data filed at 2022 0800  Gross per 24 hour   Intake 2265 ml   Output 850 ml   Net 1415 ml         Last 3 Recorded Weights in this Encounter    22 1524 22 0757 22 0904   Weight: 111.6 kg (246 lb) 103.9 kg (229 lb 0.9 oz) 95 kg (209 lb 7 oz)         Ventilator Settings:  Mode Rate Tidal Volume Pressure FiO2 PEEP   Assist control   450 ml    35 % 5 cm H20     Peak airway pressure: 17 cm H2O    Plateau pressure:     Tidal volume:    Minute ventilation: 7.29 l/min     No results for input(s): PHI, PHI, POC2, PCO2I, PO2, PO2I, HCO3, HCO3I, FIO2, FIO2I in the last 72 hours. Physical Exam:     General/Neurology: eyes open, less rightward gaze, no tracking, does not follow commands, no involuntary movements, some inward upper extremities movements to painful stimuli, exam limitation on ventilator  Head:   Normocephalic, without obvious abnormality, atraumatic. Eye:   No scleral icterus, no pallor. Nose:   No sinus tenderness  Throat:  Visible lips, mucosa, and tongue normal. ET and OG tubes in place  Neck:   Supple, symmetric. No lymphadenopathy. Trachea midline  Lung: Moderate air entry bilateral equal. No wheezing. No stridors. No prolongded expiration. No accessory muscle use. Heart:   Regular rate & rhythm. S1 S2 present. No murmur. No JVD. Abdomen:  Soft. NT. ND. +BS. No masses. No guarding, rigidity or rebound  Extremities:  Trace BL pedal edema. No cyanosis. Pulses: 2+ and symmetric in DP. Capillary refill: normal BL  Lymphatic:  No cervical or supraclavicular palpable lymphadenopathy. Musculoskeletal: No joint swelling. No tenderness. Skin:   Color, texture, turgor fair      Data:       Recent Results (from the past 24 hour(s))   GLUCOSE, POC    Collection Time: 09/07/22 11:37 AM   Result Value Ref Range    Glucose (POC) 202 (H) 70 - 110 mg/dL   GLUCOSE, POC    Collection Time: 09/07/22  6:13 PM   Result Value Ref Range    Glucose (POC) 102 70 - 110 mg/dL   GLUCOSE, POC    Collection Time: 09/07/22 11:37 PM   Result Value Ref Range    Glucose (POC) 136 (H) 70 - 110 mg/dL   CBC WITH AUTOMATED DIFF    Collection Time: 09/08/22  4:27 AM   Result Value Ref Range    WBC 10.1 4.6 - 13.2 K/uL    RBC 3.12 (L) 4.35 - 5.65 M/uL    HGB 9.1 (L) 13.0 - 16.0 g/dL    HCT 28.4 (L) 36.0 - 48.0 %    MCV 91.0 78.0 - 100.0 FL    MCH 29.2 24.0 - 34.0 PG    MCHC 32.0 31.0 - 37.0 g/dL    RDW 16.5 (H) 11.6 - 14.5 %    PLATELET 949 186 - 691 K/uL    MPV 11.3 9.2 - 11.8 FL    NRBC 1.1 (H) 0  WBC    ABSOLUTE NRBC 0.11 (H) 0.00 - 0.01 K/uL    NEUTROPHILS 72 40 - 73 %    LYMPHOCYTES 15 (L) 21 - 52 %    MONOCYTES 6 3 - 10 %    EOSINOPHILS 4 0 - 5 %    BASOPHILS 0 0 - 2 %    IMMATURE GRANULOCYTES 2 (H) 0.0 - 0.5 %    ABS. NEUTROPHILS 7.3 1.8 - 8.0 K/UL    ABS. LYMPHOCYTES 1.5 0.9 - 3.6 K/UL    ABS. MONOCYTES 0.6 0.05 - 1.2 K/UL    ABS. EOSINOPHILS 0.4 0.0 - 0.4 K/UL    ABS. BASOPHILS 0.0 0.0 - 0.1 K/UL    ABS. IMM.  GRANS. 0.2 (H) 0.00 - 0.04 K/UL    DF AUTOMATED     PROTHROMBIN TIME + INR    Collection Time: 09/08/22  4:27 AM   Result Value Ref Range    Prothrombin time 14.1 11.5 - 15.2 sec    INR 1.1 0.8 - 1.2     MAGNESIUM    Collection Time: 09/08/22  4:27 AM   Result Value Ref Range    Magnesium 2.2 1.6 - 2.6 mg/dL   METABOLIC PANEL, BASIC    Collection Time: 09/08/22  4:27 AM   Result Value Ref Range    Sodium 145 136 - 145 mmol/L    Potassium 4.7 3.5 - 5.5 mmol/L    Chloride 108 100 - 111 mmol/L    CO2 30 21 - 32 mmol/L    Anion gap 7 3.0 - 18 mmol/L    Glucose 184 (H) 74 - 99 mg/dL    BUN 34 (H) 7.0 - 18 MG/DL    Creatinine 2.36 (H) 0.6 - 1.3 MG/DL    BUN/Creatinine ratio 14 12 - 20      GFR est AA 34 (L) >60 ml/min/1.73m2    GFR est non-AA 28 (L) >60 ml/min/1.73m2    Calcium 8.5 8.5 - 10.1 MG/DL   PHOSPHORUS    Collection Time: 09/08/22  4:27 AM   Result Value Ref Range    Phosphorus 3.1 2.5 - 4.9 MG/DL   GLUCOSE, POC    Collection Time: 09/08/22  5:05 AM   Result Value Ref Range    Glucose (POC) 190 (H) 70 - 110 mg/dL         Chemistry Recent Labs     09/08/22  0427 09/07/22  0357 09/06/22  0940 09/06/22  0436   * 184*  --  212*    145  --  150*   K 4.7 3.7 3.8 3.3*    109  --  115*   CO2 30 31  --  29   BUN 34* 31*  --  30*   CREA 2.36* 2.13*  --  2.11*   CA 8.5 8.1*  --  8.1*   MG 2.2 2.3  --  2.0   PHOS 3.1 3.5  --  2.8   AGAP 7 5  --  6   BUCR 14 15  --  14   AP  --   --   --  188*   TP  --   --   --  5.8*   ALB  --   --   --  1.7*   GLOB  --   --   --  4.1*   AGRAT  --   --   --  0.4*          Lactic Acid Lactic acid   Date Value Ref Range Status   09/01/2022 1.8 0.4 - 2.0 MMOL/L Final     No results for input(s): LAC in the last 72 hours. Liver Enzymes Protein, total   Date Value Ref Range Status   09/06/2022 5.8 (L) 6.4 - 8.2 g/dL Final     Albumin   Date Value Ref Range Status   09/06/2022 1.7 (L) 3.4 - 5.0 g/dL Final     Globulin   Date Value Ref Range Status   09/06/2022 4.1 (H) 2.0 - 4.0 g/dL Final     A-G Ratio   Date Value Ref Range Status   09/06/2022 0.4 (L) 0.8 - 1.7   Final     Alk.  phosphatase   Date Value Ref Range Status   09/06/2022 188 (H) 45 - 117 U/L Final     Recent Labs     09/06/22  0436   TP 5.8*   ALB 1.7*   GLOB 4.1*   AGRAT 0.4*   *          CBC w/Diff Recent Labs     09/08/22  0427 09/07/22  0357 09/06/22  0436   WBC 10.1 9.0 7.3   RBC 3.12* 2.89* 2.70*   HGB 9.1* 8.2* 7.7*   HCT 28.4* 26.2* 24.4*    195 170   GRANS 72 70 64   LYMPH 15* 17* 20*   EOS 4 4 4          Cardiac Enzymes No results found for: CPK, CK, CKMMB, CKMB, RCK3, CKMBT, CKNDX, CKND1, ROB, TROPT, TROIQ, BAY, TROPT, TNIPOC, BNP, BNPP     BNP No results found for: BNP, BNPP, XBNPT     Coagulation Recent Labs     09/08/22  0427 09/07/22  0357 09/06/22  0436 09/05/22  1127   PTP 14.1 13.6 14.2  --    INR 1.1 1.0 1.1  --    APTT  --   --   --  27.9           Thyroid  No results found for: T4, T3U, TSH, TSHEXT, TSHEXT    No results found for: T4     Urinalysis Lab Results   Component Value Date/Time    Color YELLOW 09/03/2022 01:45 PM    Appearance CLOUDY 09/03/2022 01:45 PM    Specific gravity 1.023 09/03/2022 01:45 PM    pH (UA) 5.5 09/03/2022 01:45 PM    Protein 100 (A) 09/03/2022 01:45 PM    Glucose >1,000 (A) 09/03/2022 01:45 PM    Ketone 15 (A) 09/03/2022 01:45 PM    Bilirubin Negative 09/03/2022 01:45 PM    Urobilinogen 0.2 09/03/2022 01:45 PM    Nitrites Negative 09/03/2022 01:45 PM    Leukocyte Esterase SMALL (A) 09/03/2022 01:45 PM    Epithelial cells 1+ 09/03/2022 01:45 PM    Bacteria FEW (A) 09/03/2022 01:45 PM    WBC 11 to 20 09/03/2022 01:45 PM    RBC 4 to 10 09/03/2022 01:45 PM        Micro  No results for input(s): SDES, CULT in the last 72 hours. No results for input(s): CULT in the last 72 hours. Culture data during this hospitalization.    All Micro Results       Procedure Component Value Units Date/Time    CULTURE, BLOOD [[id number]] Collected: 08/31/22 1015    Order Status: Completed Specimen: Blood Updated: 09/06/22 0711     Special Requests: NO SPECIAL REQUESTS        Culture result: NO GROWTH 6 DAYS       CULTURE, BLOOD [[id number]] Collected: 08/31/22 1000    Order Status: Completed Specimen: Blood Updated: 09/06/22 0711     Special Requests: NO SPECIAL REQUESTS        Culture result: NO GROWTH 6 DAYS       CULTURE, RESPIRATORY/SPUTUM/BRONCH Benjamin Gutierrez STAIN [[id number]] Collected: 09/03/22 1230    Order Status: Canceled Specimen: Sputum from Tracheal Aspirate     COVID-19 RAPID TEST [[id number]] Collected: 08/31/22 1050    Order Status: Completed Specimen: Nasopharyngeal Updated: 08/31/22 1131     Specimen source Nasopharyngeal        COVID-19 rapid test Not detected        Comment: Rapid Abbott ID Now       Rapid NAAT:  The specimen is NEGATIVE for SARS-CoV-2, the novel coronavirus associated with COVID-19. Negative results should be treated as presumptive and, if inconsistent with clinical signs and symptoms or necessary for patient management, should be tested with an alternative molecular assay. Negative results do not preclude SARS-CoV-2 infection and should not be used as the sole basis for patient management decisions. This test has been authorized by the FDA under an Emergency Use Authorization (EUA) for use by authorized laboratories. Fact sheet for Healthcare Providers: [employer]  Fact sheet for Patients: [employer]       Methodology: Isothermal Nucleic Acid Amplification                    MRI BRAIN WO CONT    Result Date: 9/3/2022  EXAM: MRI of the brain without intravenous contrast. INDICATION:  \"coma r/o anoxia. \" COMPARISON:  No prior MRI is available for direct comparison. CORRELATION (related prior exam):  Recent CT. PROTOCOL:  Routine brain. _______________ FINDINGS:       IMAGE QUALITY:  Diagnostic. BRAIN AND EXTRA-AXIAL SPACE:           ACUTE/SUBACUTE INFARCT:  There is diffusion restriction diffusely involving the infratentorial and supratentorial gray matter in a pattern indicative of high-grade anoxic brain injury. MASS:  None. HEMORRHAGE:  None. SUBDURAL FLUID COLLECTION:  None. HYDROCEPHALUS:  None. ICA AND DOMINANT VA T2 FLOW VOIDS:  Unremarkable. REMOTE CEREBRAL TERRITORIAL INFARCT:  None definite. REMOTE CEREBELLAR INFARCT:  None definite. STRIVE (STandards for Reporting Vascular changes on nEuroimaging):                            --Kyle of white matter hyperintensity (\"leukoaraiosis\") of presumed vascular origin:  Mild. --Kyle of chronic lacunes of presumed vascular origin:  Mild. --Kyle of perivascular spaces:  None significant. --Kyle of \"microbleeds\":   None definite. --Degree of brain atrophy: Not characterizable in the setting of diffuse cerebral edema. SELLA/PITUITARY:  A T1 and heterogeneously peripherally hyperintense, T2 heterogeneously mildly hyperintense, and T2 FLAIR hyperintense expansile lesion in the sella abutting the undersurface of the optic chiasm measures 12 mm anterior-posterior, 16 mm medial-lateral, 18 mm orthogonal superior-inferior. The lesion is nonspecific are favored to reflect pituitary hemorrhage. HEENT: Intubated. ORBITS:  Unremarkable. PARANASAL SINUSES:  The right maxillary sinus and right anterior ethmoid air cells are opacified. There is scattered paranasal sinus mucosal thickening. MASTOID AIR CELLS:  Predominantly clear. INCLUDED UPPER CERVICAL LYMPH NODES:  Unremarkable. INCLUDED UPPER PAROTIDS:  Unremarkable. NASOPHARYNX:  Unremarkable. BONE MARROW SIGNAL:  Unremarkable. SUPERFICIAL SOFT TISSUES: Unremarkable. _______________     1. High-grade acute/subacute anoxic brain injury without herniation. 2.  Heterogeneous expansile sellar lesion, nonspecific, pituitary hematoma/hemorrhage favored over other etiologies. _______________     CT HEAD WO CONT    Result Date: 9/2/2022  EXAM: CT head INDICATION: Anoxic brain injury.  COMPARISON: 8/31/2022 TECHNIQUE: Axial CT imaging of the head was performed without intravenous contrast. Standard multiplanar coronal and sagittal reformatted images were obtained and are included in interpretation. One or more dose reduction techniques were used on this CT: automated exposure control, adjustment of the mAs and/or kVp according to patient size, and iterative reconstruction techniques. The specific techniques used on this CT exam have been documented in the patient's electronic medical record. Digital Imaging and Communications in Medicine (DICOM) format image data are available to [facility] external healthcare facilities or entities on a secure, media free, reciprocally searchable basis with patient authorization for at least a 12-month period after this study. _______________ FINDINGS: BRAIN AND POSTERIOR FOSSA: Mild patchy periventricular, deep and subcortical white matter hypoattenuation which is nonspecific but likely represents chronic ischemic changes. No evidence of acute large vessel transcortical infarct or acute parenchymal hemorrhage. No midline shift or hydrocephalus. EXTRA-AXIAL SPACES AND MENINGES: There are no abnormal extra-axial fluid collections. CALVARIUM: Intact. SINUSES: Right maxillary and ethmoid sinus mucosal disease. OTHER: None. _______________     No acute intracranial abnormality. CT HEAD WO CONT    Result Date: 8/31/2022  EXAM: CT head INDICATION: Cardiac arrest COMPARISON: None. TECHNIQUE: Axial CT imaging of the head was performed without intravenous contrast. Standard multiplanar coronal and sagittal reformatted images were obtained and are included in interpretation. One or more dose reduction techniques were used on this CT: automated exposure control, adjustment of the mAs and/or kVp according to patient size, and iterative reconstruction techniques. The specific techniques used on this CT exam have been documented in the patient's electronic medical record.   Digital Imaging and Communications in Medicine (DICOM) format image data are available to [facility] external healthcare facilities or entities on a secure, media free, reciprocally searchable basis with patient authorization for at least a 12-month period after this study. _______________ FINDINGS: BRAIN AND POSTERIOR FOSSA: Exam quality is mildly degraded secondary to motion artifact. Ventricular size and configuration appears within normal limits. Basilar cisterns are patent. Within the limitations of motion, no acute intra-axial hemorrhage. No evidence of mass effect or midline shift. Gray-white matter differentiation appears within normal limits as visualized. EXTRA-AXIAL SPACES AND MENINGES: There are no abnormal extra-axial fluid collections. CALVARIUM: Intact. SINUSES: Minor nonspecific mucosal thickening ethmoid air cells, sphenoid sinus with air-fluid level present in the right maxillary sinus. OTHER: None. _______________     1. Mildly motion limited study without evidence of acute intracranial abnormality. 2. Paranasal mucosal disease as described above with air-fluid level in the right maxillary sinus as can be seen with sinusitis. CT CHEST ABD PELV WO CONT    Result Date: 8/31/2022  EXAM: CT chest, abdomen, and pelvis INDICATION: Pain COMPARISON: No prior study. TECHNIQUE: Axial CT imaging of the chest, abdomen, and pelvis was performed without IV contrast administration. Multiplanar reformats were generated. One or more dose reduction techniques were used on this CT: automated exposure control, adjustment of the mAs and/or kVp according to patient size, and iterative reconstruction techniques. The specific techniques used on this CT exam have been documented in the patient's electronic medical record. Digital Imaging and Communications in Medicine (DICOM) format image data are available to nonaffiliated external healthcare facilities or entities on a secure, media free, reciprocally searchable basis with patient authorization for at least a 12-month period after this study.  _______________ FINDINGS: CHEST: MEDIASTINUM: Left IJV central venous catheter tip at the cavoatrial junction. Normal caliber aorta with vascular calcifications. No pericardial effusion. LYMPH NODES: No pathologically enlarged mediastinal or hilar lymph nodes. PLEURA: No pleural effusion or pneumothorax. LUNGS/AIRWAY: Endotracheal tube tip in the midthoracic trachea. No consolidation or suspicious pulmonary nodule is seen. OTHER: None. ** ABDOMEN/PELVIS: LIVER, BILIARY: Liver is unremarkable. No abnormal biliary dilation. Gallbladder is unremarkable. PANCREAS: Unremarkable. SPLEEN: Unremarkable. ADRENALS: Unremarkable. KIDNEYS: Severe left and moderate right hydroureteronephrosis with marked urinary bladder distention. No obstructing calculus. Left lower pole 5.8 cm simple cysts, no follow-up necessary. LYMPH NODES: No pathologically enlarged lymph nodes. GASTROINTESTINAL TRACT: No abnormal bowel dilation or wall thickening. PELVIC ORGANS: Unremarkable. VASCULATURE: Unremarkable. OSSEOUS: No area of erosion or aggressive-appearing bone destruction. OTHER: None. _______________     Severe left and moderate right hydroureteronephrosis with marked urinary bladder distention. No obstructing calculus. No acute intrathoracic abnormality. US RETROPERITONEUM COMP    Result Date: 9/2/2022  US RETROPERITONEUM COMP INDICATION: Acute kidney injury. TECHNIQUE: Renal ultrasound. COMPARISON: 8/31/2022 CT FINDINGS: Right kidney measures 10.7 cm. Left kidney measures 6.7 cm. Bilateral increased cortical echogenicity. Severe left hydronephrosis with cortical atrophy. No shadowing calculus or perinephric abnormality. No solid renal mass identified. Indwelling Lyle catheter within decompressed urinary bladder. Bilateral increased cortical echogenicity. Severe left hydronephrosis with cortical atrophy.      XR CHEST PORT    Result Date: 9/5/2022  EXAM: CHEST RADIOGRAPH, SINGLE VIEW CLINICAL INDICATION/HISTORY: Shortness of breath, intubated, follow-up COMPARISON: 9/4/2022 TECHNIQUE: Portable frontal view of the chest was obtained. _______________ FINDINGS: SUPPORT DEVICES: Unchanged left jugular central venous catheter, endotracheal tube, and gastric tube, all adequately positioned. HEART AND MEDIASTINUM: Cardiomediastinal silhouette appears within normal limits. Tortuous and atherosclerotic thoracic aorta. No central vascular congestion. LUNGS AND PLEURAL SPACES: Left basilar opacity partially silhouettes left hemidiaphragm. Left mid and upper lung zones and all of the right lung remain adequately aerated. No definite pleural effusion. No pneumothorax. BONY THORAX AND SOFT TISSUES: No acute osseous abnormality. _______________     1. Tubes and lines appear unchanged, adequately positioned. 2. Left basilar opacity, atelectasis versus infiltrate. XR CHEST PORT    Result Date: 9/4/2022  EXAM: CHEST RADIOGRAPH, SINGLE VIEW CLINICAL INDICATION/HISTORY: Shortness of breath, intubated, follow-up COMPARISON: 9/3/2022 TECHNIQUE: Portable frontal view of the chest was obtained. _______________ FINDINGS: SUPPORT DEVICES: Endotracheal tube, left jugular central venous catheter, and nasogastric tube all appear adequately positioned. HEART AND MEDIASTINUM: Cardiomediastinal silhouette appears within normal limits. Tortuous and atherosclerotic thoracic aorta. No central vascular congestion. LUNGS AND PLEURAL SPACES: Unchanged retrocardiac left lower lobe atelectasis. Lungs are otherwise adequately aerated with no confluent airspace opacity. No pleural effusion or pneumothorax. BONY THORAX AND SOFT TISSUES: No acute osseous abnormality. _______________     No active cardiopulmonary disease.     XR CHEST PORT    Result Date: 9/3/2022  EXAM: CHEST RADIOGRAPH, SINGLE VIEW CLINICAL INDICATION/HISTORY: Shortness of breath, endotracheal tube placement COMPARISON: 8/31/2022 TECHNIQUE: Portable frontal view of the chest was obtained. _______________ FINDINGS: SUPPORT DEVICES: Adequately positioned endotracheal tube, unchanged. Left jugular central venous catheter and nasogastric tube also appear unchanged, adequately positioned. HEART AND MEDIASTINUM: Cardiomediastinal silhouette appears within normal limits. Normal caliber thoracic aorta. No central vascular congestion. LUNGS AND PLEURAL SPACES: Retrocardiac left lower lobe subsegmental atelectasis. Lungs are otherwise adequately aerated with no confluent airspace opacity. No pleural effusion or pneumothorax. BONY THORAX AND SOFT TISSUES: No acute osseous abnormality. _______________     No active cardiopulmonary disease. XR CHEST PORT    Result Date: 8/31/2022  EXAM: XR CHEST PORT CLINICAL INDICATION/HISTORY: central line -Additional: None COMPARISON: 4 hours prior TECHNIQUE: Portable frontal view of the chest _______________ FINDINGS: SUPPORT DEVICES: Endotracheal tube, gastric tube, and left IJ approach central venous catheter noted. Tip of the central venous catheter is at the level of the superior cavoatrial junction. HEART AND MEDIASTINUM: Normal cardiac size and mediastinal contours. LUNGS AND PLEURAL SPACES: No focal pneumonic opacity. No evidence of pneumothorax or pleural effusion. BONY THORAX AND SOFT TISSUES: Unremarkable. _______________     1. Support devices in stable/appropriate position as visualized. 2. No superimposed acute radiographic cardiopulmonary abnormality. XR CHEST PORT    Result Date: 8/31/2022  XR CHEST PORT CLINICAL INDICATION/HISTORY: Tube placement -Additional: None COMPARISON: None TECHNIQUE: Portable frontal view of the chest _______________ FINDINGS: SUPPORT DEVICES: Enteric tube tip projecting over the thoracic inlet. Endotracheal tube tip in the midthoracic trachea. Left IJV central venous catheter tip at the caval atrial junction. HEART AND MEDIASTINUM: Cardiomediastinal silhouette within normal limits. LUNGS AND PLEURAL SPACES: No dense consolidation, large effusion or pneumothorax. _______________     Enteric tube tip projecting over the thoracic inlet. Endotracheal tube tip in the midthoracic trachea. No acute cardiopulmonary abnormality. ECHO ADULT COMPLETE    Result Date: 8/31/2022  Formatting of this result is different from the original.   Left Ventricle: Normal left ventricular systolic function with a visually estimated EF of 60 - 65%. Not well visualized. Left ventricle size is normal. Normal wall thickness. Normal wall motion. Right Ventricle: Not well visualized. Right ventricle is moderately dilated. Moderately reduced systolic function. Visually moderately dilated RV and moderately reduced RV systolic function. Aortic Valve: Tricuspid valve. Tricuspid Valve: The estimated RVSP is 25 mmHg. Right Atrium: Not well visualized. Right atrium is moderately dilated. No old echocardiogram available to compare. 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 · No evidence of deep vein thrombosis in the left lower extremity. DUPLEX LOWER EXT VENOUS BILAT    Result Date: 9/1/2022  · Age indeterminate thrombus present in the right popliteal vein. · No evidence of deep vein thrombosis in the left lower extremity. Images report reviewed by me:  CT (Most Recent) (CT reviewed by me) Results from Hospital Encounter encounter on 08/31/22    CT HEAD WO CONT    Narrative  EXAM: CT head    INDICATION: Anoxic brain injury. COMPARISON: 8/31/2022    TECHNIQUE: Axial CT imaging of the head was performed without intravenous  contrast. Standard multiplanar coronal and sagittal reformatted images were  obtained and are included in interpretation. One or more dose reduction techniques were used on this CT: automated exposure  control, adjustment of the mAs and/or kVp according to patient size, and  iterative reconstruction techniques.   The specific techniques used on this CT  exam have been documented in the patient's electronic medical record. Digital  Imaging and Communications in Medicine (DICOM) format image data are available  to nonaffiliated external healthcare facilities or entities on a secure, media  free, reciprocally searchable basis with patient authorization for at least a  12-month period after this study. _______________    FINDINGS:    BRAIN AND POSTERIOR FOSSA: Mild patchy periventricular, deep and subcortical  white matter hypoattenuation which is nonspecific but likely represents chronic  ischemic changes. No evidence of acute large vessel transcortical infarct or  acute parenchymal hemorrhage. No midline shift or hydrocephalus. EXTRA-AXIAL SPACES AND MENINGES: There are no abnormal extra-axial fluid  collections. CALVARIUM: Intact. SINUSES: Right maxillary and ethmoid sinus mucosal disease. OTHER: None.    _______________    Impression  No acute intracranial abnormality. CXR reviewed by me:  XR (Most Recent). XR  reviewed by me and compared with previous XR Results from Hospital Encounter encounter on 08/31/22    XR CHEST PORT    Narrative  EXAM: XR CHEST PORT    CLINICAL INDICATION/HISTORY: Pulmonary infiltrate, ET tube position, acute  respiratory failure  > Additional: None. COMPARISON: September 5, 2022    TECHNIQUE: Portable chest    _______________    FINDINGS:    SUPPORT DEVICES: Endotracheal tube, nasogastric tube, esophageal probe, and left  IJ approach central catheter are unchanged. HEART AND MEDIASTINUM: The heart is stable in size and contour. LUNGS AND PLEURAL SPACES: The lungs are underexpanded with worsening hazy  opacity of the right lung base partially obscuring the diaphragm. Left basilar  volume loss and opacity also obscures the left hemidiaphragm, similar to  slightly progressive. No definite pneumothorax. BONY THORAX AND SOFT TISSUES: No acute osseous abnormality. _______________    Impression  1.  Support lines and tubes as above remain unchanged and in satisfactory  position. 2. Progressive volume loss with bibasilar opacities, more so on the right  concerning for combination of atelectasis versus superimposed pneumonia. Small  pleural effusions not excluded. *    ** *           ·Please note: Voice-recognition software may have been used to generate this report, which may have resulted in some phonetic-based errors in grammar and contents. Even though attempts were made to correct all the mistakes, some may have been missed, and remained in the body of the document.       Navneet Morgan MD  9/8/2022

## 2022-09-08 NOTE — PROGRESS NOTES
[facility]  (A Division of [address] [place] [address] Road)    Margarita Larose MD  Office #: [phone]- Option # [phone]  Fax #: [phone]     Date of Admission: 8/31/2022   Date of Note: 9/8/2022   Reason for Referral: Evaluation and antibiotic management of dissiminated shingles/ shock post carrest  .        Current Antimicrobials:    Prior Antimicrobials:  Zosyn- 8/31 to date Oral antiviral( Valtrex) X7 days PTA  Vanco 8/31 to 9/2  Acyclovir IV 9/1 to 9/7   Antibiotic allergy: None     Assessment:     Critically ill patient /poor prognosis with :    Probable Septic Shock : Improved  Likely urine source--skin( has shingles) doesn't look superinfected  Severe L and mod R hydronephrosis, without obstructing stone. UA with TNTC wbc  BCX  8/31: NG, no UCX available for review . UA with TNTC WBC -8/31  Fluctuating WBC-- up to 15.1, not on steroid- Improved  off pressor  S/P Cardiac arrest with elevated troponins-PE not ruled out  Anoxic encephalopathy- -MRI brain : high grade acute/subacute anoxic brain injury w/out herniation, pit hematoma/hge like picture. Prognosis poor per Neurology  L lumbar/sacral dermatome Shingles-- was on viral medication at home per reports  --dried up skin lesion, no bacterial superinfection  Transaminitis-- from shock liver-- Improving  Acute on chronick GENO (Cr 1.6 5/2022)- slowly improving  Chronic RLE DVT   Age indetermiante DVT on LLE  History of gout treatment with colchicine recently per wife--new diagnosis    Recommendation related to ID issues:       Cont Zosyn- adjusted to CrCl-- will complete 10 days for Probable Urosepsis-- until 9/9  Removal of central lines per ICU  Palliative team involved for plan of care discussion    DW ICU team, DW Palliative team    Subjective:      Pt seen. No acute event over night   Remains intubated/ unresponsive. Not on pressor.  Off sedation  Afebrile, no change on vent setting  Compassionate extubation in plans once all family here, over weekend    Notes/Labs reviewed-- Prognosis poor with anoxic brain injury per Neuro  CXR this am:  1. Support lines and tubes as above remain unchanged and in satisfactory  position. 2. Progressive volume loss with bibasilar opacities, more so on the right  concerning for combination of atelectasis versus superimposed pneumonia. Small  pleural effusions not excluded. HPI 9/1/22: Keaton Doss is 65 y.o. M patient with PMH of shingles, on medication. History is limited and obtained from chart review and med staff. Presented to ED yesterday and was in cardiac arrest with CPR that took 30 minutes. He was intubated/ placed on pressor and admitted to ICU. He had shingles at home and was on treatment. He had urine retention  and had about 2L of urine with duran placement per RN. Hydronephrosis on CT scan, no CPD on CXR, LLE DVT of unknown age on FORBES. He was placed on emperic abx    He is currently on pressor- Levophed, unresponsive, with myoclonus jerk that requires propfol    Past Medical History:   Diagnosis Date    Gout     Shingles      No past surgical history on file. No family history on file.   Medications reviewed as below:   Current Facility-Administered Medications   Medication Dose Route Frequency Provider Last Rate Last Admin    insulin glargine (LANTUS) injection 15 Units  15 Units SubCUTAneous Q24H Jackie BATES MD   15 Units at 09/07/22 0925    heparin (porcine) injection 5,000 Units  5,000 Units SubCUTAneous Q8H Deanna Dow MD   5,000 Units at 09/08/22 0507    amLODIPine (NORVASC) tablet 5 mg  5 mg Oral DAILY Deanna Dow MD   5 mg at 09/08/22 0941    lactulose (CHRONULAC) 10 gram/15 mL solution 15 mL  15 mL Oral DAILY Deanna Dow MD   15 mL at 09/08/22 0941    furosemide (LASIX) injection 20 mg  20 mg IntraVENous DAILY Deanna Dow MD   20 mg at 09/08/22 0941    metoprolol (LOPRESSOR) injection 2.5 mg  2.5 mg IntraVENous Q6H PRN Sol Barfield Colten Jeffery MD   2.5 mg at 09/06/22 1747    metoprolol (LOPRESSOR) injection 1.25 mg  1.25 mg IntraVENous Q6H Ahmed, Jerie Denver, MD   1.25 mg at 09/08/22 4841    insulin lispro (HUMALOG) injection   SubCUTAneous Q6H Robbie Akins MD   3 Units at 09/08/22 0507    ELECTROLYTE REPLACEMENT PROTOCOL - Potassium Renal Dosing  1 Each Other PRN Fide Willard MD        piperacillin-tazobactam (ZOSYN) 3.375 g in 0.9% sodium chloride (MBP/ADV) 100 mL MBP  3.375 g IntraVENous Q8H Augusto Gu K, DO 25 mL/hr at 09/08/22 0441 3.375 g at 09/08/22 0441    sodium chloride (NS) flush 5-40 mL  5-40 mL IntraVENous Q8H Fransisco Chakraborty MD   10 mL at 09/08/22 1435    sodium chloride (NS) flush 5-40 mL  5-40 mL IntraVENous PRN Fransisco Chakraborty MD        acetaminophen (TYLENOL) tablet 650 mg  650 mg Oral Q6H PRN Fransisco Chakraborty MD   650 mg at 09/03/22 2027    Or    acetaminophen (TYLENOL) suppository 650 mg  650 mg Rectal Q6H PRN Brigette Chakraborty MD        polyethylene glycol (MIRALAX) packet 17 g  17 g Oral DAILY PRN Brigette Chakraborty MD        ondansetron (ZOFRAN ODT) tablet 4 mg  4 mg Oral Q8H PRN Brigette Chakraborty MD        Or    ondansetron (ZOFRAN) injection 4 mg  4 mg IntraVENous Q6H PRN Brigette Chakraborty MD        pantoprazole (PROTONIX) 40 mg in 0.9% sodium chloride 10 mL injection  40 mg IntraVENous Q12H Fransisco Chakraborty MD   40 mg at 09/08/22 0941    levETIRAcetam (KEPPRA) 1,000 mg in 0.9% sodium chloride 100 mL IVPB  1,000 mg IntraVENous Q12H Fide Willard  mL/hr at 09/08/22 0941 1,000 mg at 09/08/22 0941     No Known Allergies  Social History     Socioeconomic History    Marital status:      Spouse name: Not on file    Number of children: Not on file    Years of education: Not on file    Highest education level: Not on file   Occupational History    Not on file   Tobacco Use    Smoking status: Not on file    Smokeless tobacco: Not on file Substance and Sexual Activity    Alcohol use: Not on file    Drug use: Not on file    Sexual activity: Not on file   Other Topics Concern    Not on file   Social History Narrative    Not on file     Social Determinants of Health     Financial Resource Strain: Not on file   Food Insecurity: Not on file   Transportation Needs: Not on file   Physical Activity: Not on file   Stress: Not on file   Social Connections: Not on file   Intimate Partner Violence: Not on file   Housing Stability: Not on file        Review of Systems: Unable to provide      Objective:      Visit Vitals  /84   Pulse 98   Temp 98.4 °F (36.9 °C)   Resp 13   Ht 5' 10\" (1.778 m)   Wt 95 kg (209 lb 7 oz)   SpO2 100%   BMI 30.05 kg/m²     Temp (24hrs), Av.3 °F (36.8 °C), Min:98.1 °F (36.7 °C), Max:98.8 °F (37.1 °C)         GEN: WD unresponsive, intubated    Lines / Catheters: Left IJ central line, L PIV, Lyle  . HEENT: Unicteric. Edematous sclera. --no neck swelling  CHEST: Non laboured breathing. CTA  CVS: Tachycardic- HS 1 and 2 heard, no mur/gallop  ABD: Obese/soft. Non tender. Non distended. Hypoactive BS  VERNELL: Lyle in place  EXT: No apparent swelling or redness on UE/LE joints. Skin: Dry and intact. No obvious cellulitis-- has L thigh/buttock Shingles rash-- multiple dermatome, no active vesicular lesion on exam. No cyanosis.  Warm feet  CNS: unresponsive, no jerky movement noted during exam. Eye blinking present      Labs: Results:   Chemistry Recent Labs     22  0427 22  0357 22  0940 22  0436   * 184*  --  212*    145  --  150*   K 4.7 3.7 3.8 3.3*    109  --  115*   CO2 30 31  --  29   BUN 34* 31*  --  30*   CREA 2.36* 2.13*  --  2.11*   CA 8.5 8.1*  --  8.1*   AGAP 7 5  --  6   BUCR 14 15  --  14   AP  --   --   --  188*   TP  --   --   --  5.8*   ALB  --   --   --  1.7*   GLOB  --   --   --  4.1*   AGRAT  --   --   --  0.4*      CBC w/Diff Recent Labs     22  0429 09/07/22  0357 09/06/22  0436   WBC 10.1 9.0 7.3   RBC 3.12* 2.89* 2.70*   HGB 9.1* 8.2* 7.7*   HCT 28.4* 26.2* 24.4*    195 170   GRANS 72 70 64   LYMPH 15* 17* 20*   EOS 4 4 4            No results found for: SDES Lab Results   Component Value Date/Time    Culture result: NO GROWTH 6 DAYS 08/31/2022 10:15 AM    Culture result: NO GROWTH 6 DAYS 08/31/2022 10:00 AM        Results       Procedure Component Value Units Date/Time    CULTURE, RESPIRATORY/SPUTUM/BRONCH Rolando Mckusick STAIN [[id number]] Collected: 09/03/22 1230    Order Status: Canceled Specimen: Sputum from Tracheal Aspirate     COVID-19 RAPID TEST [[id number]] Collected: 08/31/22 1050    Order Status: Completed Specimen: Nasopharyngeal Updated: 08/31/22 1131     Specimen source Nasopharyngeal        COVID-19 rapid test Not detected        Comment: Rapid Abbott ID Now       Rapid NAAT:  The specimen is NEGATIVE for SARS-CoV-2, the novel coronavirus associated with COVID-19. Negative results should be treated as presumptive and, if inconsistent with clinical signs and symptoms or necessary for patient management, should be tested with an alternative molecular assay. Negative results do not preclude SARS-CoV-2 infection and should not be used as the sole basis for patient management decisions. This test has been authorized by the FDA under an Emergency Use Authorization (EUA) for use by authorized laboratories.    Fact sheet for Healthcare Providers: ConventionUpdate.co.nz  Fact sheet for Patients: ConventionUpdate.co.nz       Methodology: Isothermal Nucleic Acid Amplification         CULTURE, BLOOD [[id number]] Collected: 08/31/22 1015    Order Status: Completed Specimen: Blood Updated: 09/06/22 0711     Special Requests: NO SPECIAL REQUESTS        Culture result: NO GROWTH 6 DAYS       CULTURE, BLOOD [[id number]] Collected: 08/31/22 1000    Order Status: Completed Specimen: Blood Updated: 09/06/22 1811 Special Requests: NO SPECIAL REQUESTS        Culture result: NO GROWTH 6 DAYS                 Imaging:      All imaging reviewed from Admission to present as per radiology interpretation in [facility]

## 2022-09-08 NOTE — PROGRESS NOTES
0730-Received pt resting in bed with eyes closed, no sign of distress, vss on ventilator, pt has minimal response to stimuli, will slightly open eyes with stimulation, hands draw into body with nailbed pressure, pt has corneal reflex and weak gag and cough, physician aware, will continue to monitor  1200-Pt remains stable, no sign of distress, pt status discussed with palliative care team  1600-Pt remains stable, no sign of distress  1913-Bedside and Verbal shift change report given to George Vincent (oncoming nurse) by Evie Rae RN (offgoing nurse). Report included the following information SBAR, Kardex, Intake/Output, MAR, Recent Results, and Cardiac Rhythm SR/ST .

## 2022-09-09 NOTE — PROGRESS NOTES
Hospitalist Progress Note    Patient: Topher Covarrubias MRN: [id number]  [facility]: [id number]    YOB: 1960  Age: 65 y.o.   Sex: male    DOA: 8/31/2022 LOS:  LOS: 8 days          Chief Complaint:    Cardiac arrest      Assessment/Plan     Topher Covarrubias is a 65 y.o. hypertension, dyslipidemia, type 2 diabetes mellitus, vitamin D deficiency, stage 3a CKD and gout male with history of who presents with cardiac arrest.      Cardiac arrest, PEA  Severe anoxia by clinical exam and confirmed by MRI, with associated pituitary hemorrhage  Acute respiratory failure  Anoxia  Myoclonic jerks  Shock   RLE popliteal DVT  Sepsis, likely due to urinary source  UTI  Disseminated herpes zoster  Severe left and moderate right hydroureteronephrosis   GENO on CKD 3  Hyperglycemia  hypernatremia  anemia     Renal fxn improved    No clear neurologic recovery     Hypernatremia-improved      Continue duran    IV antibiotics  IV acyclovir  IV PPI    SQ heparin     Off sedation     Echo with nl EF     MRI brain shows anoxic injury    Prognosis dismal    D/w pallaitive care team -family plans for compassionate extubation 9/10/22     Full code  Disposition :  Patient Active Problem List   Diagnosis Code    Cardiac arrest ([facility] [address] I46.9    Respiratory failure ([facility] [address] J96.90    GENO (acute kidney injury) ([facility] [address] N17.9    Disseminated herpes zoster B02.7    Hydronephrosis N13.30    Shock ([facility] [address] R57.9    Sepsis ([facility] [address] A41.9    UTI (urinary tract infection) N39.0    Brain anoxia (HCC) G93.1       Subjective:  No changes of note      Review of systems:    UTO due to illness      Vital signs/Intake and Output:  Visit Vitals  /82   Pulse (!) 108   Temp 99.1 °F (37.3 °C)   Resp 23   Ht 5' 10\" (1.778 m)   Wt 92 kg (202 lb 13.2 oz)   SpO2 100%   BMI 29.10 kg/m²     Current Shift:  09/08 1901 - 09/09 0700  In: -   Out: 150 [Urine:150]  Last three shifts:  09/07 0701 - 09/08 1900  In: 4205 [I.V.:850]  Out: [address] [Urine:3450]    Exam:    General: unresponsive intubated AAM, NAD  CVS:Regular rate and rhythm, no M/R/G, S1/S2 heard, no thrill  Lungs:Clear to auscultation bilaterally, no wheezes, rhonchi, or rales  Abdomen: Soft, Nontender, No distention  Extremities: No C/C/E, pulses palpable 2+  Neuro: Eyes open intermittently, rightward gaze, does not follow commands, no involuntary movements, no withdrawal to painful stimuli                  Labs: Results:       Chemistry Recent Labs     09/08/22 0427 09/07/22 0357 09/06/22 0940 09/06/22 0436   * 184*  --  212*    145  --  150*   K 4.7 3.7 3.8 3.3*    109  --  115*   CO2 30 31  --  29   BUN 34* 31*  --  30*   CREA 2.36* 2.13*  --  2.11*   CA 8.5 8.1*  --  8.1*   AGAP 7 5  --  6   BUCR 14 15  --  14   AP  --   --   --  188*   TP  --   --   --  5.8*   ALB  --   --   --  1.7*   GLOB  --   --   --  4.1*   AGRAT  --   --   --  0.4*        CBC w/Diff Recent Labs     09/08/22 0427 09/07/22 0357 09/06/22 0436   WBC 10.1 9.0 7.3   RBC 3.12* 2.89* 2.70*   HGB 9.1* 8.2* 7.7*   HCT 28.4* 26.2* 24.4*    195 170   GRANS 72 70 64   LYMPH 15* 17* 20*   EOS 4 4 4        Cardiac Enzymes No results for input(s): CPK, CKND1, ROB in the last 72 hours. No lab exists for component: CKRMB, TROIP   Coagulation Recent Labs     09/08/22 0427 09/07/22 0357   PTP 14.1 13.6   INR 1.1 1.0         Lipid Panel Lab Results   Component Value Date/Time    Triglyceride 201 (H) 09/04/2022 05:00 AM      BNP No results for input(s): BNPP in the last 72 hours.    Liver Enzymes Recent Labs     09/06/22 0436   TP 5.8*   ALB 1.7*   *        Thyroid Studies No results found for: T4, T3U, TSH, TSHEXT, TSHEXT     Procedures/imaging: see electronic medical records for all procedures/Xrays and details which were not copied into this note but were reviewed prior to creation of Harrison Bella MD

## 2022-09-09 NOTE — PROGRESS NOTES
RN informed that wife requested family meeting at bedside  Came to meet wife with daughter, son and brother present at wife's request. RN Kadi present  Wife would like to proceed with trach and PEG and not with comfort measures  Answered all their questions regarding trach and PEG. Explain neither is treatment for his current condition  They would prefer to continue with DNR with okay to place back ET tube if accidentally dislodged and okay to escalate care. Orders updated.  Will consult ENT and GI early next week    Tayo Henson MD

## 2022-09-09 NOTE — PROGRESS NOTES
/  /                        Cardiology Progress Note        Patient: Fior Vizcaino        Sex: male          DOA: 8/31/2022  YOB: 1960      Age:  65 y.o.        LOS:  LOS: 9 days   Assessment/Plan     Principal Problem:    Cardiac arrest ([address] (8/31/2022)    Active Problems:    Respiratory failure ([address] (8/31/2022)      GENO (acute kidney injury) ([address] (8/31/2022)      Disseminated herpes zoster (8/31/2022)      Hydronephrosis (8/31/2022)      Shock ([address] (8/31/2022)      Sepsis ([address] (8/31/2022)      UTI (urinary tract infection) (9/1/2022)      Brain anoxia ([address] (9/4/2022)      Plan:    9/9/2022  No change from cardiac standpoint  Stable blood pressure and rhythm  Continue with current meds  Discussed with RN        9/8/2022  Cardiac telemetry stable  Stable blood pressure  Cardiac status unchanged      9/6/2022  Cardiac telemetry sinus rhythm with occasional PACs and PVCs  Continue with metoprolol, amlodipine and Lasix        9/5/2022  No change from cardiac standpoint  Continue with current supportive treatment      9/4/2022  High-grade anoxic brain injury on MRI  Cardiac telemetry stable  Continue with current supportive treatment as per family  Discussed with RN and Dr. Hari Tejada  Discussed with patient's wife and family          9/3/2022  Blood pressure is mildly elevated with mild tachycardia  I will add low-dose IV metoprolol 1.25 mg every 6 hours  Continue with rest of the treatment  Discussed with Dr. Ervin Gibbs        9/2/2022  Patient remained intubated  Off sedation  Of Levophed  Cardiac telemetry stable with occasional PVCs  Continue with supportive treatment  Discussed with wife and family members  Discussed with Dr. Cody Ring arrest PEA arrest  Mild troponin elevation after CPR and PEA cardiac arrest, normal EF and wall motion, no evidence of ACS  DVT  Continue anticoagulation if no active bleeding  Continue with supportive treatment  I have long and lengthy discussion with family about management plan. All the questions were answered. 35 minutes of critical care time spent in the direct evaluation and treatment of this high risk patient. The reason for providing this level of medical care for this critically ill patient was due a critical illness that impaired one or more vital organ systems such that there was a high probability of imminent or life threatening deterioration in the patients condition. This care involved high complexity decision making to assess, manipulate, and support vital system functions, to treat this degreee vital organ system failure and to prevent further life threatening deterioration of the patients condition. Subjective:    cc:  Cardiac arrest        REVIEW OF SYSTEMS:     Limited due to intubation      Objective:      Visit Vitals  BP (!) 142/89   Pulse 100   Temp 98.8 °F (37.1 °C)   Resp 14   Ht 5' 10\" (1.778 m)   Wt 92 kg (202 lb 13.2 oz)   SpO2 99%   BMI 29.10 kg/m²     Body mass index is 29.1 kg/m². Physical Exam:  General Appearance: Intubated  NECK: No JVD, no thyroidomeglay. LUNGS: Clear bilaterally. HEART: S1+S2 audible,    ABD: Non-tender, BS Audible    EXT: No edema, and no cysnosis. VASCULAR EXAM: Pulses are intact.     PSYCHIATRIC EXAM: Intubated    Medication:  Current Facility-Administered Medications   Medication Dose Route Frequency    insulin glargine (LANTUS) injection 15 Units  15 Units SubCUTAneous Q24H    heparin (porcine) injection 5,000 Units  5,000 Units SubCUTAneous Q8H    amLODIPine (NORVASC) tablet 5 mg  5 mg Oral DAILY    lactulose (CHRONULAC) 10 gram/15 mL solution 15 mL  15 mL Oral DAILY    furosemide (LASIX) injection 20 mg  20 mg IntraVENous DAILY    metoprolol (LOPRESSOR) injection 2.5 mg  2.5 mg IntraVENous Q6H PRN    metoprolol (LOPRESSOR) injection 1.25 mg  1.25 mg IntraVENous Q6H    insulin lispro (HUMALOG) injection   SubCUTAneous Q6H    ELECTROLYTE REPLACEMENT PROTOCOL - Potassium Renal Dosing  1 Each Other PRN    piperacillin-tazobactam (ZOSYN) 3.375 g in 0.9% sodium chloride (MBP/ADV) 100 mL MBP  3.375 g IntraVENous Q8H    sodium chloride (NS) flush 5-40 mL  5-40 mL IntraVENous Q8H    sodium chloride (NS) flush 5-40 mL  5-40 mL IntraVENous PRN    acetaminophen (TYLENOL) tablet 650 mg  650 mg Oral Q6H PRN    Or    acetaminophen (TYLENOL) suppository 650 mg  650 mg Rectal Q6H PRN    polyethylene glycol (MIRALAX) packet 17 g  17 g Oral DAILY PRN    ondansetron (ZOFRAN ODT) tablet 4 mg  4 mg Oral Q8H PRN    Or    ondansetron (ZOFRAN) injection 4 mg  4 mg IntraVENous Q6H PRN    pantoprazole (PROTONIX) 40 mg in 0.9% sodium chloride 10 mL injection  40 mg IntraVENous Q12H    levETIRAcetam (KEPPRA) 1,000 mg in 0.9% sodium chloride 100 mL IVPB  1,000 mg IntraVENous Q12H               Lab/Data Reviewed:  Procedures/imaging: see electronic medical records for all procedures/Xrays   and details which were not copied into this note but were reviewed prior to creation of Plan       All lab results for the last 24 hours reviewed. Recent Labs     09/09/22 0248 09/08/22 0427 09/07/22 0357   WBC 8.2 10.1 9.0   HGB 9.1* 9.1* 8.2*   HCT 28.5* 28.4* 26.2*    230 195       Recent Labs     09/09/22 0248 09/08/22 0427 09/07/22 0357    145 145   K 3.9 4.7 3.7    108 109   CO2 30 30 31   * 184* 184*   BUN 40* 34* 31*   CREA 2.53* 2.36* 2.13*   CA 8.9 8.5 8.1*         RADIOLOGY:      CXR Results  (Last 48 hours)                 09/09/22 0628  XR CHEST PORT Final result    Impression:      Improved aeration with persistent left layering pleural effusion.        Narrative:  EXAM: XR CHEST PORT       CLINICAL INDICATION/HISTORY: Pulmonary infiltrate, ET tube position, acute   respiratory failure   -Additional: None       COMPARISON: 9/7/2022       TECHNIQUE: Portable frontal view of the chest       _______________       FINDINGS:       SUPPORT DEVICES: Endotracheal and enteric tubes unchanged. Esophageal probe are   unchanged. Left IJV central venous catheter unchanged. HEART AND MEDIASTINUM: Stable cardiomediastinal silhouette. .       LUNGS AND PLEURAL SPACES: Left layering pleural effusion/atelectasis.  No   pneumothorax.       _______________                     Cardiology Procedures:   Results for orders placed or performed during the hospital encounter of 08/31/22   EKG, 12 LEAD, INITIAL   Result Value Ref Range    Ventricular Rate 112 BPM    Atrial Rate 112 BPM    P-R Interval 158 ms    QRS Duration 100 ms    Q-T Interval 360 ms    QTC Calculation (Bezet) 491 ms    Calculated P Axis 73 degrees    Calculated R Axis -69 degrees    Calculated T Axis 51 degrees    Diagnosis       Sinus tachycardia  Left axis deviation  Low voltage QRS  Inferior infarct , age undetermined  Abnormal ECG  No previous ECGs available  Confirmed by Negro Adan MD. ([id number]) on 8/31/2022 11:30:53 PM        Echo Results  (Last 48 hours)      None         Cardiolite (Tc-99m Sestamibi) stress test    Signed By: Zen Chavez MD     September 9, 2022

## 2022-09-09 NOTE — PROGRESS NOTES
Hospitalist Progress Note    Patient: Valerie Casanova MRN: [id number]  CSN: [id number]    YOB: 1960  Age: 65 y.o.   Sex: male    DOA: 8/31/2022 LOS:  LOS: 9 days          Chief Complaint:    Cardiac arrest      Assessment/Plan     Valerie Casanova is a 65 y.o. hypertension, dyslipidemia, type 2 diabetes mellitus, vitamin D deficiency, stage 3a CKD and gout male with history of who presents with cardiac arrest.      Cardiac arrest, PEA  Severe anoxia by clinical exam and confirmed by MRI, with associated pituitary hemorrhage  Acute respiratory failure  Anoxia  Myoclonic jerks  Shock   RLE popliteal DVT  Sepsis, likely due to urinary source  UTI  Disseminated herpes zoster  Severe left and moderate right hydroureteronephrosis   GENO on CKD 3  Hyperglycemia  hypernatremia  Anemia    Zosyn course completed for likely sepsis of urinary origin      Renal fxn improved    No clear neurologic recovery     Hypernatremia-improved      Continue duran    IV antibiotics  IV acyclovir  IV PPI    SQ heparin     Off sedation     Echo with nl EF     MRI brain shows anoxic injury    Prognosis dismal    D/w pallaitive care team -family plans for compassionate extubation 9/10/22     Full code  Disposition :  Patient Active Problem List   Diagnosis Code    Cardiac arrest ([facility] [address] I46.9    Respiratory failure ([facility] [address] J96.90    GENO (acute kidney injury) ([address] N17.9    Disseminated herpes zoster B02.7    Hydronephrosis N13.30    Shock ([address] R57.9    Sepsis ([facility] [address] A41.9    UTI (urinary tract infection) N39.0    Brain anoxia (HCC) G93.1       Subjective:  No changes of note      Review of systems:    UTO due to illness      Vital signs/Intake and Output:  Visit Vitals  BP (!) 146/85   Pulse 94   Temp 97.9 °F (36.6 °C)   Resp 16   Ht 5' 10\" (1.778 m)   Wt 92 kg (202 lb 13.2 oz)   SpO2 100%   BMI 29.10 kg/m²     Current Shift:  09/09 0701 - 09/09 1900  In: 700 [I.V.:400]  Out: 700 [Urine:700]  Last three shifts:  09/07 1901 - 09/09 0700  In: 2600   Out: 4350 [Urine:4350]    Exam:    General: unresponsive intubated AAM, NAD  CVS:Regular rate and rhythm, no M/R/G, S1/S2 heard, no thrill  Lungs:Clear to auscultation bilaterally, no wheezes, rhonchi, or rales  Abdomen: Soft, Nontender, No distention  Extremities: No C/C/E, pulses palpable 2+  Neuro: Eyes open intermittently, rightward gaze, does not follow commands, no involuntary movements, no withdrawal to painful stimuli                  Labs: Results:       Chemistry Recent Labs     09/09/22 0248 09/08/22 0427 09/07/22 0357   * 184* 184*    145 145   K 3.9 4.7 3.7    108 109   CO2 30 30 31   BUN 40* 34* 31*   CREA 2.53* 2.36* 2.13*   CA 8.9 8.5 8.1*   AGAP 4 7 5   BUCR 16 14 15        CBC w/Diff Recent Labs     09/09/22 0248 09/08/22 0427 09/07/22 0357   WBC 8.2 10.1 9.0   RBC 3.12* 3.12* 2.89*   HGB 9.1* 9.1* 8.2*   HCT 28.5* 28.4* 26.2*    230 195   GRANS 68 72 70   LYMPH 18* 15* 17*   EOS 4 4 4        Cardiac Enzymes No results for input(s): CPK, CKND1, ROB in the last 72 hours. No lab exists for component: CKRMB, TROIP   Coagulation Recent Labs     09/09/22 0248 09/08/22 0427   PTP 14.3 14.1   INR 1.1 1.1         Lipid Panel Lab Results   Component Value Date/Time    Triglyceride 201 (H) 09/04/2022 05:00 AM      BNP No results for input(s): BNPP in the last 72 hours. Liver Enzymes No results for input(s): TP, ALB, TBIL, AP in the last 72 hours.     No lab exists for component: SGOT, GPT, DBIL     Thyroid Studies No results found for: T4, T3U, TSH, TSHEXT, TSHEXT     Procedures/imaging: see electronic medical records for all procedures/Xrays and details which were not copied into this note but were reviewed prior to creation of Sun Boyer MD

## 2022-09-09 NOTE — PROGRESS NOTES
Pt remains on a vent. Pt is not medically stable to transition from an acute care setting at this time. CM to continue to follow and assist with care transition once needs have been identified. Jamia Jiang

## 2022-09-09 NOTE — PROGRESS NOTES
Palliative Medicine    CODE STATUS: DNR in the event of cardiac arrest. No re-intubation if he is extubated     AMD Status: none on file. Belen Draper, wife,  is his legal next of kin                   9/9/2022 0905 Seen today in room ICU 4 along with Azael Stephenson NP. Lying in bed supine. Intubated/ventilated. FiO2 35%. PEEP 5. No responses to verbal or tactile stimulation. Eyes open intermittently but not purposefully. Family not at bedside    Disposition plan: plan for compassionate extubation tomorrow. Palliative care will continue to follow Edmar Torres  and his family during his hospitalization and support them as they make healthcare decisions and define goals of care.       Viktoria Lawton RN, MSN  Palliative Medicine  P: [phone]

## 2022-09-09 NOTE — PROGRESS NOTES
@2000 pt taken over awake in bed unresponsive, fl-acc 0, No sedation in progress. Eyes open no tracking ,no focusing. OG tube in-situ infusing tube feeds. Lyle in-situ draining urine. Assessment carried out and documented in appropriate flow sheets. Nursing management continues. @0000 pt reassessed no changes. @0400 reassessment done no new changes observation continues. @1351 Bedside and Verbal shift change report given to Kaid HE (oncoming nurse) by Mavis Villarreal (offgoing nurse). Report included the following information SBAR, Kardex, Intake/Output, MAR, Recent Results, Med Rec Status, and Alarm Parameters .

## 2022-09-09 NOTE — PROGRESS NOTES
[facility]ists  Pulmonary, Critical Care, and Sleep Medicine    Name: Maura Case MRN: [id number]   : 1960 Hospital: [facility] [place]    Date: 2022  Room: [address] [place] [address] Note                                              Consult requesting physician: Dr. Dr. Sheridan Kwon   Reason for Consult: Cardiac arrest , shock, respiratory failure     IMPRESSION:   Acute respiratory failure, post-arrest - J96.0  Cardiac arrest - I46.9  Sepsis - A41.9, R65.20  Anemia - D64.9  Chronic DVT right leg  Elevated LFTs, post-arrest  Anoxic encephalopathy post-arrest   Pituitary hemorrhage/hematoma  Hypernatremia, improved - E87.0  Patient Active Problem List   Diagnosis Code    Cardiac arrest ([facility] I46.9    Respiratory failure ([facility] J96.90    GENO (acute kidney injury) ([facility] N17.9    Disseminated herpes zoster B02.7    Hydronephrosis N13.30    Shock ([facility] [facility] R57.9    Sepsis ([facility] [address] [facility] A41.9    UTI (urinary tract infection) N39.0    Brain anoxia ([facility] G93.1     Code status: DNR       RECOMMENDATIONS:     Respiratory: Acute respiratory failure secondary to post cardiac arrest anoxic encephalopathy  On VC+, FiO2 35%, PEEP 5+  Chest x-ray for follow-up 22 - slightly improved looking  Pl effusion likely reactive with fluid resuscitation, hypoalbuminemia  Ventilator bundle & Sedation protocol followed. Not on any sedation; daily assessment for readiness for SBT - episodes of apnea during SBT at bedside today - not a candidate given unreliable ability to protect airways. Chlorhexidine mouth washes. Keep SPO2 >=92%. HOB 30 degree elevation all the time. Aggressive pulmonary toileting. Aspiration precautions.      CVS: BP stable  On Norvasc, monitor blood pressure and adjust dose as needed  Cardiology following    ID: No leukocytosis or fever  ID following  Blood cultures 2022: NGT final  Antibiotics: Zosyn - last day today  Antiviral: Acyclovir finish 22 per ID  Sepsis bundle and protocol followed. Follow cultures. Deescalate antibiotic when appropriate per ID. Hematology/Oncology: Stable Hgb and Plt  No bleeding anywhere  Monitor CBC daily  Okay to use heparin for DVT prophylaxis per neurology  Not a candidate for full dose anticoagulation  Has a small chronic calf vein DVT right lower extremity  Follow-up PVL study this week ordered - hold for now - comfort measures this weekend  Vascular surgery is aware-if worsening in DVT during any future exam then may need IVC filter    Renal: Monitor S. Na+ daily  Change free water bolus to 200 mL every 4 hours while NG tube  Monitor renal functions, electrolytes, urine output    GI/: PPI for GI prophylaxis  Tube feeding via NG tube at goal  Lyle was placed by urology; no hematuria    Endocrine: Monitor BS. SSI and adjust Lantus    Neurology: Not on any sedation  Opens eyes intermittently spontaneously, does not track or follow commands, no involuntary movements  Moves upper extremities inward slightly to painful stimuli  Continue Keppra per neurology  EEG abnormal  MRI brain 9/3/2022: High-grade acute/subacute anoxic changes  Neurology followed    Toxicology: UDS negative 8/31/22    Pain/Sedation: Avoid sedatives, hypnotic if possible    Skin/Wound: As per nursing care    Electrolytes: Replace electrolytes per ICU electrolyte replacement protocol. Prophylaxis: DVT Prophylaxis: SCD/heparin. GI Prophylaxis: PPI. Restraints: none. May use wrist soft restraints for patient interfering with medical therapy/management and patient safety. Lines/Tubes: PIV  ETT: 8/31/2021. OGT: 9/2/22. Central line: 8/31/22 LT IJ (site examined, no erythema, induration, discharge or sign of infection. Dressing intact. Medically necessary, will remove it when not needed. Central line bundle followed). Lyle: 8/31/22 (Medically necessary for strict input/output monitoring in critically ill patient, will remove it when not needed.  Lyle bundle followed). Advance Directive/Palliative Care: DNR. Palliative care Consulted. Will defer respective systems problem management to primary and other respective consultant and follow patient in ICU with primary and other medical team.  Further recommendations will be based on the patient's response to recommended treatment and results of the investigation ordered. Quality Care: PPI, DVT prophylaxis, HOB elevated, Infection control all reviewed and addressed. Care of plan d/w RN, RT, MDR, hospitalist team, ID. Met and discussed with family including wife, other 09/06/22 and 09/7/22; answered all questions to their satisfaction. Discussed overall, poor long-term prognosis with high risk for prolonged hospitalization, prolonged ventilator need, nosocomial infection, hemodynamic instability, cardiac arrest, need for RRT, bleeding, morbidity, mortality, other. Family considering comfort care measures over this weekend    High complexity decision making was performed during the evaluation of this patient at high risk for decompensation with multiple organ involvement. Total critical care time spent rendering care exclusive of procedures/family discussion/coordination of care: 36 minutes. Subjective/History of Present Illness:   Patient is a 65 y.o. male with PMHx significant for HTN/CRI/DM recently diagnosed with severe zoster. Patient was on antiviral tx, very weak, sob, cough. EMS called, in ambulance 3 times cardiac arrest. Witnessed CPR/Epi. Repored PEA. 6 doses of epi and no shocks. He came on epi drip but in ER chnaged to levophed. Pateitn had seizure vs myocloninc jerks was started on propofol.     9/9/2022 :   Patient seen at bedside in ICU room #104  Remains on ventilator support with stable settings  Unchanged ET secretions-minimal  Weak gag and cough; corneal reflexes present  Some inward upper extremity movement to painful stimuli  Eyes intermittently and spontaneously opening, no tracking, does not follow any commands  Hemodynamically stable  Afebrile. Fair urine output. Tolerating tube feeding  No family at bedside  [facility] was not contacted by staff on anything about patient overnight. I/O last 24 hrs: Intake/Output Summary (Last 24 hours) at 9/9/2022 1407  Last data filed at 9/9/2022 1314  Gross per 24 hour   Intake 2535 ml   Output 3700 ml   Net -1165 ml           The patient is critically ill and can not provide additional history due to Unconsciousness and Ventilated    History taken from EMR    Review of Systems:  ROS not obtained due to patient factor. No Known Allergies     Past Medical History:   Diagnosis Date    Gout     Shingles       No past surgical history on file. Social History     Tobacco Use    Smoking status: Not on file    Smokeless tobacco: Not on file   Substance Use Topics    Alcohol use: Not on file      No family history on file.      Prior to Admission medications    Not on File     Current Facility-Administered Medications   Medication Dose Route Frequency    insulin glargine (LANTUS) injection 15 Units  15 Units SubCUTAneous Q24H    heparin (porcine) injection 5,000 Units  5,000 Units SubCUTAneous Q8H    amLODIPine (NORVASC) tablet 5 mg  5 mg Oral DAILY    lactulose (CHRONULAC) 10 gram/15 mL solution 15 mL  15 mL Oral DAILY    furosemide (LASIX) injection 20 mg  20 mg IntraVENous DAILY    metoprolol (LOPRESSOR) injection 1.25 mg  1.25 mg IntraVENous Q6H    insulin lispro (HUMALOG) injection   SubCUTAneous Q6H    piperacillin-tazobactam (ZOSYN) 3.375 g in 0.9% sodium chloride (MBP/ADV) 100 mL MBP  3.375 g IntraVENous Q8H    sodium chloride (NS) flush 5-40 mL  5-40 mL IntraVENous Q8H    pantoprazole (PROTONIX) 40 mg in 0.9% sodium chloride 10 mL injection  40 mg IntraVENous Q12H    levETIRAcetam (KEPPRA) 1,000 mg in 0.9% sodium chloride 100 mL IVPB  1,000 mg IntraVENous Q12H         Objective:   Vital Signs:    Visit Vitals  BP (!) 142/89   Pulse (!) 103 Temp 98.8 °F (37.1 °C)   Resp (!) 0   Ht 5' 10\" (1.778 m)   Wt 92 kg (202 lb 13.2 oz)   SpO2 100%   BMI 29.10 kg/m²       O2 Device: Ventilator       Temp (24hrs), Av.7 °F (37.1 °C), Min:95.7 °F (35.4 °C), Max:100 °F (37.8 °C)       Intake/Output:   Last shift:       07 - 1900  In: 3236 [I.V.:400]  Out: 1700 [Urine:1700]    Last 3 shifts:  190 -  0700  In: 2600   Out: 3891 [Urine:4350]      Intake/Output Summary (Last 24 hours) at 2022 1407  Last data filed at 2022 1314  Gross per 24 hour   Intake 2535 ml   Output 3700 ml   Net -1165 ml         Last 3 Recorded Weights in this Encounter    22 0757 22 0904 22 1200   Weight: 103.9 kg (229 lb 0.9 oz) 95 kg (209 lb 7 oz) 92 kg (202 lb 13.2 oz)         Ventilator Settings:  Mode Rate Tidal Volume Pressure FiO2 PEEP   VC+   450 ml    35 % 5 cm H20     Peak airway pressure: 21 cm H2O    Plateau pressure:     Tidal volume:    Minute ventilation: 9.25 l/min     No results for input(s): PHI, PHI, POC2, PCO2I, PO2, PO2I, HCO3, HCO3I, FIO2, FIO2I in the last 72 hours.       Physical Exam:     General: in no respiratory distress, appears stated age, on ventilator  HEENT: PERRL, ET and OG tubes in place  Neck: No abnormally enlarged lymph nodes or thyroid, supple  Chest: normal  Lungs: moderate air entry, few scattered rhonchi BL and no wheezes bilaterally, mild dullness to percussion RT base, no tenderness/ rash  Heart: Regular rate and rhythm, S1S2 present, or without murmur or extra heart sounds  Abdomen: protuberant, bowel sounds normoactive, tympanic, abdomen is soft without significant tenderness, masses, organomegaly or guarding, rigidity, rebound  Extremity: no BL edema, no cyanosis; peripheral pulses 2+ and symmetric, capillary refill normal BL  Neuro: eyes open, lno tracking, does not follow commands, no involuntary movements, some inward upper extremities movements to painful stimuli, exam limitation on ventilator  Skin: Skin color, texture, turgor unchnaged      Data:       Recent Results (from the past 24 hour(s))   GLUCOSE, POC    Collection Time: 09/08/22  5:28 PM   Result Value Ref Range    Glucose (POC) 256 (H) 70 - 110 mg/dL   GLUCOSE, POC    Collection Time: 09/08/22 11:18 PM   Result Value Ref Range    Glucose (POC) 193 (H) 70 - 110 mg/dL   CBC WITH AUTOMATED DIFF    Collection Time: 09/09/22  2:48 AM   Result Value Ref Range    WBC 8.2 4.6 - 13.2 K/uL    RBC 3.12 (L) 4.35 - 5.65 M/uL    HGB 9.1 (L) 13.0 - 16.0 g/dL    HCT 28.5 (L) 36.0 - 48.0 %    MCV 91.3 78.0 - 100.0 FL    MCH 29.2 24.0 - 34.0 PG    MCHC 31.9 31.0 - 37.0 g/dL    RDW 17.0 (H) 11.6 - 14.5 %    PLATELET 852 745 - 610 K/uL    MPV 11.4 9.2 - 11.8 FL    NRBC 1.2 (H) 0  WBC    ABSOLUTE NRBC 0.10 (H) 0.00 - 0.01 K/uL    NEUTROPHILS 68 40 - 73 %    LYMPHOCYTES 18 (L) 21 - 52 %    MONOCYTES 7 3 - 10 %    EOSINOPHILS 4 0 - 5 %    BASOPHILS 1 0 - 2 %    IMMATURE GRANULOCYTES 3 (H) 0.0 - 0.5 %    ABS. NEUTROPHILS 5.6 1.8 - 8.0 K/UL    ABS. LYMPHOCYTES 1.5 0.9 - 3.6 K/UL    ABS. MONOCYTES 0.6 0.05 - 1.2 K/UL    ABS. EOSINOPHILS 0.3 0.0 - 0.4 K/UL    ABS. BASOPHILS 0.1 0.0 - 0.1 K/UL    ABS. IMM.  GRANS. 0.2 (H) 0.00 - 0.04 K/UL    DF AUTOMATED     PROTHROMBIN TIME + INR    Collection Time: 09/09/22  2:48 AM   Result Value Ref Range    Prothrombin time 14.3 11.5 - 15.2 sec    INR 1.1 0.8 - 1.2     MAGNESIUM    Collection Time: 09/09/22  2:48 AM   Result Value Ref Range    Magnesium 2.4 1.6 - 2.6 mg/dL   METABOLIC PANEL, BASIC    Collection Time: 09/09/22  2:48 AM   Result Value Ref Range    Sodium 140 136 - 145 mmol/L    Potassium 3.9 3.5 - 5.5 mmol/L    Chloride 106 100 - 111 mmol/L    CO2 30 21 - 32 mmol/L    Anion gap 4 3.0 - 18 mmol/L    Glucose 156 (H) 74 - 99 mg/dL    BUN 40 (H) 7.0 - 18 MG/DL    Creatinine 2.53 (H) 0.6 - 1.3 MG/DL    BUN/Creatinine ratio 16 12 - 20      GFR est AA 32 (L) >60 ml/min/1.73m2    GFR est non-AA 26 (L) >60 ml/min/1.73m2    Calcium 8.9 8.5 - 10.1 MG/DL   PHOSPHORUS    Collection Time: 09/09/22  2:48 AM   Result Value Ref Range    Phosphorus 3.4 2.5 - 4.9 MG/DL   GLUCOSE, POC    Collection Time: 09/09/22  5:29 AM   Result Value Ref Range    Glucose (POC) 117 (H) 70 - 110 mg/dL   GLUCOSE, POC    Collection Time: 09/09/22 12:47 PM   Result Value Ref Range    Glucose (POC) 224 (H) 70 - 110 mg/dL         Chemistry Recent Labs     09/09/22 0248 09/08/22 0427 09/07/22 0357   * 184* 184*    145 145   K 3.9 4.7 3.7    108 109   CO2 30 30 31   BUN 40* 34* 31*   CREA 2.53* 2.36* 2.13*   CA 8.9 8.5 8.1*   MG 2.4 2.2 2.3   PHOS 3.4 3.1 3.5   AGAP 4 7 5   BUCR 16 14 15          Lactic Acid Lactic acid   Date Value Ref Range Status   09/01/2022 1.8 0.4 - 2.0 MMOL/L Final     No results for input(s): LAC in the last 72 hours. Liver Enzymes Protein, total   Date Value Ref Range Status   09/06/2022 5.8 (L) 6.4 - 8.2 g/dL Final     Albumin   Date Value Ref Range Status   09/06/2022 1.7 (L) 3.4 - 5.0 g/dL Final     Globulin   Date Value Ref Range Status   09/06/2022 4.1 (H) 2.0 - 4.0 g/dL Final     A-G Ratio   Date Value Ref Range Status   09/06/2022 0.4 (L) 0.8 - 1.7   Final     Alk. phosphatase   Date Value Ref Range Status   09/06/2022 188 (H) 45 - 117 U/L Final     No results for input(s): TP, ALB, GLOB, AGRAT, AP, TBIL in the last 72 hours.     No lab exists for component: SGOT, GPT, DBIL       CBC w/Diff Recent Labs     09/09/22 0248 09/08/22 0427 09/07/22 0357   WBC 8.2 10.1 9.0   RBC 3.12* 3.12* 2.89*   HGB 9.1* 9.1* 8.2*   HCT 28.5* 28.4* 26.2*    230 195   GRANS 68 72 70   LYMPH 18* 15* 17*   EOS 4 4 4          Cardiac Enzymes No results found for: CPK, CK, CKMMB, CKMB, RCK3, CKMBT, CKNDX, CKND1, ROB, TROPT, TROIQ, BAY, TROPT, TNIPOC, BNP, BNPP     BNP No results found for: BNP, BNPP, XBNPT     Coagulation Recent Labs     09/09/22  0248 09/08/22  0427 09/07/22  0357   PTP 14.3 14.1 13.6   INR 1.1 1.1 1.0           Thyroid  No results found for: T4, T3U, TSH, TSHEXT, TSHEXT    No results found for: T4     Urinalysis Lab Results   Component Value Date/Time    Color YELLOW 09/03/2022 01:45 PM    Appearance CLOUDY 09/03/2022 01:45 PM    Specific gravity 1.023 09/03/2022 01:45 PM    pH (UA) 5.5 09/03/2022 01:45 PM    Protein 100 (A) 09/03/2022 01:45 PM    Glucose >1,000 (A) 09/03/2022 01:45 PM    Ketone 15 (A) 09/03/2022 01:45 PM    Bilirubin Negative 09/03/2022 01:45 PM    Urobilinogen 0.2 09/03/2022 01:45 PM    Nitrites Negative 09/03/2022 01:45 PM    Leukocyte Esterase SMALL (A) 09/03/2022 01:45 PM    Epithelial cells 1+ 09/03/2022 01:45 PM    Bacteria FEW (A) 09/03/2022 01:45 PM    WBC 11 to 20 09/03/2022 01:45 PM    RBC 4 to 10 09/03/2022 01:45 PM        Micro  No results for input(s): SDES, CULT in the last 72 hours. No results for input(s): CULT in the last 72 hours. Culture data during this hospitalization. All Micro Results       Procedure Component Value Units Date/Time    CULTURE, BLOOD [[id number]] Collected: 08/31/22 1015    Order Status: Completed Specimen: Blood Updated: 09/06/22 0711     Special Requests: NO SPECIAL REQUESTS        Culture result: NO GROWTH 6 DAYS       CULTURE, BLOOD [[id number]] Collected: 08/31/22 1000    Order Status: Completed Specimen: Blood Updated: 09/06/22 0711     Special Requests: NO SPECIAL REQUESTS        Culture result: NO GROWTH 6 DAYS       CULTURE, RESPIRATORY/SPUTUM/BRONCH [employer] STAIN [[id number]] Collected: 09/03/22 1230    Order Status: Canceled Specimen: Sputum from Tracheal Aspirate     COVID-19 RAPID TEST [[id number]] Collected: 08/31/22 1050    Order Status: Completed Specimen: Nasopharyngeal Updated: 08/31/22 1131     Specimen source Nasopharyngeal        COVID-19 rapid test Not detected        Comment: Rapid Abbott ID Now       Rapid NAAT:  The specimen is NEGATIVE for SARS-CoV-2, the novel coronavirus associated with COVID-19.        Negative results should be treated as presumptive and, if inconsistent with clinical signs and symptoms or necessary for patient management, should be tested with an alternative molecular assay. Negative results do not preclude SARS-CoV-2 infection and should not be used as the sole basis for patient management decisions. This test has been authorized by the FDA under an Emergency Use Authorization (EUA) for use by authorized laboratories. Fact sheet for Healthcare Providers: [employer]  Fact sheet for Patients: Peg [employer]nz       Methodology: Isothermal Nucleic Acid Amplification                    MRI BRAIN WO CONT    Result Date: 9/3/2022  EXAM: MRI of the brain without intravenous contrast. INDICATION:  \"coma r/o anoxia. \" COMPARISON:  No prior MRI is available for direct comparison. CORRELATION (related prior exam):  Recent CT. PROTOCOL:  Routine brain. _______________ FINDINGS:       IMAGE QUALITY:  Diagnostic. BRAIN AND EXTRA-AXIAL SPACE:           ACUTE/SUBACUTE INFARCT:  There is diffusion restriction diffusely involving the infratentorial and supratentorial gray matter in a pattern indicative of high-grade anoxic brain injury. MASS:  None. HEMORRHAGE:  None. SUBDURAL FLUID COLLECTION:  None. HYDROCEPHALUS:  None. ICA AND DOMINANT VA T2 FLOW VOIDS:  Unremarkable. REMOTE CEREBRAL TERRITORIAL INFARCT:  None definite. REMOTE CEREBELLAR INFARCT:  None definite. STRIVE (STandards for Reporting Vascular changes on nEuroimaging):                            --[place] of white matter hyperintensity (\"leukoaraiosis\") of presumed vascular origin:  Mild. --[place] of chronic lacunes of presumed vascular origin:  Mild. --[place] of perivascular spaces:  None significant.                             --[place] of \"microbleeds\": None definite. --Degree of brain atrophy: Not characterizable in the setting of diffuse cerebral edema. SELLA/PITUITARY:  A T1 and heterogeneously peripherally hyperintense, T2 heterogeneously mildly hyperintense, and T2 FLAIR hyperintense expansile lesion in the sella abutting the undersurface of the optic chiasm measures 12 mm anterior-posterior, 16 mm medial-lateral, 18 mm orthogonal superior-inferior. The lesion is nonspecific are favored to reflect pituitary hemorrhage. HEENT: Intubated. ORBITS:  Unremarkable. PARANASAL SINUSES:  The right maxillary sinus and right anterior ethmoid air cells are opacified. There is scattered paranasal sinus mucosal thickening. MASTOID AIR CELLS:  Predominantly clear. INCLUDED UPPER CERVICAL LYMPH NODES:  Unremarkable. INCLUDED UPPER PAROTIDS:  Unremarkable. NASOPHARYNX:  Unremarkable. BONE MARROW SIGNAL:  Unremarkable. SUPERFICIAL SOFT TISSUES: Unremarkable. _______________     1. High-grade acute/subacute anoxic brain injury without herniation. 2.  Heterogeneous expansile sellar lesion, nonspecific, pituitary hematoma/hemorrhage favored over other etiologies. _______________     CT HEAD WO CONT    Result Date: 9/2/2022  EXAM: CT head INDICATION: Anoxic brain injury. COMPARISON: 8/31/2022 TECHNIQUE: Axial CT imaging of the head was performed without intravenous contrast. Standard multiplanar coronal and sagittal reformatted images were obtained and are included in interpretation. One or more dose reduction techniques were used on this CT: automated exposure control, adjustment of the mAs and/or kVp according to patient size, and iterative reconstruction techniques. The specific techniques used on this CT exam have been documented in the patient's electronic medical record.   Digital Imaging and Communications in Medicine (DICOM) format image data are available to nonaffiliated external healthcare facilities or entities on a secure, media free, reciprocally searchable basis with patient authorization for at least a 12-month period after this study. _______________ FINDINGS: BRAIN AND POSTERIOR FOSSA: Mild patchy periventricular, deep and subcortical white matter hypoattenuation which is nonspecific but likely represents chronic ischemic changes. No evidence of acute large vessel transcortical infarct or acute parenchymal hemorrhage. No midline shift or hydrocephalus. EXTRA-AXIAL SPACES AND MENINGES: There are no abnormal extra-axial fluid collections. CALVARIUM: Intact. SINUSES: Right maxillary and ethmoid sinus mucosal disease. OTHER: None. _______________     No acute intracranial abnormality. CT HEAD WO CONT    Result Date: 8/31/2022  EXAM: CT head INDICATION: Cardiac arrest COMPARISON: None. TECHNIQUE: Axial CT imaging of the head was performed without intravenous contrast. Standard multiplanar coronal and sagittal reformatted images were obtained and are included in interpretation. One or more dose reduction techniques were used on this CT: automated exposure control, adjustment of the mAs and/or kVp according to patient size, and iterative reconstruction techniques. The specific techniques used on this CT exam have been documented in the patient's electronic medical record. Digital Imaging and Communications in Medicine (DICOM) format image data are available to nonaffiliated external healthcare facilities or entities on a secure, media free, reciprocally searchable basis with patient authorization for at least a 12-month period after this study. _______________ FINDINGS: BRAIN AND POSTERIOR FOSSA: Exam quality is mildly degraded secondary to motion artifact. Ventricular size and configuration appears within normal limits. Basilar cisterns are patent. Within the limitations of motion, no acute intra-axial hemorrhage. No evidence of mass effect or midline shift. Gray-white matter differentiation appears within normal limits as visualized. EXTRA-AXIAL SPACES AND MENINGES: There are no abnormal extra-axial fluid collections. CALVARIUM: Intact. SINUSES: Minor nonspecific mucosal thickening ethmoid air cells, sphenoid sinus with air-fluid level present in the right maxillary sinus. OTHER: None. _______________     1. Mildly motion limited study without evidence of acute intracranial abnormality. 2. Paranasal mucosal disease as described above with air-fluid level in the right maxillary sinus as can be seen with sinusitis. CT CHEST ABD PELV WO CONT    Result Date: 8/31/2022  EXAM: CT chest, abdomen, and pelvis INDICATION: Pain COMPARISON: No prior study. TECHNIQUE: Axial CT imaging of the chest, abdomen, and pelvis was performed without IV contrast administration. Multiplanar reformats were generated. One or more dose reduction techniques were used on this CT: automated exposure control, adjustment of the mAs and/or kVp according to patient size, and iterative reconstruction techniques. The specific techniques used on this CT exam have been documented in the patient's electronic medical record. Digital Imaging and Communications in Medicine (DICOM) format image data are available to nonaffiliated external healthcare facilities or entities on a secure, media free, reciprocally searchable basis with patient authorization for at least a 12-month period after this study. _______________ FINDINGS: CHEST: MEDIASTINUM: Left IJV central venous catheter tip at the cavoatrial junction. Normal caliber aorta with vascular calcifications. No pericardial effusion. LYMPH NODES: No pathologically enlarged mediastinal or hilar lymph nodes. PLEURA: No pleural effusion or pneumothorax. LUNGS/AIRWAY: Endotracheal tube tip in the midthoracic trachea. No consolidation or suspicious pulmonary nodule is seen. OTHER: None. ** ABDOMEN/PELVIS: LIVER, BILIARY: Liver is unremarkable.  No abnormal biliary dilation. Gallbladder is unremarkable. PANCREAS: Unremarkable. SPLEEN: Unremarkable. ADRENALS: Unremarkable. KIDNEYS: Severe left and moderate right hydroureteronephrosis with marked urinary bladder distention. No obstructing calculus. Left lower pole 5.8 cm simple cysts, no follow-up necessary. LYMPH NODES: No pathologically enlarged lymph nodes. GASTROINTESTINAL TRACT: No abnormal bowel dilation or wall thickening. PELVIC ORGANS: Unremarkable. VASCULATURE: Unremarkable. OSSEOUS: No area of erosion or aggressive-appearing bone destruction. OTHER: None. _______________     Severe left and moderate right hydroureteronephrosis with marked urinary bladder distention. No obstructing calculus. No acute intrathoracic abnormality. US RETROPERITONEUM COMP    Result Date: 9/2/2022  US RETROPERITONEUM COMP INDICATION: Acute kidney injury. TECHNIQUE: Renal ultrasound. COMPARISON: 8/31/2022 CT FINDINGS: Right kidney measures 10.7 cm. Left kidney measures 6.7 cm. Bilateral increased cortical echogenicity. Severe left hydronephrosis with cortical atrophy. No shadowing calculus or perinephric abnormality. No solid renal mass identified. Indwelling Lyle catheter within decompressed urinary bladder. Bilateral increased cortical echogenicity. Severe left hydronephrosis with cortical atrophy. XR CHEST PORT    Result Date: 9/5/2022  EXAM: CHEST RADIOGRAPH, SINGLE VIEW CLINICAL INDICATION/HISTORY: Shortness of breath, intubated, follow-up COMPARISON: 9/4/2022 TECHNIQUE: Portable frontal view of the chest was obtained. _______________ FINDINGS: SUPPORT DEVICES: Unchanged left jugular central venous catheter, endotracheal tube, and gastric tube, all adequately positioned. HEART AND MEDIASTINUM: Cardiomediastinal silhouette appears within normal limits. Tortuous and atherosclerotic thoracic aorta. No central vascular congestion. LUNGS AND PLEURAL SPACES: Left basilar opacity partially silhouettes left hemidiaphragm. Left mid and upper lung zones and all of the right lung remain adequately aerated. No definite pleural effusion. No pneumothorax. BONY THORAX AND SOFT TISSUES: No acute osseous abnormality. _______________     1. Tubes and lines appear unchanged, adequately positioned. 2. Left basilar opacity, atelectasis versus infiltrate. XR CHEST PORT    Result Date: 9/4/2022  EXAM: CHEST RADIOGRAPH, SINGLE VIEW CLINICAL INDICATION/HISTORY: Shortness of breath, intubated, follow-up COMPARISON: 9/3/2022 TECHNIQUE: Portable frontal view of the chest was obtained. _______________ FINDINGS: SUPPORT DEVICES: Endotracheal tube, left jugular central venous catheter, and nasogastric tube all appear adequately positioned. HEART AND MEDIASTINUM: Cardiomediastinal silhouette appears within normal limits. Tortuous and atherosclerotic thoracic aorta. No central vascular congestion. LUNGS AND PLEURAL SPACES: Unchanged retrocardiac left lower lobe atelectasis. Lungs are otherwise adequately aerated with no confluent airspace opacity. No pleural effusion or pneumothorax. BONY THORAX AND SOFT TISSUES: No acute osseous abnormality. _______________     No active cardiopulmonary disease. XR CHEST PORT    Result Date: 9/3/2022  EXAM: CHEST RADIOGRAPH, SINGLE VIEW CLINICAL INDICATION/HISTORY: Shortness of breath, endotracheal tube placement COMPARISON: 8/31/2022 TECHNIQUE: Portable frontal view of the chest was obtained. _______________ FINDINGS: SUPPORT DEVICES: Adequately positioned endotracheal tube, unchanged. Left jugular central venous catheter and nasogastric tube also appear unchanged, adequately positioned. HEART AND MEDIASTINUM: Cardiomediastinal silhouette appears within normal limits. Normal caliber thoracic aorta. No central vascular congestion. LUNGS AND PLEURAL SPACES: Retrocardiac left lower lobe subsegmental atelectasis. Lungs are otherwise adequately aerated with no confluent airspace opacity.   No pleural effusion or pneumothorax. BONY THORAX AND SOFT TISSUES: No acute osseous abnormality. _______________     No active cardiopulmonary disease. XR CHEST PORT    Result Date: 8/31/2022  EXAM: XR CHEST PORT CLINICAL INDICATION/HISTORY: central line -Additional: None COMPARISON: 4 hours prior TECHNIQUE: Portable frontal view of the chest _______________ FINDINGS: SUPPORT DEVICES: Endotracheal tube, gastric tube, and left IJ approach central venous catheter noted. Tip of the central venous catheter is at the level of the superior cavoatrial junction. HEART AND MEDIASTINUM: Normal cardiac size and mediastinal contours. LUNGS AND PLEURAL SPACES: No focal pneumonic opacity. No evidence of pneumothorax or pleural effusion. BONY THORAX AND SOFT TISSUES: Unremarkable. _______________     1. Support devices in stable/appropriate position as visualized. 2. No superimposed acute radiographic cardiopulmonary abnormality. XR CHEST PORT    Result Date: 8/31/2022  XR CHEST PORT CLINICAL INDICATION/HISTORY: Tube placement -Additional: None COMPARISON: None TECHNIQUE: Portable frontal view of the chest _______________ FINDINGS: SUPPORT DEVICES: Enteric tube tip projecting over the thoracic inlet. Endotracheal tube tip in the midthoracic trachea. Left IJV central venous catheter tip at the caval atrial junction. HEART AND MEDIASTINUM: Cardiomediastinal silhouette within normal limits. LUNGS AND PLEURAL SPACES: No dense consolidation, large effusion or pneumothorax. _______________     Enteric tube tip projecting over the thoracic inlet. Endotracheal tube tip in the midthoracic trachea. No acute cardiopulmonary abnormality. ECHO ADULT COMPLETE    Result Date: 8/31/2022  Formatting of this result is different from the original.   Left Ventricle: Normal left ventricular systolic function with a visually estimated EF of 60 - 65%. Not well visualized. Left ventricle size is normal. Normal wall thickness. Normal wall motion.    Right Ventricle: Not well visualized. Right ventricle is moderately dilated. Moderately reduced systolic function. Visually moderately dilated RV and moderately reduced RV systolic function. Aortic Valve: Tricuspid valve. Tricuspid Valve: The estimated RVSP is 25 mmHg. Right Atrium: Not well visualized. Right atrium is moderately dilated. No old echocardiogram available to compare. 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 · No evidence of deep vein thrombosis in the left lower extremity. DUPLEX LOWER EXT VENOUS BILAT    Result Date: 9/1/2022  · Age indeterminate thrombus present in the right popliteal vein. · No evidence of deep vein thrombosis in the left lower extremity. Images report reviewed by me:  CT (Most Recent) (CT reviewed by me) Results from Hospital Encounter encounter on 08/31/22    CT HEAD WO CONT    Narrative  EXAM: CT head    INDICATION: Anoxic brain injury. COMPARISON: 8/31/2022    TECHNIQUE: Axial CT imaging of the head was performed without intravenous  contrast. Standard multiplanar coronal and sagittal reformatted images were  obtained and are included in interpretation. One or more dose reduction techniques were used on this CT: automated exposure  control, adjustment of the mAs and/or kVp according to patient size, and  iterative reconstruction techniques. The specific techniques used on this CT  exam have been documented in the patient's electronic medical record. Digital  Imaging and Communications in Medicine (DICOM) format image data are available  to nonaffiliated external healthcare facilities or entities on a secure, media  free, reciprocally searchable basis with patient authorization for at least a  12-month period after this study.   _______________    FINDINGS:    BRAIN AND POSTERIOR FOSSA: Mild patchy periventricular, deep and subcortical  white matter hypoattenuation which is nonspecific but likely represents chronic  ischemic changes. No evidence of acute large vessel transcortical infarct or  acute parenchymal hemorrhage. No midline shift or hydrocephalus. EXTRA-AXIAL SPACES AND MENINGES: There are no abnormal extra-axial fluid  collections. CALVARIUM: Intact. SINUSES: Right maxillary and ethmoid sinus mucosal disease. OTHER: None.    _______________    Impression  No acute intracranial abnormality. CXR reviewed by me:  XR (Most Recent). XR  reviewed by me and compared with previous XR Results from Hospital Encounter encounter on 08/31/22    XR CHEST PORT    Narrative  EXAM: XR CHEST PORT    CLINICAL INDICATION/HISTORY: Pulmonary infiltrate, ET tube position, acute  respiratory failure  -Additional: None    COMPARISON: 9/7/2022    TECHNIQUE: Portable frontal view of the chest    _______________    FINDINGS:    SUPPORT DEVICES: Endotracheal and enteric tubes unchanged. Esophageal probe are  unchanged. Left IJV central venous catheter unchanged. HEART AND MEDIASTINUM: Stable cardiomediastinal silhouette. .    LUNGS AND PLEURAL SPACES: Left layering pleural effusion/atelectasis. No  pneumothorax.    _______________    Impression  Improved aeration with persistent left layering pleural effusion. ·Please note: Voice-recognition software may have been used to generate this report, which may have resulted in some phonetic-based errors in grammar and contents. Even though attempts were made to correct all the mistakes, some may have been missed, and remained in the body of the document.       Dina Mccauley MD  9/9/2022

## 2022-09-09 NOTE — PROGRESS NOTES
[facility]  (A Division of [address] [place] [address] Road)    Margarita Larose MD  Office #: [phone]- Option # [phone]  Fax #: [phone]     Date of Admission: 8/31/2022   Date of Note: 9/9/2022   Reason for Referral: Evaluation and antibiotic management of dissiminated shingles/ shock post carrest      Current Antimicrobials:    Prior Antimicrobials:  Zosyn- 8/31 to date Oral antiviral( Valtrex) X7 days PTA  Vanco 8/31 to 9/2  Acyclovir IV 9/1 to 9/7   Antibiotic allergy: None     Assessment:     Probable Septic Shock : Improved  Likely urine source--skin( has shingles) doesn't look superinfected  Severe L and mod R hydronephrosis, without obstructing stone. UA with TNTC wbc  BCX  8/31: NG, no UCX available for review . UA with TNTC WBC -8/31  Fluctuating WBC-- up to 15.1, not on steroid- Improved  off pressor  S/P Cardiac arrest with elevated troponins-PE not ruled out  Anoxic encephalopathy- -MRI brain : high grade acute/subacute anoxic brain injury w/out herniation, pit hematoma/hge like picture. Prognosis poor per Neurology  L lumbar/sacral dermatome Shingles-- was on viral medication at home per reports  --dried up skin lesion, no bacterial superinfection  Transaminitis-- from shock liver-- Improving  Acute on chronick GENO (Cr 1.6 5/2022)- slowly improving  Chronic RLE DVT   Age indetermiante DVT on LLE  History of gout treatment with colchicine recently per wife--new diagnosis    Recommendation related to ID issues:       Cont Zosyn- will complete 10 days for Probable Urosepsis-- until 9/9-- DC today  Removal of central lines per ICU  Planned extubation tomorrow noted    Will sign off. Please re-consult as needed. Subjective:      Pt seen. No acute event over night   Remains intubated/ unresponsive. Not on pressor.  Off sedation  Afebrile, no change on vent setting  Compassionate extubation in plans once all family here, over weekend    Notes/Labs reviewed-- Prognosis poor with anoxic brain injury per Neuro       HPI 9/1/22: Lauren Denis is 65 y.o. M patient with PMH of shingles, on medication. History is limited and obtained from chart review and med staff. Presented to ED yesterday and was in cardiac arrest with CPR that took 30 minutes. He was intubated/ placed on pressor and admitted to ICU. He had shingles at home and was on treatment. He had urine retention  and had about 2L of urine with duran placement per RN. Hydronephrosis on CT scan, no CPD on CXR, LLE DVT of unknown age on FORBES. He was placed on emperic abx    He is currently on pressor- Levophed, unresponsive, with myoclonus jerk that requires propfol    Past Medical History:   Diagnosis Date    Gout     Shingles      No past surgical history on file. No family history on file.   Medications reviewed as below:   Current Facility-Administered Medications   Medication Dose Route Frequency Provider Last Rate Last Admin    insulin glargine (LANTUS) injection 15 Units  15 Units SubCUTAneous Q24H Crystal BATES MD   15 Units at 09/08/22 1002    heparin (porcine) injection 5,000 Units  5,000 Units SubCUTAneous Q8H Iraj Solano MD   5,000 Units at 09/09/22 0502    amLODIPine (NORVASC) tablet 5 mg  5 mg Oral DAILY Iraj Solano MD   5 mg at 09/08/22 0941    lactulose (CHRONULAC) 10 gram/15 mL solution 15 mL  15 mL Oral DAILY Iraj Solano MD   15 mL at 09/08/22 0941    furosemide (LASIX) injection 20 mg  20 mg IntraVENous DAILY Iraj Solano MD   20 mg at 09/08/22 0941    metoprolol (LOPRESSOR) injection 2.5 mg  2.5 mg IntraVENous Q6H PRN Iraj Solano MD   2.5 mg at 09/06/22 1747    metoprolol (LOPRESSOR) injection 1.25 mg  1.25 mg IntraVENous Q6H Spencer Bowen MD   1.25 mg at 09/09/22 0204    insulin lispro (HUMALOG) injection   SubCUTAneous Q6H Iraj Solano MD   3 Units at 09/08/22 2338    ELECTROLYTE REPLACEMENT PROTOCOL - Potassium Renal Dosing  1 Each Other PRN Trav Mary, Lambert Hebert MD        piperacillin-tazobactam (ZOSYN) 3.375 g in 0.9% sodium chloride (MBP/ADV) 100 mL MBP  3.375 g IntraVENous Q8H Rafael CANADA DO 25 mL/hr at 09/09/22 0456 3.375 g at 09/09/22 0456    sodium chloride (NS) flush 5-40 mL  5-40 mL IntraVENous Q8H Asael Chakraborty MD   10 mL at 09/09/22 0502    sodium chloride (NS) flush 5-40 mL  5-40 mL IntraVENous PRN Asael Chakraborty MD        acetaminophen (TYLENOL) tablet 650 mg  650 mg Oral Q6H PRN Asael Chakraborty MD   650 mg at 09/09/22 0222    Or    acetaminophen (TYLENOL) suppository 650 mg  650 mg Rectal Q6H PRN Brigette Chakraborty MD        polyethylene glycol (MIRALAX) packet 17 g  17 g Oral DAILY PRN Brigette Chakraborty MD        ondansetron (ZOFRAN ODT) tablet 4 mg  4 mg Oral Q8H PRN Brigette Chakraborty MD        Or    ondansetron (ZOFRAN) injection 4 mg  4 mg IntraVENous Q6H PRN Brigette Chakraborty MD        pantoprazole (PROTONIX) 40 mg in 0.9% sodium chloride 10 mL injection  40 mg IntraVENous Q12H Brigette Chakraborty MD   40 mg at 09/08/22 2053    levETIRAcetam (KEPPRA) 1,000 mg in 0.9% sodium chloride 100 mL IVPB  1,000 mg IntraVENous Q12H Robbin Marie  mL/hr at 09/08/22 2048 1,000 mg at 09/08/22 2048     No Known Allergies  Social History     Socioeconomic History    Marital status:      Spouse name: Not on file    Number of children: Not on file    Years of education: Not on file    Highest education level: Not on file   Occupational History    Not on file   Tobacco Use    Smoking status: Not on file    Smokeless tobacco: Not on file   Substance and Sexual Activity    Alcohol use: Not on file    Drug use: Not on file    Sexual activity: Not on file   Other Topics Concern    Not on file   Social History Narrative    Not on file     Social Determinants of Health     Financial Resource Strain: Not on file   Food Insecurity: Not on file   Transportation Needs: Not on file   Physical Activity: Not on file   Stress: Not on file   Social Connections: Not on file   Intimate Partner Violence: Not on file   Housing Stability: Not on file        Review of Systems: Unable to provide      Objective:      Visit Vitals  BP (!) 140/85   Pulse 95   Temp 98.1 °F (36.7 °C)   Resp (!) 0   Ht 5' 10\" (1.778 m)   Wt 92 kg (202 lb 13.2 oz)   SpO2 98%   BMI 29.10 kg/m²     Temp (24hrs), Av.3 °F (37.4 °C), Min:98.1 °F (36.7 °C), Max:100 °F (37.8 °C)         GEN: WD unresponsive, intubated    Lines / Catheters: Left IJ central line, L PIV, Lyle  . HEENT: Unicteric. Edematous sclera. --no neck swelling  CHEST: Non laboured breathing. CTA  CVS: Tachycardic- HS 1 and 2 heard, no mur/gallop  ABD: Obese/soft. Non tender. Non distended. Hypoactive BS  VERNELL: Lyle in place  EXT: No apparent swelling or redness on UE/LE joints. Skin: Dry and intact. No cellulitis-- has L thigh/buttock Shingles rash-- multiple dermatome, no active vesicular lesion on exam. No cyanosis.  Warm feet  CNS: unresponsive, no jerky movement noted during exam. Eye blinking present      Labs: Results:   Chemistry Recent Labs     22   * 184* 184*    145 145   K 3.9 4.7 3.7    108 109   CO2 30 30 31   BUN 40* 34* 31*   CREA 2.53* 2.36* 2.13*   CA 8.9 8.5 8.1*   AGAP 4 7 5   BUCR 16 14 15      CBC w/Diff Recent Labs     22  035   WBC 8.2 10.1 9.0   RBC 3.12* 3.12* 2.89*   HGB 9.1* 9.1* 8.2*   HCT 28.5* 28.4* 26.2*    230 195   GRANS 68 72 70   LYMPH 18* 15* 17*   EOS 4 4 4            No results found for: SDES Lab Results   Component Value Date/Time    Culture result: NO GROWTH 6 DAYS 2022 10:15 AM    Culture result: NO GROWTH 6 DAYS 2022 10:00 AM        Results       Procedure Component Value Units Date/Time    CULTURE, RESPIRATORY/SPUTUM/BRONCH [place] STAIN [[id number]] Collected: 22 1230    Order Status: Canceled Specimen: Sputum from Tracheal Aspirate     COVID-19 RAPID TEST [[id number]] Collected: 08/31/22 1050    Order Status: Completed Specimen: Nasopharyngeal Updated: 08/31/22 1131     Specimen source Nasopharyngeal        COVID-19 rapid test Not detected        Comment: Rapid Abbott ID Now       Rapid NAAT:  The specimen is NEGATIVE for SARS-CoV-2, the novel coronavirus associated with COVID-19. Negative results should be treated as presumptive and, if inconsistent with clinical signs and symptoms or necessary for patient management, should be tested with an alternative molecular assay. Negative results do not preclude SARS-CoV-2 infection and should not be used as the sole basis for patient management decisions. This test has been authorized by the FDA under an Emergency Use Authorization (EUA) for use by authorized laboratories. Fact sheet for Healthcare Providers: ConventionUpdate.co.nz  Fact sheet for Patients: ConventionUpdate.co.nz       Methodology: Isothermal Nucleic Acid Amplification         CULTURE, BLOOD [[id number]] Collected: 08/31/22 1015    Order Status: Completed Specimen: Blood Updated: 09/06/22 0711     Special Requests: NO SPECIAL REQUESTS        Culture result: NO GROWTH 6 DAYS       CULTURE, BLOOD [[id number]] Collected: 08/31/22 1000    Order Status: Completed Specimen: Blood Updated: 09/06/22 0711     Special Requests: NO SPECIAL REQUESTS        Culture result: NO GROWTH 6 DAYS                 Imaging:      All imaging reviewed from Admission to present as per radiology interpretation in [address] [place] [address]

## 2022-09-09 NOTE — PROGRESS NOTES
0700 bedside report received from outgoing nurseArgentina. 1200 reassessment with no change. Familly stated \"we would not like to do comfort care tomorrow and would like to do the trach and PEG. \" RN told provider, Dr. Linda López who spoke with family. 1600 reassessment with no change. 1900 bedside shift report given to on-coming nurseArgentina.

## 2022-09-09 NOTE — DIABETES MGMT
Diabetes/ Glycemic Control Plan of Care  Recommendations:   No changes recommended at this time. Continue current basal and corrective regimen    Assessment: BG ranging 117-256 mg/dl over the last 24 hours on current regimen. Recent Glucose Results:   Lab Results   Component Value Date/Time     (H) 09/09/2022 02:48 AM    GLUCPOC 117 (H) 09/09/2022 05:29 AM    GLUCPOC 193 (H) 09/08/2022 11:18 PM    GLUCPOC 256 (H) 09/08/2022 05:28 PM         BG within target range (non-ICU: <180; -180):  [] Yes    [x] No   Current insulin orders: 15 units Lantus, corrective Humalog  Total Daily Dose previous 24 hours = 36 units (15 units Lantus + 21 units Humalog)     Plan/Goals:   Blood glucose will be within target of 70 - 180 mg/dl within 72 hours  Reinforce dietary and medication compliance at home.        Education:  [] Refer to Diabetes Education Record                       [x] Education not indicated at this time     Marta Serrato RD  Glycemic Control Team  [phone]    Monday-Friday   9 am - 3 pm

## 2022-09-10 NOTE — ROUTINE PROCESS
Bedside shift change report given to Paula Redmond RN (oncoming nurse) by Loren Lane RN (offgoing nurse). Report included the following information SBAR, Kardex, OR Summary, Intake/Output, and MAR.

## 2022-09-10 NOTE — PROGRESS NOTES
@2000 pt taken over awake in bed unresponsive, fl-acc 0, No sedation in progress. Eyes open no tracking ,no focusing. OG tube in-situ infusing tube feeds. Lyle in-situ draining urine. Assessment done and placed in flow sheets. Nursing management continues. @0000 pt reassessed no new changes care continues. @0400 condition unchanged when reassessed observation continues. @9112 Bedside and Verbal shift change report given to iWitness (oncoming nurse) by Isma Schwartz (offgoing nurse). Report included the following information SBAR, Kardex, Intake/Output, MAR, Recent Results, Med Rec Status, and Alarm Parameters .

## 2022-09-10 NOTE — PROGRESS NOTES
Hospitalist Progress Note    Patient: Elder Matamoros MRN: [id number]  [facility]: [id number]    YOB: 1960  Age: 65 y.o. Sex: male    DOA: 8/31/2022 LOS:  LOS: 10 days          Chief Complaint:    Cardiac arrest      Assessment/Plan     Elder Matamoros is a 65 y.o. hypertension, dyslipidemia, type 2 diabetes mellitus, vitamin D deficiency, stage 3a CKD and gout male with history of who presents with cardiac arrest.      Cardiac arrest, PEA  Severe anoxia by clinical exam and confirmed by MRI, with associated pituitary hemorrhage  Acute respiratory failure  Anoxia  Myoclonic jerks  Shock   RLE popliteal DVT  Sepsis, likely due to urinary source  UTI  Disseminated herpes zoster  Severe left and moderate right hydroureteronephrosis   GENO on CKD 3  Hyperglycemia  hypernatremia  Anemia    Family meeting with intensivist yesterday and wife has decided to proceed with trach and PEG. She has decided to cancel comfort measures.   ENT and GI will be consulted after the weekend    Zosyn course completed for likely sepsis of urinary origin      Renal fxn improved    No clear neurologic recovery     Hypernatremia-improved      Continue duran    IV antibiotics  IV acyclovir  IV PPI    SQ heparin     Off sedation     Echo with nl EF     Prognosis dismal    pallaitive care team following      Wife at bedside     Disposition : TBD  Patient Active Problem List   Diagnosis Code    Cardiac arrest ([facility] I46.9    Respiratory failure ([facility] J96.90    GENO (acute kidney injury) ([facility] N17.9    Disseminated herpes zoster B02.7    Hydronephrosis N13.30    Shock ([facility] R57.9    Sepsis ([facility] A41.9    UTI (urinary tract infection) N39.0    Brain anoxia (H[facility]) G93.1       Subjective:    No changes of note      Review of systems:    UTO due to illness      Vital signs/Intake and Output:  Visit Vitals  /79   Pulse (!) 101   Temp 99 °F (37.2 °C)   Resp 8   Ht 5' 10\" (1.778 m)   Wt 92 kg (202 lb 13.2 oz)   SpO2 100%   BMI 29.10 kg/m² Current Shift:  09/10 0701 - 09/10 1900  In: 969 [I.V.:100]  Out: 1350 [Urine:1350]  Last three shifts:  09/08 1901 - 09/10 0700  In: 3917 [I.V.:500]  Out: 4250 [Urine:4250]    Exam:    General: unresponsive intubated AAM, NAD  CVS:Regular rate and rhythm, no M/R/G, S1/S2 heard, no thrill  Lungs:Clear to auscultation bilaterally, no wheezes, rhonchi, or rales  Abdomen: Soft, Nontender, No distention  Extremities: No C/C/E, pulses palpable 2+  Neuro: Eyes open intermittently, rightward gaze, does not follow commands, no involuntary movements, no withdrawal to painful stimuli                  Labs: Results:       Chemistry Recent Labs     09/10/22  0428 09/09/22  0248 09/08/22  0427   * 156* 184*    140 145   K 4.6 3.9 4.7    106 108   CO2 27 30 30   BUN 43* 40* 34*   CREA 2.60* 2.53* 2.36*   CA 8.7 8.9 8.5   AGAP 8 4 7   BUCR 17 16 14        CBC w/Diff Recent Labs     09/10/22  0428 09/09/22  0248 09/08/22  0427   WBC 9.6 8.2 10.1   RBC 3.27* 3.12* 3.12*   HGB 9.4* 9.1* 9.1*   HCT 29.5* 28.5* 28.4*    284 230   GRANS 72 68 72   LYMPH 16* 18* 15*   EOS 3 4 4        Cardiac Enzymes No results for input(s): CPK, CKND1, ROB in the last 72 hours. No lab exists for component: CKRMB, TROIP   Coagulation Recent Labs     09/10/22  0428 09/09/22  0248   PTP 13.8 14.3   INR 1.0 1.1         Lipid Panel Lab Results   Component Value Date/Time    Triglyceride 201 (H) 09/04/2022 05:00 AM      BNP No results for input(s): BNPP in the last 72 hours. Liver Enzymes No results for input(s): TP, ALB, TBIL, AP in the last 72 hours.     No lab exists for component: SGOT, GPT, DBIL     Thyroid Studies No results found for: T4, T3U, TSH, TSHEXT, TSHEXT     Procedures/imaging: see electronic medical records for all procedures/Xrays and details which were not copied into this note but were reviewed prior to creation of Johndwight Thorpe MD

## 2022-09-10 NOTE — PROGRESS NOTES
/  /                        Cardiology Progress Note        Patient: Valerie Casanova        Sex: male          DOA: 8/31/2022  YOB: 1960      Age:  65 y.o.        LOS:  LOS: 10 days   Assessment/Plan     Principal Problem:    Cardiac arrest ([address] (8/31/2022)    Active Problems:    Respiratory failure ([address] (8/31/2022)      GENO (acute kidney injury) ([address] (8/31/2022)      Disseminated herpes zoster (8/31/2022)      Hydronephrosis (8/31/2022)      Shock ([address] (8/31/2022)      Sepsis ([address] (8/31/2022)      UTI (urinary tract infection) (9/1/2022)      Brain anoxia ([address] (9/4/2022)      Plan:  9/10/2022  Patient remained intubated  Hemodynamically and rhythm standpoint stable  Patient's wife is considering tracheostomy/PEG tube  Continue with current meds      9/9/2022  No change from cardiac standpoint  Stable blood pressure and rhythm  Continue with current meds  Discussed with RN        9/8/2022  Cardiac telemetry stable  Stable blood pressure  Cardiac status unchanged      9/6/2022  Cardiac telemetry sinus rhythm with occasional PACs and PVCs  Continue with metoprolol, amlodipine and Lasix        9/5/2022  No change from cardiac standpoint  Continue with current supportive treatment      9/4/2022  High-grade anoxic brain injury on MRI  Cardiac telemetry stable  Continue with current supportive treatment as per family  Discussed with RN and Dr. Yasmin Dumont  Discussed with patient's wife and family          9/3/2022  Blood pressure is mildly elevated with mild tachycardia  I will add low-dose IV metoprolol 1.25 mg every 6 hours  Continue with rest of the treatment  Discussed with Dr. Geraldo Peña        9/2/2022  Patient remained intubated  Off sedation  Of Levophed  Cardiac telemetry stable with occasional PVCs  Continue with supportive treatment  Discussed with wife and family members  Discussed with Dr. Mary Jordan arrest PEA arrest  Mild troponin elevation after CPR and PEA cardiac arrest, normal EF and wall motion, no evidence of ACS  DVT  Continue anticoagulation if no active bleeding  Continue with supportive treatment  I have long and lengthy discussion with family about management plan. All the questions were answered. Subjective:    cc:  Cardiac arrest        REVIEW OF SYSTEMS:     Limited due to intubation      Objective:      Visit Vitals  /75   Pulse 91   Temp 99.1 °F (37.3 °C)   Resp 14   Ht 5' 10\" (1.778 m)   Wt 92 kg (202 lb 13.2 oz)   SpO2 100%   BMI 29.10 kg/m²     Body mass index is 29.1 kg/m². Physical Exam:  General Appearance: Intubated  NECK: No JVD, no thyroidomeglay. LUNGS: Clear bilaterally. HEART: S1+S2 audible,    ABD: Non-tender, BS Audible    EXT: No edema, and no cysnosis. VASCULAR EXAM: Pulses are intact.     PSYCHIATRIC EXAM: Intubated    Medication:  Current Facility-Administered Medications   Medication Dose Route Frequency    insulin glargine (LANTUS) injection 15 Units  15 Units SubCUTAneous Q24H    heparin (porcine) injection 5,000 Units  5,000 Units SubCUTAneous Q8H    amLODIPine (NORVASC) tablet 5 mg  5 mg Oral DAILY    lactulose (CHRONULAC) 10 gram/15 mL solution 15 mL  15 mL Oral DAILY    furosemide (LASIX) injection 20 mg  20 mg IntraVENous DAILY    metoprolol (LOPRESSOR) injection 2.5 mg  2.5 mg IntraVENous Q6H PRN    metoprolol (LOPRESSOR) injection 1.25 mg  1.25 mg IntraVENous Q6H    insulin lispro (HUMALOG) injection   SubCUTAneous Q6H    ELECTROLYTE REPLACEMENT PROTOCOL - Potassium Renal Dosing  1 Each Other PRN    sodium chloride (NS) flush 5-40 mL  5-40 mL IntraVENous Q8H    sodium chloride (NS) flush 5-40 mL  5-40 mL IntraVENous PRN    acetaminophen (TYLENOL) tablet 650 mg  650 mg Oral Q6H PRN    Or    acetaminophen (TYLENOL) suppository 650 mg  650 mg Rectal Q6H PRN    polyethylene glycol (MIRALAX) packet 17 g  17 g Oral DAILY PRN    ondansetron (ZOFRAN ODT) tablet 4 mg  4 mg Oral Q8H PRN    Or    ondansetron (ZOFRAN) injection 4 mg  4 mg IntraVENous Q6H PRN    pantoprazole (PROTONIX) 40 mg in 0.9% sodium chloride 10 mL injection  40 mg IntraVENous Q12H    levETIRAcetam (KEPPRA) 1,000 mg in 0.9% sodium chloride 100 mL IVPB  1,000 mg IntraVENous Q12H               Lab/Data Reviewed:  Procedures/imaging: see electronic medical records for all procedures/Xrays   and details which were not copied into this note but were reviewed prior to creation of Plan       All lab results for the last 24 hours reviewed. Recent Labs     09/10/22  0428 09/09/22  0248 09/08/22  0427   WBC 9.6 8.2 10.1   HGB 9.4* 9.1* 9.1*   HCT 29.5* 28.5* 28.4*    284 230       Recent Labs     09/10/22  0428 09/09/22  0248 09/08/22  0427    140 145   K 4.6 3.9 4.7    106 108   CO2 27 30 30   * 156* 184*   BUN 43* 40* 34*   CREA 2.60* 2.53* 2.36*   CA 8.7 8.9 8.5         RADIOLOGY:      CXR Results  (Last 48 hours)                 09/09/22 0628  XR CHEST PORT Final result    Impression:      Improved aeration with persistent left layering pleural effusion. Narrative:  EXAM: XR CHEST PORT       CLINICAL INDICATION/HISTORY: Pulmonary infiltrate, ET tube position, acute   respiratory failure   -Additional: None       COMPARISON: 9/7/2022       TECHNIQUE: Portable frontal view of the chest       _______________       FINDINGS:       SUPPORT DEVICES: Endotracheal and enteric tubes unchanged. Esophageal probe are   unchanged. Left IJV central venous catheter unchanged. HEART AND MEDIASTINUM: Stable cardiomediastinal silhouette. .       LUNGS AND PLEURAL SPACES: Left layering pleural effusion/atelectasis.  No   pneumothorax.       _______________                     Cardiology Procedures:   Results for orders placed or performed during the hospital encounter of 08/31/22   EKG, 12 LEAD, INITIAL   Result Value Ref Range    Ventricular Rate 112 BPM    Atrial Rate 112 BPM    P-R Interval 158 ms    QRS Duration 100 ms    Q-T Interval 360 ms    QTC Calculation (Bezet) 491 ms    Calculated P Axis 73 degrees    Calculated R Axis -69 degrees    Calculated T Axis 51 degrees    Diagnosis       Sinus tachycardia  Left axis deviation  Low voltage QRS  Inferior infarct , age undetermined  Abnormal ECG  No previous ECGs available  Confirmed by Poppy Godinez MD. ([id number]) on 8/31/2022 11:30:53 PM        Echo Results  (Last 48 hours)      None         Cardiolite (Tc-99m Sestamibi) stress test    Signed By: Tammy Odom MD     September 10, 2022

## 2022-09-10 NOTE — PROGRESS NOTES
[facility]  Pulmonary, Critical Care, and Sleep Medicine    Name: Fior Vizcaino MRN: [id number]   : 1960 Hospital: [facility] [place]    Date: 9/10/2022  Room: [facility]/[address] [place] [address] Note                                              Consult requesting physician: Dr. Dr. Chela Bentley   Reason for Consult: Cardiac arrest , shock, respiratory failure     IMPRESSION:   Acute respiratory failure, post-arrest - J96.0  Cardiac arrest - I46.9  Sepsis - A41.9, R65.20  Anemia - D64.9  Chronic DVT right leg  Elevated LFTs, post-arrest  Anoxic encephalopathy post-arrest   Pituitary hemorrhage/hematoma  Hypernatremia, improved - E87.0  Patient Active Problem List   Diagnosis Code    Cardiac arrest ([facility] I46.9    Respiratory failure ([facility] J96.90    GENO (acute kidney injury) ([facility] N17.9    Disseminated herpes zoster B02.7    Hydronephrosis N13.30    Shock ([facility] R57.9    Sepsis ([facility] A41.9    UTI (urinary tract infection) N39.0    Brain anoxia ([facility] G93.1     Code status: Partial Code       RECOMMENDATIONS:     Respiratory: Acute respiratory failure 2' to post cardiac arrest anoxic encephalopathy  On VC+, FiO2 35%, PEEP 5+  Chest x-ray for follow-up 22 - slightly improved looking  Pl effusion likely reactive with fluid resuscitation, hypoalbuminemia  Ventilator bundle & Sedation protocol followed. Not on any sedation; daily assessment for readiness for SBT - episodes of apnea during SBT at bedside today - not a candidate given unreliable ability to protect airways. Chlorhexidine mouth washes. Keep SPO2 >=92%. HOB 30 degree elevation all the time. Aggressive pulmonary toileting. Aspiration precautions. Wife and family decided for trach + PEG rather than comfort care understanding continuation of care during meeting on 22. Confirmed decision today at bedside with wife. ENT consult requested.  Will consult GI or IR when services available    CVS: hemodynamically stable  On Norvasc, monitor blood pressure and adjust dose as needed  Cardiology following    ID: No leukocytosis or fever  ID following  Blood cultures 8/31/2022: NGT final  Antibiotics: Zosyn finished 9/9/22  Antiviral: Acyclovir finish 9/7/22 per ID  Will request CVC removal by RN  Sepsis bundle and protocol followed. Follow cultures. Deescalate antibiotic when appropriate per ID. Hematology/Oncology: Stable Hgb and Plt  No bleeding anywhere  Monitor CBC daily  Okay to use heparin for DVT prophylaxis per neurology  Not a candidate for full dose anticoagulation  Has a small chronic calf vein DVT right lower extremity  Follow-up PVL study 9/8/22 - stable chronic RT leg calf vein DVT  Vascular surgery is aware-if worsening in DVT during any future exam then may need IVC filter    Renal: Monitor S. Na+ with BMP  Free water bolus to 200 mL every 4 hours while NG tube  Monitor renal functions, electrolytes, urine output    GI/: PPI for GI prophylaxis  Tube feeding via NG tube at goal  Lyle was placed by urology; no hematuria    Endocrine: Monitor BS. SSI and adjust Lantus    Neurology: Not on any sedation  Opens eyes intermittently spontaneously, does not track or follow commands, no involuntary movements  Moves upper extremities inward slightly to painful stimuli  Continue Keppra per neurology  EEG abnormal  MRI brain 9/3/2022: High-grade acute/subacute anoxic changes  Neurology followed    Toxicology: UDS negative 8/31/22    Pain/Sedation: Avoid sedatives, hypnotic if possible    Skin/Wound: As per nursing care    Electrolytes: Replace electrolytes per ICU electrolyte replacement protocol. Prophylaxis: DVT Prophylaxis: SCD/heparin. GI Prophylaxis: PPI. Restraints: none. May use wrist soft restraints for patient interfering with medical therapy/management and patient safety. Lines/Tubes: PIV  ETT: 8/31/2021. OGT: 9/2/22.   Central line: 8/31/22 LT IJ (site examined, no erythema, induration, discharge or sign of infection. Dressing intact. Medically necessary, will remove it when not needed. Central line bundle followed). Lyle: 8/31/22 (Medically necessary for strict input/output monitoring in critically ill patient, will remove it when not needed. Lyle bundle followed). Advance Directive/Palliative Care: partial DNR. Palliative care Consulted. Will defer respective systems problem management to primary and other respective consultant and follow patient in ICU with primary and other medical team.  Further recommendations will be based on the patient's response to recommended treatment and results of the investigation ordered. Quality Care: PPI, DVT prophylaxis, HOB elevated, Infection control all reviewed and addressed. Care of plan d/w RN, RT, hospitalist team, ENT  Met and discussed with family including wife, other 09/06/22 09/7/22 9/9/22 and 9/10/22; answered all questions to their satisfaction. Discussed overall, poor long-term prognosis with high risk for prolonged hospitalization, prolonged ventilator need, nosocomial infection, hemodynamic instability, cardiac arrest, need for RRT, bleeding, morbidity, mortality, other. High complexity decision making was performed during the evaluation of this patient at high risk for decompensation with multiple organ involvement. Total critical care time spent rendering care exclusive of procedures/family discussion/coordination of care: 36 minutes. Subjective/History of Present Illness:   Patient is a 65 y.o. male with PMHx significant for HTN/CRI/DM recently diagnosed with severe zoster. Patient was on antiviral tx, very weak, sob, cough. EMS called, in ambulance 3 times cardiac arrest. Witnessed CPR/Epi. Repored PEA. 6 doses of epi and no shocks. He came on epi drip but in ER chnaged to levophed. Patjonnyn had seizure vs myocloninc jerks was started on propofol.     9/10/2022 :   Patient seen at bedside in ICU room #104  Remains unresponsive to verbal commands, does not follow commands, no tracking, no involuntary movements  On stable ventilator settings  Episode of apnea on SBT requiring to place back on full support  BP stable. Afebrile. Good urine output  Tolerating tube feedings  No bleeding anywhere  Wife at bedside. Answered her questions to satisfaction  [facility] was not contacted by staff on anything about patient overnight. I/O last 24 hrs: Intake/Output Summary (Last 24 hours) at 9/10/2022 1259  Last data filed at 9/10/2022 1217  Gross per 24 hour   Intake 2707 ml   Output 4000 ml   Net -1293 ml           The patient is critically ill and can not provide additional history due to Unconsciousness and Ventilated    History taken from EMR    Review of Systems:  ROS not obtained due to patient factor. No Known Allergies     Past Medical History:   Diagnosis Date    Gout     Shingles       No past surgical history on file. Social History     Tobacco Use    Smoking status: Not on file    Smokeless tobacco: Not on file   Substance Use Topics    Alcohol use: Not on file      No family history on file.      Prior to Admission medications    Not on File     Current Facility-Administered Medications   Medication Dose Route Frequency    insulin glargine (LANTUS) injection 15 Units  15 Units SubCUTAneous Q24H    heparin (porcine) injection 5,000 Units  5,000 Units SubCUTAneous Q8H    amLODIPine (NORVASC) tablet 5 mg  5 mg Oral DAILY    lactulose (CHRONULAC) 10 gram/15 mL solution 15 mL  15 mL Oral DAILY    furosemide (LASIX) injection 20 mg  20 mg IntraVENous DAILY    metoprolol (LOPRESSOR) injection 1.25 mg  1.25 mg IntraVENous Q6H    insulin lispro (HUMALOG) injection   SubCUTAneous Q6H    piperacillin-tazobactam (ZOSYN) 3.375 g in 0.9% sodium chloride (MBP/ADV) 100 mL MBP  3.375 g IntraVENous Q8H    sodium chloride (NS) flush 5-40 mL  5-40 mL IntraVENous Q8H    pantoprazole (PROTONIX) 40 mg in 0.9% sodium chloride 10 mL injection  40 mg IntraVENous Q12H    levETIRAcetam (KEPPRA) 1,000 mg in 0.9% sodium chloride 100 mL IVPB  1,000 mg IntraVENous Q12H         Objective:   Vital Signs:    Visit Vitals  /79   Pulse (!) 101   Temp 99 °F (37.2 °C)   Resp 8   Ht 5' 10\" (1.778 m)   Wt 92 kg (202 lb 13.2 oz)   SpO2 100%   BMI 29.10 kg/m²       O2 Device: Endotracheal tube, Ventilator       Temp (24hrs), Av.2 °F (37.3 °C), Min:96.6 °F (35.9 °C), Max:99.9 °F (37.7 °C)       Intake/Output:   Last shift:      09/10 07 - 09/10 190  In: 122 [I.V.:100]  Out: 1350 [Urine:1350]    Last 3 shifts:  190 - 09/10 0700  In: 4929 [I.V.:500]  Out: 4250 [Urine:4250]      Intake/Output Summary (Last 24 hours) at 9/10/2022 1259  Last data filed at 9/10/2022 1217  Gross per 24 hour   Intake 2707 ml   Output 4000 ml   Net -1293 ml         Last 3 Recorded Weights in this Encounter    22 0757 22 0904 22 1200   Weight: 103.9 kg (229 lb 0.9 oz) 95 kg (209 lb 7 oz) 92 kg (202 lb 13.2 oz)         Ventilator Settings:  Mode Rate Tidal Volume Pressure FiO2 PEEP   Assist control, VC+   450 ml    35 % 5 cm H20     Peak airway pressure: 16 cm H2O    Plateau pressure:     Tidal volume:    Minute ventilation: 6.25 l/min     No results for input(s): PHI, PHI, POC2, PCO2I, PO2, PO2I, HCO3, HCO3I, FIO2, FIO2I in the last 72 hours. Physical Exam:     General/Neurology: eyes open spontaneously, no tracking, does not follow commands, no involuntary movements, some inward upper extremities movements to painful stimuli, exam limitation on ventilator  Head:   Normocephalic, without obvious abnormality, atraumatic. Eye:   No scleral icterus, no pallor. Nose:   No sinus tenderness  Throat:  Visible lips, mucosa, and tongue normal. ET and OG tubes in place  Neck:   Supple, symmetric. No lymphadenopathy. Trachea midline  Lung: Moderate air entry bilateral equal. No wheezing. No stridors. No prolongded expiration. No accessory muscle use.    Heart:   Regular rate & rhythm. S1 S2 present. No murmur. No JVD. Abdomen:  Soft. NT. ND. +BS. No masses. No guarding, rigidity or rebound  Extremities:  No pedal edema. No cyanosis. Pulses: 2+ and symmetric in DP. Capillary refill: normal BL  Lymphatic:  No cervical or supraclavicular palpable lymphadenopathy. Musculoskeletal: No joint swelling. No tenderness. Skin:   Color, texture, turgor fair      Data:       Recent Results (from the past 24 hour(s))   GLUCOSE, POC    Collection Time: 09/09/22  5:24 PM   Result Value Ref Range    Glucose (POC) 222 (H) 70 - 110 mg/dL   GLUCOSE, POC    Collection Time: 09/09/22 11:47 PM   Result Value Ref Range    Glucose (POC) 204 (H) 70 - 110 mg/dL   CBC WITH AUTOMATED DIFF    Collection Time: 09/10/22  4:28 AM   Result Value Ref Range    WBC 9.6 4.6 - 13.2 K/uL    RBC 3.27 (L) 4.35 - 5.65 M/uL    HGB 9.4 (L) 13.0 - 16.0 g/dL    HCT 29.5 (L) 36.0 - 48.0 %    MCV 90.2 78.0 - 100.0 FL    MCH 28.7 24.0 - 34.0 PG    MCHC 31.9 31.0 - 37.0 g/dL    RDW 16.7 (H) 11.6 - 14.5 %    PLATELET 807 841 - 919 K/uL    MPV 11.9 (H) 9.2 - 11.8 FL    NRBC 0.6 (H) 0  WBC    ABSOLUTE NRBC 0.06 (H) 0.00 - 0.01 K/uL    NEUTROPHILS 72 40 - 73 %    LYMPHOCYTES 16 (L) 21 - 52 %    MONOCYTES 7 3 - 10 %    EOSINOPHILS 3 0 - 5 %    BASOPHILS 1 0 - 2 %    IMMATURE GRANULOCYTES 2 (H) 0.0 - 0.5 %    ABS. NEUTROPHILS 6.9 1.8 - 8.0 K/UL    ABS. LYMPHOCYTES 1.6 0.9 - 3.6 K/UL    ABS. MONOCYTES 0.6 0.05 - 1.2 K/UL    ABS. EOSINOPHILS 0.3 0.0 - 0.4 K/UL    ABS. BASOPHILS 0.1 0.0 - 0.1 K/UL    ABS. IMM.  GRANS. 0.2 (H) 0.00 - 0.04 K/UL    DF AUTOMATED     PROTHROMBIN TIME + INR    Collection Time: 09/10/22  4:28 AM   Result Value Ref Range    Prothrombin time 13.8 11.5 - 15.2 sec    INR 1.0 0.8 - 1.2     MAGNESIUM    Collection Time: 09/10/22  4:28 AM   Result Value Ref Range    Magnesium 2.5 1.6 - 2.6 mg/dL   METABOLIC PANEL, BASIC    Collection Time: 09/10/22  4:28 AM   Result Value Ref Range    Sodium 139 136 - 145 mmol/L    Potassium 4.6 3.5 - 5.5 mmol/L    Chloride 104 100 - 111 mmol/L    CO2 27 21 - 32 mmol/L    Anion gap 8 3.0 - 18 mmol/L    Glucose 140 (H) 74 - 99 mg/dL    BUN 43 (H) 7.0 - 18 MG/DL    Creatinine 2.60 (H) 0.6 - 1.3 MG/DL    BUN/Creatinine ratio 17 12 - 20      GFR est AA 31 (L) >60 ml/min/1.73m2    GFR est non-AA 25 (L) >60 ml/min/1.73m2    Calcium 8.7 8.5 - 10.1 MG/DL   PHOSPHORUS    Collection Time: 09/10/22  4:28 AM   Result Value Ref Range    Phosphorus 3.7 2.5 - 4.9 MG/DL   GLUCOSE, POC    Collection Time: 09/10/22  5:36 AM   Result Value Ref Range    Glucose (POC) 139 (H) 70 - 110 mg/dL   GLUCOSE, POC    Collection Time: 09/10/22 11:44 AM   Result Value Ref Range    Glucose (POC) 225 (H) 70 - 110 mg/dL         Chemistry Recent Labs     09/10/22  0428 09/09/22  0248 09/08/22  0427   * 156* 184*    140 145   K 4.6 3.9 4.7    106 108   CO2 27 30 30   BUN 43* 40* 34*   CREA 2.60* 2.53* 2.36*   CA 8.7 8.9 8.5   MG 2.5 2.4 2.2   PHOS 3.7 3.4 3.1   AGAP 8 4 7   BUCR 17 16 14          Lactic Acid Lactic acid   Date Value Ref Range Status   09/01/2022 1.8 0.4 - 2.0 MMOL/L Final     No results for input(s): LAC in the last 72 hours. Liver Enzymes Protein, total   Date Value Ref Range Status   09/06/2022 5.8 (L) 6.4 - 8.2 g/dL Final     Albumin   Date Value Ref Range Status   09/06/2022 1.7 (L) 3.4 - 5.0 g/dL Final     Globulin   Date Value Ref Range Status   09/06/2022 4.1 (H) 2.0 - 4.0 g/dL Final     A-G Ratio   Date Value Ref Range Status   09/06/2022 0.4 (L) 0.8 - 1.7   Final     Alk. phosphatase   Date Value Ref Range Status   09/06/2022 188 (H) 45 - 117 U/L Final     No results for input(s): TP, ALB, GLOB, AGRAT, AP, TBIL in the last 72 hours.     No lab exists for component: SGOT, GPT, DBIL       CBC w/Diff Recent Labs     09/10/22  0428 09/09/22  0248 09/08/22  0427   WBC 9.6 8.2 10.1   RBC 3.27* 3.12* 3.12*   HGB 9.4* 9.1* 9.1*   HCT 29.5* 28.5* 28.4*    284 230   GRANS 72 68 72   LYMPH 16* 18* 15*   EOS 3 4 4          Cardiac Enzymes No results found for: CPK, CK, CKMMB, CKMB, RCK3, CKMBT, CKNDX, CKND1, ROB, TROPT, TROIQ, BYA, TROPT, TNIPOC, BNP, BNPP     BNP No results found for: BNP, BNPP, XBNPT     Coagulation Recent Labs     09/10/22  0428 09/09/22  0248 09/08/22  0427   PTP 13.8 14.3 14.1   INR 1.0 1.1 1.1           Thyroid  No results found for: T4, T3U, TSH, TSHEXT, TSHEXT    No results found for: T4     Urinalysis Lab Results   Component Value Date/Time    Color YELLOW 09/03/2022 01:45 PM    Appearance CLOUDY 09/03/2022 01:45 PM    Specific gravity 1.023 09/03/2022 01:45 PM    pH (UA) 5.5 09/03/2022 01:45 PM    Protein 100 (A) 09/03/2022 01:45 PM    Glucose >1,000 (A) 09/03/2022 01:45 PM    Ketone 15 (A) 09/03/2022 01:45 PM    Bilirubin Negative 09/03/2022 01:45 PM    Urobilinogen 0.2 09/03/2022 01:45 PM    Nitrites Negative 09/03/2022 01:45 PM    Leukocyte Esterase SMALL (A) 09/03/2022 01:45 PM    Epithelial cells 1+ 09/03/2022 01:45 PM    Bacteria FEW (A) 09/03/2022 01:45 PM    WBC 11 to 20 09/03/2022 01:45 PM    RBC 4 to 10 09/03/2022 01:45 PM        Micro  No results for input(s): SDES, CULT in the last 72 hours. No results for input(s): CULT in the last 72 hours. Culture data during this hospitalization.    All Micro Results       Procedure Component Value Units Date/Time    CULTURE, BLOOD [[id number]] Collected: 08/31/22 1015    Order Status: Completed Specimen: Blood Updated: 09/06/22 0711     Special Requests: NO SPECIAL REQUESTS        Culture result: NO GROWTH 6 DAYS       CULTURE, BLOOD [[id number]] Collected: 08/31/22 1000    Order Status: Completed Specimen: Blood Updated: 09/06/22 0711     Special Requests: NO SPECIAL REQUESTS        Culture result: NO GROWTH 6 DAYS       CULTURE, RESPIRATORY/SPUTUM/BRONCH Vo Kayser STAIN [[id number]] Collected: 09/03/22 1230    Order Status: Canceled Specimen: Sputum from Tracheal Aspirate     COVID-19 RAPID TEST [[id number]] Collected: 08/31/22 1050    Order Status: Completed Specimen: Nasopharyngeal Updated: 08/31/22 1131     Specimen source Nasopharyngeal        COVID-19 rapid test Not detected        Comment: Rapid Abbott ID Now       Rapid NAAT:  The specimen is NEGATIVE for SARS-CoV-2, the novel coronavirus associated with COVID-19. Negative results should be treated as presumptive and, if inconsistent with clinical signs and symptoms or necessary for patient management, should be tested with an alternative molecular assay. Negative results do not preclude SARS-CoV-2 infection and should not be used as the sole basis for patient management decisions. This test has been authorized by the FDA under an Emergency Use Authorization (EUA) for use by authorized laboratories. Fact sheet for Healthcare Providers: [employer]  Fact sheet for Patients: [employer]       Methodology: Isothermal Nucleic Acid Amplification                    MRI BRAIN WO CONT    Result Date: 9/3/2022  EXAM: MRI of the brain without intravenous contrast. INDICATION:  \"coma r/o anoxia. \" COMPARISON:  No prior MRI is available for direct comparison. CORRELATION (related prior exam):  Recent CT. PROTOCOL:  Routine brain. _______________ FINDINGS:       IMAGE QUALITY:  Diagnostic. BRAIN AND EXTRA-AXIAL SPACE:           ACUTE/SUBACUTE INFARCT:  There is diffusion restriction diffusely involving the infratentorial and supratentorial gray matter in a pattern indicative of high-grade anoxic brain injury. MASS:  None. HEMORRHAGE:  None. SUBDURAL FLUID COLLECTION:  None. HYDROCEPHALUS:  None. ICA AND DOMINANT VA T2 FLOW VOIDS:  Unremarkable. REMOTE CEREBRAL TERRITORIAL INFARCT:  None definite. REMOTE CEREBELLAR INFARCT:  None definite.            STRIVE (STandards for Reporting Vascular changes on nEuroimaging): --Seffner of white matter hyperintensity (\"leukoaraiosis\") of presumed vascular origin:  Mild. --Seffner of chronic lacunes of presumed vascular origin:  Mild. --Seffner of perivascular spaces:  None significant. --Seffner of \"microbleeds\":   None definite. --Degree of brain atrophy: Not characterizable in the setting of diffuse cerebral edema. SELLA/PITUITARY:  A T1 and heterogeneously peripherally hyperintense, T2 heterogeneously mildly hyperintense, and T2 FLAIR hyperintense expansile lesion in the sella abutting the undersurface of the optic chiasm measures 12 mm anterior-posterior, 16 mm medial-lateral, 18 mm orthogonal superior-inferior. The lesion is nonspecific are favored to reflect pituitary hemorrhage. HEENT: Intubated. ORBITS:  Unremarkable. PARANASAL SINUSES:  The right maxillary sinus and right anterior ethmoid air cells are opacified. There is scattered paranasal sinus mucosal thickening. MASTOID AIR CELLS:  Predominantly clear. INCLUDED UPPER CERVICAL LYMPH NODES:  Unremarkable. INCLUDED UPPER PAROTIDS:  Unremarkable. NASOPHARYNX:  Unremarkable. BONE MARROW SIGNAL:  Unremarkable. SUPERFICIAL SOFT TISSUES: Unremarkable. _______________     1. High-grade acute/subacute anoxic brain injury without herniation. 2.  Heterogeneous expansile sellar lesion, nonspecific, pituitary hematoma/hemorrhage favored over other etiologies. _______________     CT HEAD WO CONT    Result Date: 9/2/2022  EXAM: CT head INDICATION: Anoxic brain injury. COMPARISON: 8/31/2022 TECHNIQUE: Axial CT imaging of the head was performed without intravenous contrast. Standard multiplanar coronal and sagittal reformatted images were obtained and are included in interpretation.  One or more dose reduction techniques were used on this CT: automated exposure control, adjustment of the mAs and/or kVp according to patient size, and iterative reconstruction techniques. The specific techniques used on this CT exam have been documented in the patient's electronic medical record. Digital Imaging and Communications in Medicine (DICOM) format image data are available to nonaffiliated external healthcare facilities or entities on a secure, media free, reciprocally searchable basis with patient authorization for at least a 12-month period after this study. _______________ FINDINGS: BRAIN AND POSTERIOR FOSSA: Mild patchy periventricular, deep and subcortical white matter hypoattenuation which is nonspecific but likely represents chronic ischemic changes. No evidence of acute large vessel transcortical infarct or acute parenchymal hemorrhage. No midline shift or hydrocephalus. EXTRA-AXIAL SPACES AND MENINGES: There are no abnormal extra-axial fluid collections. CALVARIUM: Intact. SINUSES: Right maxillary and ethmoid sinus mucosal disease. OTHER: None. _______________     No acute intracranial abnormality. CT HEAD WO CONT    Result Date: 8/31/2022  EXAM: CT head INDICATION: Cardiac arrest COMPARISON: None. TECHNIQUE: Axial CT imaging of the head was performed without intravenous contrast. Standard multiplanar coronal and sagittal reformatted images were obtained and are included in interpretation. One or more dose reduction techniques were used on this CT: automated exposure control, adjustment of the mAs and/or kVp according to patient size, and iterative reconstruction techniques. The specific techniques used on this CT exam have been documented in the patient's electronic medical record.   Digital Imaging and Communications in Medicine (DICOM) format image data are available to nonaffiliated external healthcare facilities or entities on a secure, media free, reciprocally searchable basis with patient authorization for at least a 12-month period after this study. _______________ FINDINGS: BRAIN AND POSTERIOR FOSSA: Exam quality is mildly degraded secondary to motion artifact. Ventricular size and configuration appears within normal limits. Basilar cisterns are patent. Within the limitations of motion, no acute intra-axial hemorrhage. No evidence of mass effect or midline shift. Gray-white matter differentiation appears within normal limits as visualized. EXTRA-AXIAL SPACES AND MENINGES: There are no abnormal extra-axial fluid collections. CALVARIUM: Intact. SINUSES: Minor nonspecific mucosal thickening ethmoid air cells, sphenoid sinus with air-fluid level present in the right maxillary sinus. OTHER: None. _______________     1. Mildly motion limited study without evidence of acute intracranial abnormality. 2. Paranasal mucosal disease as described above with air-fluid level in the right maxillary sinus as can be seen with sinusitis. CT CHEST ABD PELV WO CONT    Result Date: 8/31/2022  EXAM: CT chest, abdomen, and pelvis INDICATION: Pain COMPARISON: No prior study. TECHNIQUE: Axial CT imaging of the chest, abdomen, and pelvis was performed without IV contrast administration. Multiplanar reformats were generated. One or more dose reduction techniques were used on this CT: automated exposure control, adjustment of the mAs and/or kVp according to patient size, and iterative reconstruction techniques. The specific techniques used on this CT exam have been documented in the patient's electronic medical record. Digital Imaging and Communications in Medicine (DICOM) format image data are available to nonaffiliated external healthcare facilities or entities on a secure, media free, reciprocally searchable basis with patient authorization for at least a 12-month period after this study. _______________ FINDINGS: CHEST: MEDIASTINUM: Left IJV central venous catheter tip at the cavoatrial junction. Normal caliber aorta with vascular calcifications.  No pericardial effusion. LYMPH NODES: No pathologically enlarged mediastinal or hilar lymph nodes. PLEURA: No pleural effusion or pneumothorax. LUNGS/AIRWAY: Endotracheal tube tip in the midthoracic trachea. No consolidation or suspicious pulmonary nodule is seen. OTHER: None. ** ABDOMEN/PELVIS: LIVER, BILIARY: Liver is unremarkable. No abnormal biliary dilation. Gallbladder is unremarkable. PANCREAS: Unremarkable. SPLEEN: Unremarkable. ADRENALS: Unremarkable. KIDNEYS: Severe left and moderate right hydroureteronephrosis with marked urinary bladder distention. No obstructing calculus. Left lower pole 5.8 cm simple cysts, no follow-up necessary. LYMPH NODES: No pathologically enlarged lymph nodes. GASTROINTESTINAL TRACT: No abnormal bowel dilation or wall thickening. PELVIC ORGANS: Unremarkable. VASCULATURE: Unremarkable. OSSEOUS: No area of erosion or aggressive-appearing bone destruction. OTHER: None. _______________     Severe left and moderate right hydroureteronephrosis with marked urinary bladder distention. No obstructing calculus. No acute intrathoracic abnormality. US RETROPERITONEUM COMP    Result Date: 9/2/2022  US RETROPERITONEUM COMP INDICATION: Acute kidney injury. TECHNIQUE: Renal ultrasound. COMPARISON: 8/31/2022 CT FINDINGS: Right kidney measures 10.7 cm. Left kidney measures 6.7 cm. Bilateral increased cortical echogenicity. Severe left hydronephrosis with cortical atrophy. No shadowing calculus or perinephric abnormality. No solid renal mass identified. Indwelling Lyle catheter within decompressed urinary bladder. Bilateral increased cortical echogenicity. Severe left hydronephrosis with cortical atrophy.      XR CHEST PORT    Result Date: 9/5/2022  EXAM: CHEST RADIOGRAPH, SINGLE VIEW CLINICAL INDICATION/HISTORY: Shortness of breath, intubated, follow-up COMPARISON: 9/4/2022 TECHNIQUE: Portable frontal view of the chest was obtained. _______________ FINDINGS: SUPPORT DEVICES: Unchanged left jugular central venous catheter, endotracheal tube, and gastric tube, all adequately positioned. HEART AND MEDIASTINUM: Cardiomediastinal silhouette appears within normal limits. Tortuous and atherosclerotic thoracic aorta. No central vascular congestion. LUNGS AND PLEURAL SPACES: Left basilar opacity partially silhouettes left hemidiaphragm. Left mid and upper lung zones and all of the right lung remain adequately aerated. No definite pleural effusion. No pneumothorax. BONY THORAX AND SOFT TISSUES: No acute osseous abnormality. _______________     1. Tubes and lines appear unchanged, adequately positioned. 2. Left basilar opacity, atelectasis versus infiltrate. XR CHEST PORT    Result Date: 9/4/2022  EXAM: CHEST RADIOGRAPH, SINGLE VIEW CLINICAL INDICATION/HISTORY: Shortness of breath, intubated, follow-up COMPARISON: 9/3/2022 TECHNIQUE: Portable frontal view of the chest was obtained. _______________ FINDINGS: SUPPORT DEVICES: Endotracheal tube, left jugular central venous catheter, and nasogastric tube all appear adequately positioned. HEART AND MEDIASTINUM: Cardiomediastinal silhouette appears within normal limits. Tortuous and atherosclerotic thoracic aorta. No central vascular congestion. LUNGS AND PLEURAL SPACES: Unchanged retrocardiac left lower lobe atelectasis. Lungs are otherwise adequately aerated with no confluent airspace opacity. No pleural effusion or pneumothorax. BONY THORAX AND SOFT TISSUES: No acute osseous abnormality. _______________     No active cardiopulmonary disease. XR CHEST PORT    Result Date: 9/3/2022  EXAM: CHEST RADIOGRAPH, SINGLE VIEW CLINICAL INDICATION/HISTORY: Shortness of breath, endotracheal tube placement COMPARISON: 8/31/2022 TECHNIQUE: Portable frontal view of the chest was obtained. _______________ FINDINGS: SUPPORT DEVICES: Adequately positioned endotracheal tube, unchanged.  Left jugular central venous catheter and nasogastric tube also appear unchanged, adequately positioned. HEART AND MEDIASTINUM: Cardiomediastinal silhouette appears within normal limits. Normal caliber thoracic aorta. No central vascular congestion. LUNGS AND PLEURAL SPACES: Retrocardiac left lower lobe subsegmental atelectasis. Lungs are otherwise adequately aerated with no confluent airspace opacity. No pleural effusion or pneumothorax. BONY THORAX AND SOFT TISSUES: No acute osseous abnormality. _______________     No active cardiopulmonary disease. XR CHEST PORT    Result Date: 8/31/2022  EXAM: XR CHEST PORT CLINICAL INDICATION/HISTORY: central line -Additional: None COMPARISON: 4 hours prior TECHNIQUE: Portable frontal view of the chest _______________ FINDINGS: SUPPORT DEVICES: Endotracheal tube, gastric tube, and left IJ approach central venous catheter noted. Tip of the central venous catheter is at the level of the superior cavoatrial junction. HEART AND MEDIASTINUM: Normal cardiac size and mediastinal contours. LUNGS AND PLEURAL SPACES: No focal pneumonic opacity. No evidence of pneumothorax or pleural effusion. BONY THORAX AND SOFT TISSUES: Unremarkable. _______________     1. Support devices in stable/appropriate position as visualized. 2. No superimposed acute radiographic cardiopulmonary abnormality. XR CHEST PORT    Result Date: 8/31/2022  XR CHEST PORT CLINICAL INDICATION/HISTORY: Tube placement -Additional: None COMPARISON: None TECHNIQUE: Portable frontal view of the chest _______________ FINDINGS: SUPPORT DEVICES: Enteric tube tip projecting over the thoracic inlet. Endotracheal tube tip in the midthoracic trachea. Left IJV central venous catheter tip at the caval atrial junction. HEART AND MEDIASTINUM: Cardiomediastinal silhouette within normal limits. LUNGS AND PLEURAL SPACES: No dense consolidation, large effusion or pneumothorax. _______________     Enteric tube tip projecting over the thoracic inlet. Endotracheal tube tip in the midthoracic trachea.  No acute cardiopulmonary abnormality. ECHO ADULT COMPLETE    Result Date: 8/31/2022  Formatting of this result is different from the original.   Left Ventricle: Normal left ventricular systolic function with a visually estimated EF of 60 - 65%. Not well visualized. Left ventricle size is normal. Normal wall thickness. Normal wall motion. Right Ventricle: Not well visualized. Right ventricle is moderately dilated. Moderately reduced systolic function. Visually moderately dilated RV and moderately reduced RV systolic function. Aortic Valve: Tricuspid valve. Tricuspid Valve: The estimated RVSP is 25 mmHg. Right Atrium: Not well visualized. Right atrium is moderately dilated. No old echocardiogram available to compare. 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 · No evidence of deep vein thrombosis in the left lower extremity. DUPLEX LOWER EXT VENOUS BILAT    Result Date: 9/1/2022  · Age indeterminate thrombus present in the right popliteal vein. · No evidence of deep vein thrombosis in the left lower extremity. Images report reviewed by me:  CT (Most Recent) (CT reviewed by me) Results from Hospital Encounter encounter on 08/31/22    CT HEAD WO CONT    Narrative  EXAM: CT head    INDICATION: Anoxic brain injury. COMPARISON: 8/31/2022    TECHNIQUE: Axial CT imaging of the head was performed without intravenous  contrast. Standard multiplanar coronal and sagittal reformatted images were  obtained and are included in interpretation. One or more dose reduction techniques were used on this CT: automated exposure  control, adjustment of the mAs and/or kVp according to patient size, and  iterative reconstruction techniques. The specific techniques used on this CT  exam have been documented in the patient's electronic medical record.   Digital  Imaging and Communications in Medicine (DICOM) format image data are available  to nonaffiliated external healthcare facilities or entities on a secure, media  free, reciprocally searchable basis with patient authorization for at least a  12-month period after this study. _______________    FINDINGS:    BRAIN AND POSTERIOR FOSSA: Mild patchy periventricular, deep and subcortical  white matter hypoattenuation which is nonspecific but likely represents chronic  ischemic changes. No evidence of acute large vessel transcortical infarct or  acute parenchymal hemorrhage. No midline shift or hydrocephalus. EXTRA-AXIAL SPACES AND MENINGES: There are no abnormal extra-axial fluid  collections. CALVARIUM: Intact. SINUSES: Right maxillary and ethmoid sinus mucosal disease. OTHER: None.    _______________    Impression  No acute intracranial abnormality. CXR reviewed by me:  XR (Most Recent). XR  reviewed by me and compared with previous XR Results from Hospital Encounter encounter on 08/31/22    XR CHEST PORT    Narrative  EXAM: XR CHEST PORT    CLINICAL INDICATION/HISTORY: Pulmonary infiltrate, ET tube position, acute  respiratory failure  -Additional: None    COMPARISON: 9/7/2022    TECHNIQUE: Portable frontal view of the chest    _______________    FINDINGS:    SUPPORT DEVICES: Endotracheal and enteric tubes unchanged. Esophageal probe are  unchanged. Left IJV central venous catheter unchanged. HEART AND MEDIASTINUM: Stable cardiomediastinal silhouette. .    LUNGS AND PLEURAL SPACES: Left layering pleural effusion/atelectasis. No  pneumothorax.    _______________    Impression  Improved aeration with persistent left layering pleural effusion. ·Please note: Voice-recognition software may have been used to generate this report, which may have resulted in some phonetic-based errors in grammar and contents. Even though attempts were made to correct all the mistakes, some may have been missed, and remained in the body of the document.       Warner Franklin MD  9/10/2022

## 2022-09-11 NOTE — PROGRESS NOTES
Pulmonary Specialists  Pulmonary, Critical Care, and Sleep Medicine    Name: Jessika Vela MRN: [id number]   : 1960 Hospital: [facility] [place]    Date: 2022  Room: [facility]/[address] [place] [address] Note                                              Consult requesting physician: Dr. Dr. Cheryl Montes   Reason for Consult: Cardiac arrest , shock, respiratory failure     IMPRESSION:   Acute respiratory failure, post-arrest - J96.0  Cardiac arrest - I46.9  Sepsis - A41.9, R65.20  Anemia - D64.9  Chronic DVT right leg  Elevated LFTs, post-arrest  Anoxic encephalopathy post-arrest   Pituitary hemorrhage/hematoma  Hypernatremia, improved - E87.0  Patient Active Problem List   Diagnosis Code    Cardiac arrest ([facility] I46.9    Respiratory failure ([facility] [facility] J96.90    GENO (acute kidney injury) ([facility] [facility] N17.9    Disseminated herpes zoster B02.7    Hydronephrosis N13.30    Shock ([facility] [address] R57.9    Sepsis ([address] A41.9    UTI (urinary tract infection) N39.0    Brain anoxia ([facility] [address] G93.1     Code status: Partial Code       RECOMMENDATIONS:     Respiratory: Acute respiratory failure 2' to post cardiac arrest anoxic encephalopathy  Stable ventilator settings on VC+, FiO2 35%, PEEP 5+  Chest x-ray for follow-up 9/10/22 - stable  Pl effusion likely reactive with fluid resuscitation, hypoalbuminemia  Ventilator bundle & Sedation protocol followed. Not on any sedation; daily assessment for readiness for SBT - episodes of apnea during SBT at bedside today - not a candidate for ventilator liberation given unreliable ability to protect airways. Chlorhexidine mouth washes. Keep SPO2 >=92%. HOB 30 degree elevation all the time. Aggressive pulmonary toileting. Aspiration precautions. Wife and family decided for trach + PEG rather than comfort care understanding continuation of care during meeting on 22. Today wife at bedside is rethinking her decision and would like to hold off scheduling of trach.  She prefers to think over today and may updated medical team during meeting with palliative care    CVS: BP stable  On Norvasc, monitor blood pressure and adjust dose as needed  Cardiology following    ID: No leukocytosis or fever  ID following  Blood cultures 8/31/2022: NGT final  Antibiotics: Zosyn finished 9/9/22  Antiviral: Acyclovir finish 9/7/22 per ID  Poor IV access limited CVC removal by RN; once RRT able to place new PIV tomorrow then may remove CVC  Sepsis bundle and protocol followed. Follow cultures. Hematology/Oncology: Monitor Hgb and Plt with CBC daily  No bleeding anywhere  Okay to use heparin for DVT prophylaxis per neurology  Not a candidate for full dose anticoagulation  Has a small chronic calf vein DVT right lower extremity  Follow-up PVL study 9/8/22 - stable chronic RT leg calf vein DVT  Vascular surgery is aware-if worsening in DVT during any future exam then may need IVC filter    Renal: Monitor S. Na+ with BMP - stable  S. Cr stable  Free water bolus to 200 mL every 4 hours while NG tube  Monitor renal functions, electrolytes, urine output    GI/: PPI for GI prophylaxis  Tube feeding via NG tube at goal  Lyle was placed by urology; no hematuria    Endocrine: Monitor FSBS. SSI and adjust Lantus    Neurology: Not on any sedation  Opens eyes intermittently spontaneously, does not track or follow commands, no involuntary movements  Moves upper extremities inward slightly to painful stimuli  Continue Keppra per neurology  EEG abnormal  MRI brain 9/3/2022: High-grade acute/subacute anoxic changes  Neurology followed    Toxicology: UDS negative 8/31/22    Pain/Sedation: Avoid sedatives, hypnotic if possible    Skin/Wound: As per nursing care    Electrolytes: Replace electrolytes per ICU electrolyte replacement protocol. Prophylaxis: DVT Prophylaxis: SCD/heparin. GI Prophylaxis: PPI. Restraints: none. May use wrist soft restraints for patient interfering with medical therapy/management and patient safety. Lines/Tubes: PIV  ETT: 8/31/2021. OGT: 9/2/22. Central line: 8/31/22 LT IJ (site examined, no erythema, induration, discharge or sign of infection. Dressing intact. Medically necessary, will remove it when not needed. Central line bundle followed). Lyle: 8/31/22 (Medically necessary for strict input/output monitoring in critically ill patient, will remove it when not needed. Lyle bundle followed). Advance Directive/Palliative Care: partial DNR. Palliative care Consulted. Will defer respective systems problem management to primary and other respective consultant and follow patient in ICU with primary and other medical team.  Further recommendations will be based on the patient's response to recommended treatment and results of the investigation ordered. Quality Care: PPI, DVT prophylaxis, HOB elevated, Infection control all reviewed and addressed. Care of plan d/w RN, RT, hospitalist team, ENT  Met and discussed with family including wife, other 09/06/22 09/7/22 9/9/22 9/10/22 and 9/11/22; answered all questions to their satisfaction. Discussed overall, poor long-term prognosis with high risk for prolonged hospitalization, prolonged ventilator need, nosocomial infection, hemodynamic instability, cardiac arrest, need for RRT, bleeding, morbidity, mortality, other. High complexity decision making was performed during the evaluation of this patient at high risk for decompensation with multiple organ involvement. Total critical care time spent rendering care exclusive of procedures/family discussion/coordination of care: 36 minutes. Subjective/History of Present Illness:   Patient is a 65 y.o. male with PMHx significant for HTN/CRI/DM recently diagnosed with severe zoster. Patient was on antiviral tx, very weak, sob, cough. EMS called, in ambulance 3 times cardiac arrest. Witnessed CPR/Epi. Repored PEA. 6 doses of epi and no shocks. He came on epi drip but in ER chnaged to levophed. Ling had seizure vs myocloninc jerks was started on propofol. 9/11/2022 :   Patient seen at bedside in ICU room #104  Remains on a ventilator with stable settings  Not on any sedatives  Does not follow any commands, no tracking, no involuntary movements  Stable hemodynamics  Fair urine output  No fever or grossly bleeding anywhere  Tube feeding at goal  Wife at bedside. Answered all the questions to her satisfaction  [facility] was not contacted by staff on anything about patient overnight. I/O last 24 hrs: Intake/Output Summary (Last 24 hours) at 9/11/2022 1101  Last data filed at 9/11/2022 0400  Gross per 24 hour   Intake 1300 ml   Output 1500 ml   Net -200 ml           The patient is critically ill and can not provide additional history due to Unconsciousness and Ventilated    History taken from EMR    Review of Systems:  ROS not obtained due to patient factor. No Known Allergies     Past Medical History:   Diagnosis Date    Gout     Shingles       No past surgical history on file. Social History     Tobacco Use    Smoking status: Not on file    Smokeless tobacco: Not on file   Substance Use Topics    Alcohol use: Not on file      No family history on file.      Prior to Admission medications    Not on File     Current Facility-Administered Medications   Medication Dose Route Frequency    insulin glargine (LANTUS) injection 15 Units  15 Units SubCUTAneous Q24H    heparin (porcine) injection 5,000 Units  5,000 Units SubCUTAneous Q8H    amLODIPine (NORVASC) tablet 5 mg  5 mg Oral DAILY    lactulose (CHRONULAC) 10 gram/15 mL solution 15 mL  15 mL Oral DAILY    furosemide (LASIX) injection 20 mg  20 mg IntraVENous DAILY    metoprolol (LOPRESSOR) injection 1.25 mg  1.25 mg IntraVENous Q6H    insulin lispro (HUMALOG) injection   SubCUTAneous Q6H    sodium chloride (NS) flush 5-40 mL  5-40 mL IntraVENous Q8H    pantoprazole (PROTONIX) 40 mg in 0.9% sodium chloride 10 mL injection  40 mg IntraVENous Q12H    levETIRAcetam (KEPPRA) 1,000 mg in 0.9% sodium chloride 100 mL IVPB  1,000 mg IntraVENous Q12H         Objective:   Vital Signs:    Visit Vitals  /81   Pulse 93   Temp 98.1 °F (36.7 °C)   Resp 14   Ht 5' 10\" (1.778 m)   Wt 92 kg (202 lb 13.2 oz)   SpO2 100%   BMI 29.10 kg/m²       O2 Device: Endotracheal tube, Ventilator       Temp (24hrs), Av.8 °F (37.1 °C), Min:97.3 °F (36.3 °C), Max:99.1 °F (37.3 °C)       Intake/Output:   Last shift:      No intake/output data recorded. Last 3 shifts:  1901 -  0700  In: 2867 [I.V.:200]  Out: 3650 [Urine:3650]      Intake/Output Summary (Last 24 hours) at 2022 1101  Last data filed at 2022 0400  Gross per 24 hour   Intake 1300 ml   Output 1500 ml   Net -200 ml         Last 3 Recorded Weights in this Encounter    22 0757 22 0904 22 1200   Weight: 103.9 kg (229 lb 0.9 oz) 95 kg (209 lb 7 oz) 92 kg (202 lb 13.2 oz)         Ventilator Settings:  Mode Rate Tidal Volume Pressure FiO2 PEEP   Assist control   450 ml    35 % 5 cm H20     Peak airway pressure: 20 cm H2O    Plateau pressure:     Tidal volume:    Minute ventilation: 6.39 l/min     No results for input(s): PHI, PHI, POC2, PCO2I, PO2, PO2I, HCO3, HCO3I, FIO2, FIO2I in the last 72 hours.       Physical Exam:     General: in no respiratory distress, on ventilator  HEENT: PERRL, ET and NG tubes in place  Neck: No abnormally enlarged lymph nodes or thyroid, supple  Chest: normal  Lungs: moderate air entry, few stable scattered rhonchi and no wheezes bilaterally, no tenderness/ rash  Heart: Regular rate and rhythm, S1S2 present, or without murmur or extra heart sounds  Abdomen: non-distended, bowel sounds normoactive, tympanic, abdomen is soft without significant tenderness, masses, organomegaly or guarding, rigidity, rebound  Extremity: no leg edema, no cyanosis; peripheral pulses 2+ and symmetric, capillary refill normal BL  Neuro: eyes open spontaneously or during suctioning, no tracking, does not follow commands, no involuntary movements, some inward upper extremities movements to painful stimuli, exam limitation on ventilator   Skin: Skin color, texture, turgor unchanged      Data:       Recent Results (from the past 24 hour(s))   GLUCOSE, POC    Collection Time: 09/10/22 11:44 AM   Result Value Ref Range    Glucose (POC) 225 (H) 70 - 110 mg/dL   GLUCOSE, POC    Collection Time: 09/10/22  6:10 PM   Result Value Ref Range    Glucose (POC) 247 (H) 70 - 110 mg/dL   GLUCOSE, POC    Collection Time: 09/10/22 11:18 PM   Result Value Ref Range    Glucose (POC) 260 (H) 70 - 110 mg/dL   CBC WITH AUTOMATED DIFF    Collection Time: 09/11/22  4:43 AM   Result Value Ref Range    WBC 9.7 4.6 - 13.2 K/uL    RBC 2.98 (L) 4.35 - 5.65 M/uL    HGB 8.5 (L) 13.0 - 16.0 g/dL    HCT 26.4 (L) 36.0 - 48.0 %    MCV 88.6 78.0 - 100.0 FL    MCH 28.5 24.0 - 34.0 PG    MCHC 32.2 31.0 - 37.0 g/dL    RDW 16.8 (H) 11.6 - 14.5 %    PLATELET 761 665 - 636 K/uL    MPV 11.0 9.2 - 11.8 FL    NRBC 0.2 (H) 0  WBC    ABSOLUTE NRBC 0.02 (H) 0.00 - 0.01 K/uL    NEUTROPHILS 76 (H) 40 - 73 %    LYMPHOCYTES 12 (L) 21 - 52 %    MONOCYTES 8 3 - 10 %    EOSINOPHILS 3 0 - 5 %    BASOPHILS 1 0 - 2 %    IMMATURE GRANULOCYTES 2 (H) 0.0 - 0.5 %    ABS. NEUTROPHILS 7.3 1.8 - 8.0 K/UL    ABS. LYMPHOCYTES 1.1 0.9 - 3.6 K/UL    ABS. MONOCYTES 0.8 0.05 - 1.2 K/UL    ABS. EOSINOPHILS 0.3 0.0 - 0.4 K/UL    ABS. BASOPHILS 0.1 0.0 - 0.1 K/UL    ABS. IMM.  GRANS. 0.2 (H) 0.00 - 0.04 K/UL    DF AUTOMATED     PROTHROMBIN TIME + INR    Collection Time: 09/11/22  4:43 AM   Result Value Ref Range    Prothrombin time 13.7 11.5 - 15.2 sec    INR 1.0 0.8 - 1.2     MAGNESIUM    Collection Time: 09/11/22  4:43 AM   Result Value Ref Range    Magnesium 2.7 (H) 1.6 - 2.6 mg/dL   METABOLIC PANEL, BASIC    Collection Time: 09/11/22  4:43 AM   Result Value Ref Range    Sodium 137 136 - 145 mmol/L    Potassium 4.4 3.5 - 5.5 mmol/L    Chloride 103 100 - 111 mmol/L    CO2 27 21 - 32 mmol/L    Anion gap 7 3.0 - 18 mmol/L    Glucose 238 (H) 74 - 99 mg/dL    BUN 48 (H) 7.0 - 18 MG/DL    Creatinine 2.68 (H) 0.6 - 1.3 MG/DL    BUN/Creatinine ratio 18 12 - 20      GFR est AA 30 (L) >60 ml/min/1.73m2    GFR est non-AA 24 (L) >60 ml/min/1.73m2    Calcium 9.0 8.5 - 10.1 MG/DL   PHOSPHORUS    Collection Time: 09/11/22  4:43 AM   Result Value Ref Range    Phosphorus 3.6 2.5 - 4.9 MG/DL   GLUCOSE, POC    Collection Time: 09/11/22  5:07 AM   Result Value Ref Range    Glucose (POC) 230 (H) 70 - 110 mg/dL         Chemistry Recent Labs     09/11/22  0443 09/10/22  0428 09/09/22  0248   * 140* 156*    139 140   K 4.4 4.6 3.9    104 106   CO2 27 27 30   BUN 48* 43* 40*   CREA 2.68* 2.60* 2.53*   CA 9.0 8.7 8.9   MG 2.7* 2.5 2.4   PHOS 3.6 3.7 3.4   AGAP 7 8 4   BUCR 18 17 16          Lactic Acid Lactic acid   Date Value Ref Range Status   09/01/2022 1.8 0.4 - 2.0 MMOL/L Final     No results for input(s): LAC in the last 72 hours. Liver Enzymes Protein, total   Date Value Ref Range Status   09/06/2022 5.8 (L) 6.4 - 8.2 g/dL Final     Albumin   Date Value Ref Range Status   09/06/2022 1.7 (L) 3.4 - 5.0 g/dL Final     Globulin   Date Value Ref Range Status   09/06/2022 4.1 (H) 2.0 - 4.0 g/dL Final     A-G Ratio   Date Value Ref Range Status   09/06/2022 0.4 (L) 0.8 - 1.7   Final     Alk. phosphatase   Date Value Ref Range Status   09/06/2022 188 (H) 45 - 117 U/L Final     No results for input(s): TP, ALB, GLOB, AGRAT, AP, TBIL in the last 72 hours.     No lab exists for component: SGOT, GPT, DBIL       CBC w/Diff Recent Labs     09/11/22  0443 09/10/22  0428 09/09/22  0248   WBC 9.7 9.6 8.2   RBC 2.98* 3.27* 3.12*   HGB 8.5* 9.4* 9.1*   HCT 26.4* 29.5* 28.5*    305 284   GRANS 76* 72 68   LYMPH 12* 16* 18*   EOS 3 3 4          Cardiac Enzymes No results found for: CPK, CK, CKMMB, CKMB, RCK3, CKMBT, CKNDX, CKND1, ROB, TROPT, TROIQ, BAY, TROPT, TNIPOC, BNP, BNPP     BNP No results found for: BNP, BNPP, XBNPT     Coagulation Recent Labs     09/11/22  0443 09/10/22  0428 09/09/22  0248   PTP 13.7 13.8 14.3   INR 1.0 1.0 1.1           Thyroid  No results found for: T4, T3U, TSH, TSHEXT, TSHEXT    No results found for: T4     Urinalysis Lab Results   Component Value Date/Time    Color YELLOW 09/03/2022 01:45 PM    Appearance CLOUDY 09/03/2022 01:45 PM    Specific gravity 1.023 09/03/2022 01:45 PM    pH (UA) 5.5 09/03/2022 01:45 PM    Protein 100 (A) 09/03/2022 01:45 PM    Glucose >1,000 (A) 09/03/2022 01:45 PM    Ketone 15 (A) 09/03/2022 01:45 PM    Bilirubin Negative 09/03/2022 01:45 PM    Urobilinogen 0.2 09/03/2022 01:45 PM    Nitrites Negative 09/03/2022 01:45 PM    Leukocyte Esterase SMALL (A) 09/03/2022 01:45 PM    Epithelial cells 1+ 09/03/2022 01:45 PM    Bacteria FEW (A) 09/03/2022 01:45 PM    WBC 11 to 20 09/03/2022 01:45 PM    RBC 4 to 10 09/03/2022 01:45 PM        Micro  No results for input(s): SDES, CULT in the last 72 hours. No results for input(s): CULT in the last 72 hours. Culture data during this hospitalization.    All Micro Results       Procedure Component Value Units Date/Time    CULTURE, BLOOD [[id number]] Collected: 08/31/22 1015    Order Status: Completed Specimen: Blood Updated: 09/06/22 0711     Special Requests: NO SPECIAL REQUESTS        Culture result: NO GROWTH 6 DAYS       CULTURE, BLOOD [[id number]] Collected: 08/31/22 1000    Order Status: Completed Specimen: Blood Updated: 09/06/22 0711     Special Requests: NO SPECIAL REQUESTS        Culture result: NO GROWTH 6 DAYS       CULTURE, RESPIRATORY/SPUTUM/BRONCH Sydell Bessy STAIN [[id number]] Collected: 09/03/22 1230    Order Status: Canceled Specimen: Sputum from Tracheal Aspirate     COVID-19 RAPID TEST [[id number]] Collected: 08/31/22 1050    Order Status: Completed Specimen: Nasopharyngeal Updated: 08/31/22 1131     Specimen source Nasopharyngeal        COVID-19 rapid test Not detected Comment: Rapid Abbott ID Now       Rapid NAAT:  The specimen is NEGATIVE for SARS-CoV-2, the novel coronavirus associated with COVID-19. Negative results should be treated as presumptive and, if inconsistent with clinical signs and symptoms or necessary for patient management, should be tested with an alternative molecular assay. Negative results do not preclude SARS-CoV-2 infection and should not be used as the sole basis for patient management decisions. This test has been authorized by the FDA under an Emergency Use Authorization (EUA) for use by authorized laboratories. Fact sheet for Healthcare Providers: [employer]  Fact sheet for Patients: [employer]       Methodology: Isothermal Nucleic Acid Amplification                    MRI BRAIN WO CONT    Result Date: 9/3/2022  EXAM: MRI of the brain without intravenous contrast. INDICATION:  \"coma r/o anoxia. \" COMPARISON:  No prior MRI is available for direct comparison. CORRELATION (related prior exam):  Recent CT. PROTOCOL:  Routine brain. _______________ FINDINGS:       IMAGE QUALITY:  Diagnostic. BRAIN AND EXTRA-AXIAL SPACE:           ACUTE/SUBACUTE INFARCT:  There is diffusion restriction diffusely involving the infratentorial and supratentorial gray matter in a pattern indicative of high-grade anoxic brain injury. MASS:  None. HEMORRHAGE:  None. SUBDURAL FLUID COLLECTION:  None. HYDROCEPHALUS:  None. ICA AND DOMINANT VA T2 FLOW VOIDS:  Unremarkable. REMOTE CEREBRAL TERRITORIAL INFARCT:  None definite. REMOTE CEREBELLAR INFARCT:  None definite. STRIVE (STandards for Reporting Vascular changes on nEuroimaging):                            --[place] of white matter hyperintensity (\"leukoaraiosis\") of presumed vascular origin:  Mild.                             --[place] of chronic lacunes of presumed vascular origin:  Mild. --[place]riken of perivascular spaces:  None significant. --[place] of \"microbleeds\":   None definite. --Degree of brain atrophy: Not characterizable in the setting of diffuse cerebral edema. SELLA/PITUITARY:  A T1 and heterogeneously peripherally hyperintense, T2 heterogeneously mildly hyperintense, and T2 FLAIR hyperintense expansile lesion in the sella abutting the undersurface of the optic chiasm measures 12 mm anterior-posterior, 16 mm medial-lateral, 18 mm orthogonal superior-inferior. The lesion is nonspecific are favored to reflect pituitary hemorrhage. HEENT: Intubated. ORBITS:  Unremarkable. PARANASAL SINUSES:  The right maxillary sinus and right anterior ethmoid air cells are opacified. There is scattered paranasal sinus mucosal thickening. MASTOID AIR CELLS:  Predominantly clear. INCLUDED UPPER CERVICAL LYMPH NODES:  Unremarkable. INCLUDED UPPER PAROTIDS:  Unremarkable. NASOPHARYNX:  Unremarkable. BONE MARROW SIGNAL:  Unremarkable. SUPERFICIAL SOFT TISSUES: Unremarkable. _______________     1. High-grade acute/subacute anoxic brain injury without herniation. 2.  Heterogeneous expansile sellar lesion, nonspecific, pituitary hematoma/hemorrhage favored over other etiologies. _______________     CT HEAD WO CONT    Result Date: 9/2/2022  EXAM: CT head INDICATION: Anoxic brain injury. COMPARISON: 8/31/2022 TECHNIQUE: Axial CT imaging of the head was performed without intravenous contrast. Standard multiplanar coronal and sagittal reformatted images were obtained and are included in interpretation. One or more dose reduction techniques were used on this CT: automated exposure control, adjustment of the mAs and/or kVp according to patient size, and iterative reconstruction techniques.   The specific techniques used on this CT exam have been documented in the patient's electronic medical record. Digital Imaging and Communications in Medicine (DICOM) format image data are available to nonaffiliated external healthcare facilities or entities on a secure, media free, reciprocally searchable basis with patient authorization for at least a 12-month period after this study. _______________ FINDINGS: BRAIN AND POSTERIOR FOSSA: Mild patchy periventricular, deep and subcortical white matter hypoattenuation which is nonspecific but likely represents chronic ischemic changes. No evidence of acute large vessel transcortical infarct or acute parenchymal hemorrhage. No midline shift or hydrocephalus. EXTRA-AXIAL SPACES AND MENINGES: There are no abnormal extra-axial fluid collections. CALVARIUM: Intact. SINUSES: Right maxillary and ethmoid sinus mucosal disease. OTHER: None. _______________     No acute intracranial abnormality. CT HEAD WO CONT    Result Date: 8/31/2022  EXAM: CT head INDICATION: Cardiac arrest COMPARISON: None. TECHNIQUE: Axial CT imaging of the head was performed without intravenous contrast. Standard multiplanar coronal and sagittal reformatted images were obtained and are included in interpretation. One or more dose reduction techniques were used on this CT: automated exposure control, adjustment of the mAs and/or kVp according to patient size, and iterative reconstruction techniques. The specific techniques used on this CT exam have been documented in the patient's electronic medical record. Digital Imaging and Communications in Medicine (DICOM) format image data are available to nonaffiliated external healthcare facilities or entities on a secure, media free, reciprocally searchable basis with patient authorization for at least a 12-month period after this study. _______________ FINDINGS: BRAIN AND POSTERIOR FOSSA: Exam quality is mildly degraded secondary to motion artifact.  Ventricular size and configuration appears within normal limits. Basilar cisterns are patent. Within the limitations of motion, no acute intra-axial hemorrhage. No evidence of mass effect or midline shift. Gray-white matter differentiation appears within normal limits as visualized. EXTRA-AXIAL SPACES AND MENINGES: There are no abnormal extra-axial fluid collections. CALVARIUM: Intact. SINUSES: Minor nonspecific mucosal thickening ethmoid air cells, sphenoid sinus with air-fluid level present in the right maxillary sinus. OTHER: None. _______________     1. Mildly motion limited study without evidence of acute intracranial abnormality. 2. Paranasal mucosal disease as described above with air-fluid level in the right maxillary sinus as can be seen with sinusitis. CT CHEST ABD PELV WO CONT    Result Date: 8/31/2022  EXAM: CT chest, abdomen, and pelvis INDICATION: Pain COMPARISON: No prior study. TECHNIQUE: Axial CT imaging of the chest, abdomen, and pelvis was performed without IV contrast administration. Multiplanar reformats were generated. One or more dose reduction techniques were used on this CT: automated exposure control, adjustment of the mAs and/or kVp according to patient size, and iterative reconstruction techniques. The specific techniques used on this CT exam have been documented in the patient's electronic medical record. Digital Imaging and Communications in Medicine (DICOM) format image data are available to nonaffiliated external healthcare facilities or entities on a secure, media free, reciprocally searchable basis with patient authorization for at least a 12-month period after this study. _______________ FINDINGS: CHEST: MEDIASTINUM: Left IJV central venous catheter tip at the cavoatrial junction. Normal caliber aorta with vascular calcifications. No pericardial effusion. LYMPH NODES: No pathologically enlarged mediastinal or hilar lymph nodes. PLEURA: No pleural effusion or pneumothorax.  LUNGS/AIRWAY: Endotracheal tube tip in the midthoracic trachea. No consolidation or suspicious pulmonary nodule is seen. OTHER: None. ** ABDOMEN/PELVIS: LIVER, BILIARY: Liver is unremarkable. No abnormal biliary dilation. Gallbladder is unremarkable. PANCREAS: Unremarkable. SPLEEN: Unremarkable. ADRENALS: Unremarkable. KIDNEYS: Severe left and moderate right hydroureteronephrosis with marked urinary bladder distention. No obstructing calculus. Left lower pole 5.8 cm simple cysts, no follow-up necessary. LYMPH NODES: No pathologically enlarged lymph nodes. GASTROINTESTINAL TRACT: No abnormal bowel dilation or wall thickening. PELVIC ORGANS: Unremarkable. VASCULATURE: Unremarkable. OSSEOUS: No area of erosion or aggressive-appearing bone destruction. OTHER: None. _______________     Severe left and moderate right hydroureteronephrosis with marked urinary bladder distention. No obstructing calculus. No acute intrathoracic abnormality. US RETROPERITONEUM COMP    Result Date: 9/2/2022  US RETROPERITONEUM COMP INDICATION: Acute kidney injury. TECHNIQUE: Renal ultrasound. COMPARISON: 8/31/2022 CT FINDINGS: Right kidney measures 10.7 cm. Left kidney measures 6.7 cm. Bilateral increased cortical echogenicity. Severe left hydronephrosis with cortical atrophy. No shadowing calculus or perinephric abnormality. No solid renal mass identified. Indwelling Lyle catheter within decompressed urinary bladder. Bilateral increased cortical echogenicity. Severe left hydronephrosis with cortical atrophy. XR CHEST PORT    Result Date: 9/5/2022  EXAM: CHEST RADIOGRAPH, SINGLE VIEW CLINICAL INDICATION/HISTORY: Shortness of breath, intubated, follow-up COMPARISON: 9/4/2022 TECHNIQUE: Portable frontal view of the chest was obtained. _______________ FINDINGS: SUPPORT DEVICES: Unchanged left jugular central venous catheter, endotracheal tube, and gastric tube, all adequately positioned. HEART AND MEDIASTINUM: Cardiomediastinal silhouette appears within normal limits.   Tortuous and atherosclerotic thoracic aorta. No central vascular congestion. LUNGS AND PLEURAL SPACES: Left basilar opacity partially silhouettes left hemidiaphragm. Left mid and upper lung zones and all of the right lung remain adequately aerated. No definite pleural effusion. No pneumothorax. BONY THORAX AND SOFT TISSUES: No acute osseous abnormality. _______________     1. Tubes and lines appear unchanged, adequately positioned. 2. Left basilar opacity, atelectasis versus infiltrate. XR CHEST PORT    Result Date: 9/4/2022  EXAM: CHEST RADIOGRAPH, SINGLE VIEW CLINICAL INDICATION/HISTORY: Shortness of breath, intubated, follow-up COMPARISON: 9/3/2022 TECHNIQUE: Portable frontal view of the chest was obtained. _______________ FINDINGS: SUPPORT DEVICES: Endotracheal tube, left jugular central venous catheter, and nasogastric tube all appear adequately positioned. HEART AND MEDIASTINUM: Cardiomediastinal silhouette appears within normal limits. Tortuous and atherosclerotic thoracic aorta. No central vascular congestion. LUNGS AND PLEURAL SPACES: Unchanged retrocardiac left lower lobe atelectasis. Lungs are otherwise adequately aerated with no confluent airspace opacity. No pleural effusion or pneumothorax. BONY THORAX AND SOFT TISSUES: No acute osseous abnormality. _______________     No active cardiopulmonary disease. XR CHEST PORT    Result Date: 9/3/2022  EXAM: CHEST RADIOGRAPH, SINGLE VIEW CLINICAL INDICATION/HISTORY: Shortness of breath, endotracheal tube placement COMPARISON: 8/31/2022 TECHNIQUE: Portable frontal view of the chest was obtained. _______________ FINDINGS: SUPPORT DEVICES: Adequately positioned endotracheal tube, unchanged. Left jugular central venous catheter and nasogastric tube also appear unchanged, adequately positioned. HEART AND MEDIASTINUM: Cardiomediastinal silhouette appears within normal limits. Normal caliber thoracic aorta. No central vascular congestion.  LUNGS AND PLEURAL SPACES: Retrocardiac left lower lobe subsegmental atelectasis. Lungs are otherwise adequately aerated with no confluent airspace opacity. No pleural effusion or pneumothorax. BONY THORAX AND SOFT TISSUES: No acute osseous abnormality. _______________     No active cardiopulmonary disease. XR CHEST PORT    Result Date: 8/31/2022  EXAM: XR CHEST PORT CLINICAL INDICATION/HISTORY: central line -Additional: None COMPARISON: 4 hours prior TECHNIQUE: Portable frontal view of the chest _______________ FINDINGS: SUPPORT DEVICES: Endotracheal tube, gastric tube, and left IJ approach central venous catheter noted. Tip of the central venous catheter is at the level of the superior cavoatrial junction. HEART AND MEDIASTINUM: Normal cardiac size and mediastinal contours. LUNGS AND PLEURAL SPACES: No focal pneumonic opacity. No evidence of pneumothorax or pleural effusion. BONY THORAX AND SOFT TISSUES: Unremarkable. _______________     1. Support devices in stable/appropriate position as visualized. 2. No superimposed acute radiographic cardiopulmonary abnormality. XR CHEST PORT    Result Date: 8/31/2022  XR CHEST PORT CLINICAL INDICATION/HISTORY: Tube placement -Additional: None COMPARISON: None TECHNIQUE: Portable frontal view of the chest _______________ FINDINGS: SUPPORT DEVICES: Enteric tube tip projecting over the thoracic inlet. Endotracheal tube tip in the midthoracic trachea. Left IJV central venous catheter tip at the caval atrial junction. HEART AND MEDIASTINUM: Cardiomediastinal silhouette within normal limits. LUNGS AND PLEURAL SPACES: No dense consolidation, large effusion or pneumothorax. _______________     Enteric tube tip projecting over the thoracic inlet. Endotracheal tube tip in the midthoracic trachea. No acute cardiopulmonary abnormality.     ECHO ADULT COMPLETE    Result Date: 8/31/2022  Formatting of this result is different from the original.   Left Ventricle: Normal left ventricular systolic function with a visually estimated EF of 60 - 65%. Not well visualized. Left ventricle size is normal. Normal wall thickness. Normal wall motion. Right Ventricle: Not well visualized. Right ventricle is moderately dilated. Moderately reduced systolic function. Visually moderately dilated RV and moderately reduced RV systolic function. Aortic Valve: Tricuspid valve. Tricuspid Valve: The estimated RVSP is 25 mmHg. Right Atrium: Not well visualized. Right atrium is moderately dilated. No old echocardiogram available to compare. 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 · No evidence of deep vein thrombosis in the left lower extremity. DUPLEX LOWER EXT VENOUS BILAT    Result Date: 9/1/2022  · Age indeterminate thrombus present in the right popliteal vein. · No evidence of deep vein thrombosis in the left lower extremity. Images report reviewed by me:  CT (Most Recent) (CT reviewed by me) Results from Hospital Encounter encounter on 08/31/22    CT HEAD WO CONT    Narrative  EXAM: CT head    INDICATION: Anoxic brain injury. COMPARISON: 8/31/2022    TECHNIQUE: Axial CT imaging of the head was performed without intravenous  contrast. Standard multiplanar coronal and sagittal reformatted images were  obtained and are included in interpretation. One or more dose reduction techniques were used on this CT: automated exposure  control, adjustment of the mAs and/or kVp according to patient size, and  iterative reconstruction techniques. The specific techniques used on this CT  exam have been documented in the patient's electronic medical record.   Digital  Imaging and Communications in Medicine (DICOM) format image data are available  to nonaffiliated external healthcare facilities or entities on a secure, media  free, reciprocally searchable basis with patient authorization for at least a  12-month period after this study.  _______________    FINDINGS:    BRAIN AND POSTERIOR FOSSA: Mild patchy periventricular, deep and subcortical  white matter hypoattenuation which is nonspecific but likely represents chronic  ischemic changes. No evidence of acute large vessel transcortical infarct or  acute parenchymal hemorrhage. No midline shift or hydrocephalus. EXTRA-AXIAL SPACES AND MENINGES: There are no abnormal extra-axial fluid  collections. CALVARIUM: Intact. SINUSES: Right maxillary and ethmoid sinus mucosal disease. OTHER: None.    _______________    Impression  No acute intracranial abnormality. CXR reviewed by me:  XR (Most Recent). XR  reviewed by me and compared with previous XR Results from Hospital Encounter encounter on 08/31/22    XR CHEST PORT    Narrative  EXAM: One view chest x-ray    CLINICAL INDICATION/HISTORY: Endotracheal tube placement. COMPARISON: 09/09/2022. TECHNIQUE: Single AP view of the chest was obtained.    _______________    FINDINGS:    HEART, VESSELS, MEDIASTINUM: Heart size is normal. No vascular congestion. LUNGS, PLEURAL SPACES: There is blunting of the left costophrenic angle and  bibasilar linear airspace opacity. No pneumothorax. BONY THORAX, SOFT TISSUES: Unremarkable. MEDICAL SUPPORT DEVICES: Endotracheal tube tip at the level of the aortic arch. Left internal jugular catheter in satisfactory position. NG/OG tube courses  beyond the limits of the film into the left upper quadrant of the abdomen.    _______________    Impression  1. Medical support devices in satisfactory position. 2. Bibasilar atelectasis or infiltrate with small associated left pleural  effusion. ·Please note: Voice-recognition software may have been used to generate this report, which may have resulted in some phonetic-based errors in grammar and contents.  Even though attempts were made to correct all the mistakes, some may have been missed, and remained in the body of the document.       Hemant Darden MD  9/11/2022

## 2022-09-11 NOTE — PROGRESS NOTES
Bedside report received from [address]. Assumed care of pt at this time. Patient remains intubated. No command following or purposeful movement. No distress noted.

## 2022-09-11 NOTE — PROGRESS NOTES
Hospitalist Progress Note    Patient: Keaton Doss MRN: [id number]  CSN: [id number]    YOB: 1960  Age: 65 y.o. Sex: male    DOA: 8/31/2022 LOS:  LOS: 11 days          Chief Complaint:    Cardiac arrest      Assessment/Plan     Keaton Doss is a 65 y.o. hypertension, dyslipidemia, type 2 diabetes mellitus, vitamin D deficiency, stage 3a CKD and gout male with history of who presents with cardiac arrest.      Cardiac arrest, PEA  Severe anoxia by clinical exam and confirmed by MRI, with associated pituitary hemorrhage  Acute respiratory failure  Anoxia  Myoclonic jerks  Shock   RLE popliteal DVT  Sepsis, likely due to urinary source  UTI  Disseminated herpes zoster  Severe left and moderate right hydroureteronephrosis   GENO on CKD 3  Hyperglycemia  hypernatremia  Anemia    Patient's wife may be reconsidering her deision to proceed with trach and PEG and cancel comfort measures. ENT and GI will be consulted after the weekend    Zosyn course completed for likely sepsis of urinary origin      Renal fxn improved    No clear neurologic recovery     Hypernatremia-improved      Continue duran    IV antibiotics  IV acyclovir  IV PPI    SQ heparin     Off sedation     Echo with nl EF     Prognosis dismal    pallaitive care team following       Disposition : TBD  Patient Active Problem List   Diagnosis Code    Cardiac arrest ([facility] [address] I46.9    Respiratory failure ([address] J96.90    GENO (acute kidney injury) ([address] N17.9    Disseminated herpes zoster B02.7    Hydronephrosis N13.30    Shock ([address] R57.9    Sepsis ([address] A41.9    UTI (urinary tract infection) N39.0    Brain anoxia (HCC) G93.1       Subjective:    No changes of note      Review of systems:    UTO due to illness      Vital signs/Intake and Output:  Visit Vitals  /81   Pulse 93   Temp 98.1 °F (36.7 °C)   Resp 14   Ht 5' 10\" (1.778 m)   Wt 92 kg (202 lb 13.2 oz)   SpO2 100%   BMI 29.10 kg/m²     Current Shift:  No intake/output data recorded.   Last three shifts:  09/09 1901 - 09/11 0700  In: 2867 [I.V.:200]  Out: 3650 [Urine:3650]    Exam:    General: unresponsive intubated AAM, NAD  CVS:Regular rate and rhythm, no M/R/G, S1/S2 heard, no thrill  Lungs:Clear to auscultation bilaterally, no wheezes, rhonchi, or rales  Abdomen: Soft, Nontender, No distention  Extremities: No C/C/E, pulses palpable 2+  Neuro: Eyes open intermittently, rightward gaze, does not follow commands, no involuntary movements, no withdrawal to painful stimuli                  Labs: Results:       Chemistry Recent Labs     09/11/22  0443 09/10/22  0428 09/09/22  0248   * 140* 156*    139 140   K 4.4 4.6 3.9    104 106   CO2 27 27 30   BUN 48* 43* 40*   CREA 2.68* 2.60* 2.53*   CA 9.0 8.7 8.9   AGAP 7 8 4   BUCR 18 17 16        CBC w/Diff Recent Labs     09/11/22  0443 09/10/22  0428 09/09/22  0248   WBC 9.7 9.6 8.2   RBC 2.98* 3.27* 3.12*   HGB 8.5* 9.4* 9.1*   HCT 26.4* 29.5* 28.5*    305 284   GRANS 76* 72 68   LYMPH 12* 16* 18*   EOS 3 3 4        Cardiac Enzymes No results for input(s): CPK, CKND1, ROB in the last 72 hours. No lab exists for component: CKRMB, TROIP   Coagulation Recent Labs     09/11/22  0443 09/10/22  0428   PTP 13.7 13.8   INR 1.0 1.0         Lipid Panel Lab Results   Component Value Date/Time    Triglyceride 201 (H) 09/04/2022 05:00 AM      BNP No results for input(s): BNPP in the last 72 hours. Liver Enzymes No results for input(s): TP, ALB, TBIL, AP in the last 72 hours.     No lab exists for component: SGOT, GPT, DBIL     Thyroid Studies No results found for: T4, T3U, TSH, TSHEXT, TSHEXT     Procedures/imaging: see electronic medical records for all procedures/Xrays and details which were not copied into this note but were reviewed prior to creation of Benito Ramirez MD

## 2022-09-11 NOTE — PROGRESS NOTES
/  /                        Cardiology Progress Note        Patient: Cassi Johnson        Sex: male          DOA: 8/31/2022  YOB: 1960      Age:  65 y.o.        LOS:  LOS: 11 days   Assessment/Plan     Principal Problem:    Cardiac arrest ([address] (8/31/2022)    Active Problems:    Respiratory failure ([address] (8/31/2022)      GENO (acute kidney injury) ([address] (8/31/2022)      Disseminated herpes zoster (8/31/2022)      Hydronephrosis (8/31/2022)      Shock ([address] (8/31/2022)      Sepsis ([address] (8/31/2022)      UTI (urinary tract infection) (9/1/2022)      Brain anoxia ([address] (9/4/2022)      Plan:  9/11/2022    Cardiac telemetry stable  Blood pressure and heart rate and rhythm stable  Good urine output  Continue with current medications            9/10/2022  Patient remained intubated  Hemodynamically and rhythm standpoint stable  Patient's wife is considering tracheostomy/PEG tube  Continue with current meds      9/9/2022  No change from cardiac standpoint  Stable blood pressure and rhythm  Continue with current meds  Discussed with RN        9/8/2022  Cardiac telemetry stable  Stable blood pressure  Cardiac status unchanged      9/6/2022  Cardiac telemetry sinus rhythm with occasional PACs and PVCs  Continue with metoprolol, amlodipine and Lasix        9/5/2022  No change from cardiac standpoint  Continue with current supportive treatment      9/4/2022  High-grade anoxic brain injury on MRI  Cardiac telemetry stable  Continue with current supportive treatment as per family  Discussed with RN and Dr. Elliott Daley  Discussed with patient's wife and family          9/3/2022  Blood pressure is mildly elevated with mild tachycardia  I will add low-dose IV metoprolol 1.25 mg every 6 hours  Continue with rest of the treatment  Discussed with Dr. Keri Lucero        9/2/2022  Patient remained intubated  Off sedation  Of Levophed  Cardiac telemetry stable with occasional PVCs  Continue with supportive treatment  Discussed with wife and family members  Discussed with Dr. Beatriz Davila arrest PEA arrest  Mild troponin elevation after CPR and PEA cardiac arrest, normal EF and wall motion, no evidence of ACS  DVT  Continue anticoagulation if no active bleeding  Continue with supportive treatment  I have long and lengthy discussion with family about management plan. All the questions were answered. Subjective:    cc:  Cardiac arrest        REVIEW OF SYSTEMS:     Limited due to intubation      Objective:      Visit Vitals  /81   Pulse 93   Temp 98.1 °F (36.7 °C)   Resp 14   Ht 5' 10\" (1.778 m)   Wt 92 kg (202 lb 13.2 oz)   SpO2 100%   BMI 29.10 kg/m²     Body mass index is 29.1 kg/m². Physical Exam:  General Appearance: Intubated  NECK: No JVD, no thyroidomeglay. LUNGS: Clear bilaterally. HEART: S1+S2 audible,    ABD: Non-tender, BS Audible    EXT: No edema, and no cysnosis. VASCULAR EXAM: Pulses are intact.     PSYCHIATRIC EXAM: Intubated    Medication:  Current Facility-Administered Medications   Medication Dose Route Frequency    insulin glargine (LANTUS) injection 15 Units  15 Units SubCUTAneous Q24H    heparin (porcine) injection 5,000 Units  5,000 Units SubCUTAneous Q8H    amLODIPine (NORVASC) tablet 5 mg  5 mg Oral DAILY    lactulose (CHRONULAC) 10 gram/15 mL solution 15 mL  15 mL Oral DAILY    furosemide (LASIX) injection 20 mg  20 mg IntraVENous DAILY    metoprolol (LOPRESSOR) injection 2.5 mg  2.5 mg IntraVENous Q6H PRN    metoprolol (LOPRESSOR) injection 1.25 mg  1.25 mg IntraVENous Q6H    insulin lispro (HUMALOG) injection   SubCUTAneous Q6H    ELECTROLYTE REPLACEMENT PROTOCOL - Potassium Renal Dosing  1 Each Other PRN    sodium chloride (NS) flush 5-40 mL  5-40 mL IntraVENous Q8H    sodium chloride (NS) flush 5-40 mL  5-40 mL IntraVENous PRN    acetaminophen (TYLENOL) tablet 650 mg  650 mg Oral Q6H PRN    Or    acetaminophen (TYLENOL) suppository 650 mg  650 mg Rectal Q6H PRN    polyethylene glycol (MIRALAX) packet 17 g  17 g Oral DAILY PRN    ondansetron (ZOFRAN ODT) tablet 4 mg  4 mg Oral Q8H PRN    Or    ondansetron (ZOFRAN) injection 4 mg  4 mg IntraVENous Q6H PRN    pantoprazole (PROTONIX) 40 mg in 0.9% sodium chloride 10 mL injection  40 mg IntraVENous Q12H    levETIRAcetam (KEPPRA) 1,000 mg in 0.9% sodium chloride 100 mL IVPB  1,000 mg IntraVENous Q12H               Lab/Data Reviewed:  Procedures/imaging: see electronic medical records for all procedures/Xrays   and details which were not copied into this note but were reviewed prior to creation of Plan       All lab results for the last 24 hours reviewed. Recent Labs     09/11/22  0443 09/10/22  0428 09/09/22  0248   WBC 9.7 9.6 8.2   HGB 8.5* 9.4* 9.1*   HCT 26.4* 29.5* 28.5*    305 284       Recent Labs     09/11/22  0443 09/10/22  0428 09/09/22  0248    139 140   K 4.4 4.6 3.9    104 106   CO2 27 27 30   * 140* 156*   BUN 48* 43* 40*   CREA 2.68* 2.60* 2.53*   CA 9.0 8.7 8.9         RADIOLOGY:      CXR Results  (Last 48 hours)                 09/11/22 0635  XR CHEST PORT Final result    Impression:      1. Medical support devices in satisfactory position. 2. Bibasilar atelectasis or infiltrate with small associated left pleural   effusion. Narrative:  EXAM: One view chest x-ray       CLINICAL INDICATION/HISTORY: Endotracheal tube placement. COMPARISON: 09/09/2022. TECHNIQUE: Single AP view of the chest was obtained.       _______________       FINDINGS:       HEART, VESSELS, MEDIASTINUM: Heart size is normal. No vascular congestion. LUNGS, PLEURAL SPACES: There is blunting of the left costophrenic angle and   bibasilar linear airspace opacity. No pneumothorax. BONY THORAX, SOFT TISSUES: Unremarkable. MEDICAL SUPPORT DEVICES: Endotracheal tube tip at the level of the aortic arch. Left internal jugular catheter in satisfactory position.  NG/OG tube courses   beyond the limits of the film into the left upper quadrant of the abdomen.       _______________                     Cardiology Procedures:   Results for orders placed or performed during the hospital encounter of 08/31/22   EKG, 12 LEAD, INITIAL   Result Value Ref Range    Ventricular Rate 112 BPM    Atrial Rate 112 BPM    P-R Interval 158 ms    QRS Duration 100 ms    Q-T Interval 360 ms    QTC Calculation (Bezet) 491 ms    Calculated P Axis 73 degrees    Calculated R Axis -69 degrees    Calculated T Axis 51 degrees    Diagnosis       Sinus tachycardia  Left axis deviation  Low voltage QRS  Inferior infarct , age undetermined  Abnormal ECG  No previous ECGs available  Confirmed by Lew London MD. ([id number]) on 8/31/2022 11:30:53 PM        Echo Results  (Last 48 hours)      None         Cardiolite (Tc-99m Sestamibi) stress test    Signed By: Thai Segura MD     September 11, 2022

## 2022-09-12 NOTE — PROGRESS NOTES
21  Patient extubated with RT and family at bedside. [address] called informing of extubation and comfort care.

## 2022-09-12 NOTE — PROGRESS NOTES
Hospitalist Progress Note    Patient: Zenobia Cervantes MRN: [id number]  CSN: [id number]    YOB: 1960  Age: 65 y.o. Sex: male    DOA: 8/31/2022 LOS:  LOS: 12 days          Chief Complaint:    anoxia      Assessment/Plan        Zenobia Cervantes is a 65 y.o. hypertension, dyslipidemia, type 2 diabetes mellitus, vitamin D deficiency, stage 3a CKD and gout male with history of who presents with cardiac arrest.      Cardiac arrest, PEA  Severe anoxia by clinical exam and confirmed by MRI, with associated pituitary hemorrhage  Acute respiratory failure  Anoxia  Myoclonic jerks  Shock   RLE popliteal DVT  Sepsis, likely due to urinary source  UTI  Disseminated herpes zoster  Severe left and moderate right hydroureteronephrosis   GENO on CKD 3  Hyperglycemia  hypernatremia  Anemia     Family planning w/drawal of care     Severe anoxia due to cardiac arrest    DNR     Disposition :  Patient Active Problem List   Diagnosis Code    Cardiac arrest ([facility] [address] I46.9    Respiratory failure ([address] J96.90    GENO (acute kidney injury) ([address] N17.9    Disseminated herpes zoster B02.7    Hydronephrosis N13.30    Shock ([address] R57.9    Sepsis ([address] A41.9    UTI (urinary tract infection) N39.0    Brain anoxia (HCC) G93.1       Subjective:    No new events  Does not respond to stimuli  unresponsive    Review of systems:    UTO      Vital signs/Intake and Output:  Visit Vitals  BP (!) 142/74   Pulse 89   Temp 97.7 °F (36.5 °C)   Resp (!) 106   Ht 5' 10\" (1.778 m)   Wt 92 kg (202 lb 13.2 oz)   SpO2 100%   BMI 29.10 kg/m²     Current Shift:  No intake/output data recorded.   Last three shifts:  09/10 1901 - 09/12 0700  In: 3620 [I.V.:200]  Out: 1600 [Urine:1600]    Exam:    General: unresponsive AAM intubated NAD  CVS:Regular rate and rhythm, no M/R/G, S1/S2 heard, no thrill  Lungs:Clear to auscultation bilaterally, no wheezes, rhonchi, or rales  Abdomen: Soft, Nontender, No distention  Extremities: No C/C/E, pulses palpable 2+  Neuro:unresponsive    Labs: Results:       Chemistry Recent Labs     09/12/22  0414 09/11/22  0443 09/10/22  0428   * 238* 140*    137 139   K 3.9 4.4 4.6    103 104   CO2 28 27 27   BUN 48* 48* 43*   CREA 2.25* 2.68* 2.60*   CA 9.0 9.0 8.7   AGAP 5 7 8   BUCR 21* 18 17      CBC w/Diff Recent Labs     09/12/22  0414 09/11/22  0443 09/10/22  0428   WBC 8.6 9.7 9.6   RBC 2.87* 2.98* 3.27*   HGB 8.1* 8.5* 9.4*   HCT 25.5* 26.4* 29.5*    381 305   GRANS 71 76* 72   LYMPH 14* 12* 16*   EOS 4 3 3      Cardiac Enzymes No results for input(s): CPK, CKND1, ROB in the last 72 hours. No lab exists for component: CKRMB, TROIP   Coagulation Recent Labs     09/12/22 0414 09/11/22 0443   PTP 13.3 13.7   INR 1.0 1.0       Lipid Panel Lab Results   Component Value Date/Time    Triglyceride 201 (H) 09/04/2022 05:00 AM      BNP No results for input(s): BNPP in the last 72 hours. Liver Enzymes No results for input(s): TP, ALB, TBIL, AP in the last 72 hours.     No lab exists for component: SGOT, GPT, DBIL   Thyroid Studies No results found for: T4, T3U, TSH, TSHEXT     Procedures/imaging: see electronic medical records for all procedures/Xrays and details which were not copied into this note but were reviewed prior to creation of Emerald Burris MD

## 2022-09-12 NOTE — PROGRESS NOTES
Full note to follow  Family meeting today to review goals of care. Family does not want trach/peg, or any life prolonging measures. Wife Mrs. Harjit Calvert would like to move forward with comfort measures including compassionate extubation, initiation of medications for comfort, and discontinuation of all medications, tests, labs, treatments, and therapies not intended for comfort. Re-explained comfort measures in detail. Wife Mrs. Arline Ruelas signed a POST form with the following measures: DNR/DNI, comfort measures, no feeding tubes. Discussed with intensivist, hospitalist, and RN. Comfort orders placed.

## 2022-09-12 NOTE — PROGRESS NOTES
0730 Received report, assumed care pt in ICU bed 4  on all monitoring equipment, vent vss nadn. Vent settings verified. 0800 Complete RN assessment done at this time as per doc flow sheet. Pt opens eyes to noxious stimuli but eyes deviate up and do not track. No command following. +gag + cough. Oral care provided hob elevated 30 degrees. Afebrile. NSR, OGT with glucerna infusing via pump at 55mls/hr tolerating well no residuals. Vent settings verfied. Scattered shingles blisters healing noted open to air otherwise skin intact. Lyle patent TLC and PIC both patent with dressings CD&I. Will continue to monitor     1200  no change in assessment    1600 no change in assessement    1800 no changes, family in and out throughout the day.  Wife did speak with Dr. Dhaval Treadwell earlier about considering a change in plan and wishes to meet with palliative care in the am.     1900 report to Janelle Irving RN

## 2022-09-12 NOTE — ACP (ADVANCE CARE PLANNING)
Palliative Medicine     CODE STATUS: DNR/DNI; comfort measures only; no feeding tube     AMD Status: none on file His wife, Jorge Luis Andre is his legal next of kin                9/12/2022 0920 Seen today in room ICU 4 along with Dwayne Farias NP. Intubated/sedated. FiO2 35% PEEP 5     Over the weekend the family transitioned the goals of care to include trach and PEG and will allow reintubation should his endotracheal tube become dislodged. We met with the family this morning and they said that after further conversations, they would like to proceed with extubation and initiation of comfort measures. Lengthy discussion about how he would be extubated and comfort measures would be initiated. They asked thoughtful questions. . All were in agreement with the decision. POST completed (see below). Comfort orders placed. Clinical nurse, intensivist, and hospitalist update with plan. Disposition plan: to be determined based on his response after extubation. They understand he may be moved to a general medical bed and possible hospice discussions     Palliative care will continue to follow Luis Alberto Farias  and his family during his hospitalization and support them as they make healthcare decisions and define goals of care.     Advanced Steps [facility] (Physician Orders for Scope of Treatment)  Participants:   [] Patient    [] Healthcare agent (already designated in existing ACP document)    Name:     Relationship to Patient:     Phone number:   [x] Other Surrogate Decision Maker / Next of Kin    Name: Marylu Medrano    Relationship to Patient: wife    Phone Number:      Other persons present: multiple family members including daughter, brother, sister-in-law, and      Advanced Steps® ACP Jhonatan Mack NP, Elvan Landau, RN, MSN    Conversation Topics   Understanding of Medical Condition/s AND Potential Complications: they understand his potential to return to pre-arrest state is minimal      Needs to discuss with spiritual/Judaism advisor: [x] Yes  [] No Family  at bedside    Needs more information about illness and complications:  [] Yes  [x] No    Cardiopulmonary Resuscitation      Order Elected for CPR:  []  Attempt Resuscitation [x]  Do Not Attempt Resuscitation  When NOT in Cardiopulmonary Arrest, Order Elected:    [x] Comfort Measures  [] Limited Additional Interventions  [] Full Interventions  Artificially Administered Nutrition, Order Elected:  [x] No Feeding Tube (present OG tube to be removed)  [] Feeding Tube for a defined trial period  [] Feeding Tube long-term if indicated    The following was provided (check all that apply):      Healthcare Decision Information Cards:   [] Help with Breathing Facts   [] Tube Feeding Facts   [] CPR Facts      [] Review of existing Advance Directive  [] Assistance with Completion of New Advance Directive   [x] Review of [place] Form       Meeting Outcomes:   [x] ACP discussion completed   [x] [place] form completed  [x] [place] prepared for Provider review and signature   [x] Original placed on Chart, if in facility (form to be sent with patient at discharge)  [x] Copy given to healthcare agent    [x] Copy scanned to electronic medical record      Acosta Santana RN, MSN  Palliative Medicine  [phone]

## 2022-09-12 NOTE — PROGRESS NOTES
NUTRITION update    ASSESSMENT/PLAN:     Current Diet: NPO     Chart reviewed, note change in goals of care now focused on comfort measures only. Aggressive nutrition intervention is not indicated at this time. Will assist as needed; please consult if nutrition intervention is medically indicated and consistent with patient's goals of care.       Silke Bello RD

## 2022-09-12 NOTE — PROGRESS NOTES
Palliative Medicine Consult    Patient Name: Keaton Doss  YOB: 1960    Date of Initial Consult: 9/1/2022  Date of follow up: 9/12/2022  Reason for Consult: goals of care discussions  Requesting Provider: Dr. Sol Barfield  Primary Care Physician: Jaspal Peña MD      SUMMARY:   Keaton Doss is a 65 y.o. with a past history of hypertension, dyslipidemia, type 2 diabetes mellitus, vitamin D deficiency, stage 3a CKD and gout , who was admitted on 8/31/2022 from home with a diagnosis of cardiac arrest, acute respiratory failure, sepsis, shock, disseminated herpes zoster, and Severe left and moderate right hydroureteronephrosis with marked urinary bladder distention. Current medical issues leading to Palliative Medicine involvement include: goals of care discussions. 9/6/2022: Patient remains orally intubated, not sedated, no response to noxious stimuli. 9/8/2022: Patient remains orally intubated, and unresponsive on no sedation. 9/12/2022: Patient remains unresponsive on no sedation, orally intubated. Family has made the decision for comfort measures only. PALLIATIVE DIAGNOSES:   Goals of care discussions  Cardiac arrest  Acute respiratory failure  Sepsis, shock  Disseminated herpes zoster  Severe left and moderate right hydroureteronephrosis with marked urinary bladder distention       PLAN:   9/12/2022: Palliative medicine team including Noel Severe, RN and I met with patient at patient's bedside. Patient remains orally intubated, and unresponsive on no sedation. Family chose to take more time to review goals of care over the weekend, and received further clarity on trach/peg versus comfort measures (appreciate intensivist involvement). Family meeting today to review goals of care. Family does not want trach/peg, or any further life prolonging measures.  Wife Mrs. Magdaleno Blackwood would like to move forward with comfort measures including compassionate extubation, initiation of medications for comfort, and discontinuation of all medications, tests, labs, treatments, and therapies not intended for comfort. Re-explained comfort measures in detail. Wife Mrs. Savanna Sharma signed a POST form with the following measures: DNR/DNI, comfort measures, no feeding tubes. Discussed with intensivist, hospitalist, and RN. Comfort orders placed. Discussed that further conversations about hospice may occur depending on how patient does after extubation. We will continue to follow for comfort. See previous discussions below:    9/8/2022: Palliative medicine team including Jaren Sena RN and I met with patient at patient's bedside. Patient remains orally intubated, and unresponsive on no sedation. Wife and family at bedside and stated they are ready to readdress goals of care. Met with wife and family in ICU waiting room; wife shares that they are waiting for patient's brother to arrive from out of town (estimated arrival Friday afternoon/evening). Wife states that once all family is here, they plan to move forward with comfort measures and compassionate extubation on Saturday 9/10/2022. Explained comfort measures in detail including the initiation of medication for comfort, removal of ventilator support, and discontinuation of all medications, labs, tests, and treatments not intended for comfort. Family verbalizes understanding. Addressed goals of care in interim, Wife wants to change code status to DNR, do not re-intubate for any reason, and no escalation of care. Otherwise, continue current level of care until Saturday when family makes final goals of care decisions. Discussed with intensivist, hospitalist, and RN. See previous discussions below:    9/6/2022: Palliative medicine team including Jaren Sena RN and I met with patient at patient's bedside. Patient is orally intubated, no response to noxious stimuli.  Family meeting in ICU waiting room with spouse and family to readdress goals of care. Discussed the benefits and burdens of continuing aggressive measures with potential option of trach/peg in the near future versus implementing comfort measures with compassionate extubation. Family states they have a strong katelynn in God, and that they understand the anoxic brain injury and the poor prognosis. Family wishes to meet again in 24 hours to readdress goals of care (would like to talk as a family about treatment options). At this time, no changes in goals of care. Continue Full code with full interventions. See previous discussions below:    9/1/2022: Goals of care discussions: Palliative medicine team including Jasson Rosas RN and I met with patient at patient's bedside. Patient is orally intubated, no response to noxious stimuli. Gopi Michael at bedside. Family meeting in ICU waiting room today with patient's wife Corina Anderson, patient's daughter, and 2 sister-in laws. Family has a good understanding of current health situation and they are waiting for results from ongoing neurological testing to determine the existence of and the severity of anoxic brain injury. Prior to admission, patient was independent in ADLs, worked as a  at [employer], and is a  in [employer]. Patient has no known AMD, and his wife Eduardo Medina is legal NOK. Family is hopeful for recovery but recognize consideration of quality of life will be discussed further based on patient's response to medical treatment. Discussed the benefits and burdens of CPR in the event of cardiopulmonary arrest.  Encouraged ongoing conversations as patient is very high risk for unsuccessful attempts and/or post-arrest complications. At this time continue full code with full interventions. We will continue to follow with you. Cardiac arrest: Multiple, witnessed by EMS, total downtime ~30 minutes. Cardiology following. EEG done for myoclonic movements revealing:  \"This EEG is abnormal due to severely depressed EEG background, which may be due to severe anoxic brain injury. \"   Acute respiratory failure: secondary to number 2. Orally intubated on mechanical vent, managed by PCCM. SBT ongoing. Sepsis, shock: likely due to urinary source. On IVAB per primary team. Lyle placed by urology for retention, bleeding, and hydronephrosis. Disseminated herpes zoster: Family reports patient was experiencing significant pain. Severe left and moderate right hydroureteronephrosis with marked urinary bladder distention: Lyle placed by urology for retention, bleeding, and hydronephrosis.   Initial consult note routed to primary continuity provider  Communicated plan of care with: Palliative IDT       GOALS OF CARE / TREATMENT PREFERENCES:   [====Goals of Care====]  GOALS OF CARE: DNR/DNI, comfort measures, no feeding tubes  Patient/Health Care Proxy Stated Goals: Comfort      TREATMENT PREFERENCES:   Code Status: DNR    Advance Care Planning:  Advance Care Planning 9/1/2022   Patient's Healthcare Decision Maker is: Legal Next of Kin   Confirm Advance Directive None   Patient Would Like to Complete Advance Directive Unable            Artificially Administered Nutrition: No feeding tube      The palliative care team has discussed with patient / health care proxy about goals of care / treatment preferences for patient.  [====Goals of Care====]         HISTORY:     History obtained from: patient's chart, family    CHIEF COMPLAINT: cardiac arrest    HPI/SUBJECTIVE:    The patient is:   [] Verbal and participatory  [x] Non-participatory due to:   Orally intubated on mechanical ventilator, no response to noxious stimuli      Clinical Pain Assessment (nonverbal scale for severity on nonverbal patients):   Clinical Pain Assessment  Severity: 0    Adult Nonverbal Pain Scale  Face: No particular expression or smile  Activity (Movement): Lying quietly, normal position  Guarding: Lying quietly, no positioning of hands over areas of body  Physiology (Vital Signs): Stable vital signs  Respiratory: Baseline RR/SpO2 compliant with ventilator  Total Score: 0       FUNCTIONAL ASSESSMENT:     Palliative Performance Scale (PPS):  PPS: 20       PSYCHOSOCIAL/SPIRITUAL SCREENING:     Advance Care Planning:  Advance Care Planning 9/1/2022   Patient's Healthcare Decision Maker is: Legal Next of Kin   Confirm Advance Directive None   Patient Would Like to Complete Advance Directive Unable        Any spiritual / Druze concerns: unable to assess  [] Yes /  [] No    Caregiver Burnout:  [] Yes /  [x] No /  [] No Caregiver Present      Anticipatory grief assessment: unable to assess  [] Normal  / [] Maladaptive             REVIEW OF SYSTEMS:     Positive and pertinent negative findings in ROS are noted above in HPI. The following systems were [] reviewed / [x] unable to be reviewed as noted in HPI  Other findings are noted below. Systems: constitutional, ears/nose/mouth/throat, respiratory, gastrointestinal, genitourinary, musculoskeletal, integumentary, neurologic, psychiatric, endocrine. Positive findings noted below. Modified ESAS Completed by: provider           Pain: 0           Dyspnea: 0           Stool Occurrence(s): 0        PHYSICAL EXAM:     From RN flowsheet:  Wt Readings from Last 3 Encounters:   09/08/22 92 kg (202 lb 13.2 oz)     Blood pressure (!) 146/74, pulse (!) 109, temperature 98.4 °F (36.9 °C), resp. rate 23, height 5' 10\" (1.778 m), weight 92 kg (202 lb 13.2 oz), SpO2 93 %.     Pain Scale 1: Adult Nonverbal Pain Scale  Pain Intensity 1: 0                   Constitutional: orally intubated, critically ill appearing  Eyes: closed  ENMT: orally intubated   Cardiovascular: regular rhythm, distal pulses intact  Respiratory: orally intubated on mechanical ventilation  Gastrointestinal: soft non-tender   Musculoskeletal: no deformity, no tenderness to palpation  Skin: warm, dry  Neurologic: off sedation, no response to noxious stimuli        HISTORY:     Principal Problem:    Cardiac arrest ([facility] (8/31/2022)    Active Problems:    Respiratory failure ([facility] [address] (8/31/2022)      GENO (acute kidney injury) ([facility] [address] (8/31/2022)      Disseminated herpes zoster (8/31/2022)      Hydronephrosis (8/31/2022)      Shock ([facility] [address] (8/31/2022)      Sepsis [facility] (8/31/2022)      UTI (urinary tract infection) (9/1/2022)      Brain anoxia ([facility] [address] (9/4/2022)    Past Medical History:   Diagnosis Date    Gout     Shingles       No past surgical history on file. No family history on file. History reviewed, no pertinent family history.   Social History     Tobacco Use    Smoking status: Not on file    Smokeless tobacco: Not on file   Substance Use Topics    Alcohol use: Not on file     No Known Allergies   Current Facility-Administered Medications   Medication Dose Route Frequency    acetaminophen (TYLENOL) tablet 650 mg  650 mg Oral Q4H PRN    Or    acetaminophen (TYLENOL) solution 650 mg  650 mg Oral Q4H PRN    Or    acetaminophen (TYLENOL) suppository 650 mg  650 mg Rectal Q4H PRN    glycopyrrolate (ROBINUL) injection 0.2 mg  0.2 mg IntraVENous Q4H PRN    scopolamine (TRANSDERM-SCOP) 1 mg over 3 days 1 Patch  1 Patch TransDERmal Q72H PRN    bisacodyL (DULCOLAX) suppository 10 mg  10 mg Rectal DAILY PRN    morphine injection 2 mg  2 mg IntraVENous Q15MIN PRN    LORazepam (INTENSOL) 2 mg/mL oral concentrate 1 mg  1 mg SubLINGual Q1H PRN    sodium chloride (NS) flush 5-40 mL  5-40 mL IntraVENous PRN    ondansetron (ZOFRAN) injection 4 mg  4 mg IntraVENous Q6H PRN          LAB AND IMAGING FINDINGS:     Lab Results   Component Value Date/Time    WBC 8.6 09/12/2022 04:14 AM    HGB 8.1 (L) 09/12/2022 04:14 AM    PLATELET 501 69/86/5015 04:14 AM     Lab Results   Component Value Date/Time    Sodium 140 09/12/2022 04:14 AM    Potassium 3.9 09/12/2022 04:14 AM    Chloride 107 09/12/2022 04:14 AM    CO2 28 09/12/2022 04:14 AM    BUN 48 (H) 09/12/2022 04:14 AM    Creatinine 2.25 (H) 09/12/2022 04:14 AM    Calcium 9.0 09/12/2022 04:14 AM    Magnesium 2.7 (H) 09/12/2022 04:14 AM    Phosphorus 3.1 09/12/2022 04:14 AM      Lab Results   Component Value Date/Time    Alk. phosphatase 188 (H) 09/06/2022 04:36 AM    Protein, total 5.8 (L) 09/06/2022 04:36 AM    Albumin 1.7 (L) 09/06/2022 04:36 AM    Globulin 4.1 (H) 09/06/2022 04:36 AM     Lab Results   Component Value Date/Time    INR 1.0 09/12/2022 04:14 AM    Prothrombin time 13.3 09/12/2022 04:14 AM    aPTT 27.9 09/05/2022 11:27 AM      No results found for: IRON, FE, TIBC, IBCT, PSAT, FERR   No results found for: PH, PCO2, PO2  No components found for: GLPOC   No results found for: CPK, CKMB             Total time: 35 minutes  Counseling / coordination time, spent as noted above:   > 50% counseling / coordination?: yes, patient, family, and medical team    Prolonged service was provided for  []30 min   []75 min in face to face time in the presence of the patient, spent as noted above.   Time Start:   Time End:

## 2022-09-12 NOTE — CONSULTS
[id number] [facility]    Name:  Hugo Wills  MR#:   [id number]  :  1960  ACCOUNT #:  [de-identified]  DATE OF SERVICE:  2022    HISTORY OF PRESENT ILLNESS:  The patient is a 73-year-old male with past medical history significant for hypertension as well as diabetes mellitus, was diagnosed with severe zoster. The patient developed some difficulties with a cardiac arrest with witnessed CPR. The patient was reported to have a PEA, was also noted to have difficulties with seizures, was on a ventilator since his admission on 2022. The patient currently has had some markedly poor prognostic signs. There was some question whether the patient may need a tracheotomy. In any event, I was asked to see the patient in the anticipation of possible tracheotomy. However, there apparently has been some question whether wife would like to continue with care. ENT consultation requested for evaluation regarding the above. PAST MEDICAL HISTORY:  Significant for gout and shingles. MEDICATION USE:  Includes,  1. Tylenol. 2.  Dulcolax. 3.  Robinul. 4.  Zofran. 5.  Transdermal scopolamine. ALLERGIES:  DENIED. SURGERIES:  Past surgical history is unremarkable. REVIEW OF SYSTEMS:  Noncontributory. PHYSICAL EXAMINATION:  GENERAL:  Reveals a 73-year-old male, on a ventilator. HEENT:  Intraoral exam reveals the patient's ET tube to be in place. The intranasal exam reveals no evidence of any mucopurulent discharge. On facial exam, the patient does open eyes intermittently. NECK:  Supple. Nontender without any evidence of any mass, lesion, or abnormality. CHEST:  Distant breath sounds. HEART:  S1 and S2. No murmur audible. EXTREMITIES:  Unremarkable. IMPRESSION:  The patient is status post cardiac arrest shock, respiratory failure with anoxic encephalopathy post arrest with poor electroencephalogram.    PLAN:  I have discussed with intensivist regarding tracheotomy. At this point, we will hold off with this. Wife is to decide whether she wishes to proceed with tracheotomy. Initially, wished to proceed with tracheotomy on 09/12/2022; however, at this point, we will hold off until further definitive decision making has been noted. Please contact me if tracheotomy is necessary. Thank you for your consult.       Brian Webb MD      [id number]  D:  09/12/2022 10:38  T:  09/12/2022 12:04  JOB #:  [id number]

## 2022-09-12 NOTE — PROGRESS NOTES
[facility]  Pulmonary, Critical Care, and Sleep Medicine    Name: Lauren Denis MRN: [id number]   : 1960 Hospital: [facility]    Date: 2022  Room: [address] [place] [address] Note                                              Consult requesting physician: Dr. Dr. Martin Deluca   Reason for Consult: Cardiac arrest , shock, respiratory failure     IMPRESSION:   Acute respiratory failure, post-arrest - J96.0  Cardiac arrest - I46.9  Sepsis - A41.9, R65.20  Anemia - D64.9  Chronic DVT right leg  Elevated LFTs, post-arrest  Anoxic encephalopathy post-arrest   Pituitary hemorrhage/hematoma  Hypernatremia, improved - E87.0  Patient Active Problem List   Diagnosis Code    Cardiac arrest ([facility] [address] I46.9    Respiratory failure ([facility] [address] J96.90    GENO (acute kidney injury) ([facility] [address] N17.9    Disseminated herpes zoster B02.7    Hydronephrosis N13.30    Shock ([facility] [address] R57.9    Sepsis ([address] A41.9    UTI (urinary tract infection) N39.0    Brain anoxia ([address] G93.1     Code status: Prior       RECOMMENDATIONS:   S/p cardiac arrest -severe anoxic encephalopathy, and remains on ventilator support. Respiratory: VCV ventilation. Chest x-ray from  -ET tube in good position; left IJ central line; OG tube in good position; no focal infiltrates or consolidation; minimal left basal effusion. CT chest on admission-No acute intrathoracic abnormality. Ventilator and sedation bundles reviewed. Discussed with palliative care team-family planning to proceed with comfort care/compassionate extubation today morning. CVS: Stable hemodynamics; telemetry-sinus rhythm. ID: No fever. Antibiotics: Zosyn finished 22  Antiviral: Acyclovir finish 22 per ID. Hematology/Oncology: Stable hemoglobin and platelet. No active bleeding issues. Follow-up PVL study 22 - stable chronic RT leg calf vein DVT    Renal: Stable renal function.      GI: Stop tube feed; suction OG tube prior to compassionate extubation today. Neurology: Patient remains comatosed. MRI brain 9/3/2022: High-grade acute/subacute anoxic changes. EEG 8/31-anoxic encephalopathy. Neurology has signed off. Lines/Tubes: PIV  ETT: 8/31/2021. OGT: 9/2/22. Central line: 8/31/22 LT IJ (site examined, no erythema, induration, discharge or sign of infection. Dressing intact. Medically necessary, will remove it when not needed. Central line bundle followed). Lyle: 8/31/22 (Medically necessary for strict input/output monitoring in critically ill patient, will remove it when not needed. Lyle bundle followed). Lyle catheter placed by Dr. Malcom Griffiths in urology-do not remove. Advance Directive/Palliative Care: partial DNR. Palliative care team on case-discussed with NP BRITTNY Figueroa  Poor prognosis for meaningful recovery; comfort care/compassionate extubation decisions per family. Also discussed with patient daughter Mona Jalloh and brother at bedside; discussed about compassionate extubation, and reviewed plans for this morning; after extubation, patient will be placed on nasal cannula oxygen; prn midazolam/morphine if needed; patient has stable breathing, subsequently need to consider hospice consultation. High complexity decision making was performed during the evaluation of this patient at high risk for decompensation with multiple organ involvement. Subjective/History of Present Illness:   Patient is a 65 y.o. male with PMHx significant for HTN/CRI/DM recently diagnosed with severe zoster. Patient was on antiviral tx, very weak, sob, cough. EMS called, in ambulance 3 times cardiac arrest. Witnessed CPR/Epi. Repored PEA. 6 doses of epi and no shocks. He came on epi drip but in ER chnaged to levophed. Pateitn had seizure vs myocloninc jerks was started on propofol. 9/12/2022 :   Chart reviewed; discussed during signout. Patient seen in ICU. Patient remains unresponsive, on ventilator support. Stable hemodynamics.   No acute issues overnight. Review of Systems:  ROS not obtained due to patient factor. No Known Allergies     Past Medical History:   Diagnosis Date    Gout     Shingles       No past surgical history on file. Social History     Tobacco Use    Smoking status: Not on file    Smokeless tobacco: Not on file   Substance Use Topics    Alcohol use: Not on file      No family history on file. Prior to Admission medications    Not on File     Current Facility-Administered Medications   Medication Dose Route Frequency         Objective:   Vital Signs:    Visit Vitals  /73   Pulse 87   Temp 98.1 °F (36.7 °C)   Resp 14   Ht 5' 10\" (1.778 m)   Wt 92 kg (202 lb 13.2 oz)   SpO2 100%   BMI 29.10 kg/m²       O2 Device: Ventilator       Temp (24hrs), Av.5 °F (36.4 °C), Min:95.4 °F (35.2 °C), Max:98.1 °F (36.7 °C)       Intake/Output:   Last shift:       07 -  190  In: 300   Out: 300 [Urine:300]    Last 3 shifts: 09/10 1901 -  0700  In: 9493 [I.V.:200]  Out: 1600 [Urine:1600]      Intake/Output Summary (Last 24 hours) at 2022 0955  Last data filed at 2022 0800  Gross per 24 hour   Intake 2620 ml   Output 1150 ml   Net 1470 ml         Last 3 Recorded Weights in this Encounter    22 0757 22 0904 22 1200   Weight: 103.9 kg (229 lb 0.9 oz) 95 kg (209 lb 7 oz) 92 kg (202 lb 13.2 oz)         Ventilator Settings:  Mode Rate Tidal Volume Pressure FiO2 PEEP   Assist control, VC+   450 ml    35 % 5 cm H20     Peak airway pressure: 30 cm H2O    Plateau pressure:     Tidal volume:    Minute ventilation: 6.4 l/min     No results for input(s): PHI, PHI, POC2, PCO2I, PO2, PO2I, HCO3, HCO3I, FIO2, FIO2I in the last 72 hours.       Physical Exam:   Intubated and unresponsive; acyanotic  HEENT: pupils not dilated, reactive, no scleral jaundice, moist oral mucosa  Neck: No adenopathy or thyroid swelling  Orotracheal and orogastric tubes-no bleeding or secretions  CVS: S1S2 no murmurs; JVD not elevated; telemetry-sinus rhythm  RS: Mod air entry bilaterally, decreased BS at bases, no wheezes or crackles; not tachypneic or in distress  Abd: Soft, non tender, no hepatosplenomegaly, no abd distension, no guarding or rigidity, bowel sounds heard  Neuro: Intubated and unresponsive; occasional twitching of eyelids; no other focal movements noted; limited exam   Extrm: No leg edema   Skin: no rash  Lymphatic: no cervical or supraclavicular adenopathy  Vasc: DPs palpable       Data:       Recent Results (from the past 24 hour(s))   GLUCOSE, POC    Collection Time: 09/11/22 11:34 AM   Result Value Ref Range    Glucose (POC) 240 (H) 70 - 110 mg/dL   GLUCOSE, POC    Collection Time: 09/11/22  5:07 PM   Result Value Ref Range    Glucose (POC) 275 (H) 70 - 110 mg/dL   GLUCOSE, POC    Collection Time: 09/11/22 11:22 PM   Result Value Ref Range    Glucose (POC) 220 (H) 70 - 110 mg/dL   CBC WITH AUTOMATED DIFF    Collection Time: 09/12/22  4:14 AM   Result Value Ref Range    WBC 8.6 4.6 - 13.2 K/uL    RBC 2.87 (L) 4.35 - 5.65 M/uL    HGB 8.1 (L) 13.0 - 16.0 g/dL    HCT 25.5 (L) 36.0 - 48.0 %    MCV 88.9 78.0 - 100.0 FL    MCH 28.2 24.0 - 34.0 PG    MCHC 31.8 31.0 - 37.0 g/dL    RDW 16.8 (H) 11.6 - 14.5 %    PLATELET 504 053 - 066 K/uL    MPV 10.8 9.2 - 11.8 FL    NRBC 0.2 (H) 0  WBC    ABSOLUTE NRBC 0.02 (H) 0.00 - 0.01 K/uL    NEUTROPHILS 71 40 - 73 %    LYMPHOCYTES 14 (L) 21 - 52 %    MONOCYTES 8 3 - 10 %    EOSINOPHILS 4 0 - 5 %    BASOPHILS 1 0 - 2 %    IMMATURE GRANULOCYTES 2 (H) 0.0 - 0.5 %    ABS. NEUTROPHILS 6.1 1.8 - 8.0 K/UL    ABS. LYMPHOCYTES 1.2 0.9 - 3.6 K/UL    ABS. MONOCYTES 0.7 0.05 - 1.2 K/UL    ABS. EOSINOPHILS 0.3 0.0 - 0.4 K/UL    ABS. BASOPHILS 0.0 0.0 - 0.1 K/UL    ABS. IMM.  GRANS. 0.2 (H) 0.00 - 0.04 K/UL    DF AUTOMATED     PROTHROMBIN TIME + INR    Collection Time: 09/12/22  4:14 AM   Result Value Ref Range    Prothrombin time 13.3 11.5 - 15.2 sec    INR 1.0 0.8 - 1.2     MAGNESIUM Collection Time: 09/12/22  4:14 AM   Result Value Ref Range    Magnesium 2.7 (H) 1.6 - 2.6 mg/dL   METABOLIC PANEL, BASIC    Collection Time: 09/12/22  4:14 AM   Result Value Ref Range    Sodium 140 136 - 145 mmol/L    Potassium 3.9 3.5 - 5.5 mmol/L    Chloride 107 100 - 111 mmol/L    CO2 28 21 - 32 mmol/L    Anion gap 5 3.0 - 18 mmol/L    Glucose 211 (H) 74 - 99 mg/dL    BUN 48 (H) 7.0 - 18 MG/DL    Creatinine 2.25 (H) 0.6 - 1.3 MG/DL    BUN/Creatinine ratio 21 (H) 12 - 20      GFR est AA 36 (L) >60 ml/min/1.73m2    GFR est non-AA 30 (L) >60 ml/min/1.73m2    Calcium 9.0 8.5 - 10.1 MG/DL   PHOSPHORUS    Collection Time: 09/12/22  4:14 AM   Result Value Ref Range    Phosphorus 3.1 2.5 - 4.9 MG/DL   GLUCOSE, POC    Collection Time: 09/12/22  5:16 AM   Result Value Ref Range    Glucose (POC) 204 (H) 70 - 110 mg/dL         Chemistry Recent Labs     09/12/22  0414 09/11/22  0443 09/10/22  0428   * 238* 140*    137 139   K 3.9 4.4 4.6    103 104   CO2 28 27 27   BUN 48* 48* 43*   CREA 2.25* 2.68* 2.60*   CA 9.0 9.0 8.7   MG 2.7* 2.7* 2.5   PHOS 3.1 3.6 3.7   AGAP 5 7 8   BUCR 21* 18 17          Lactic Acid Lactic acid   Date Value Ref Range Status   09/01/2022 1.8 0.4 - 2.0 MMOL/L Final     No results for input(s): LAC in the last 72 hours. Liver Enzymes Protein, total   Date Value Ref Range Status   09/06/2022 5.8 (L) 6.4 - 8.2 g/dL Final     Albumin   Date Value Ref Range Status   09/06/2022 1.7 (L) 3.4 - 5.0 g/dL Final     Globulin   Date Value Ref Range Status   09/06/2022 4.1 (H) 2.0 - 4.0 g/dL Final     A-G Ratio   Date Value Ref Range Status   09/06/2022 0.4 (L) 0.8 - 1.7   Final     Alk. phosphatase   Date Value Ref Range Status   09/06/2022 188 (H) 45 - 117 U/L Final     No results for input(s): TP, ALB, GLOB, AGRAT, AP, TBIL in the last 72 hours.     No lab exists for component: SGOT, GPT, DBIL       CBC w/Diff Recent Labs     09/12/22  0414 09/11/22  0443 09/10/22  0428   WBC 8.6 9.7 9.6 RBC 2.87* 2.98* 3.27*   HGB 8.1* 8.5* 9.4*   HCT 25.5* 26.4* 29.5*    381 305   GRANS 71 76* 72   LYMPH 14* 12* 16*   EOS 4 3 3            Coagulation Recent Labs     09/12/22  0414 09/11/22  0443 09/10/22  0428   PTP 13.3 13.7 13.8   INR 1.0 1.0 1.0             Culture data during this hospitalization. All Micro Results       Procedure Component Value Units Date/Time    CULTURE, BLOOD [[id number]] Collected: 08/31/22 1015    Order Status: Completed Specimen: Blood Updated: 09/06/22 0711     Special Requests: NO SPECIAL REQUESTS        Culture result: NO GROWTH 6 DAYS       CULTURE, BLOOD [[id number]] Collected: 08/31/22 1000    Order Status: Completed Specimen: Blood Updated: 09/06/22 0711     Special Requests: NO SPECIAL REQUESTS        Culture result: NO GROWTH 6 DAYS       CULTURE, RESPIRATORY/SPUTUM/BRONCH Paige Mulch STAIN [[id number]] Collected: 09/03/22 1230    Order Status: Canceled Specimen: Sputum from Tracheal Aspirate     COVID-19 RAPID TEST [[id number]] Collected: 08/31/22 1050    Order Status: Completed Specimen: Nasopharyngeal Updated: 08/31/22 1131     Specimen source Nasopharyngeal        COVID-19 rapid test Not detected        Comment: Rapid Abbott ID Now       Rapid NAAT:  The specimen is NEGATIVE for SARS-CoV-2, the novel coronavirus associated with COVID-19. Negative results should be treated as presumptive and, if inconsistent with clinical signs and symptoms or necessary for patient management, should be tested with an alternative molecular assay. Negative results do not preclude SARS-CoV-2 infection and should not be used as the sole basis for patient management decisions. This test has been authorized by the FDA under an Emergency Use Authorization (EUA) for use by authorized laboratories.    Fact sheet for Healthcare Providers: ConventionUpdate.co.nz  Fact sheet for Patients: ConventionUpdate.co.nz       Methodology: Isothermal Nucleic Acid Amplification                  ECHO ADULT COMPLETE  Result Date: 8/31/2022  Formatting of this result is different from the original.   Left Ventricle: Normal left ventricular systolic function with a visually estimated EF of 60 - 65%. Not well visualized. Left ventricle size is normal. Normal wall thickness. Normal wall motion. Right Ventricle: Not well visualized. Right ventricle is moderately dilated. Moderately reduced systolic function. Visually moderately dilated RV and moderately reduced RV systolic function. Aortic Valve: Tricuspid valve. Tricuspid Valve: The estimated RVSP is 25 mmHg. Right Atrium: Not well visualized. Right atrium is moderately dilated. No old echocardiogram available to compare. MRI brain 9/3/2022  IMPRESSION   1. High-grade acute/subacute anoxic brain injury without herniation. 2.  Heterogeneous expansile sellar lesion, nonspecific, pituitary  hematoma/hemorrhage favored over other etiologies. CXR reviewed by me:  XR 9/11 Results from Hospital Encounter encounter on 08/31/22    XR CHEST PORT    Narrative  EXAM: One view chest x-ray    CLINICAL INDICATION/HISTORY: Endotracheal tube placement. COMPARISON: 09/09/2022. TECHNIQUE: Single AP view of the chest was obtained.    _______________    FINDINGS:    HEART, VESSELS, MEDIASTINUM: Heart size is normal. No vascular congestion. LUNGS, PLEURAL SPACES: There is blunting of the left costophrenic angle and  bibasilar linear airspace opacity. No pneumothorax. BONY THORAX, SOFT TISSUES: Unremarkable. MEDICAL SUPPORT DEVICES: Endotracheal tube tip at the level of the aortic arch. Left internal jugular catheter in satisfactory position. NG/OG tube courses  beyond the limits of the film into the left upper quadrant of the abdomen.    _______________    Impression  1. Medical support devices in satisfactory position.   2. Bibasilar atelectasis or infiltrate with small associated left pleural  effusion. Please note: Voice-recognition software may have been used to generate this report, which may have resulted in some phonetic-based errors in grammar and contents. Even though attempts were made to correct all the mistakes, some may have been missed, and remained in the body of the document.       Moraima Olivares MD  9/12/2022

## 2022-09-12 NOTE — PROGRESS NOTES
Problem: Patient Education: Go to Patient Education Activity  Goal: Patient/Family Education  Outcome: Progressing Towards Goal     Problem: Falls - Risk of  Goal: *Absence of Falls  Description: Document Deanna Brown Fall Risk and appropriate interventions in the flowsheet. Outcome: Progressing Towards Goal  Note: Fall Risk Interventions:       Mentation Interventions: Bed/chair exit alarm, Door open when patient unattended    Medication Interventions: Bed/chair exit alarm    Elimination Interventions: Bed/chair exit alarm, Call light in reach, Toileting schedule/hourly rounds    History of Falls Interventions: Bed/chair exit alarm, Door open when patient unattended         Problem: Pressure Injury - Risk of  Goal: *Prevention of pressure injury  Description: Document Marvin Scale and appropriate interventions in the flowsheet. Outcome: Progressing Towards Goal  Note: Pressure Injury Interventions:  Sensory Interventions: Avoid rigorous massage over bony prominences, Float heels, Keep linens dry and wrinkle-free, Maintain/enhance activity level, Pressure redistribution bed/mattress (bed type), Turn and reposition approx. every two hours (pillows and wedges if needed)    Moisture Interventions: Absorbent underpads, Apply protective barrier, creams and emollients, Check for incontinence Q2 hours and as needed    Activity Interventions: Pressure redistribution bed/mattress(bed type)    Mobility Interventions: Float heels, Pressure redistribution bed/mattress (bed type), Turn and reposition approx.  every two hours(pillow and wedges)    Nutrition Interventions: Document food/fluid/supplement intake, Discuss nutritional consult with provider    Friction and Shear Interventions: Apply protective barrier, creams and emollients, HOB 30 degrees or less, Transferring/repositioning devices                Problem: Patient Education: Go to Patient Education Activity  Goal: Patient/Family Education  Outcome: Progressing Towards Goal

## 2022-09-12 NOTE — DIABETES MGMT
GLYCEMIC CONTROL PROGRESS NOTE:    - family has made decision for compassionate extubation  - GC interventions not indicated at this time      Qian Decker MS, RN, CDE  Glycemic Control Team  [phone]

## 2022-09-13 NOTE — PROGRESS NOTES
D/C Plan: [address] 9/15/22    CM met with pt/family at bedside to discuss care transition options. CM discussed hospice at a facility and hospice at home. CM notified family of cost for hospice at a facility. Given this, family has elected to have pt return home with hospice. Family has been provided a list of personal care agencies to assist with care transition. Family will be clearing out a room to prepare for DME to be delivered tomorrow. Anticipate pt will transition home with [address] on Thursday.   CM to continue to follow and assist.    Care Management Interventions  Mode of Transport at Discharge: BLS  Transition of Care Consult (CM Consult): [place]: Yes  Health Maintenance Reviewed: Yes  Support Systems: Spouse/Significant Other, Child(lesley), Other Family Member(s)  The Plan for Transition of Care is Related to the Following Treatment Goals : Home with [address]  Discharge Location  Patient Expects to be Discharged to[de-identified] Home with hospice

## 2022-09-13 NOTE — PROGRESS NOTES
Palliative Medicine    CODE STATUS: DNR/DNI; comfort measures only; no feeding tube    AMD Status: none on file. Elizabeth Chris, wife, is his legal next of kin     9/13/2022  Seen today in room 320 along with Marlen Gannon NP. Lying in bed supine. No response to verbal or tactile stimulation. Respirations unlabored with oxygen on at 2 lpm per NC. One short episode of coughing--upper airway secretions noted. Appeared comfortable. No grimacing or brow furrowing. Family at bedside. Discussed that hospice consultation will be placed and they agreed to same. Disposition plan: to be determined     Palliative care will continue to follow Cassi Johnson  and his family during his hospitalization and support them as they make healthcare decisions and define goals of care.       Suman Reed RN, MSN  Palliative Medicine  P: [phone]

## 2022-09-13 NOTE — PROGRESS NOTES
Palliative Medicine Consult    Patient Name: Hi Winter  YOB: 1960    Date of Initial Consult: 9/1/2022  Date of follow up: 9/13/2022  Reason for Consult: goals of care discussions  Requesting Provider: Dr. Tristian William  Primary Care Physician: Julian Orozco MD      SUMMARY:   Hi Winter is a 65 y.o. with a past history of hypertension, dyslipidemia, type 2 diabetes mellitus, vitamin D deficiency, stage 3a CKD and gout , who was admitted on 8/31/2022 from home with a diagnosis of cardiac arrest, acute respiratory failure, sepsis, shock, disseminated herpes zoster, and Severe left and moderate right hydroureteronephrosis with marked urinary bladder distention. Current medical issues leading to Palliative Medicine involvement include: goals of care discussions. 9/6/2022: Patient remains orally intubated, not sedated, no response to noxious stimuli. 9/8/2022: Patient remains orally intubated, and unresponsive on no sedation. 9/12/2022: Patient remains unresponsive on no sedation, orally intubated. Family has made the decision for comfort measures only. 9/13/2022: Patient remains on comfort measures, prn medications given appropriately, appears in NAD. No response to verbal or light tactile stimuli. PALLIATIVE DIAGNOSES:   Goals of care discussions  Cardiac arrest  Acute respiratory failure  Sepsis, shock  Disseminated herpes zoster  Severe left and moderate right hydroureteronephrosis with marked urinary bladder distention       PLAN:   9/13/2022: Palliative medicine team including Chayito Alvarado RN and I met with patient at patient's bedside. Patient remains on comfort measures, prn medications given appropriately, appears in NAD. No response to verbal or light tactile stimuli. Support offered to family at bedside.  Explained that patient appears that his symptoms are well managed, and a hospice consult will be placed to discuss continuation of comfort measures once patient is discharged. Family unsure of home versus facility with hospice, will defer these discussions to hospice and case management. At this time, continue DNR/DNI, comfort measures, no feeding tubes. POST form on file. We will continue to follow for comfort. See previous discussions below:    9/12/2022: Palliative medicine team including Destin Rizvi RN and I met with patient at patient's bedside. Patient remains orally intubated, and unresponsive on no sedation. Family chose to take more time to review goals of care over the weekend, and received further clarity on trach/peg versus comfort measures (appreciate intensivist involvement). Family meeting today to review goals of care. Family does not want trach/peg, or any further life prolonging measures. Wife Mrs. Hemanth Castañeda would like to move forward with comfort measures including compassionate extubation, initiation of medications for comfort, and discontinuation of all medications, tests, labs, treatments, and therapies not intended for comfort. Re-explained comfort measures in detail. Wife Mrs. Kyler Grullon signed a POST form with the following measures: DNR/DNI, comfort measures, no feeding tubes. Discussed with intensivist, hospitalist, and RN. Comfort orders placed. Discussed that further conversations about hospice may occur depending on how patient does after extubation. We will continue to follow for comfort. See previous discussions below:    9/8/2022: Palliative medicine team including Destin Rizvi RN and I met with patient at patient's bedside. Patient remains orally intubated, and unresponsive on no sedation. Wife and family at bedside and stated they are ready to readdress goals of care. Met with wife and family in ICU waiting room; wife shares that they are waiting for patient's brother to arrive from out of town (estimated arrival Friday afternoon/evening).  Wife states that once all family is here, they plan to move forward with comfort measures and compassionate extubation on Saturday 9/10/2022. Explained comfort measures in detail including the initiation of medication for comfort, removal of ventilator support, and discontinuation of all medications, labs, tests, and treatments not intended for comfort. Family verbalizes understanding. Addressed goals of care in interim, Wife wants to change code status to DNR, do not re-intubate for any reason, and no escalation of care. Otherwise, continue current level of care until Saturday when family makes final goals of care decisions. Discussed with intensivist, hospitalist, and RN. See previous discussions below:    9/6/2022: Palliative medicine team including Nellie Plasencia RN and I met with patient at patient's bedside. Patient is orally intubated, no response to noxious stimuli. Family meeting in ICU waiting room with spouse and family to readdress goals of care. Discussed the benefits and burdens of continuing aggressive measures with potential option of trach/peg in the near future versus implementing comfort measures with compassionate extubation. Family states they have a strong katelynn in God, and that they understand the anoxic brain injury and the poor prognosis. Family wishes to meet again in 24 hours to readdress goals of care (would like to talk as a family about treatment options). At this time, no changes in goals of care. Continue Full code with full interventions. See previous discussions below:    9/1/2022: Goals of care discussions: Palliative medicine team including Nellie Plasencia RN and I met with patient at patient's bedside. Patient is orally intubated, no response to noxious stimuli. Marek Mendoza at bedside. Family meeting in ICU waiting room today with patient's wife Sloan Deng, patient's daughter, and 2 sister-in laws.  Family has a good understanding of current health situation and they are waiting for results from ongoing neurological testing to determine the existence of and the severity of anoxic brain injury. Prior to admission, patient was independent in ADLs, worked as a  at [employer], and is a  in [place]. Patient has no known AMD, and his wife Lebron Doyle is legal NOK. Family is hopeful for recovery but recognize consideration of quality of life will be discussed further based on patient's response to medical treatment. Discussed the benefits and burdens of CPR in the event of cardiopulmonary arrest.  Encouraged ongoing conversations as patient is very high risk for unsuccessful attempts and/or post-arrest complications. At this time continue full code with full interventions. We will continue to follow with you. Cardiac arrest: Multiple, witnessed by EMS, total downtime ~30 minutes. Cardiology following. EEG done for myoclonic movements revealing: \"This EEG is abnormal due to severely depressed EEG background, which may be due to severe anoxic brain injury. \"   Acute respiratory failure: secondary to number 2. Orally intubated on mechanical vent, managed by PCCM. SBT ongoing. Sepsis, shock: likely due to urinary source. On IVAB per primary team. Lyle placed by urology for retention, bleeding, and hydronephrosis. Disseminated herpes zoster: Family reports patient was experiencing significant pain. Severe left and moderate right hydroureteronephrosis with marked urinary bladder distention: Lyle placed by urology for retention, bleeding, and hydronephrosis.   Initial consult note routed to primary continuity provider  Communicated plan of care with: Palliative IDT       GOALS OF CARE / TREATMENT PREFERENCES:   [====Goals of Care====]  GOALS OF CARE: DNR/DNI, comfort measures, no feeding tubes  Patient/Health Care Proxy Stated Goals: Comfort      TREATMENT PREFERENCES:   Code Status: DNR    Advance Care Planning:  Advance Care Planning 9/1/2022   Patient's Healthcare Decision Maker is: Legal Next of Kin   Confirm Advance Directive None   Patient Would Like to Complete Advance Directive Unable            Artificially Administered Nutrition: No feeding tube      The palliative care team has discussed with patient / health care proxy about goals of care / treatment preferences for patient.  [====Goals of Care====]         HISTORY:     History obtained from: patient's chart, family    CHIEF COMPLAINT: cardiac arrest    HPI/SUBJECTIVE:    The patient is:   [] Verbal and participatory  [x] Non-participatory due to: Unresponsive    Clinical Pain Assessment (nonverbal scale for severity on nonverbal patients):   Clinical Pain Assessment  Severity: 0    Adult Nonverbal Pain Scale  Face: No particular expression or smile  Activity (Movement): Lying quietly, normal position  Guarding: Lying quietly, no positioning of hands over areas of body  Physiology (Vital Signs): Stable vital signs  Respiratory: Baseline RR/SpO2 compliant with ventilator  Total Score: 0       FUNCTIONAL ASSESSMENT:     Palliative Performance Scale (PPS):  PPS: 20       PSYCHOSOCIAL/SPIRITUAL SCREENING:     Advance Care Planning:  Advance Care Planning 9/1/2022   Patient's Healthcare Decision Maker is: Legal Next of Kin   Confirm Advance Directive None   Patient Would Like to Complete Advance Directive Unable        Any spiritual / Yazdanism concerns: unable to assess  [] Yes /  [] No    Caregiver Burnout:  [] Yes /  [x] No /  [] No Caregiver Present      Anticipatory grief assessment: unable to assess  [] Normal  / [] Maladaptive             REVIEW OF SYSTEMS:     Positive and pertinent negative findings in ROS are noted above in HPI. The following systems were [] reviewed / [x] unable to be reviewed as noted in HPI  Other findings are noted below. Systems: constitutional, ears/nose/mouth/throat, respiratory, gastrointestinal, genitourinary, musculoskeletal, integumentary, neurologic, psychiatric, endocrine. Positive findings noted below.   Modified ESAS Completed by: provider           Pain: 0           Dyspnea: 0           Stool Occurrence(s): 0        PHYSICAL EXAM:     From RN flowsheet:  Wt Readings from Last 3 Encounters:   09/08/22 92 kg (202 lb 13.2 oz)     Blood pressure 139/82, pulse 95, temperature 98.9 °F (37.2 °C), resp. rate 17, height 5' 10\" (1.778 m), weight 92 kg (202 lb 13.2 oz), SpO2 93 %. Pain Scale 1: FLACC  Pain Intensity 1: 0                   Constitutional: lying in bed, appears to be resting in NAD  Eyes: closed  ENMT: dry mucous membranes  Cardiovascular: distal pulses intact  Respiratory: even, unlabored, wet and weak cough noted x 1  Gastrointestinal: soft non-tender   Musculoskeletal: no deformity, no tenderness to palpation  Skin: warm, dry  Neurologic: no response to verbal or light tactile stimuli       HISTORY:     Principal Problem:    Cardiac arrest ([address] (8/31/2022)    Active Problems:    Respiratory failure ([address] (8/31/2022)      GENO (acute kidney injury) ([address] (8/31/2022)      Disseminated herpes zoster (8/31/2022)      Hydronephrosis (8/31/2022)      Shock ([address] (8/31/2022)      Sepsis ([address] (8/31/2022)      UTI (urinary tract infection) (9/1/2022)      Brain anoxia ([address] (9/4/2022)    Past Medical History:   Diagnosis Date    Gout     Shingles       No past surgical history on file. No family history on file. History reviewed, no pertinent family history.   Social History     Tobacco Use    Smoking status: Not on file    Smokeless tobacco: Not on file   Substance Use Topics    Alcohol use: Not on file     No Known Allergies   Current Facility-Administered Medications   Medication Dose Route Frequency    acetaminophen (TYLENOL) tablet 650 mg  650 mg Oral Q4H PRN    Or    acetaminophen (TYLENOL) solution 650 mg  650 mg Oral Q4H PRN    Or    acetaminophen (TYLENOL) suppository 650 mg  650 mg Rectal Q4H PRN    glycopyrrolate (ROBINUL) injection 0.2 mg  0.2 mg IntraVENous Q4H PRN    scopolamine (TRANSDERM-SCOP) 1 mg over 3 days 1 Patch  1 Patch TransDERmal Q72H PRN    bisacodyL (DULCOLAX) suppository 10 mg  10 mg Rectal DAILY PRN    morphine injection 2 mg  2 mg IntraVENous Q15MIN PRN    LORazepam (INTENSOL) 2 mg/mL oral concentrate 1 mg  1 mg SubLINGual Q1H PRN    sodium chloride (NS) flush 5-40 mL  5-40 mL IntraVENous PRN    ondansetron (ZOFRAN) injection 4 mg  4 mg IntraVENous Q6H PRN          LAB AND IMAGING FINDINGS:     Lab Results   Component Value Date/Time    WBC 8.6 09/12/2022 04:14 AM    HGB 8.1 (L) 09/12/2022 04:14 AM    PLATELET 001 08/24/1380 04:14 AM     Lab Results   Component Value Date/Time    Sodium 140 09/12/2022 04:14 AM    Potassium 3.9 09/12/2022 04:14 AM    Chloride 107 09/12/2022 04:14 AM    CO2 28 09/12/2022 04:14 AM    BUN 48 (H) 09/12/2022 04:14 AM    Creatinine 2.25 (H) 09/12/2022 04:14 AM    Calcium 9.0 09/12/2022 04:14 AM    Magnesium 2.7 (H) 09/12/2022 04:14 AM    Phosphorus 3.1 09/12/2022 04:14 AM      Lab Results   Component Value Date/Time    Alk. phosphatase 188 (H) 09/06/2022 04:36 AM    Protein, total 5.8 (L) 09/06/2022 04:36 AM    Albumin 1.7 (L) 09/06/2022 04:36 AM    Globulin 4.1 (H) 09/06/2022 04:36 AM     Lab Results   Component Value Date/Time    INR 1.0 09/12/2022 04:14 AM    Prothrombin time 13.3 09/12/2022 04:14 AM    aPTT 27.9 09/05/2022 11:27 AM      No results found for: IRON, FE, TIBC, IBCT, PSAT, FERR   No results found for: PH, PCO2, PO2  No components found for: GLPOC   No results found for: CPK, CKMB             Total time: 15 minutes  Counseling / coordination time, spent as noted above:   > 50% counseling / coordination?: yes, patient, family, and medical team    Prolonged service was provided for  []30 min   []75 min in face to face time in the presence of the patient, spent as noted above.   Time Start:   Time End:

## 2022-09-13 NOTE — PROGRESS NOTES
Assumed patient care. Patient is resting quietly an bed. patients family in room with patient. Urinary catheter in place, draining.

## 2022-09-13 NOTE — PROGRESS NOTES
@3811 Pt relative came to nursing station verbalized that pt appears uncomfortable. Pt observed to be having some SOB morphine administered as prescribed. Comfort measures maintained per family request. Assessment done and charted in relevant flow sheets care continues. Relatives and visitors remains at bedside. @2019 pt having SOB morphine administered as prescribed care continues. @0030 Report was called to Lisseth Castro on Sacha  care continues. @0046 pt having SOB  morphine administered as prescribed observation continues.

## 2022-09-13 NOTE — HOSPICE
[facility] [address] Felecia Moore    Please send scripts for comfort medications to the outpatient pharmacy to be filled and sent home with the pt at discharge. I have verified with the pharmacy the medication is available. Morphine concentrate 20mg/ml  Give 0.25-1ml po/sl every 3 hours PRN pain/SOB  Quantity 30ml       Lorazepam oral solution 2mg/ml   Give 0.25-1ml (0.5-2mg) po/sl every 6 hours PRN anxiety/agitation/restlessness   Quantity 30 ml       Hyoscyamine 0.125mg tablets  Give 1 tab po/sl every 6 hours PRN secretions  Quantity 10 tabs.        Bisacodyl Suppository 10mg  Give one suppository rectally every day PRN constipation  Quantity 5 suppositories    Eric MAYN, [facility] Inpatient Clinical Liaison  [place] [address] [facility] [address], [address]  [phone]  Email: [email or web address]

## 2022-09-13 NOTE — PROGRESS NOTES
Hospitalist Progress Note-critical care note     Patient: Tony Desai MRN: [id number]  [facility]: [id number]    YOB: 1960  Age: 65 y.o. Sex: male    DOA: 8/31/2022 LOS:  LOS: 13 days            Chief complaint:brain anoxia uti sepsis     Assessment/Plan         Hospital Problems  Never Reviewed            Codes Class Noted POA    Brain anoxia ([facility] ICD-10-CM: G93.1  ICD-9-CM: 348.1  9/4/2022 Yes        UTI (urinary tract infection) ICD-10-CM: N39.0  ICD-9-CM: 599.0  9/1/2022 Yes        * (Principal) Cardiac arrest ([facility] ICD-10-CM: I46.9  ICD-9-CM: 427.5  8/31/2022 Yes        Respiratory failure ([facility] ICD-10-CM: J96.90  ICD-9-CM: 518.81  8/31/2022 Yes        GENO (acute kidney injury) ([facility] ICD-10-CM: N17.9  ICD-9-CM: 584.9  8/31/2022 Yes        Disseminated herpes zoster ICD-10-CM: B02.7  ICD-9-CM: 053.79  8/31/2022 Yes        Hydronephrosis ICD-10-CM: N13.30  ICD-9-CM: 037  8/31/2022 Yes        Shock ([facility] ICD-10-CM: R57.9  ICD-9-CM: 785.50  8/31/2022 Yes        Sepsis ([facility] ICD-10-CM: A41.9  ICD-9-CM: 038.9, 995.91  8/31/2022 Yes            Tony Desai is a 65 y.o. hypertension, dyslipidemia, type 2 diabetes mellitus, vitamin D deficiency, stage 3a CKD and gout male with history of who presents with cardiac arrest.      Cardiac arrest, PEA  Severe anoxia by clinical exam and confirmed by MRI, with associated pituitary hemorrhage  Acute respiratory failure  Anoxia  Myoclonic jerks  Shock   RLE popliteal DVT  Sepsis, likely due to urinary source  UTI  Disseminated herpes zoster  Severe left and moderate right hydroureteronephrosis   GENO on CKD 3  Hyperglycemia  hypernatremia  Anemia     Family planning w/drawal of care and extubated and transfer to the floor. Family was around at the bedside. All questions have been answered. 35 total min's spent on patient care including >50% on counseling/coordinating care.       Cm on board hospice consult   Disposition :tbd,   Review of systems:    Limited due to pt condition   Vital signs/Intake and Output:  Visit Vitals  BP (!) 142/74 (BP 1 Location: Right upper arm, BP Patient Position: At rest)   Pulse 93   Temp 98.2 °F (36.8 °C)   Resp 17   Ht 5' 10\" (1.778 m)   Wt 92 kg (202 lb 13.2 oz)   SpO2 94%   BMI 29.10 kg/m²     Current Shift:  09/13 0701 - 09/13 1900  In: -   Out: 550 [Urine:550]  Last three shifts:  09/11 1901 - 09/13 0700  In: 1300 [I.V.:100]  Out: 3025 [Urine:3025]    Physical Exam:  General: Eyes closed no acute distress    HEENT: NC, Atraumatic. PERRLA, anicteric sclerae. Lungs: CTA Bilaterally. No Wheezing/Rhonchi/Rales. Heart:  Regular  rhythm,  No murmur, No Rubs, No Gallops  Abdomen: Soft, Non distended, Non tender. +Bowel sounds,   Extremities: No c/c/e  Psych:   Not anxious or agitated. Neurologic:  Limited           Labs: Results:       Chemistry Recent Labs     09/12/22 0414 09/11/22 0443   * 238*    137   K 3.9 4.4    103   CO2 28 27   BUN 48* 48*   CREA 2.25* 2.68*   CA 9.0 9.0   AGAP 5 7   BUCR 21* 18      CBC w/Diff Recent Labs     09/12/22 0414 09/11/22 0443   WBC 8.6 9.7   RBC 2.87* 2.98*   HGB 8.1* 8.5*   HCT 25.5* 26.4*    381   GRANS 71 76*   LYMPH 14* 12*   EOS 4 3      Cardiac Enzymes No results for input(s): CPK, CKND1, ROB in the last 72 hours. No lab exists for component: CKRMB, TROIP   Coagulation Recent Labs     09/12/22 0414 09/11/22 0443   PTP 13.3 13.7   INR 1.0 1.0       Lipid Panel Lab Results   Component Value Date/Time    Triglyceride 201 (H) 09/04/2022 05:00 AM      BNP No results for input(s): BNPP in the last 72 hours. Liver Enzymes No results for input(s): TP, ALB, TBIL, AP in the last 72 hours.     No lab exists for component: SGOT, GPT, DBIL   Thyroid Studies No results found for: T4, T3U, TSH, TSHEXT     Procedures/imaging: see electronic medical records for all procedures/Xrays and details which were not copied into this note but were reviewed prior to creation of Plan    MRI BRAIN WO CONT    Result Date: 9/3/2022  EXAM: MRI of the brain without intravenous contrast. INDICATION:  \"coma r/o anoxia. \" COMPARISON:  No prior MRI is available for direct comparison. CORRELATION (related prior exam):  Recent CT. PROTOCOL:  Routine brain. _______________ FINDINGS:       IMAGE QUALITY:  Diagnostic. BRAIN AND EXTRA-AXIAL SPACE:           ACUTE/SUBACUTE INFARCT:  There is diffusion restriction diffusely involving the infratentorial and supratentorial gray matter in a pattern indicative of high-grade anoxic brain injury. MASS:  None. HEMORRHAGE:  None. SUBDURAL FLUID COLLECTION:  None. HYDROCEPHALUS:  None. ICA AND DOMINANT VA T2 FLOW VOIDS:  Unremarkable. REMOTE CEREBRAL TERRITORIAL INFARCT:  None definite. REMOTE CEREBELLAR INFARCT:  None definite. STRIVE (STandards for Reporting Vascular changes on nEuroimaging):                            --[place] of white matter hyperintensity (\"leukoaraiosis\") of presumed vascular origin:  Mild. --[place] of chronic lacunes of presumed vascular origin:  Mild. --[place] of perivascular spaces:  None significant. --[place] of \"microbleeds\":   None definite. --Degree of brain atrophy: Not characterizable in the setting of diffuse cerebral edema. SELLA/PITUITARY:  A T1 and heterogeneously peripherally hyperintense, T2 heterogeneously mildly hyperintense, and T2 FLAIR hyperintense expansile lesion in the sella abutting the undersurface of the optic chiasm measures 12 mm anterior-posterior, 16 mm medial-lateral, 18 mm orthogonal superior-inferior. The lesion is nonspecific are favored to reflect pituitary hemorrhage. HEENT: Intubated. ORBITS:  Unremarkable.            PARANASAL SINUSES:  The right maxillary sinus and right anterior ethmoid air cells are opacified. There is scattered paranasal sinus mucosal thickening. MASTOID AIR CELLS:  Predominantly clear. INCLUDED UPPER CERVICAL LYMPH NODES:  Unremarkable. INCLUDED UPPER PAROTIDS:  Unremarkable. NASOPHARYNX:  Unremarkable. BONE MARROW SIGNAL:  Unremarkable. SUPERFICIAL SOFT TISSUES: Unremarkable. _______________     1. High-grade acute/subacute anoxic brain injury without herniation. 2.  Heterogeneous expansile sellar lesion, nonspecific, pituitary hematoma/hemorrhage favored over other etiologies. _______________     CT HEAD WO CONT    Result Date: 9/2/2022  EXAM: CT head INDICATION: Anoxic brain injury. COMPARISON: 8/31/2022 TECHNIQUE: Axial CT imaging of the head was performed without intravenous contrast. Standard multiplanar coronal and sagittal reformatted images were obtained and are included in interpretation. One or more dose reduction techniques were used on this CT: automated exposure control, adjustment of the mAs and/or kVp according to patient size, and iterative reconstruction techniques. The specific techniques used on this CT exam have been documented in the patient's electronic medical record. Digital Imaging and Communications in Medicine (DICOM) format image data are available to nonaffiliated external healthcare facilities or entities on a secure, media free, reciprocally searchable basis with patient authorization for at least a 12-month period after this study. _______________ FINDINGS: BRAIN AND POSTERIOR FOSSA: Mild patchy periventricular, deep and subcortical white matter hypoattenuation which is nonspecific but likely represents chronic ischemic changes. No evidence of acute large vessel transcortical infarct or acute parenchymal hemorrhage. No midline shift or hydrocephalus. EXTRA-AXIAL SPACES AND MENINGES: There are no abnormal extra-axial fluid collections. CALVARIUM: Intact.  SINUSES: Right maxillary and ethmoid sinus mucosal disease. OTHER: None. _______________     No acute intracranial abnormality. CT HEAD WO CONT    Result Date: 8/31/2022  EXAM: CT head INDICATION: Cardiac arrest COMPARISON: None. TECHNIQUE: Axial CT imaging of the head was performed without intravenous contrast. Standard multiplanar coronal and sagittal reformatted images were obtained and are included in interpretation. One or more dose reduction techniques were used on this CT: automated exposure control, adjustment of the mAs and/or kVp according to patient size, and iterative reconstruction techniques. The specific techniques used on this CT exam have been documented in the patient's electronic medical record. Digital Imaging and Communications in Medicine (DICOM) format image data are available to nonaffiliated external healthcare facilities or entities on a secure, media free, reciprocally searchable basis with patient authorization for at least a 12-month period after this study. _______________ FINDINGS: BRAIN AND POSTERIOR FOSSA: Exam quality is mildly degraded secondary to motion artifact. Ventricular size and configuration appears within normal limits. Basilar cisterns are patent. Within the limitations of motion, no acute intra-axial hemorrhage. No evidence of mass effect or midline shift. Gray-white matter differentiation appears within normal limits as visualized. EXTRA-AXIAL SPACES AND MENINGES: There are no abnormal extra-axial fluid collections. CALVARIUM: Intact. SINUSES: Minor nonspecific mucosal thickening ethmoid air cells, sphenoid sinus with air-fluid level present in the right maxillary sinus. OTHER: None. _______________     1. Mildly motion limited study without evidence of acute intracranial abnormality. 2. Paranasal mucosal disease as described above with air-fluid level in the right maxillary sinus as can be seen with sinusitis.     CT CHEST ABD PELV WO CONT    Result Date: 8/31/2022  EXAM: CT chest, abdomen, and pelvis INDICATION: Pain COMPARISON: No prior study. TECHNIQUE: Axial CT imaging of the chest, abdomen, and pelvis was performed without IV contrast administration. Multiplanar reformats were generated. One or more dose reduction techniques were used on this CT: automated exposure control, adjustment of the mAs and/or kVp according to patient size, and iterative reconstruction techniques. The specific techniques used on this CT exam have been documented in the patient's electronic medical record. Digital Imaging and Communications in Medicine (DICOM) format image data are available to nonaffiliated external healthcare facilities or entities on a secure, media free, reciprocally searchable basis with patient authorization for at least a 12-month period after this study. _______________ FINDINGS: CHEST: MEDIASTINUM: Left IJV central venous catheter tip at the cavoatrial junction. Normal caliber aorta with vascular calcifications. No pericardial effusion. LYMPH NODES: No pathologically enlarged mediastinal or hilar lymph nodes. PLEURA: No pleural effusion or pneumothorax. LUNGS/AIRWAY: Endotracheal tube tip in the midthoracic trachea. No consolidation or suspicious pulmonary nodule is seen. OTHER: None. ABDOMEN/PELVIS: LIVER, BILIARY: Liver is unremarkable. No abnormal biliary dilation. Gallbladder is unremarkable. PANCREAS: Unremarkable. SPLEEN: Unremarkable. ADRENALS: Unremarkable. KIDNEYS: Severe left and moderate right hydroureteronephrosis with marked urinary bladder distention. No obstructing calculus. Left lower pole 5.8 cm simple cysts, no follow-up necessary. LYMPH NODES: No pathologically enlarged lymph nodes. GASTROINTESTINAL TRACT: No abnormal bowel dilation or wall thickening. PELVIC ORGANS: Unremarkable. VASCULATURE: Unremarkable. OSSEOUS: No area of erosion or aggressive-appearing bone destruction.  OTHER: None. _______________     Severe left and moderate right hydroureteronephrosis with marked urinary bladder distention. No obstructing calculus. No acute intrathoracic abnormality. US RETROPERITONEUM COMP    Result Date: 9/2/2022  US RETROPERITONEUM COMP INDICATION: Acute kidney injury. TECHNIQUE: Renal ultrasound. COMPARISON: 8/31/2022 CT FINDINGS: Right kidney measures 10.7 cm. Left kidney measures 6.7 cm. Bilateral increased cortical echogenicity. Severe left hydronephrosis with cortical atrophy. No shadowing calculus or perinephric abnormality. No solid renal mass identified. Indwelling Lyle catheter within decompressed urinary bladder. Bilateral increased cortical echogenicity. Severe left hydronephrosis with cortical atrophy. XR CHEST PORT    Result Date: 9/11/2022  EXAM: One view chest x-ray CLINICAL INDICATION/HISTORY: Endotracheal tube placement. COMPARISON: 09/09/2022. TECHNIQUE: Single AP view of the chest was obtained. _______________ FINDINGS: HEART, VESSELS, MEDIASTINUM: Heart size is normal. No vascular congestion. LUNGS, PLEURAL SPACES: There is blunting of the left costophrenic angle and bibasilar linear airspace opacity. No pneumothorax. BONY THORAX, SOFT TISSUES: Unremarkable. MEDICAL SUPPORT DEVICES: Endotracheal tube tip at the level of the aortic arch. Left internal jugular catheter in satisfactory position. NG/OG tube courses beyond the limits of the film into the left upper quadrant of the abdomen. _______________     1. Medical support devices in satisfactory position. 2. Bibasilar atelectasis or infiltrate with small associated left pleural effusion. XR CHEST PORT    Result Date: 9/9/2022  EXAM: XR CHEST PORT CLINICAL INDICATION/HISTORY: Pulmonary infiltrate, ET tube position, acute respiratory failure -Additional: None COMPARISON: 9/7/2022 TECHNIQUE: Portable frontal view of the chest _______________ FINDINGS: SUPPORT DEVICES: Endotracheal and enteric tubes unchanged. Esophageal probe are unchanged. Left IJV central venous catheter unchanged.  HEART AND MEDIASTINUM: Stable cardiomediastinal silhouette. . LUNGS AND PLEURAL SPACES: Left layering pleural effusion/atelectasis. No pneumothorax. _______________     Improved aeration with persistent left layering pleural effusion. XR CHEST PORT    Result Date: 9/7/2022  EXAM: XR CHEST PORT CLINICAL INDICATION/HISTORY: Pulmonary infiltrate, ET tube position, acute respiratory failure   > Additional: None. COMPARISON: September 5, 2022 TECHNIQUE: Portable chest _______________ FINDINGS: SUPPORT DEVICES: Endotracheal tube, nasogastric tube, esophageal probe, and left IJ approach central catheter are unchanged. HEART AND MEDIASTINUM: The heart is stable in size and contour. LUNGS AND PLEURAL SPACES: The lungs are underexpanded with worsening hazy opacity of the right lung base partially obscuring the diaphragm. Left basilar volume loss and opacity also obscures the left hemidiaphragm, similar to slightly progressive. No definite pneumothorax. BONY THORAX AND SOFT TISSUES: No acute osseous abnormality. _______________     1. Support lines and tubes as above remain unchanged and in satisfactory position. 2. Progressive volume loss with bibasilar opacities, more so on the right concerning for combination of atelectasis versus superimposed pneumonia. Small pleural effusions not excluded. *    ** *     XR CHEST PORT    Result Date: 9/5/2022  EXAM: CHEST RADIOGRAPH, SINGLE VIEW CLINICAL INDICATION/HISTORY: Shortness of breath, intubated, follow-up COMPARISON: 9/4/2022 TECHNIQUE: Portable frontal view of the chest was obtained. _______________ FINDINGS: SUPPORT DEVICES: Unchanged left jugular central venous catheter, endotracheal tube, and gastric tube, all adequately positioned. HEART AND MEDIASTINUM: Cardiomediastinal silhouette appears within normal limits. Tortuous and atherosclerotic thoracic aorta. No central vascular congestion. LUNGS AND PLEURAL SPACES: Left basilar opacity partially silhouettes left hemidiaphragm.  Left mid and upper lung zones and all of the right lung remain adequately aerated. No definite pleural effusion. No pneumothorax. BONY THORAX AND SOFT TISSUES: No acute osseous abnormality. _______________     1. Tubes and lines appear unchanged, adequately positioned. 2. Left basilar opacity, atelectasis versus infiltrate. XR CHEST PORT    Result Date: 9/4/2022  EXAM: CHEST RADIOGRAPH, SINGLE VIEW CLINICAL INDICATION/HISTORY: Shortness of breath, intubated, follow-up COMPARISON: 9/3/2022 TECHNIQUE: Portable frontal view of the chest was obtained. _______________ FINDINGS: SUPPORT DEVICES: Endotracheal tube, left jugular central venous catheter, and nasogastric tube all appear adequately positioned. HEART AND MEDIASTINUM: Cardiomediastinal silhouette appears within normal limits. Tortuous and atherosclerotic thoracic aorta. No central vascular congestion. LUNGS AND PLEURAL SPACES: Unchanged retrocardiac left lower lobe atelectasis. Lungs are otherwise adequately aerated with no confluent airspace opacity. No pleural effusion or pneumothorax. BONY THORAX AND SOFT TISSUES: No acute osseous abnormality. _______________     No active cardiopulmonary disease. XR CHEST PORT    Result Date: 9/3/2022  EXAM: CHEST RADIOGRAPH, SINGLE VIEW CLINICAL INDICATION/HISTORY: Shortness of breath, endotracheal tube placement COMPARISON: 8/31/2022 TECHNIQUE: Portable frontal view of the chest was obtained. _______________ FINDINGS: SUPPORT DEVICES: Adequately positioned endotracheal tube, unchanged. Left jugular central venous catheter and nasogastric tube also appear unchanged, adequately positioned. HEART AND MEDIASTINUM: Cardiomediastinal silhouette appears within normal limits. Normal caliber thoracic aorta. No central vascular congestion. LUNGS AND PLEURAL SPACES: Retrocardiac left lower lobe subsegmental atelectasis. Lungs are otherwise adequately aerated with no confluent airspace opacity. No pleural effusion or pneumothorax.   BONY THORAX AND SOFT TISSUES: No acute osseous abnormality. _______________     No active cardiopulmonary disease. XR CHEST PORT    Result Date: 8/31/2022  EXAM: XR CHEST PORT CLINICAL INDICATION/HISTORY: central line -Additional: None COMPARISON: 4 hours prior TECHNIQUE: Portable frontal view of the chest _______________ FINDINGS: SUPPORT DEVICES: Endotracheal tube, gastric tube, and left IJ approach central venous catheter noted. Tip of the central venous catheter is at the level of the superior cavoatrial junction. HEART AND MEDIASTINUM: Normal cardiac size and mediastinal contours. LUNGS AND PLEURAL SPACES: No focal pneumonic opacity. No evidence of pneumothorax or pleural effusion. BONY THORAX AND SOFT TISSUES: Unremarkable. _______________     1. Support devices in stable/appropriate position as visualized. 2. No superimposed acute radiographic cardiopulmonary abnormality. XR CHEST PORT    Result Date: 8/31/2022  XR CHEST PORT CLINICAL INDICATION/HISTORY: Tube placement -Additional: None COMPARISON: None TECHNIQUE: Portable frontal view of the chest _______________ FINDINGS: SUPPORT DEVICES: Enteric tube tip projecting over the thoracic inlet. Endotracheal tube tip in the midthoracic trachea. Left IJV central venous catheter tip at the caval atrial junction. HEART AND MEDIASTINUM: Cardiomediastinal silhouette within normal limits. LUNGS AND PLEURAL SPACES: No dense consolidation, large effusion or pneumothorax. _______________     Enteric tube tip projecting over the thoracic inlet. Endotracheal tube tip in the midthoracic trachea. No acute cardiopulmonary abnormality. ECHO ADULT COMPLETE    Result Date: 8/31/2022  Formatting of this result is different from the original.   Left Ventricle: Normal left ventricular systolic function with a visually estimated EF of 60 - 65%. Not well visualized. Left ventricle size is normal. Normal wall thickness. Normal wall motion. Right Ventricle: Not well visualized.  Right ventricle is moderately dilated. Moderately reduced systolic function. Visually moderately dilated RV and moderately reduced RV systolic function. Aortic Valve: Tricuspid valve. Tricuspid Valve: The estimated RVSP is 25 mmHg. Right Atrium: Not well visualized. Right atrium is moderately dilated. No old echocardiogram available to compare. DUPLEX LOWER EXT VENOUS BILAT    Result Date: 9/9/2022  · Chronic non-occlusive thrombus present in the right popliteal vein. · Chronic non-occlusive thrombus present in the right posterior tibial vein. · No evidence of deep vein thrombosis in the left lower extremity. 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 · No evidence of deep vein thrombosis in the left lower extremity. DUPLEX LOWER EXT VENOUS BILAT    Result Date: 9/1/2022  · Age indeterminate thrombus present in the right popliteal vein. · No evidence of deep vein thrombosis in the left lower extremity.         Alvarado Magallanes MD

## 2022-09-13 NOTE — HOSPICE
1137: Spoke with pts wife, Sumi Roland. Discussed [employer] philosophy, services, criteria, and IDT. Discussed caregiver need for round the clock care with Sumi Roland . Primary caregiver identified as Sumi Roland. Caregiver concerns identified as Sumi Roland stated she will need help. Upon further questioning, she stated her brother and sister will be able to help. Answered all questions. Gave 24/7 contact information. Sumi Roland stated she would like to move forward with hospice services using [address]. DME needs identified as hospital bed with rails, bedside table, oxygen concentrator with tanks. Tried to get DME ordered and delivered tonight. Sumi Roland stated that pt is not to be discharged until Thursday. Information to be sent to [facility] for approval of hospice services. 1111:Hospice referral received. Chart review in process. Thank you for the referral to [employer]. If we can be of further assistance please contact [phone].      Ana EISENBERG, [facility] Inpatient Clinical Liaison  [place] [address] [address]  [phone]  Email: [email or web address]

## 2022-09-14 NOTE — PROGRESS NOTES
Bedside shift change report given to Bruce rn (oncoming nurse) by Carole Vickers RN   (offgoing nurse). Report included the following information SBAR, Kardex, Intake/Output, MAR, and Recent Results.

## 2022-09-14 NOTE — PROGRESS NOTES
Bereavement Note:     responded to the death of Tommy Walsh, who is a 65 y.o., male, offering Spiritual Care to patient and family. Date of Death: 22    Extended Emergency Contact Information  Primary Emergency Contact: Annelise Faulkner Phone: [phone]  Mobile Phone: [phone]  Relation: Spouse  Secondary Emergency Contact: [address] Phone: [phone]  Mobile Phone: [phone]  Relation: Other Relative                 YES      NO  UNKNOWN  [employer]   []        []    [x]   [facility]   [] [] [x]   Medical Examiner  []        [x]  []   Going to [employer]  []        [x] []      Autopsy   []        [x]         []   Sympathy Card  [x]        []  Bereavement Materials  [x]        []           Business Card Provided  [x]        []              Home: [place] [address]    Chaplains will continue to follow family and will provide spiritual care as needed. Rev. Emma Verma.  Car Ahr, [address] [place] [address] [phone]  Pager :[phone]

## 2022-09-14 NOTE — DISCHARGE SUMMARY
Discharge Summary    Patient: Keaton Doss MRN: [id number]  CSN: [id number]    YOB: 1960  Age: 65 y.o. Sex: male    DOA: 8/31/2022 LOS:  LOS: 14 days   Discharge Date:      Primary Care Provider:  Jaspal Peña MD    Admission Diagnoses: Cardiac arrest [facility]) [I46.9]    Discharge Diagnoses:    Problem List as of 9/14/2022 Never Reviewed            Codes Class Noted - Resolved    Brain anoxia ([facility] ICD-10-CM: G93.1  ICD-9-CM: 348.1  9/4/2022 - Present        UTI (urinary tract infection) ICD-10-CM: N39.0  ICD-9-CM: 599.0  9/1/2022 - Present        * (Principal) Cardiac arrest ([facility] ICD-10-CM: I46.9  ICD-9-CM: 427.5  8/31/2022 - Present        Respiratory failure ([facility] ICD-10-CM: J96.90  ICD-9-CM: 518.81  8/31/2022 - Present        GENO (acute kidney injury) ([facility] ICD-10-CM: N17.9  ICD-9-CM: 584.9  8/31/2022 - Present        Disseminated herpes zoster ICD-10-CM: B02.7  ICD-9-CM: 053.79  8/31/2022 - Present        Hydronephrosis ICD-10-CM: N13.30  ICD-9-CM: 963  8/31/2022 - Present        Shock ([facility] ICD-10-CM: R57.9  ICD-9-CM: 785.50  8/31/2022 - Present        Sepsis ([facility] ICD-10-CM: A41.9  ICD-9-CM: 038.9, 995.91  8/31/2022 - Present           Discharge Medications: There are no discharge medications for this patient. Discharge Condition: Death    Procedures : Multiple    Consults: GI pulmonary and cardiology nephrology      PHYSICAL EXAM   No palpable pulse no responsive verbal or painful stimuli family at bedside                                      Admission HPI :   Keaton Doss is a 65 y.o. hypertension, dyslipidemia, type 2 diabetes mellitus, vitamin D deficiency, stage 3a CKD and gout male with history of who presents with cardiac arrest. Layman Selam is a 65 y.o. male who presents with cardiac arrest. Reportedly patient was at home, has been feeling unwell since yesterday, when this morning he was feeling worse. The call for EMS was for \"illness\".   On EMS arrival patient was encephalopathic but they were able to measure vital signs and check a blood sugar. In their presence patient had a witnessed cardiac arrest.  Reportedly in pulseless electrical activity. He received about 6 doses of epinephrine, no shocks delivered. He was started on a Levophed drip and then changed to an epinephrine drip. They had between 4 and 5 episodes of cardiac arrest, patient arrives with return of spontaneous circulation. He is unresponsive and not able to contribute to the history and the history is limited as a result. Reportedly being treated for both gout and shingles at present. Hospital Course :   /p cardiac arrest 8/31-severe anoxic encephalopathy, and remains on ventilator support. Respiratory: VCV ventilation. Chest x-ray from 9/11 -ET tube in good position; left IJ central line; OG tube in good position; no focal infiltrates or consolidation; minimal left basal effusion. CT chest on admission-No acute intrathoracic abnormality. Ventilator and sedation bundles reviewed. Discussed with palliative care team-family planning to proceed with comfort care/compassionate extubation today morning. Patient transition to comfort measures on 12 September and the above tubes and lines were discontinued and he was placed on comfort measures       ID: No fever. Antibiotics: Zosyn finished 9/9/22  Antiviral: Acyclovir finish 9/7/22 per ID. Transition to comfort September 12     Hematology/Oncology: Stable hemoglobin and platelet. No active bleeding issues. Follow-up PVL study 9/8/22 - stable chronic RT leg calf vein DVT     Renal: Stable renal function. GI: Stop tube feed; suction OG tube prior to compassionate extubation and comfort measures initiated September 12 death on September 14 at 12:05 AM     Neurology: Patient remains comatosed. MRI brain 9/3/2022: High-grade acute/subacute anoxic changes. EEG 8/31-anoxic encephalopathy. Neurology has signed off.      Lines/Tubes: PIV  ETT: 8/31/2021. OGT: 9/2/22. Central line: 8/31/22 LT IJ (site examined, no erythema, induration, discharge or sign of infection. Dressing intact. Medically necessary, will remove it when not needed. Central line bundle followed). Lyle: 8/31/22 (Medically necessary for strict input/output monitoring in critically ill patient, will remove it when not needed. Lyle bundle followed). Lyle catheter placed by Dr. Katiuska Thorne in urology-do not remove. Advance Directive/Palliative Care: partial DNR. Palliative care team on case-discussed with NP BRITTNY Murphy  Poor prognosis for meaningful recovery; comfort care/compassionate extubation decisions per family. Also discussed with patient daughter Charissa Quintana and brother at bedside; discussed about compassionate extubation, and reviewed plans for this morning; after extubation, patient will be placed on nasal cannula oxygen; prn midazolam/morphine if needed; patient has stable breathing, subsequently need to consider hospice consultation. Activity: Death    Diet: N.p.o. none    Follow-up: None    Disposition: Death    Minutes spent on discharge: 40 minutes      Labs: Results:       Chemistry Recent Labs     09/12/22 0414 09/11/22 0443   * 238*    137   K 3.9 4.4    103   CO2 28 27   BUN 48* 48*   CREA 2.25* 2.68*   CA 9.0 9.0   AGAP 5 7   BUCR 21* 18      CBC w/Diff Recent Labs     09/12/22 0414 09/11/22 0443   WBC 8.6 9.7   RBC 2.87* 2.98*   HGB 8.1* 8.5*   HCT 25.5* 26.4*    381   GRANS 71 76*   LYMPH 14* 12*   EOS 4 3      Cardiac Enzymes No results for input(s): CPK, CKND1, ROB in the last 72 hours. No lab exists for component: CKRMB, TROIP   Coagulation Recent Labs     09/12/22 0414 09/11/22 0443   PTP 13.3 13.7   INR 1.0 1.0       Lipid Panel Lab Results   Component Value Date/Time    Triglyceride 201 (H) 09/04/2022 05:00 AM      BNP No results for input(s): BNPP in the last 72 hours.    Liver Enzymes No results for input(s): TP, ALB, TBIL, AP in the last 72 hours. No lab exists for component: SGOT, GPT, DBIL   Thyroid Studies No results found for: T4, T3U, TSH, TSHEXT         Significant Diagnostic Studies: MRI BRAIN WO CONT    Result Date: 9/3/2022  EXAM: MRI of the brain without intravenous contrast. INDICATION:  \"coma r/o anoxia. \" COMPARISON:  No prior MRI is available for direct comparison. CORRELATION (related prior exam):  Recent CT. PROTOCOL:  Routine brain. _______________ FINDINGS:       IMAGE QUALITY:  Diagnostic. BRAIN AND EXTRA-AXIAL SPACE:           ACUTE/SUBACUTE INFARCT:  There is diffusion restriction diffusely involving the infratentorial and supratentorial gray matter in a pattern indicative of high-grade anoxic brain injury. MASS:  None. HEMORRHAGE:  None. SUBDURAL FLUID COLLECTION:  None. HYDROCEPHALUS:  None. ICA AND DOMINANT VA T2 FLOW VOIDS:  Unremarkable. REMOTE CEREBRAL TERRITORIAL INFARCT:  None definite. REMOTE CEREBELLAR INFARCT:  None definite. STRIVE (STandards for Reporting Vascular changes on nEuroimaging):                            --Hoffman of white matter hyperintensity (\"leukoaraiosis\") of presumed vascular origin:  Mild. --Hoffman of chronic lacunes of presumed vascular origin:  Mild. --Hoffman of perivascular spaces:  None significant. --Hoffman of \"microbleeds\":   None definite. --Degree of brain atrophy: Not characterizable in the setting of diffuse cerebral edema. SELLA/PITUITARY:  A T1 and heterogeneously peripherally hyperintense, T2 heterogeneously mildly hyperintense, and T2 FLAIR hyperintense expansile lesion in the sella abutting the undersurface of the optic chiasm measures 12 mm anterior-posterior, 16 mm medial-lateral, 18 mm orthogonal superior-inferior.  The lesion is nonspecific are favored to reflect pituitary hemorrhage. HEENT: Intubated. ORBITS:  Unremarkable. PARANASAL SINUSES:  The right maxillary sinus and right anterior ethmoid air cells are opacified. There is scattered paranasal sinus mucosal thickening. MASTOID AIR CELLS:  Predominantly clear. INCLUDED UPPER CERVICAL LYMPH NODES:  Unremarkable. INCLUDED UPPER PAROTIDS:  Unremarkable. NASOPHARYNX:  Unremarkable. BONE MARROW SIGNAL:  Unremarkable. SUPERFICIAL SOFT TISSUES: Unremarkable. _______________     1. High-grade acute/subacute anoxic brain injury without herniation. 2.  Heterogeneous expansile sellar lesion, nonspecific, pituitary hematoma/hemorrhage favored over other etiologies. _______________     CT HEAD WO CONT    Result Date: 9/2/2022  EXAM: CT head INDICATION: Anoxic brain injury. COMPARISON: 8/31/2022 TECHNIQUE: Axial CT imaging of the head was performed without intravenous contrast. Standard multiplanar coronal and sagittal reformatted images were obtained and are included in interpretation. One or more dose reduction techniques were used on this CT: automated exposure control, adjustment of the mAs and/or kVp according to patient size, and iterative reconstruction techniques. The specific techniques used on this CT exam have been documented in the patient's electronic medical record. Digital Imaging and Communications in Medicine (DICOM) format image data are available to nonaffiliated external healthcare facilities or entities on a secure, media free, reciprocally searchable basis with patient authorization for at least a 12-month period after this study. _______________ FINDINGS: BRAIN AND POSTERIOR FOSSA: Mild patchy periventricular, deep and subcortical white matter hypoattenuation which is nonspecific but likely represents chronic ischemic changes. No evidence of acute large vessel transcortical infarct or acute parenchymal hemorrhage.  No midline shift or hydrocephalus. EXTRA-AXIAL SPACES AND MENINGES: There are no abnormal extra-axial fluid collections. CALVARIUM: Intact. SINUSES: Right maxillary and ethmoid sinus mucosal disease. OTHER: None. _______________     No acute intracranial abnormality. CT HEAD WO CONT    Result Date: 8/31/2022  EXAM: CT head INDICATION: Cardiac arrest COMPARISON: None. TECHNIQUE: Axial CT imaging of the head was performed without intravenous contrast. Standard multiplanar coronal and sagittal reformatted images were obtained and are included in interpretation. One or more dose reduction techniques were used on this CT: automated exposure control, adjustment of the mAs and/or kVp according to patient size, and iterative reconstruction techniques. The specific techniques used on this CT exam have been documented in the patient's electronic medical record. Digital Imaging and Communications in Medicine (DICOM) format image data are available to nonaffiliated external healthcare facilities or entities on a secure, media free, reciprocally searchable basis with patient authorization for at least a 12-month period after this study. _______________ FINDINGS: BRAIN AND POSTERIOR FOSSA: Exam quality is mildly degraded secondary to motion artifact. Ventricular size and configuration appears within normal limits. Basilar cisterns are patent. Within the limitations of motion, no acute intra-axial hemorrhage. No evidence of mass effect or midline shift. Gray-white matter differentiation appears within normal limits as visualized. EXTRA-AXIAL SPACES AND MENINGES: There are no abnormal extra-axial fluid collections. CALVARIUM: Intact. SINUSES: Minor nonspecific mucosal thickening ethmoid air cells, sphenoid sinus with air-fluid level present in the right maxillary sinus. OTHER: None. _______________     1. Mildly motion limited study without evidence of acute intracranial abnormality.  2. Paranasal mucosal disease as described above with air-fluid level in the right maxillary sinus as can be seen with sinusitis. CT CHEST ABD PELV WO CONT    Result Date: 8/31/2022  EXAM: CT chest, abdomen, and pelvis INDICATION: Pain COMPARISON: No prior study. TECHNIQUE: Axial CT imaging of the chest, abdomen, and pelvis was performed without IV contrast administration. Multiplanar reformats were generated. One or more dose reduction techniques were used on this CT: automated exposure control, adjustment of the mAs and/or kVp according to patient size, and iterative reconstruction techniques. The specific techniques used on this CT exam have been documented in the patient's electronic medical record. Digital Imaging and Communications in Medicine (DICOM) format image data are available to nonaffiliated external healthcare facilities or entities on a secure, media free, reciprocally searchable basis with patient authorization for at least a 12-month period after this study. _______________ FINDINGS: CHEST: MEDIASTINUM: Left IJV central venous catheter tip at the cavoatrial junction. Normal caliber aorta with vascular calcifications. No pericardial effusion. LYMPH NODES: No pathologically enlarged mediastinal or hilar lymph nodes. PLEURA: No pleural effusion or pneumothorax. LUNGS/AIRWAY: Endotracheal tube tip in the midthoracic trachea. No consolidation or suspicious pulmonary nodule is seen. OTHER: None. /PELVIS: LIVER, BILIARY: Liver is unremarkable. No abnormal biliary dilation. Gallbladder is unremarkable. PANCREAS: Unremarkable. SPLEEN: Unremarkable. ADRENALS: Unremarkable. KIDNEYS: Severe left and moderate right hydroureteronephrosis with marked urinary bladder distention. No obstructing calculus. Left lower pole 5.8 cm simple cysts, no follow-up necessary. LYMPH NODES: No pathologically enlarged lymph nodes. GASTROINTESTINAL TRACT: No abnormal bowel dilation or wall thickening. PELVIC ORGANS: Unremarkable. VASCULATURE: Unremarkable.  OSSEOUS: No area of erosion or aggressive-appearing bone destruction. OTHER: None. _______________     Severe left and moderate right hydroureteronephrosis with marked urinary bladder distention. No obstructing calculus. No acute intrathoracic abnormality. US RETROPERITONEUM COMP    Result Date: 9/2/2022  US RETROPERITONEUM COMP INDICATION: Acute kidney injury. TECHNIQUE: Renal ultrasound. COMPARISON: 8/31/2022 CT FINDINGS: Right kidney measures 10.7 cm. Left kidney measures 6.7 cm. Bilateral increased cortical echogenicity. Severe left hydronephrosis with cortical atrophy. No shadowing calculus or perinephric abnormality. No solid renal mass identified. Indwelling Lyle catheter within decompressed urinary bladder. Bilateral increased cortical echogenicity. Severe left hydronephrosis with cortical atrophy. XR CHEST PORT    Result Date: 9/11/2022  EXAM: One view chest x-ray CLINICAL INDICATION/HISTORY: Endotracheal tube placement. COMPARISON: 09/09/2022. TECHNIQUE: Single AP view of the chest was obtained. _______________ FINDINGS: HEART, VESSELS, MEDIASTINUM: Heart size is normal. No vascular congestion. LUNGS, PLEURAL SPACES: There is blunting of the left costophrenic angle and bibasilar linear airspace opacity. No pneumothorax. BONY THORAX, SOFT TISSUES: Unremarkable. MEDICAL SUPPORT DEVICES: Endotracheal tube tip at the level of the aortic arch. Left internal jugular catheter in satisfactory position. NG/OG tube courses beyond the limits of the film into the left upper quadrant of the abdomen. _______________     1. Medical support devices in satisfactory position. 2. Bibasilar atelectasis or infiltrate with small associated left pleural effusion.     XR CHEST PORT    Result Date: 9/9/2022  EXAM: XR CHEST PORT CLINICAL INDICATION/HISTORY: Pulmonary infiltrate, ET tube position, acute respiratory failure -Additional: None COMPARISON: 9/7/2022 TECHNIQUE: Portable frontal view of the chest _______________ FINDINGS: SUPPORT DEVICES: Endotracheal and enteric tubes unchanged. Esophageal probe are unchanged. Left IJV central venous catheter unchanged. HEART AND MEDIASTINUM: Stable cardiomediastinal silhouette. . LUNGS AND PLEURAL SPACES: Left layering pleural effusion/atelectasis. No pneumothorax. _______________     Improved aeration with persistent left layering pleural effusion. XR CHEST PORT    Result Date: 9/7/2022  EXAM: XR CHEST PORT CLINICAL INDICATION/HISTORY: Pulmonary infiltrate, ET tube position, acute respiratory failure   > Additional: None. COMPARISON: September 5, 2022 TECHNIQUE: Portable chest _______________ FINDINGS: SUPPORT DEVICES: Endotracheal tube, nasogastric tube, esophageal probe, and left IJ approach central catheter are unchanged. HEART AND MEDIASTINUM: The heart is stable in size and contour. LUNGS AND PLEURAL SPACES: The lungs are underexpanded with worsening hazy opacity of the right lung base partially obscuring the diaphragm. Left basilar volume loss and opacity also obscures the left hemidiaphragm, similar to slightly progressive. No definite pneumothorax. BONY THORAX AND SOFT TISSUES: No acute osseous abnormality. _______________     1. Support lines and tubes as above remain unchanged and in satisfactory position. 2. Progressive volume loss with bibasilar opacities, more so on the right concerning for combination of atelectasis versus superimposed pneumonia. Small pleural effusions not excluded. *    ** *     XR CHEST PORT    Result Date: 9/5/2022  EXAM: CHEST RADIOGRAPH, SINGLE VIEW CLINICAL INDICATION/HISTORY: Shortness of breath, intubated, follow-up COMPARISON: 9/4/2022 TECHNIQUE: Portable frontal view of the chest was obtained. _______________ FINDINGS: SUPPORT DEVICES: Unchanged left jugular central venous catheter, endotracheal tube, and gastric tube, all adequately positioned. HEART AND MEDIASTINUM: Cardiomediastinal silhouette appears within normal limits. Tortuous and atherosclerotic thoracic aorta. No central vascular congestion. LUNGS AND PLEURAL SPACES: Left basilar opacity partially silhouettes left hemidiaphragm. Left mid and upper lung zones and all of the right lung remain adequately aerated. No definite pleural effusion. No pneumothorax. BONY THORAX AND SOFT TISSUES: No acute osseous abnormality. _______________     1. Tubes and lines appear unchanged, adequately positioned. 2. Left basilar opacity, atelectasis versus infiltrate. XR CHEST PORT    Result Date: 9/4/2022  EXAM: CHEST RADIOGRAPH, SINGLE VIEW CLINICAL INDICATION/HISTORY: Shortness of breath, intubated, follow-up COMPARISON: 9/3/2022 TECHNIQUE: Portable frontal view of the chest was obtained. _______________ FINDINGS: SUPPORT DEVICES: Endotracheal tube, left jugular central venous catheter, and nasogastric tube all appear adequately positioned. HEART AND MEDIASTINUM: Cardiomediastinal silhouette appears within normal limits. Tortuous and atherosclerotic thoracic aorta. No central vascular congestion. LUNGS AND PLEURAL SPACES: Unchanged retrocardiac left lower lobe atelectasis. Lungs are otherwise adequately aerated with no confluent airspace opacity. No pleural effusion or pneumothorax. BONY THORAX AND SOFT TISSUES: No acute osseous abnormality. _______________     No active cardiopulmonary disease. XR CHEST PORT    Result Date: 9/3/2022  EXAM: CHEST RADIOGRAPH, SINGLE VIEW CLINICAL INDICATION/HISTORY: Shortness of breath, endotracheal tube placement COMPARISON: 8/31/2022 TECHNIQUE: Portable frontal view of the chest was obtained. _______________ FINDINGS: SUPPORT DEVICES: Adequately positioned endotracheal tube, unchanged. Left jugular central venous catheter and nasogastric tube also appear unchanged, adequately positioned. HEART AND MEDIASTINUM: Cardiomediastinal silhouette appears within normal limits. Normal caliber thoracic aorta. No central vascular congestion.  LUNGS AND PLEURAL SPACES: Retrocardiac left lower lobe subsegmental atelectasis. Lungs are otherwise adequately aerated with no confluent airspace opacity. No pleural effusion or pneumothorax. BONY THORAX AND SOFT TISSUES: No acute osseous abnormality. _______________     No active cardiopulmonary disease. XR CHEST PORT    Result Date: 8/31/2022  EXAM: XR CHEST PORT CLINICAL INDICATION/HISTORY: central line -Additional: None COMPARISON: 4 hours prior TECHNIQUE: Portable frontal view of the chest _______________ FINDINGS: SUPPORT DEVICES: Endotracheal tube, gastric tube, and left IJ approach central venous catheter noted. Tip of the central venous catheter is at the level of the superior cavoatrial junction. HEART AND MEDIASTINUM: Normal cardiac size and mediastinal contours. LUNGS AND PLEURAL SPACES: No focal pneumonic opacity. No evidence of pneumothorax or pleural effusion. BONY THORAX AND SOFT TISSUES: Unremarkable. _______________     1. Support devices in stable/appropriate position as visualized. 2. No superimposed acute radiographic cardiopulmonary abnormality. XR CHEST PORT    Result Date: 8/31/2022  XR CHEST PORT CLINICAL INDICATION/HISTORY: Tube placement -Additional: None COMPARISON: None TECHNIQUE: Portable frontal view of the chest _______________ FINDINGS: SUPPORT DEVICES: Enteric tube tip projecting over the thoracic inlet. Endotracheal tube tip in the midthoracic trachea. Left IJV central venous catheter tip at the caval atrial junction. HEART AND MEDIASTINUM: Cardiomediastinal silhouette within normal limits. LUNGS AND PLEURAL SPACES: No dense consolidation, large effusion or pneumothorax. _______________     Enteric tube tip projecting over the thoracic inlet. Endotracheal tube tip in the midthoracic trachea. No acute cardiopulmonary abnormality.     ECHO ADULT COMPLETE    Result Date: 8/31/2022  Formatting of this result is different from the original.   Left Ventricle: Normal left ventricular systolic function with a visually estimated EF of 60 - 65%. Not well visualized. Left ventricle size is normal. Normal wall thickness. Normal wall motion. Right Ventricle: Not well visualized. Right ventricle is moderately dilated. Moderately reduced systolic function. Visually moderately dilated RV and moderately reduced RV systolic function. Aortic Valve: Tricuspid valve. Tricuspid Valve: The estimated RVSP is 25 mmHg. Right Atrium: Not well visualized. Right atrium is moderately dilated. No old echocardiogram available to compare. DUPLEX LOWER EXT VENOUS BILAT    Result Date: 9/9/2022  · Chronic non-occlusive thrombus present in the right popliteal vein. · Chronic non-occlusive thrombus present in the right posterior tibial vein. · No evidence of deep vein thrombosis in the left lower extremity. 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 · No evidence of deep vein thrombosis in the left lower extremity. DUPLEX LOWER EXT VENOUS BILAT    Result Date: 9/1/2022  · Age indeterminate thrombus present in the right popliteal vein. · No evidence of deep vein thrombosis in the left lower extremity. No results found for this or any previous visit.         CC: Carlene Santos MD

## 2022-09-14 NOTE — PROGRESS NOTES
RN called to bedside by pts family. [phone] RN at pts bedside to assess pt. Pt noted to have no pulse and pt is no longer breathing. Family at bedside. Pts daughter Judy Pulse has pts bag of belongings. 0 - Hospitalist notified via telephone call    [address] notified of patients death.  will arrive shortly. [phone]  RN spoke with Lisa Piña ([facility] Coordinator). [phone]   at bedside with family. [phone]  This RN spoke with Glen Saint Mary with [address] [place] [address]. Family remains at bedside. Nimaya will call this RN back. [phone]  This RN spoke with Juan Kraft with [employer] as family has left the bedside. [facility] and [facility] have ruled out pt as a candidate for organ/tissue donation with notification given to nursing supervisor (at [phone]). [phone]  [facility].      [phone]  Representative from [facility] arrives to pickup pt.    [address] 19 representative leaves with patient with .

## 2022-09-14 NOTE — PROGRESS NOTES
Called for death   Family at bedside  Time of death 80   No response to verbal or painful stimuli  No palpablepulse non reactive pupils  [place] 2 aware helping with  arrangements

## 2022-09-14 NOTE — PROGRESS NOTES
Problem: Patient Education: Go to Patient Education Activity  Goal: Patient/Family Education  Outcome: Not Progressing Towards Goal     Problem: Falls - Risk of  Goal: *Absence of Falls  Description: Document Nancy Cabrera Fall Risk and appropriate interventions in the flowsheet. Outcome: Not Progressing Towards Goal  Note: Fall Risk Interventions:       Mentation Interventions: Family/sitter at bedside    Medication Interventions: Bed/chair exit alarm    Elimination Interventions:  Toileting schedule/hourly rounds    History of Falls Interventions: Bed/chair exit alarm         Problem: Patient Education: Go to Patient Education Activity  Goal: Patient/Family Education  Outcome: Not Progressing Towards Goal